# Patient Record
Sex: FEMALE | Race: WHITE | NOT HISPANIC OR LATINO | Employment: PART TIME | URBAN - METROPOLITAN AREA
[De-identification: names, ages, dates, MRNs, and addresses within clinical notes are randomized per-mention and may not be internally consistent; named-entity substitution may affect disease eponyms.]

---

## 2017-03-27 ENCOUNTER — TELEPHONE (OUTPATIENT)
Dept: PEDIATRIC GASTROENTEROLOGY | Facility: CLINIC | Age: 22
End: 2017-03-27

## 2017-03-27 NOTE — TELEPHONE ENCOUNTER
Called and spoke with mom.  Informed mom pt may f/u in clinic with Dr. Piedra and transition at any time to adult.  Mom would like to f/u with Dr. Piedra.  Scheduled for Wednesday 4/26 at 10am.  Informed mom pt may schedule IBD clinic appt after f/u with Dr. Piedra.

## 2017-03-27 NOTE — TELEPHONE ENCOUNTER
----- Message from Tala Gonzalez MD sent at 3/27/2017 11:04 AM CDT -----  Contact: Mom [phone]  They definitely need to be seen.  If it's me that's great, happy to see them.  If she wants to see adult then we can recommend some folks across the street.  Thanks,  cfb  ----- Message -----     From: Maryan Khalil RN     Sent: 3/27/2017  10:49 AM       To: Tala Gonzalez MD    If they have moved on to an adult MD, is there an adult IBD Clinic you recommend?    Thanks,  Maryan     ----- Message -----     From: Dona Sumner     Sent: 3/27/2017  10:46 AM       To: [facility] Edgar Staff    Mom says she received a letter about opportunity to meet with providers about Crohn's Disease. Since the pt will be turning 22 on May 7th she wants to know if this will still apply to her. Please advise.

## 2017-04-26 ENCOUNTER — LAB VISIT (OUTPATIENT)
Dept: LAB | Facility: HOSPITAL | Age: 22
End: 2017-04-26
Attending: PEDIATRICS
Payer: MEDICAID

## 2017-04-26 ENCOUNTER — OFFICE VISIT (OUTPATIENT)
Dept: PEDIATRIC GASTROENTEROLOGY | Facility: CLINIC | Age: 22
End: 2017-04-26
Payer: MEDICAID

## 2017-04-26 VITALS
BODY MASS INDEX: 26.91 KG/M2 | SYSTOLIC BLOOD PRESSURE: 126 MMHG | DIASTOLIC BLOOD PRESSURE: 72 MMHG | HEART RATE: 73 BPM | WEIGHT: 142.5 LBS | TEMPERATURE: 98 F | HEIGHT: 61 IN

## 2017-04-26 DIAGNOSIS — K50.019 CROHN'S DISEASE OF SMALL INTESTINE WITH COMPLICATION: Primary | ICD-10-CM

## 2017-04-26 DIAGNOSIS — K50.019 CROHN'S DISEASE OF SMALL INTESTINE WITH COMPLICATION: ICD-10-CM

## 2017-04-26 LAB
25(OH)D3+25(OH)D2 SERPL-MCNC: 34 NG/ML
ALBUMIN SERPL BCP-MCNC: 3.9 G/DL
ALP SERPL-CCNC: 60 U/L
ALT SERPL W/O P-5'-P-CCNC: 9 U/L
ANION GAP SERPL CALC-SCNC: 8 MMOL/L
AST SERPL-CCNC: 16 U/L
BASOPHILS # BLD AUTO: 0.03 K/UL
BASOPHILS NFR BLD: 0.4 %
BILIRUB SERPL-MCNC: 0.1 MG/DL
BUN SERPL-MCNC: 6 MG/DL
CALCIUM SERPL-MCNC: 9.4 MG/DL
CHLORIDE SERPL-SCNC: 106 MMOL/L
CO2 SERPL-SCNC: 23 MMOL/L
CREAT SERPL-MCNC: 0.8 MG/DL
CRP SERPL-MCNC: 1.9 MG/L
DIFFERENTIAL METHOD: ABNORMAL
EOSINOPHIL # BLD AUTO: 0.3 K/UL
EOSINOPHIL NFR BLD: 4.5 %
ERYTHROCYTE [DISTWIDTH] IN BLOOD BY AUTOMATED COUNT: 16.8 %
ERYTHROCYTE [SEDIMENTATION RATE] IN BLOOD BY WESTERGREN METHOD: 7 MM/HR
EST. GFR  (AFRICAN AMERICAN): >60 ML/MIN/1.73 M^2
EST. GFR  (NON AFRICAN AMERICAN): >60 ML/MIN/1.73 M^2
FERRITIN SERPL-MCNC: 6 NG/ML
GGT SERPL-CCNC: 14 U/L
GLUCOSE SERPL-MCNC: 90 MG/DL
HCT VFR BLD AUTO: 36.7 %
HGB BLD-MCNC: 11.5 G/DL
IGA SERPL-MCNC: 250 MG/DL
IRON SERPL-MCNC: 16 UG/DL
LYMPHOCYTES # BLD AUTO: 1.7 K/UL
LYMPHOCYTES NFR BLD: 25.3 %
MCH RBC QN AUTO: 22.9 PG
MCHC RBC AUTO-ENTMCNC: 31.3 %
MCV RBC AUTO: 73 FL
MONOCYTES # BLD AUTO: 0.6 K/UL
MONOCYTES NFR BLD: 8.5 %
NEUTROPHILS # BLD AUTO: 4.1 K/UL
NEUTROPHILS NFR BLD: 61.3 %
PLATELET # BLD AUTO: 326 K/UL
PMV BLD AUTO: 10.3 FL
POTASSIUM SERPL-SCNC: 4.7 MMOL/L
PROT SERPL-MCNC: 7.5 G/DL
RBC # BLD AUTO: 5.02 M/UL
SATURATED IRON: 3 %
SODIUM SERPL-SCNC: 137 MMOL/L
TOTAL IRON BINDING CAPACITY: 509 UG/DL
TRANSFERRIN SERPL-MCNC: 344 MG/DL
VIT B12 SERPL-MCNC: 282 PG/ML
WBC # BLD AUTO: 6.68 K/UL

## 2017-04-26 PROCEDURE — 82784 ASSAY IGA/IGD/IGG/IGM EACH: CPT

## 2017-04-26 PROCEDURE — 82977 ASSAY OF GGT: CPT

## 2017-04-26 PROCEDURE — 86140 C-REACTIVE PROTEIN: CPT

## 2017-04-26 PROCEDURE — 85025 COMPLETE CBC W/AUTO DIFF WBC: CPT | Mod: PO

## 2017-04-26 PROCEDURE — 82306 VITAMIN D 25 HYDROXY: CPT

## 2017-04-26 PROCEDURE — 83540 ASSAY OF IRON: CPT

## 2017-04-26 PROCEDURE — 82728 ASSAY OF FERRITIN: CPT

## 2017-04-26 PROCEDURE — 83516 IMMUNOASSAY NONANTIBODY: CPT

## 2017-04-26 PROCEDURE — 36415 COLL VENOUS BLD VENIPUNCTURE: CPT | Mod: PO

## 2017-04-26 PROCEDURE — 80053 COMPREHEN METABOLIC PANEL: CPT

## 2017-04-26 PROCEDURE — 99999 PR PBB SHADOW E&M-EST. PATIENT-LVL III: CPT | Mod: PBBFAC,,, | Performed by: PEDIATRICS

## 2017-04-26 PROCEDURE — 82607 VITAMIN B-12: CPT

## 2017-04-26 PROCEDURE — 99214 OFFICE O/P EST MOD 30 MIN: CPT | Mod: S$PBB,,, | Performed by: PEDIATRICS

## 2017-04-26 PROCEDURE — 85651 RBC SED RATE NONAUTOMATED: CPT

## 2017-04-26 NOTE — MR AVS SNAPSHOT
[facility]  [address]  [facility] [address]  Phone: [phone]                  Nina Dickerson   2017 10:00 AM   Appointment    Description:  Female : 1995   Provider:  Tala Gonzalez MD   Department:  [facility]                To Do List           Goals (5 Years of Data)     None      [facility]     [facility] Care Line -  Assistance  Unless otherwise directed by your provider, please contact [facility], our nurse care line that is available for  assistance.     Registered nurses in the [facility] provide: appointment scheduling, clinical advisement, health education, and other advisory services.  Call: [phone] (toll free)               Medications           Message regarding Medications     Verify the changes and/or additions to your medication regime listed below are the same as discussed with your clinician today.  If any of these changes or additions are incorrect, please notify your healthcare provider.             Verify that the below list of medications is an accurate representation of the medications you are currently taking.  If none reported, the list may be blank. If incorrect, please contact your healthcare provider. Carry this list with you in case of emergency.           Current Medications     budesonide (ENTOCORT EC) 3 mg capsule Take 9 mg by mouth once daily.    hyoscyamine (LEVSIN/SL) 0.125 mg Subl Take 1 tablet (0.125 mg total) by mouth every 4 (four) hours as needed (Abdominal pain).    omeprazole (PRILOSEC) 40 MG capsule Take 1 capsule (40 mg total) by mouth once daily.    sertraline (ZOLOFT) 100 MG tablet Take 100 mg by mouth once daily.           Clinical Reference Information           Allergies as of 2017     Latex, Natural Rubber    Adhesive      Immunizations Administered on Date of Encounter - 2017     None      [facility] Sign-Up     Activating your  [facility] account is as easy as 1-2-3!     1) Visit [email or web address], select Sign Up Now, enter this activation code and your date of birth, then select Next.  [email or web address]  Expires: 6/10/2017 10:04 AM      2) Create a username and password to use when you visit [facility] in the future and select a security question in case you lose your password and select Next.    3) Enter your e-mail address and click Sign Up!    Additional Information  If you have questions, please e-mail [email or web address] or call [phone] to talk to our [employer] staff. Remember, [employer] is NOT to be used for urgent needs. For medical emergencies, dial 911.         Language Assistance Services     ATTENTION: Language assistance services are available, free of charge. Please call [phone].      ATENCIÓN: Si habla español, tiene a hardin disposición servicios gratuitos de asistencia lingüística. Llame al [phone].     LINDA Ý: N?u b?n nói Ti?ng Vi?t, có các d?ch v? h? tr? ngôn ng? mi?n phí dành cho b?n. G?i s? [phone].         [facility] complies with applicable Federal civil rights laws and does not discriminate on the basis of race, color, national origin, age, disability, or sex.

## 2017-04-26 NOTE — PROGRESS NOTES
"Chief complaint:   Chief Complaint   Patient presents with    Abdominal Pain       HPI:  21 y.o. female otherwise healthy, referred by Dr. Hinds, comes in with Mom for "stomach pains".  Here for follow up with mom.  Hasn't been seen in 1 year.  Off of entocort after weaning after last scope which was normal.  Since then has had 2 "flares".    Pain present during flares with some diarrhea that seem to last about 3 days and resolve on their own.  Watching diet "for the most part". Not really on low fiber/low residue diet.  Weight is steady, although down about 4 pounds in 1 year.    Currently on no treatment.      1 year ago:  S/p EGD/Colon. EGD showed some duodenitis and colon on repeat eval had chronic inflammation on IC valve, normal colon.  S/p MRI enterography with distal and terminal ileal thickening.        Past Medical History:   Diagnosis Date    Crohn disease     Crohn disease      Past Surgical History:   Procedure Laterality Date    COLONOSCOPY N/A 12/10/2015    Procedure: COLONOSCOPY;  Surgeon: Tala Gonzalez MD;  Location: [facility] ([address] [place] [address]);  Service: Endoscopy;  Laterality: N/A;    COLONOSCOPY N/A 4/21/2016    Procedure: COLONOSCOPY;  Surgeon: Tala Gonzalez MD;  Location: [facility] ([address] [place] [address]);  Service: Endoscopy;  Laterality: N/A;    ESOPHAGOGASTRODUODENOSCOPY      TONSILLECTOMY      TYMPANOSTOMY TUBE PLACEMENT       Family History   Problem Relation Age of Onset    No Known Problems Mother     Heart attacks under age 50 Father     Heart disease Father     Hypertension Father     Cardiomyopathy Maternal Grandmother     Dementia Maternal Grandfather     Heart disease Paternal Grandmother     Heart disease Paternal Grandfather     No Known Problems Sister      Social History     Social History    Marital status: Single     Spouse name: N/A    Number of children: N/A    Years of education: N/A     Occupational History    Not on file.     Social History Main " "Topics    Smoking status: Passive Smoke Exposure - Never Smoker    Smokeless tobacco: Not on file      Comment: mom smokes    Alcohol use No    Drug use: No    Sexual activity: Not on file     Other Topics Concern    Not on file     Social History Narrative    Lives with mom, sister.    2 cats.    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       Review Of Systems:  Constitutional: negative for fatigue, fevers and weight loss  ENT: no nasal congestion or sore throat  Respiratory: negative for cough  Cardiovascular: negative for chest pressure/discomfort, palpitations and cyanosis  Gastrointestinal: see HPI  Genitourinary: no hematuria or dysuria  Hematologic/Lymphatic: no easy bruising or lymphadenopathy  Musculoskeletal: no arthralgias or myalgias  Neurological: no seizures or tremors  Behavioral/Psych: no auditory or visual hallucinations  Endocrine: no heat or cold intolerance    Physical Exam:    /72 (BP Location: Left arm, Patient Position: Sitting, BP Method: Automatic)  Pulse 73  Temp 97.7 °F (36.5 °C) (Tympanic)   Ht 5' 1.02" (1.55 m)  Wt 64.7 kg (142 lb 8.4 oz)  BMI 26.91 kg/m2    General:  alert, active, in no acute distress  Head:  atraumatic and normocephalic  Eyes:  conjunctiva clear and sclera nonicteric  Throat:  moist mucous membranes without erythema, exudates or petechiae  Neck:  supple, no lymphadenopathy  Lungs:  clear to auscultation  Heart:  regular rate and rhythm, normal S1, S2, no murmurs or gallops.  Abdomen:  Abdomen soft,mild tenderness in epigastric region.  BS normal. No masses, organomegaly  Neuro:  normal without focal findings  Musculoskeletal:  moves all extremities equally  Rectal:  not examined  Skin:  warm, no rashes, no ecchymosis    Records Reviewed:   EGD/Colon:   Duodenitis, IC valve with chronic inflammation, favors IBD, namely Crohn's if clinical picture fits.    12/2015 Serology; consistent with IBD though not " determinate Crohn's vs. UC.  TPMT normal  Hepatitis panel negative  Hepatitis surface Ab negative  quantiferon gold negative  DEXA normal    MRI 12/2015: bowel wall thickening in distal and terminal ileum    Assessment/Plan:  Crohn's disease, not on treatment    Overall doing ok, made appointment moreso because they received a letter about the IBD clinic and knew they needed a follow up.  Mostly asymptomatic, though concerned since she had inflammatory changes on MRE previously. Though last scope 1 year ago was normal.    1. Will get screening labs and stool studies today.  Orders Placed This Encounter    Stool culture    CBC auto differential    Comprehensive metabolic panel    ESR (SEDIMENTATION RATE, MANUAL)    C-reactive protein    TISSUE TRANSGLUTAMINASE (TTG), IGA    IgA    Gamma GT    IRON AND TIBC    FERRITIN    VITAMIN D    Vitamin B12    Calprotectin    Alpha-1-antitrypsin, stool    Occult blood x 1, stool     2. Eye exam needed.  Refused flu shot  3. Discussed diet again  4. Discussed possible scope but if needed will decide if she wants to transition to adult care.       Spent 40 minutes with patient and parents, greater than 50% of which was counseling.  The patient's doctor will be notified via Fax/[employer]

## 2017-04-28 LAB — TTG IGA SER IA-ACNC: 11 UNITS

## 2017-05-01 ENCOUNTER — TELEPHONE (OUTPATIENT)
Dept: PEDIATRIC GASTROENTEROLOGY | Facility: CLINIC | Age: 22
End: 2017-05-01

## 2017-05-01 NOTE — TELEPHONE ENCOUNTER
----- Message from Tala Gonzalez MD sent at 5/1/2017  1:33 PM CDT -----  Please let Nina know (or mom) that her labs are normal, except for anemia, and low iron which she already knew about.  She needs to take an iron supplement.  But good news is that her inflammatory markers are normal and her electrolytes are good.  Thanks,  cfb

## 2017-05-01 NOTE — TELEPHONE ENCOUNTER
Called and spoke with mom to inform of results and recommendations. No further questions at this time.

## 2017-05-16 ENCOUNTER — TELEPHONE (OUTPATIENT)
Dept: PEDIATRIC GASTROENTEROLOGY | Facility: CLINIC | Age: 22
End: 2017-05-16

## 2017-05-16 NOTE — TELEPHONE ENCOUNTER
----- Message from Alisson Manjarrez sent at 5/16/2017 12:15 PM CDT -----  Contact: [phone] patient  Patient ask if nurse can call her about results? She have a question.

## 2017-05-16 NOTE — TELEPHONE ENCOUNTER
Called and spoke with pt.  Pt discussed results with mom and wanted to clarify.  Explained to pt that her labs showed low iron, so MD recommends an iron supplement which she can get OTC.  Informed her that electrolytes and inflammatory markers looked good.  No further questions from pt at this time.

## 2017-05-16 NOTE — TELEPHONE ENCOUNTER
----- Message from Lilliana Mora sent at 5/16/2017  2:03 PM CDT -----  Contact: Krish Wood [phone]  Pt returning your call re: Her test result

## 2021-01-11 ENCOUNTER — TELEPHONE (OUTPATIENT)
Dept: SURGERY | Facility: CLINIC | Age: 26
End: 2021-01-11

## 2021-01-11 DIAGNOSIS — Z12.11 SCREENING FOR COLON CANCER: Primary | ICD-10-CM

## 2021-01-11 RX ORDER — SODIUM CHLORIDE 0.9 % (FLUSH) 0.9 %
3 SYRINGE (ML) INJECTION
Status: CANCELLED | OUTPATIENT
Start: 2021-01-11

## 2021-01-11 RX ORDER — SODIUM, POTASSIUM,MAG SULFATES 17.5-3.13G
1 SOLUTION, RECONSTITUTED, ORAL ORAL DAILY
Qty: 1 KIT | Refills: 0 | Status: SHIPPED | OUTPATIENT
Start: 2021-01-11 | End: 2021-01-13

## 2021-01-11 RX ORDER — SODIUM CHLORIDE, SODIUM LACTATE, POTASSIUM CHLORIDE, CALCIUM CHLORIDE 600; 310; 30; 20 MG/100ML; MG/100ML; MG/100ML; MG/100ML
INJECTION, SOLUTION INTRAVENOUS CONTINUOUS
Status: CANCELLED | OUTPATIENT
Start: 2021-01-11

## 2021-01-19 ENCOUNTER — TELEPHONE (OUTPATIENT)
Dept: SURGERY | Facility: CLINIC | Age: 26
End: 2021-01-19

## 2021-01-20 ENCOUNTER — TELEPHONE (OUTPATIENT)
Dept: SURGERY | Facility: CLINIC | Age: 26
End: 2021-01-20

## 2021-01-29 ENCOUNTER — TELEPHONE (OUTPATIENT)
Dept: SURGERY | Facility: CLINIC | Age: 26
End: 2021-01-29

## 2021-02-12 DIAGNOSIS — K56.699 STRICTURE INTESTINAL: Primary | ICD-10-CM

## 2021-02-22 ENCOUNTER — TELEPHONE (OUTPATIENT)
Dept: SURGERY | Facility: CLINIC | Age: 26
End: 2021-02-22

## 2021-03-26 ENCOUNTER — OFFICE VISIT (OUTPATIENT)
Dept: GASTROENTEROLOGY | Facility: CLINIC | Age: 26
End: 2021-03-26
Payer: MEDICAID

## 2021-03-26 VITALS
SYSTOLIC BLOOD PRESSURE: 115 MMHG | HEART RATE: 83 BPM | BODY MASS INDEX: 24.66 KG/M2 | WEIGHT: 130.5 LBS | OXYGEN SATURATION: 98 % | TEMPERATURE: 98 F | DIASTOLIC BLOOD PRESSURE: 82 MMHG

## 2021-03-26 DIAGNOSIS — K52.9 ILEITIS: ICD-10-CM

## 2021-03-26 DIAGNOSIS — R11.2 NON-INTRACTABLE VOMITING WITH NAUSEA, UNSPECIFIED VOMITING TYPE: ICD-10-CM

## 2021-03-26 DIAGNOSIS — R10.31 ABDOMINAL PAIN, RLQ (RIGHT LOWER QUADRANT): Primary | ICD-10-CM

## 2021-03-26 DIAGNOSIS — R19.7 DIARRHEA, UNSPECIFIED TYPE: ICD-10-CM

## 2021-03-26 DIAGNOSIS — K59.09 CONSTIPATION, CHRONIC: ICD-10-CM

## 2021-03-26 PROCEDURE — 99215 OFFICE O/P EST HI 40 MIN: CPT | Mod: S$GLB,,, | Performed by: INTERNAL MEDICINE

## 2021-03-26 PROCEDURE — 99215 PR OFFICE/OUTPT VISIT, EST, LEVL V, 40-54 MIN: ICD-10-PCS | Mod: S$GLB,,, | Performed by: INTERNAL MEDICINE

## 2021-03-26 RX ORDER — ONDANSETRON 4 MG/1
4 TABLET, ORALLY DISINTEGRATING ORAL EVERY 8 HOURS PRN
Qty: 90 TABLET | Refills: 0 | Status: SHIPPED | OUTPATIENT
Start: 2021-03-26 | End: 2022-05-10

## 2021-03-26 RX ORDER — PANTOPRAZOLE SODIUM 40 MG/1
40 TABLET, DELAYED RELEASE ORAL DAILY
Qty: 30 TABLET | Refills: 11 | Status: SHIPPED | OUTPATIENT
Start: 2021-03-26 | End: 2021-08-30 | Stop reason: SDUPTHER

## 2021-03-29 ENCOUNTER — PATIENT MESSAGE (OUTPATIENT)
Dept: GASTROENTEROLOGY | Facility: CLINIC | Age: 26
End: 2021-03-29

## 2021-04-06 ENCOUNTER — HOSPITAL ENCOUNTER (OUTPATIENT)
Dept: RADIOLOGY | Facility: HOSPITAL | Age: 26
Discharge: HOME | End: 2021-04-06
Attending: INTERNAL MEDICINE
Payer: MEDICAID

## 2021-04-06 DIAGNOSIS — K52.9 ILEITIS: ICD-10-CM

## 2021-04-06 DIAGNOSIS — R19.7 DIARRHEA, UNSPECIFIED TYPE: ICD-10-CM

## 2021-04-06 PROCEDURE — 74183 MRI ENTEROGRAPHY: ICD-10-PCS | Mod: 26,,, | Performed by: RADIOLOGY

## 2021-04-06 PROCEDURE — 72197 MRI PELVIS W/O & W/DYE: CPT | Mod: 26,,, | Performed by: RADIOLOGY

## 2021-04-06 PROCEDURE — 72197 MRI ENTEROGRAPHY: ICD-10-PCS | Mod: 26,,, | Performed by: RADIOLOGY

## 2021-04-06 PROCEDURE — 74183 MRI ABD W/O CNTR FLWD CNTR: CPT | Mod: 26,,, | Performed by: RADIOLOGY

## 2021-04-06 PROCEDURE — 25500020 PHARM REV CODE 255: Performed by: INTERNAL MEDICINE

## 2021-04-06 PROCEDURE — A9585 GADOBUTROL INJECTION: HCPCS | Performed by: INTERNAL MEDICINE

## 2021-04-06 PROCEDURE — 72197 MRI PELVIS W/O & W/DYE: CPT | Mod: TC

## 2021-04-06 RX ORDER — GADOBUTROL 604.72 MG/ML
10 INJECTION INTRAVENOUS
Status: COMPLETED | OUTPATIENT
Start: 2021-04-06 | End: 2021-04-06

## 2021-04-06 RX ADMIN — GADOBUTROL 10 ML: 604.72 INJECTION INTRAVENOUS at 06:04

## 2021-04-15 ENCOUNTER — PATIENT MESSAGE (OUTPATIENT)
Dept: GASTROENTEROLOGY | Facility: CLINIC | Age: 26
End: 2021-04-15

## 2021-04-27 ENCOUNTER — PATIENT MESSAGE (OUTPATIENT)
Dept: GASTROENTEROLOGY | Facility: HOSPITAL | Age: 26
End: 2021-04-27

## 2021-04-27 RX ORDER — BUDESONIDE 3 MG/1
9 CAPSULE, COATED PELLETS ORAL DAILY
Qty: 90 CAPSULE | Refills: 2 | Status: SHIPPED | OUTPATIENT
Start: 2021-04-27 | End: 2021-05-27

## 2021-04-28 ENCOUNTER — PATIENT MESSAGE (OUTPATIENT)
Dept: RESEARCH | Facility: HOSPITAL | Age: 26
End: 2021-04-28

## 2021-05-02 ENCOUNTER — PATIENT MESSAGE (OUTPATIENT)
Dept: GASTROENTEROLOGY | Facility: CLINIC | Age: 26
End: 2021-05-02

## 2021-05-17 ENCOUNTER — PATIENT MESSAGE (OUTPATIENT)
Dept: GASTROENTEROLOGY | Facility: CLINIC | Age: 26
End: 2021-05-17

## 2021-05-28 ENCOUNTER — PATIENT MESSAGE (OUTPATIENT)
Dept: GASTROENTEROLOGY | Facility: CLINIC | Age: 26
End: 2021-05-28

## 2021-05-28 RX ORDER — DICYCLOMINE HYDROCHLORIDE 20 MG/1
20 TABLET ORAL EVERY 6 HOURS
Qty: 120 TABLET | Refills: 3 | Status: SHIPPED | OUTPATIENT
Start: 2021-05-28 | End: 2021-06-27

## 2021-06-09 ENCOUNTER — TELEPHONE (OUTPATIENT)
Dept: ENDOSCOPY | Facility: HOSPITAL | Age: 26
End: 2021-06-09

## 2021-06-09 ENCOUNTER — OFFICE VISIT (OUTPATIENT)
Dept: GASTROENTEROLOGY | Facility: CLINIC | Age: 26
End: 2021-06-09
Payer: MEDICAID

## 2021-06-09 VITALS
BODY MASS INDEX: 24.04 KG/M2 | OXYGEN SATURATION: 100 % | HEART RATE: 92 BPM | DIASTOLIC BLOOD PRESSURE: 93 MMHG | WEIGHT: 127.19 LBS | TEMPERATURE: 98 F | SYSTOLIC BLOOD PRESSURE: 140 MMHG

## 2021-06-09 DIAGNOSIS — K50.90 CROHN DISEASE: ICD-10-CM

## 2021-06-09 DIAGNOSIS — R10.31 ABDOMINAL PAIN, RLQ (RIGHT LOWER QUADRANT): ICD-10-CM

## 2021-06-09 DIAGNOSIS — K52.9 ILEITIS: Primary | ICD-10-CM

## 2021-06-09 DIAGNOSIS — R09.A2 GLOBUS SENSATION: ICD-10-CM

## 2021-06-09 DIAGNOSIS — Z12.11 SPECIAL SCREENING FOR MALIGNANT NEOPLASMS, COLON: Primary | ICD-10-CM

## 2021-06-09 DIAGNOSIS — R12 HEARTBURN: ICD-10-CM

## 2021-06-09 DIAGNOSIS — R19.7 DIARRHEA, UNSPECIFIED TYPE: ICD-10-CM

## 2021-06-09 PROCEDURE — 99214 PR OFFICE/OUTPT VISIT, EST, LEVL IV, 30-39 MIN: ICD-10-PCS | Mod: S$GLB,,, | Performed by: INTERNAL MEDICINE

## 2021-06-09 PROCEDURE — 99214 OFFICE O/P EST MOD 30 MIN: CPT | Mod: S$GLB,,, | Performed by: INTERNAL MEDICINE

## 2021-06-09 RX ORDER — SODIUM, POTASSIUM,MAG SULFATES 17.5-3.13G
1 SOLUTION, RECONSTITUTED, ORAL ORAL ONCE
Qty: 1 KIT | Refills: 0 | Status: SHIPPED | OUTPATIENT
Start: 2021-06-09 | End: 2021-06-09

## 2021-06-09 RX ORDER — BUDESONIDE 3 MG/1
9 CAPSULE, COATED PELLETS ORAL DAILY
COMMUNITY
End: 2021-08-09 | Stop reason: SDUPTHER

## 2021-06-23 ENCOUNTER — TELEPHONE (OUTPATIENT)
Dept: GASTROENTEROLOGY | Facility: CLINIC | Age: 26
End: 2021-06-23

## 2021-06-23 ENCOUNTER — PATIENT MESSAGE (OUTPATIENT)
Dept: ENDOSCOPY | Facility: HOSPITAL | Age: 26
End: 2021-06-23

## 2021-06-28 ENCOUNTER — PATIENT MESSAGE (OUTPATIENT)
Dept: ENDOSCOPY | Facility: HOSPITAL | Age: 26
End: 2021-06-28

## 2021-07-05 ENCOUNTER — PATIENT MESSAGE (OUTPATIENT)
Dept: ENDOSCOPY | Facility: HOSPITAL | Age: 26
End: 2021-07-05

## 2021-07-06 ENCOUNTER — TELEPHONE (OUTPATIENT)
Dept: ENDOSCOPY | Facility: HOSPITAL | Age: 26
End: 2021-07-06

## 2021-07-07 ENCOUNTER — PATIENT MESSAGE (OUTPATIENT)
Dept: ENDOSCOPY | Facility: HOSPITAL | Age: 26
End: 2021-07-07

## 2021-07-07 ENCOUNTER — TELEPHONE (OUTPATIENT)
Dept: GASTROENTEROLOGY | Facility: CLINIC | Age: 26
End: 2021-07-07

## 2021-07-07 RX ORDER — PROMETHAZINE HYDROCHLORIDE 25 MG/1
25 TABLET ORAL EVERY 6 HOURS PRN
Qty: 6 TABLET | Refills: 0 | Status: SHIPPED | OUTPATIENT
Start: 2021-07-07 | End: 2022-04-25 | Stop reason: SDUPTHER

## 2021-07-08 ENCOUNTER — HOSPITAL ENCOUNTER (OUTPATIENT)
Facility: HOSPITAL | Age: 26
Discharge: HOME | End: 2021-07-08
Attending: INTERNAL MEDICINE | Admitting: INTERNAL MEDICINE
Payer: MEDICAID

## 2021-07-08 ENCOUNTER — ANESTHESIA EVENT (OUTPATIENT)
Dept: ENDOSCOPY | Facility: HOSPITAL | Age: 26
End: 2021-07-08
Payer: MEDICAID

## 2021-07-08 ENCOUNTER — ANESTHESIA (OUTPATIENT)
Dept: ENDOSCOPY | Facility: HOSPITAL | Age: 26
End: 2021-07-08
Payer: MEDICAID

## 2021-07-08 VITALS
BODY MASS INDEX: 22.73 KG/M2 | DIASTOLIC BLOOD PRESSURE: 83 MMHG | HEIGHT: 61 IN | TEMPERATURE: 99 F | RESPIRATION RATE: 16 BRPM | OXYGEN SATURATION: 99 % | HEART RATE: 72 BPM | SYSTOLIC BLOOD PRESSURE: 124 MMHG | WEIGHT: 120.38 LBS

## 2021-07-08 DIAGNOSIS — K52.9 ILEITIS: ICD-10-CM

## 2021-07-08 LAB
B-HCG UR QL: NEGATIVE
CTP QC/QA: YES

## 2021-07-08 PROCEDURE — 63600175 PHARM REV CODE 636 W HCPCS: Performed by: NURSE ANESTHETIST, CERTIFIED REGISTERED

## 2021-07-08 PROCEDURE — E9220 PRA ENDO ANESTHESIA: ICD-10-PCS | Mod: ,,, | Performed by: NURSE ANESTHETIST, CERTIFIED REGISTERED

## 2021-07-08 PROCEDURE — 88305 TISSUE EXAM BY PATHOLOGIST: CPT | Performed by: PATHOLOGY

## 2021-07-08 PROCEDURE — 43239 PR EGD, FLEX, W/BIOPSY, SGL/MULTI: ICD-10-PCS | Mod: 51,,, | Performed by: INTERNAL MEDICINE

## 2021-07-08 PROCEDURE — 88342 IMHCHEM/IMCYTCHM 1ST ANTB: CPT | Performed by: PATHOLOGY

## 2021-07-08 PROCEDURE — 00813 ANES UPR LWR GI NDSC PX: CPT | Performed by: INTERNAL MEDICINE

## 2021-07-08 PROCEDURE — 88342 IMHCHEM/IMCYTCHM 1ST ANTB: CPT | Mod: 26,,, | Performed by: PATHOLOGY

## 2021-07-08 PROCEDURE — 25000003 PHARM REV CODE 250: Performed by: NURSE ANESTHETIST, CERTIFIED REGISTERED

## 2021-07-08 PROCEDURE — 37000009 HC ANESTHESIA EA ADD 15 MINS: Performed by: INTERNAL MEDICINE

## 2021-07-08 PROCEDURE — 43239 EGD BIOPSY SINGLE/MULTIPLE: CPT | Mod: 51,,, | Performed by: INTERNAL MEDICINE

## 2021-07-08 PROCEDURE — 45380 COLONOSCOPY AND BIOPSY: CPT | Mod: ,,, | Performed by: INTERNAL MEDICINE

## 2021-07-08 PROCEDURE — 27201012 HC FORCEPS, HOT/COLD, DISP: Performed by: INTERNAL MEDICINE

## 2021-07-08 PROCEDURE — 45380 COLONOSCOPY AND BIOPSY: CPT | Performed by: INTERNAL MEDICINE

## 2021-07-08 PROCEDURE — 81025 URINE PREGNANCY TEST: CPT | Performed by: INTERNAL MEDICINE

## 2021-07-08 PROCEDURE — 43239 EGD BIOPSY SINGLE/MULTIPLE: CPT | Performed by: INTERNAL MEDICINE

## 2021-07-08 PROCEDURE — 88305 TISSUE EXAM BY PATHOLOGIST: CPT | Mod: 26,,, | Performed by: PATHOLOGY

## 2021-07-08 PROCEDURE — 45380 PR COLONOSCOPY,BIOPSY: ICD-10-PCS | Mod: ,,, | Performed by: INTERNAL MEDICINE

## 2021-07-08 PROCEDURE — 88305 TISSUE EXAM BY PATHOLOGIST: ICD-10-PCS | Mod: 26,,, | Performed by: PATHOLOGY

## 2021-07-08 PROCEDURE — 37000008 HC ANESTHESIA 1ST 15 MINUTES: Performed by: INTERNAL MEDICINE

## 2021-07-08 PROCEDURE — 25000003 PHARM REV CODE 250: Performed by: INTERNAL MEDICINE

## 2021-07-08 PROCEDURE — E9220 PRA ENDO ANESTHESIA: HCPCS | Mod: ,,, | Performed by: NURSE ANESTHETIST, CERTIFIED REGISTERED

## 2021-07-08 PROCEDURE — 88342 CHG IMMUNOCYTOCHEMISTRY: ICD-10-PCS | Mod: 26,,, | Performed by: PATHOLOGY

## 2021-07-08 RX ORDER — FENTANYL CITRATE 50 UG/ML
INJECTION, SOLUTION INTRAMUSCULAR; INTRAVENOUS
Status: DISCONTINUED | OUTPATIENT
Start: 2021-07-08 | End: 2021-07-08

## 2021-07-08 RX ORDER — MIDAZOLAM HYDROCHLORIDE 1 MG/ML
INJECTION, SOLUTION INTRAMUSCULAR; INTRAVENOUS
Status: DISCONTINUED | OUTPATIENT
Start: 2021-07-08 | End: 2021-07-08

## 2021-07-08 RX ORDER — SODIUM CHLORIDE 9 MG/ML
INJECTION, SOLUTION INTRAVENOUS CONTINUOUS
Status: DISCONTINUED | OUTPATIENT
Start: 2021-07-08 | End: 2021-07-08 | Stop reason: HOSPADM

## 2021-07-08 RX ORDER — PROPOFOL 10 MG/ML
VIAL (ML) INTRAVENOUS CONTINUOUS PRN
Status: DISCONTINUED | OUTPATIENT
Start: 2021-07-08 | End: 2021-07-08

## 2021-07-08 RX ORDER — PROPOFOL 10 MG/ML
VIAL (ML) INTRAVENOUS
Status: DISCONTINUED | OUTPATIENT
Start: 2021-07-08 | End: 2021-07-08

## 2021-07-08 RX ORDER — LIDOCAINE HCL/PF 100 MG/5ML
SYRINGE (ML) INTRAVENOUS
Status: DISCONTINUED | OUTPATIENT
Start: 2021-07-08 | End: 2021-07-08

## 2021-07-08 RX ADMIN — FENTANYL CITRATE 25 MCG: 50 INJECTION INTRAMUSCULAR; INTRAVENOUS at 09:07

## 2021-07-08 RX ADMIN — PROPOFOL 200 MCG/KG/MIN: 10 INJECTION, EMULSION INTRAVENOUS at 08:07

## 2021-07-08 RX ADMIN — PROPOFOL 50 MG: 10 INJECTION, EMULSION INTRAVENOUS at 08:07

## 2021-07-08 RX ADMIN — MIDAZOLAM HYDROCHLORIDE 2 MG: 1 INJECTION, SOLUTION INTRAMUSCULAR; INTRAVENOUS at 08:07

## 2021-07-08 RX ADMIN — FENTANYL CITRATE 25 MCG: 50 INJECTION INTRAMUSCULAR; INTRAVENOUS at 08:07

## 2021-07-08 RX ADMIN — GLYCOPYRROLATE 0.1 MG: 0.2 INJECTION, SOLUTION INTRAMUSCULAR; INTRAVITREAL at 08:07

## 2021-07-08 RX ADMIN — Medication 50 MG: at 08:07

## 2021-07-08 RX ADMIN — SODIUM CHLORIDE: 0.9 INJECTION, SOLUTION INTRAVENOUS at 08:07

## 2021-07-15 LAB
FINAL PATHOLOGIC DIAGNOSIS: NORMAL
GROSS: NORMAL
Lab: NORMAL

## 2021-08-04 ENCOUNTER — TELEPHONE (OUTPATIENT)
Dept: GASTROENTEROLOGY | Facility: CLINIC | Age: 26
End: 2021-08-04

## 2021-08-04 ENCOUNTER — PATIENT MESSAGE (OUTPATIENT)
Dept: GASTROENTEROLOGY | Facility: CLINIC | Age: 26
End: 2021-08-04

## 2021-08-06 ENCOUNTER — TELEPHONE (OUTPATIENT)
Dept: GASTROENTEROLOGY | Facility: CLINIC | Age: 26
End: 2021-08-06

## 2021-08-09 ENCOUNTER — PATIENT MESSAGE (OUTPATIENT)
Dept: GASTROENTEROLOGY | Facility: CLINIC | Age: 26
End: 2021-08-09

## 2021-08-09 RX ORDER — BUDESONIDE 3 MG/1
9 CAPSULE, COATED PELLETS ORAL DAILY
Qty: 90 CAPSULE | Refills: 2 | Status: SHIPPED | OUTPATIENT
Start: 2021-08-09 | End: 2021-12-06

## 2021-08-13 ENCOUNTER — PATIENT MESSAGE (OUTPATIENT)
Dept: GASTROENTEROLOGY | Facility: CLINIC | Age: 26
End: 2021-08-13

## 2021-08-16 ENCOUNTER — PATIENT MESSAGE (OUTPATIENT)
Dept: GASTROENTEROLOGY | Facility: CLINIC | Age: 26
End: 2021-08-16

## 2021-08-19 ENCOUNTER — OFFICE VISIT (OUTPATIENT)
Dept: GASTROENTEROLOGY | Facility: CLINIC | Age: 26
End: 2021-08-19
Payer: MEDICAID

## 2021-08-19 DIAGNOSIS — K50.012 CROHN'S DISEASE OF SMALL INTESTINE WITH INTESTINAL OBSTRUCTION: Primary | ICD-10-CM

## 2021-08-19 PROCEDURE — 99214 PR OFFICE/OUTPT VISIT, EST, LEVL IV, 30-39 MIN: ICD-10-PCS | Mod: 95,,, | Performed by: INTERNAL MEDICINE

## 2021-08-19 PROCEDURE — 99214 OFFICE O/P EST MOD 30 MIN: CPT | Mod: 95,,, | Performed by: INTERNAL MEDICINE

## 2021-08-19 RX ORDER — NORGESTIMATE AND ETHINYL ESTRADIOL 7DAYSX3 28
1 KIT ORAL DAILY
Status: ON HOLD | COMMUNITY
Start: 2021-08-05 | End: 2023-10-10 | Stop reason: HOSPADM

## 2021-08-20 ENCOUNTER — PATIENT MESSAGE (OUTPATIENT)
Dept: GASTROENTEROLOGY | Facility: CLINIC | Age: 26
End: 2021-08-20

## 2021-08-23 ENCOUNTER — LAB VISIT (OUTPATIENT)
Dept: LAB | Facility: HOSPITAL | Age: 26
End: 2021-08-23
Attending: INTERNAL MEDICINE
Payer: MEDICAID

## 2021-08-23 DIAGNOSIS — K50.012 CROHN'S DISEASE OF SMALL INTESTINE WITH INTESTINAL OBSTRUCTION: ICD-10-CM

## 2021-08-23 LAB
25(OH)D3+25(OH)D2 SERPL-MCNC: 21 NG/ML (ref 30–96)
ALBUMIN SERPL BCP-MCNC: 3.7 G/DL (ref 3.5–5.2)
ALP SERPL-CCNC: 51 U/L (ref 55–135)
ALT SERPL W/O P-5'-P-CCNC: 12 U/L (ref 10–44)
ANION GAP SERPL CALC-SCNC: 10 MMOL/L (ref 8–16)
AST SERPL-CCNC: 14 U/L (ref 10–40)
BASOPHILS # BLD AUTO: 0.03 K/UL (ref 0–0.2)
BASOPHILS NFR BLD: 0.5 % (ref 0–1.9)
BILIRUB SERPL-MCNC: 0.1 MG/DL (ref 0.1–1)
BUN SERPL-MCNC: 6 MG/DL (ref 6–20)
CALCIUM SERPL-MCNC: 9.6 MG/DL (ref 8.7–10.5)
CHLORIDE SERPL-SCNC: 102 MMOL/L (ref 95–110)
CO2 SERPL-SCNC: 25 MMOL/L (ref 23–29)
CREAT SERPL-MCNC: 0.8 MG/DL (ref 0.5–1.4)
CRP SERPL-MCNC: 9.9 MG/L (ref 0–8.2)
DIFFERENTIAL METHOD: ABNORMAL
EOSINOPHIL # BLD AUTO: 0.1 K/UL (ref 0–0.5)
EOSINOPHIL NFR BLD: 1.4 % (ref 0–8)
ERYTHROCYTE [DISTWIDTH] IN BLOOD BY AUTOMATED COUNT: 12.6 % (ref 11.5–14.5)
EST. GFR  (AFRICAN AMERICAN): >60 ML/MIN/1.73 M^2
EST. GFR  (NON AFRICAN AMERICAN): >60 ML/MIN/1.73 M^2
GLUCOSE SERPL-MCNC: 92 MG/DL (ref 70–110)
HBV CORE AB SERPL QL IA: NEGATIVE
HBV SURFACE AB SER-ACNC: NEGATIVE M[IU]/ML
HBV SURFACE AG SERPL QL IA: NEGATIVE
HCT VFR BLD AUTO: 37.6 % (ref 37–48.5)
HCV AB SERPL QL IA: NEGATIVE
HEPATITIS A ANTIBODY, IGG: NEGATIVE
HGB BLD-MCNC: 11.8 G/DL (ref 12–16)
HIV 1+2 AB+HIV1 P24 AG SERPL QL IA: NEGATIVE
IMM GRANULOCYTES # BLD AUTO: 0.02 K/UL (ref 0–0.04)
IMM GRANULOCYTES NFR BLD AUTO: 0.3 % (ref 0–0.5)
LYMPHOCYTES # BLD AUTO: 1.7 K/UL (ref 1–4.8)
LYMPHOCYTES NFR BLD: 25.5 % (ref 18–48)
MCH RBC QN AUTO: 25.8 PG (ref 27–31)
MCHC RBC AUTO-ENTMCNC: 31.4 G/DL (ref 32–36)
MCV RBC AUTO: 82 FL (ref 82–98)
MONOCYTES # BLD AUTO: 0.6 K/UL (ref 0.3–1)
MONOCYTES NFR BLD: 8.4 % (ref 4–15)
NEUTROPHILS # BLD AUTO: 4.2 K/UL (ref 1.8–7.7)
NEUTROPHILS NFR BLD: 63.9 % (ref 38–73)
NRBC BLD-RTO: 0 /100 WBC
PLATELET # BLD AUTO: 274 K/UL (ref 150–450)
PMV BLD AUTO: 10.7 FL (ref 9.2–12.9)
POTASSIUM SERPL-SCNC: 4.3 MMOL/L (ref 3.5–5.1)
PROT SERPL-MCNC: 7.7 G/DL (ref 6–8.4)
RBC # BLD AUTO: 4.57 M/UL (ref 4–5.4)
SODIUM SERPL-SCNC: 137 MMOL/L (ref 136–145)
VIT B12 SERPL-MCNC: 272 PG/ML (ref 210–950)
WBC # BLD AUTO: 6.58 K/UL (ref 3.9–12.7)

## 2021-08-23 PROCEDURE — 86704 HEP B CORE ANTIBODY TOTAL: CPT | Performed by: INTERNAL MEDICINE

## 2021-08-23 PROCEDURE — 85025 COMPLETE CBC W/AUTO DIFF WBC: CPT | Performed by: INTERNAL MEDICINE

## 2021-08-23 PROCEDURE — 86790 VIRUS ANTIBODY NOS: CPT | Performed by: INTERNAL MEDICINE

## 2021-08-23 PROCEDURE — 86140 C-REACTIVE PROTEIN: CPT | Performed by: INTERNAL MEDICINE

## 2021-08-23 PROCEDURE — 82306 VITAMIN D 25 HYDROXY: CPT | Performed by: INTERNAL MEDICINE

## 2021-08-23 PROCEDURE — 80053 COMPREHEN METABOLIC PANEL: CPT | Performed by: INTERNAL MEDICINE

## 2021-08-23 PROCEDURE — 86803 HEPATITIS C AB TEST: CPT | Performed by: INTERNAL MEDICINE

## 2021-08-23 PROCEDURE — 87389 HIV-1 AG W/HIV-1&-2 AB AG IA: CPT | Performed by: INTERNAL MEDICINE

## 2021-08-23 PROCEDURE — 87340 HEPATITIS B SURFACE AG IA: CPT | Performed by: INTERNAL MEDICINE

## 2021-08-23 PROCEDURE — 86787 VARICELLA-ZOSTER ANTIBODY: CPT | Performed by: INTERNAL MEDICINE

## 2021-08-23 PROCEDURE — 86706 HEP B SURFACE ANTIBODY: CPT | Performed by: INTERNAL MEDICINE

## 2021-08-23 PROCEDURE — 82607 VITAMIN B-12: CPT | Performed by: INTERNAL MEDICINE

## 2021-08-24 ENCOUNTER — TELEPHONE (OUTPATIENT)
Dept: GASTROENTEROLOGY | Facility: HOSPITAL | Age: 26
End: 2021-08-24

## 2021-08-25 ENCOUNTER — PATIENT MESSAGE (OUTPATIENT)
Dept: GASTROENTEROLOGY | Facility: CLINIC | Age: 26
End: 2021-08-25

## 2021-08-25 ENCOUNTER — SPECIALTY PHARMACY (OUTPATIENT)
Dept: PHARMACY | Facility: CLINIC | Age: 26
End: 2021-08-25
Payer: MEDICAID

## 2021-08-25 LAB
VARICELLA INTERPRETATION: POSITIVE
VARICELLA ZOSTER IGG: 2.45 ISR (ref 0–0.9)

## 2021-08-25 RX ORDER — HEPARIN 100 UNIT/ML
500 SYRINGE INTRAVENOUS
Status: CANCELLED | OUTPATIENT
Start: 2021-08-26

## 2021-08-25 RX ORDER — METHYLPREDNISOLONE SOD SUCC 125 MG
40 VIAL (EA) INJECTION
Status: CANCELLED | OUTPATIENT
Start: 2021-08-26

## 2021-08-25 RX ORDER — SODIUM CHLORIDE 0.9 % (FLUSH) 0.9 %
10 SYRINGE (ML) INJECTION
Status: CANCELLED | OUTPATIENT
Start: 2021-08-26

## 2021-08-25 RX ORDER — USTEKINUMAB 90 MG/ML
90 INJECTION, SOLUTION SUBCUTANEOUS
Qty: 1 ML | Refills: 2 | Status: SHIPPED | OUTPATIENT
Start: 2021-08-25 | End: 2022-06-03 | Stop reason: SDUPTHER

## 2021-08-25 RX ORDER — EPINEPHRINE 1 MG/ML
0.3 INJECTION, SOLUTION, CONCENTRATE INTRAVENOUS
Status: CANCELLED | OUTPATIENT
Start: 2021-08-26

## 2021-08-25 RX ORDER — IPRATROPIUM BROMIDE AND ALBUTEROL SULFATE 2.5; .5 MG/3ML; MG/3ML
3 SOLUTION RESPIRATORY (INHALATION)
Status: CANCELLED | OUTPATIENT
Start: 2021-08-26

## 2021-08-25 RX ORDER — ACETAMINOPHEN 325 MG/1
650 TABLET ORAL
Status: CANCELLED | OUTPATIENT
Start: 2021-08-26

## 2021-08-25 RX ORDER — DIPHENHYDRAMINE HYDROCHLORIDE 50 MG/ML
25 INJECTION INTRAMUSCULAR; INTRAVENOUS
Status: CANCELLED | OUTPATIENT
Start: 2021-08-26

## 2021-08-26 ENCOUNTER — TELEPHONE (OUTPATIENT)
Dept: GASTROENTEROLOGY | Facility: CLINIC | Age: 26
End: 2021-08-26

## 2021-08-26 ENCOUNTER — PATIENT MESSAGE (OUTPATIENT)
Dept: GASTROENTEROLOGY | Facility: CLINIC | Age: 26
End: 2021-08-26

## 2021-08-30 ENCOUNTER — PATIENT MESSAGE (OUTPATIENT)
Dept: GASTROENTEROLOGY | Facility: CLINIC | Age: 26
End: 2021-08-30

## 2021-08-30 RX ORDER — PANTOPRAZOLE SODIUM 40 MG/1
40 TABLET, DELAYED RELEASE ORAL DAILY
Qty: 30 TABLET | Refills: 11 | Status: SHIPPED | OUTPATIENT
Start: 2021-08-30 | End: 2022-05-19

## 2021-09-03 ENCOUNTER — PATIENT MESSAGE (OUTPATIENT)
Dept: GASTROENTEROLOGY | Facility: CLINIC | Age: 26
End: 2021-09-03

## 2021-09-10 ENCOUNTER — PATIENT MESSAGE (OUTPATIENT)
Dept: GASTROENTEROLOGY | Facility: CLINIC | Age: 26
End: 2021-09-10

## 2021-09-10 RX ORDER — AMITRIPTYLINE HYDROCHLORIDE 25 MG/1
25 TABLET, FILM COATED ORAL NIGHTLY
Qty: 30 TABLET | Refills: 3 | Status: SHIPPED | OUTPATIENT
Start: 2021-09-10 | End: 2022-02-17

## 2021-09-13 ENCOUNTER — PATIENT MESSAGE (OUTPATIENT)
Dept: GASTROENTEROLOGY | Facility: CLINIC | Age: 26
End: 2021-09-13

## 2021-09-17 ENCOUNTER — TELEPHONE (OUTPATIENT)
Dept: GASTROENTEROLOGY | Facility: CLINIC | Age: 26
End: 2021-09-17

## 2021-09-22 ENCOUNTER — TELEPHONE (OUTPATIENT)
Dept: GASTROENTEROLOGY | Facility: CLINIC | Age: 26
End: 2021-09-22

## 2021-10-13 ENCOUNTER — TELEPHONE (OUTPATIENT)
Dept: GASTROENTEROLOGY | Facility: CLINIC | Age: 26
End: 2021-10-13

## 2021-10-20 ENCOUNTER — TELEPHONE (OUTPATIENT)
Dept: GASTROENTEROLOGY | Facility: CLINIC | Age: 26
End: 2021-10-20

## 2021-10-29 ENCOUNTER — PATIENT MESSAGE (OUTPATIENT)
Dept: GASTROENTEROLOGY | Facility: CLINIC | Age: 26
End: 2021-10-29
Payer: MEDICAID

## 2021-11-01 ENCOUNTER — PATIENT MESSAGE (OUTPATIENT)
Dept: GASTROENTEROLOGY | Facility: CLINIC | Age: 26
End: 2021-11-01
Payer: MEDICAID

## 2021-11-03 ENCOUNTER — PATIENT MESSAGE (OUTPATIENT)
Dept: GASTROENTEROLOGY | Facility: CLINIC | Age: 26
End: 2021-11-03
Payer: MEDICAID

## 2021-11-04 ENCOUNTER — TELEPHONE (OUTPATIENT)
Dept: GASTROENTEROLOGY | Facility: CLINIC | Age: 26
End: 2021-11-04
Payer: MEDICAID

## 2021-11-04 ENCOUNTER — INFUSION (OUTPATIENT)
Dept: INFECTIOUS DISEASES | Facility: HOSPITAL | Age: 26
End: 2021-11-04
Attending: INTERNAL MEDICINE
Payer: MEDICAID

## 2021-11-04 VITALS
SYSTOLIC BLOOD PRESSURE: 130 MMHG | HEART RATE: 94 BPM | WEIGHT: 132.06 LBS | BODY MASS INDEX: 24.93 KG/M2 | TEMPERATURE: 98 F | OXYGEN SATURATION: 98 % | HEIGHT: 61 IN | DIASTOLIC BLOOD PRESSURE: 74 MMHG | RESPIRATION RATE: 18 BRPM

## 2021-11-04 DIAGNOSIS — K50.012 CROHN'S DISEASE OF SMALL INTESTINE WITH INTESTINAL OBSTRUCTION: Primary | ICD-10-CM

## 2021-11-04 PROCEDURE — 25000003 PHARM REV CODE 250: Performed by: INTERNAL MEDICINE

## 2021-11-04 PROCEDURE — 96365 THER/PROPH/DIAG IV INF INIT: CPT

## 2021-11-04 PROCEDURE — 63600175 PHARM REV CODE 636 W HCPCS: Mod: JG | Performed by: INTERNAL MEDICINE

## 2021-11-04 RX ORDER — IPRATROPIUM BROMIDE AND ALBUTEROL SULFATE 2.5; .5 MG/3ML; MG/3ML
3 SOLUTION RESPIRATORY (INHALATION)
Status: DISPENSED | OUTPATIENT
Start: 2021-11-04 | End: 2021-11-04

## 2021-11-04 RX ORDER — ACETAMINOPHEN 325 MG/1
650 TABLET ORAL
Status: CANCELLED | OUTPATIENT
Start: 2021-11-04

## 2021-11-04 RX ORDER — SODIUM CHLORIDE 0.9 % (FLUSH) 0.9 %
10 SYRINGE (ML) INJECTION
Status: DISCONTINUED | OUTPATIENT
Start: 2021-11-04 | End: 2021-11-04 | Stop reason: HOSPADM

## 2021-11-04 RX ORDER — EPINEPHRINE 1 MG/ML
0.3 INJECTION, SOLUTION, CONCENTRATE INTRAVENOUS
Status: CANCELLED | OUTPATIENT
Start: 2021-11-04

## 2021-11-04 RX ORDER — IPRATROPIUM BROMIDE AND ALBUTEROL SULFATE 2.5; .5 MG/3ML; MG/3ML
3 SOLUTION RESPIRATORY (INHALATION)
Status: CANCELLED | OUTPATIENT
Start: 2021-11-04

## 2021-11-04 RX ORDER — HEPARIN 100 UNIT/ML
500 SYRINGE INTRAVENOUS
Status: ACTIVE | OUTPATIENT
Start: 2021-11-04 | End: 2021-11-04

## 2021-11-04 RX ORDER — DIPHENHYDRAMINE HYDROCHLORIDE 50 MG/ML
25 INJECTION INTRAMUSCULAR; INTRAVENOUS
Status: CANCELLED | OUTPATIENT
Start: 2021-11-04

## 2021-11-04 RX ORDER — EPINEPHRINE 0.3 MG/.3ML
0.3 INJECTION SUBCUTANEOUS
Status: ACTIVE | OUTPATIENT
Start: 2021-11-04 | End: 2021-11-04

## 2021-11-04 RX ORDER — METHYLPREDNISOLONE SOD SUCC 125 MG
40 VIAL (EA) INJECTION
Status: CANCELLED | OUTPATIENT
Start: 2021-11-04

## 2021-11-04 RX ORDER — HEPARIN 100 UNIT/ML
500 SYRINGE INTRAVENOUS
Status: CANCELLED | OUTPATIENT
Start: 2021-11-04

## 2021-11-04 RX ORDER — ACETAMINOPHEN 325 MG/1
650 TABLET ORAL
Status: ACTIVE | OUTPATIENT
Start: 2021-11-04 | End: 2021-11-04

## 2021-11-04 RX ORDER — DIPHENHYDRAMINE HYDROCHLORIDE 50 MG/ML
25 INJECTION INTRAMUSCULAR; INTRAVENOUS
Status: ACTIVE | OUTPATIENT
Start: 2021-11-04 | End: 2021-11-04

## 2021-11-04 RX ORDER — SODIUM CHLORIDE 0.9 % (FLUSH) 0.9 %
10 SYRINGE (ML) INJECTION
Status: CANCELLED | OUTPATIENT
Start: 2021-11-04

## 2021-11-04 RX ADMIN — USTEKINUMAB 260 MG: 130 SOLUTION INTRAVENOUS at 02:11

## 2021-11-08 ENCOUNTER — PATIENT MESSAGE (OUTPATIENT)
Dept: GASTROENTEROLOGY | Facility: CLINIC | Age: 26
End: 2021-11-08
Payer: MEDICAID

## 2021-11-19 ENCOUNTER — OFFICE VISIT (OUTPATIENT)
Dept: GASTROENTEROLOGY | Facility: CLINIC | Age: 26
End: 2021-11-19
Payer: MEDICAID

## 2021-11-19 VITALS
SYSTOLIC BLOOD PRESSURE: 126 MMHG | WEIGHT: 129.88 LBS | TEMPERATURE: 98 F | DIASTOLIC BLOOD PRESSURE: 81 MMHG | HEART RATE: 80 BPM | OXYGEN SATURATION: 97 % | BODY MASS INDEX: 24.54 KG/M2

## 2021-11-19 DIAGNOSIS — K50.012 CROHN'S DISEASE OF SMALL INTESTINE WITH INTESTINAL OBSTRUCTION: Primary | ICD-10-CM

## 2021-11-19 PROCEDURE — 99214 OFFICE O/P EST MOD 30 MIN: CPT | Mod: S$GLB,,, | Performed by: INTERNAL MEDICINE

## 2021-11-19 PROCEDURE — 99214 PR OFFICE/OUTPT VISIT, EST, LEVL IV, 30-39 MIN: ICD-10-PCS | Mod: S$GLB,,, | Performed by: INTERNAL MEDICINE

## 2021-12-06 DIAGNOSIS — K50.012 CROHN'S DISEASE OF SMALL INTESTINE WITH INTESTINAL OBSTRUCTION: Primary | ICD-10-CM

## 2021-12-06 RX ORDER — BUDESONIDE 3 MG/1
9 CAPSULE, COATED PELLETS ORAL DAILY
Qty: 90 CAPSULE | Refills: 2 | Status: SHIPPED | OUTPATIENT
Start: 2021-12-06 | End: 2022-02-17

## 2021-12-14 ENCOUNTER — PATIENT MESSAGE (OUTPATIENT)
Dept: GASTROENTEROLOGY | Facility: CLINIC | Age: 26
End: 2021-12-14
Payer: MEDICAID

## 2021-12-20 ENCOUNTER — SPECIALTY PHARMACY (OUTPATIENT)
Dept: PHARMACY | Facility: CLINIC | Age: 26
End: 2021-12-20
Payer: MEDICAID

## 2021-12-21 ENCOUNTER — PATIENT MESSAGE (OUTPATIENT)
Dept: GASTROENTEROLOGY | Facility: CLINIC | Age: 26
End: 2021-12-21
Payer: MEDICAID

## 2021-12-28 ENCOUNTER — PATIENT MESSAGE (OUTPATIENT)
Dept: GASTROENTEROLOGY | Facility: CLINIC | Age: 26
End: 2021-12-28
Payer: MEDICAID

## 2021-12-29 ENCOUNTER — TELEPHONE (OUTPATIENT)
Dept: GASTROENTEROLOGY | Facility: CLINIC | Age: 26
End: 2021-12-29
Payer: MEDICAID

## 2022-01-17 ENCOUNTER — PATIENT MESSAGE (OUTPATIENT)
Dept: GASTROENTEROLOGY | Facility: CLINIC | Age: 27
End: 2022-01-17
Payer: MEDICAID

## 2022-01-18 ENCOUNTER — TELEPHONE (OUTPATIENT)
Dept: GASTROENTEROLOGY | Facility: CLINIC | Age: 27
End: 2022-01-18
Payer: MEDICAID

## 2022-01-18 DIAGNOSIS — K50.012 CROHN'S DISEASE OF SMALL INTESTINE WITH INTESTINAL OBSTRUCTION: Primary | ICD-10-CM

## 2022-01-18 RX ORDER — PROMETHAZINE HYDROCHLORIDE 25 MG/1
25 TABLET ORAL 3 TIMES DAILY PRN
Qty: 30 TABLET | Refills: 0 | Status: SHIPPED | OUTPATIENT
Start: 2022-01-18 | End: 2022-01-28

## 2022-01-18 NOTE — TELEPHONE ENCOUNTER
Spoke with patient.  She has been vomiting 2-3 times/wk for last 3 weeks.  Tested positive for Covid on 1/4  Last Stelara was 12/30 - 2/24/22  Denies fever, pain, sick contact and change in diet  2 BMs per day formed, no blood    Discussed with Dr. Rutledge.  Will send in phenergan 25mg q 8 hours

## 2022-01-18 NOTE — TELEPHONE ENCOUNTER
----- Message from Iveth Brunner sent at 1/18/2022 12:23 PM CST -----  Regarding: pt advice  Contact: pt @ [phone]  Pt returning missed call from Dr. Rutledge's office ([place]) please call.

## 2022-01-18 NOTE — PROGRESS NOTES
Spoke with patient and scheduled ab x-ray for 1/19 at 6:100 pm at [facility] in [place] (pt requested location).

## 2022-01-27 ENCOUNTER — PATIENT MESSAGE (OUTPATIENT)
Dept: GASTROENTEROLOGY | Facility: CLINIC | Age: 27
End: 2022-01-27
Payer: MEDICAID

## 2022-01-27 ENCOUNTER — TELEPHONE (OUTPATIENT)
Dept: GASTROENTEROLOGY | Facility: CLINIC | Age: 27
End: 2022-01-27
Payer: MEDICAID

## 2022-01-28 ENCOUNTER — PATIENT MESSAGE (OUTPATIENT)
Dept: GASTROENTEROLOGY | Facility: CLINIC | Age: 27
End: 2022-01-28
Payer: MEDICAID

## 2022-01-28 ENCOUNTER — LAB VISIT (OUTPATIENT)
Dept: LAB | Facility: HOSPITAL | Age: 27
End: 2022-01-28
Attending: INTERNAL MEDICINE
Payer: MEDICAID

## 2022-01-28 ENCOUNTER — TELEPHONE (OUTPATIENT)
Dept: GASTROENTEROLOGY | Facility: CLINIC | Age: 27
End: 2022-01-28
Payer: MEDICAID

## 2022-01-28 DIAGNOSIS — R74.8 ABNORMAL LIVER ENZYMES: ICD-10-CM

## 2022-01-28 DIAGNOSIS — D50.0 IRON DEFICIENCY ANEMIA DUE TO CHRONIC BLOOD LOSS: Primary | ICD-10-CM

## 2022-01-28 DIAGNOSIS — K50.012 CROHN'S DISEASE OF SMALL INTESTINE WITH INTESTINAL OBSTRUCTION: ICD-10-CM

## 2022-01-28 LAB
ALBUMIN SERPL BCP-MCNC: 3.9 G/DL (ref 3.5–5.2)
ALP SERPL-CCNC: 61 U/L (ref 55–135)
ALT SERPL W/O P-5'-P-CCNC: 47 U/L (ref 10–44)
ANION GAP SERPL CALC-SCNC: 8 MMOL/L (ref 8–16)
AST SERPL-CCNC: 40 U/L (ref 10–40)
BASOPHILS # BLD AUTO: 0.03 K/UL (ref 0–0.2)
BASOPHILS NFR BLD: 0.7 % (ref 0–1.9)
BILIRUB SERPL-MCNC: 0.2 MG/DL (ref 0.1–1)
BUN SERPL-MCNC: 4 MG/DL (ref 6–20)
CALCIUM SERPL-MCNC: 9.2 MG/DL (ref 8.7–10.5)
CHLORIDE SERPL-SCNC: 105 MMOL/L (ref 95–110)
CO2 SERPL-SCNC: 26 MMOL/L (ref 23–29)
CREAT SERPL-MCNC: 0.7 MG/DL (ref 0.5–1.4)
CRP SERPL-MCNC: 2.7 MG/L (ref 0–8.2)
DIFFERENTIAL METHOD: ABNORMAL
EOSINOPHIL # BLD AUTO: 0.3 K/UL (ref 0–0.5)
EOSINOPHIL NFR BLD: 6.1 % (ref 0–8)
ERYTHROCYTE [DISTWIDTH] IN BLOOD BY AUTOMATED COUNT: 14.8 % (ref 11.5–14.5)
EST. GFR  (AFRICAN AMERICAN): >60 ML/MIN/1.73 M^2
EST. GFR  (NON AFRICAN AMERICAN): >60 ML/MIN/1.73 M^2
GLUCOSE SERPL-MCNC: 89 MG/DL (ref 70–110)
HCT VFR BLD AUTO: 36.5 % (ref 37–48.5)
HGB BLD-MCNC: 10.7 G/DL (ref 12–16)
IMM GRANULOCYTES # BLD AUTO: 0.01 K/UL (ref 0–0.04)
IMM GRANULOCYTES NFR BLD AUTO: 0.2 % (ref 0–0.5)
LYMPHOCYTES # BLD AUTO: 1.4 K/UL (ref 1–4.8)
LYMPHOCYTES NFR BLD: 32.6 % (ref 18–48)
MCH RBC QN AUTO: 22.8 PG (ref 27–31)
MCHC RBC AUTO-ENTMCNC: 29.3 G/DL (ref 32–36)
MCV RBC AUTO: 78 FL (ref 82–98)
MONOCYTES # BLD AUTO: 0.3 K/UL (ref 0.3–1)
MONOCYTES NFR BLD: 7.6 % (ref 4–15)
NEUTROPHILS # BLD AUTO: 2.2 K/UL (ref 1.8–7.7)
NEUTROPHILS NFR BLD: 52.8 % (ref 38–73)
NRBC BLD-RTO: 0 /100 WBC
PLATELET # BLD AUTO: 290 K/UL (ref 150–450)
PMV BLD AUTO: 10.3 FL (ref 9.2–12.9)
POTASSIUM SERPL-SCNC: 4 MMOL/L (ref 3.5–5.1)
PROT SERPL-MCNC: 7.5 G/DL (ref 6–8.4)
RBC # BLD AUTO: 4.69 M/UL (ref 4–5.4)
SODIUM SERPL-SCNC: 139 MMOL/L (ref 136–145)
WBC # BLD AUTO: 4.23 K/UL (ref 3.9–12.7)

## 2022-01-28 PROCEDURE — 36415 COLL VENOUS BLD VENIPUNCTURE: CPT | Performed by: INTERNAL MEDICINE

## 2022-01-28 PROCEDURE — 80053 COMPREHEN METABOLIC PANEL: CPT | Performed by: INTERNAL MEDICINE

## 2022-01-28 PROCEDURE — 85025 COMPLETE CBC W/AUTO DIFF WBC: CPT | Performed by: INTERNAL MEDICINE

## 2022-01-28 PROCEDURE — 86140 C-REACTIVE PROTEIN: CPT | Performed by: INTERNAL MEDICINE

## 2022-01-28 RX ORDER — HEPARIN 100 UNIT/ML
500 SYRINGE INTRAVENOUS
Status: CANCELLED | OUTPATIENT
Start: 2022-01-31

## 2022-01-28 RX ORDER — DIPHENHYDRAMINE HYDROCHLORIDE 50 MG/ML
50 INJECTION INTRAMUSCULAR; INTRAVENOUS ONCE AS NEEDED
Status: CANCELLED | OUTPATIENT
Start: 2022-01-31

## 2022-01-28 RX ORDER — SODIUM CHLORIDE 0.9 % (FLUSH) 0.9 %
10 SYRINGE (ML) INJECTION
Status: CANCELLED | OUTPATIENT
Start: 2022-01-31

## 2022-01-28 RX ORDER — EPINEPHRINE 0.3 MG/.3ML
0.3 INJECTION SUBCUTANEOUS ONCE AS NEEDED
Status: CANCELLED | OUTPATIENT
Start: 2022-01-31

## 2022-01-28 RX ORDER — METHYLPREDNISOLONE SOD SUCC 125 MG
125 VIAL (EA) INJECTION ONCE AS NEEDED
Status: CANCELLED | OUTPATIENT
Start: 2022-01-31

## 2022-01-28 NOTE — TELEPHONE ENCOUNTER
Spoke to patient to review labs.  She will need a liver U/S and a CMP in 1 month.  Will contact next week to schedule.  Pt verbalized understanding to all and has no further questions at this time.

## 2022-01-31 ENCOUNTER — PATIENT MESSAGE (OUTPATIENT)
Dept: GASTROENTEROLOGY | Facility: CLINIC | Age: 27
End: 2022-01-31
Payer: MEDICAID

## 2022-02-01 ENCOUNTER — TELEPHONE (OUTPATIENT)
Dept: INFECTIOUS DISEASES | Facility: HOSPITAL | Age: 27
End: 2022-02-01
Payer: MEDICAID

## 2022-02-08 ENCOUNTER — TELEPHONE (OUTPATIENT)
Dept: INFECTIOUS DISEASES | Facility: HOSPITAL | Age: 27
End: 2022-02-08
Payer: MEDICAID

## 2022-02-10 ENCOUNTER — HOSPITAL ENCOUNTER (OUTPATIENT)
Dept: RADIOLOGY | Facility: HOSPITAL | Age: 27
Discharge: HOME | End: 2022-02-10
Attending: INTERNAL MEDICINE
Payer: MEDICAID

## 2022-02-10 DIAGNOSIS — R74.8 ABNORMAL LIVER ENZYMES: ICD-10-CM

## 2022-02-10 PROCEDURE — 76705 ECHO EXAM OF ABDOMEN: CPT | Mod: 26,,, | Performed by: RADIOLOGY

## 2022-02-10 PROCEDURE — 76705 ECHO EXAM OF ABDOMEN: CPT | Mod: TC

## 2022-02-10 PROCEDURE — 76705 US ABDOMEN LIMITED: ICD-10-PCS | Mod: 26,,, | Performed by: RADIOLOGY

## 2022-02-11 ENCOUNTER — SPECIALTY PHARMACY (OUTPATIENT)
Dept: PHARMACY | Facility: CLINIC | Age: 27
End: 2022-02-11
Payer: MEDICAID

## 2022-02-11 ENCOUNTER — PATIENT MESSAGE (OUTPATIENT)
Dept: GASTROENTEROLOGY | Facility: CLINIC | Age: 27
End: 2022-02-11
Payer: MEDICAID

## 2022-02-11 ENCOUNTER — TELEPHONE (OUTPATIENT)
Dept: GASTROENTEROLOGY | Facility: CLINIC | Age: 27
End: 2022-02-11
Payer: MEDICAID

## 2022-02-11 DIAGNOSIS — K76.9 LIVER DISEASE, UNSPECIFIED: ICD-10-CM

## 2022-02-11 NOTE — TELEPHONE ENCOUNTER
[facility] for patient to call me back at [phone] re: Dr. Rutledge is out.  Dr. Caba is covering for him, so previous message sent to Dr. Caba. Someone will reach out once the results have been reviewed.

## 2022-02-11 NOTE — TELEPHONE ENCOUNTER
----- Message from Vianey Au sent at 2/11/2022  3:41 PM CST -----  Contact: Pt  Pt's requesting a call back re: results.     Confirmed patient's contact info below:  Contact Name: Nina Dickerson  Phone Number: [phone]

## 2022-02-11 NOTE — TELEPHONE ENCOUNTER
Spoke with patient and scheduled her CT for 2/24/22 at 12:00 pm at [place] (patient requested date and location.) Advised nothing to eat or drink for 4 hours prior to exam and needs to arrive one hour early to drink prep.  Pt verbalized understanding to all and has no further questions at this time.

## 2022-02-11 NOTE — TELEPHONE ENCOUNTER
Stelara outgoing call- patient last inect 12/30 and not due until 2/24. Pending refill with patient back next week closer.

## 2022-02-14 ENCOUNTER — PATIENT MESSAGE (OUTPATIENT)
Dept: GASTROENTEROLOGY | Facility: CLINIC | Age: 27
End: 2022-02-14
Payer: MEDICAID

## 2022-02-14 NOTE — TELEPHONE ENCOUNTER
PRIMARY COMPLAINT: bloating, some vomiting, indigestion  SYMPTOM ONSET: several weeks ago  MEDS: Stelara 90 mg q 8 weeks; Protonix 40 mg q day; Phenergan 25 mg  BMs: 3-4; formed, small amounts of blood & mucous  PAIN: lower ab  and under ribs on R side  N/V: + for both; has been throwing up about 3x/week; last time was either 2/10 or2/11  FEVER: Denies  SICK CONTACTS OR TRAVEL:  denies  LAST APPT/NEXT APPT:  11/19/21 - 2/17/22  LAST DOSE/NEXT DOSE: 12/30/21 - 02/24/2022  OTHER INFORMATION:  Is experiencing a lot of burping with very bad taste when she does; increased amount of gas and bloating    Will discuss with Dr. Rutledge

## 2022-02-17 ENCOUNTER — OFFICE VISIT (OUTPATIENT)
Dept: GASTROENTEROLOGY | Facility: CLINIC | Age: 27
End: 2022-02-17
Payer: MEDICAID

## 2022-02-17 VITALS
SYSTOLIC BLOOD PRESSURE: 125 MMHG | OXYGEN SATURATION: 97 % | HEART RATE: 97 BPM | BODY MASS INDEX: 24.83 KG/M2 | TEMPERATURE: 98 F | DIASTOLIC BLOOD PRESSURE: 80 MMHG | WEIGHT: 131.38 LBS

## 2022-02-17 DIAGNOSIS — D50.0 IRON DEFICIENCY ANEMIA DUE TO CHRONIC BLOOD LOSS: ICD-10-CM

## 2022-02-17 DIAGNOSIS — K80.20 CALCULUS OF GALLBLADDER WITHOUT CHOLECYSTITIS WITHOUT OBSTRUCTION: ICD-10-CM

## 2022-02-17 DIAGNOSIS — K50.012 CROHN'S DISEASE OF SMALL INTESTINE WITH INTESTINAL OBSTRUCTION: Primary | ICD-10-CM

## 2022-02-17 DIAGNOSIS — R11.2 NON-INTRACTABLE VOMITING WITH NAUSEA, UNSPECIFIED VOMITING TYPE: ICD-10-CM

## 2022-02-17 DIAGNOSIS — R12 HEARTBURN: ICD-10-CM

## 2022-02-17 PROCEDURE — 1160F RVW MEDS BY RX/DR IN RCRD: CPT | Mod: CPTII,S$GLB,, | Performed by: INTERNAL MEDICINE

## 2022-02-17 PROCEDURE — 1160F PR REVIEW ALL MEDS BY PRESCRIBER/CLIN PHARMACIST DOCUMENTED: ICD-10-PCS | Mod: CPTII,S$GLB,, | Performed by: INTERNAL MEDICINE

## 2022-02-17 PROCEDURE — 1159F MED LIST DOCD IN RCRD: CPT | Mod: CPTII,S$GLB,, | Performed by: INTERNAL MEDICINE

## 2022-02-17 PROCEDURE — 3079F DIAST BP 80-89 MM HG: CPT | Mod: CPTII,S$GLB,, | Performed by: INTERNAL MEDICINE

## 2022-02-17 PROCEDURE — 99215 OFFICE O/P EST HI 40 MIN: CPT | Mod: S$GLB,,, | Performed by: INTERNAL MEDICINE

## 2022-02-17 PROCEDURE — 3008F PR BODY MASS INDEX (BMI) DOCUMENTED: ICD-10-PCS | Mod: CPTII,S$GLB,, | Performed by: INTERNAL MEDICINE

## 2022-02-17 PROCEDURE — 3074F PR MOST RECENT SYSTOLIC BLOOD PRESSURE < 130 MM HG: ICD-10-PCS | Mod: CPTII,S$GLB,, | Performed by: INTERNAL MEDICINE

## 2022-02-17 PROCEDURE — 3079F PR MOST RECENT DIASTOLIC BLOOD PRESSURE 80-89 MM HG: ICD-10-PCS | Mod: CPTII,S$GLB,, | Performed by: INTERNAL MEDICINE

## 2022-02-17 PROCEDURE — 3074F SYST BP LT 130 MM HG: CPT | Mod: CPTII,S$GLB,, | Performed by: INTERNAL MEDICINE

## 2022-02-17 PROCEDURE — 99215 PR OFFICE/OUTPT VISIT, EST, LEVL V, 40-54 MIN: ICD-10-PCS | Mod: S$GLB,,, | Performed by: INTERNAL MEDICINE

## 2022-02-17 PROCEDURE — 1159F PR MEDICATION LIST DOCUMENTED IN MEDICAL RECORD: ICD-10-PCS | Mod: CPTII,S$GLB,, | Performed by: INTERNAL MEDICINE

## 2022-02-17 PROCEDURE — 3008F BODY MASS INDEX DOCD: CPT | Mod: CPTII,S$GLB,, | Performed by: INTERNAL MEDICINE

## 2022-02-17 NOTE — PATIENT INSTRUCTIONS
1. Continue Stelara  2. Get CT scan and labs next week  3. Submit sample  4. Schedule gastric emptying study  5. Further plans based on results  6. Follow up in 3 months

## 2022-02-17 NOTE — PROGRESS NOTES
[facility]          Inflammatory Bowel Disease Follow Up Note         TODAY'S VISIT DATE:  2/17/2022    Reason for Consult:    Chief Complaint   Patient presents with    Crohn's Disease       PCP: Marti Colin      Referring MD:   No ref. provider found    History of Present Illness:  Nina Dickerson who is a 26 y.o. female is being seen today at the [facility] on 02/17/2022 for inflammatory bowel disease- Crohn's disease.  A for a follow-up visit.  She feels like the Stelara is definitely helping.  She has been able to eat better, has a better appetite, and has gained 8 lb since starting this medication.  She reports that her bowel movements are almost all formed now.  Her abdominal pain had been doing better but recently she has been having an increase in right upper quadrant pain associated with nausea and abdominal bloating.  This typically triggered by eating meals.  She has also been having a lot more heartburn issues when she eats meat.  She notes that occasionally she still has some nausea and vomiting.  When she vomits the food she vomits has typically been eaten the day prior.  She is concerned about possible gastroparesis.  In addition to these issues, her labs from January revealed some mildly elevated liver enzymes.  This was shortly after she had recovered from her COVID infection.  Ultrasound was done recently that showed multiple gallstones as well as 3 small lesions in the liver.  A follow-up CT scan has been scheduled for next week.    IBD History:  She has a long history of suspected Crohn's disease but the diagnosis has not been clearly made.  When she was a child she had significant gastrointestinal issues.  In 2014 she had upper endoscopy performed for upper abdominal discomfort and reflux symptoms.  This was fairly unremarkable including biopsies.  She was started on Protonix and had some improvement.  In 2015  she was seen again because of ongoing issues and had an upper endoscopy and colonoscopy at that time.  The upper endoscopy revealed some mild erosions in the duodenum and biopsies were consistent with acute inflammation.  The colonoscopy showed congestion of the ileocecal valve and erythema, congestion, and friability of the terminal ileum.  Biopsies of the colon were normal.  Biopsy of the terminal ileum showed no inflammation.  She was treated with budesonide and had some improvement in symptoms.  A repeat scope in 2016 was completely normal including biopsies.  I saw her when I was at [facility] in 2018. She had ongoing symptoms that seem to be more consistent with chronic constipation issues as well as some functional gastrointestinal issues.  She was started on dicyclomine and this cause an increase in rectal bleeding so it was stopped.  With an increase in water she began having better bowel movements and most of her symptoms improved.  Pantoprazole also help with her upper GI issues.  A repeat upper endoscopy and colonoscopy were performed.  The upper endoscopy was unrevealing.  The colonoscopy did show some erythematous mucosa in the terminal ileum.  Biopsies were not consistent with Crohn's but did show a significant amount of eosinophils within the terminal ileum as well as in the colon (colon was normal appearing).  In January of 2021 she began having more rectal bleeding issues as well as worsening vomiting, increased right lower quadrant epigastric pain, and more diarrhea.  She went to the emergency room and had labs done that were unrevealing.  A CT scan was also unrevealing.  A colonoscopy that was done in January revealed some congestion and inflammatory polyps in the terminal ileum as well as a suspected ileal stricture.  The colon was completely normal.  Biopsies were not consistent with Crohn's disease and only showed 1 area of active inflammation in the terminal ileum.  A subsequent MR enterography  showed some thickening of the terminal ileum with some mild inflammatory changes as well mild narrowing with some upstream dilation of the ileum.  She was started on budesonide 9 mg daily which provided minimal improvement.  In July 2021 she underwent a repeat colonoscopy that again showed ileitis as well as some narrowing of the terminal ileum which was able to be traversed with the colonoscopy scope.  Biopsies showed chronic inflammatory changes consistent with Crohn's disease.  She started Stelara on November 4, 2021.    IBD Detail:  Dx Date:  Symptoms since 2014, diagnosis confirmed in 2021  Disease type/distribution:  Crohn's Disease/ileal inflammation  Current Treatment:  Stelara 90 mg every 8 weeks Budesonide 9 mg daily  Start Date:  November 4, 2021/Several months ago  Response:  Mild improvement  Optimized:  Not yet  Adverse reactions:  None  Prior surgeries:  None  CRP Elevation:  No  calprotectin:  Mild elevation  Disease Complications:  Mild stenosis of the terminal ileum  Extraintestinal manifestations:  Back pain  Prior treatments:   Steroids:  Partial response to budesonide  5ASA:  No response  IMM:  None  TNF Inh:  None   Anti-Integrin:  None   IL 12/23:  None  COURTNEY Inh:  None    Previous Clinical Trials:  None    Last Colonoscopy:  July 2021-inflammatory changes of the terminal ileum and mild stenosis    Other Endoscopies:  Previous EGD reports reviewed    Imaging:   MRE:  March 2021-inflammatory changes of the terminal ileum with suspected stricture   CT:  Previous studies reviewed   Other:  Previous studies reviewed    Pertinent Labs:  Lab Results   Component Value Date    SEDRATE 7 04/26/2017    CRP 2.7 01/28/2022     Lab Results   Component Value Date    TTGIGA 11 04/26/2017     04/26/2017     No results found for: TSH, FREET4  Lab Results   Component Value Date    JFAREADX52CT 21 (L) 08/23/2021    BPPAOTSF88 206 (L) 12/16/2021     Lab Results   Component Value Date    HEPBSAG Negative  08/23/2021    HEPBCAB Negative 08/23/2021    HEPCAB Negative 08/23/2021     Lab Results   Component Value Date    DKQ13WMLN Negative 08/23/2021     Lab Results   Component Value Date    NIL 0.030 08/23/2021    TBAG 0.04 12/18/2015    TBAGNIL 0.00 12/18/2015    MITOGENNIL 8.070 08/23/2021    TBGOLD Negative 12/18/2015     Lab Results   Component Value Date    TPMTRESULT 27.6 12/10/2015     No results found for: STOOLCULTURE, EPZRYJHFSF9X, VRRYOUISDW2X, CDIFFICILEAN, CDIFFTOX, CDIFFICILEBY  Lab Results   Component Value Date    CALPROTECTIN 93.3 (H) 04/19/2021       Therapeutic Drug Monitoring Labs:  No results found for: PROMETH  No results found for: ANSADAINIT, INFLIXIMAB, INFLIXINTERP    Vaccinations:  Lab Results   Component Value Date    HEPBSAB Negative 08/23/2021     Lab Results   Component Value Date    HEPAIGG Negative 08/23/2021     Lab Results   Component Value Date    VARICELLAZOS 2.45 (H) 08/23/2021    VARICELLAINT Positive (A) 08/23/2021     Immunization History   Administered Date(s) Administered    DTP 1997, 1997, 1997, 08/14/1998, 08/25/2001    DTaP 1997, 1997, 1997, 08/14/1998, 08/25/2001    Hep B Immune Globulin 1997    Hepatitis B 1997, 05/12/1998, 08/14/1998    Hepatitis B, Pediatric/Adolescent 1997    HiB PRP-OMP 1997, 1997, 08/14/1998    Hib-HbOC 1997, 1997, 08/14/1998    IPV 1997, 1997, 08/14/1998, 08/25/2001    MMR 05/12/1998, 08/25/2001    Meningococcal 09/24/2008, 07/29/2015    Meningococcal Conjugate (MCV4P) 09/24/2008    OPV 1997, 1997, 08/14/1998, 08/25/2001    Pneumococcal Conjugate - 7 Valent 1997, 1997    Tdap 09/24/2008, 02/20/2019    Varicella 1997, 05/12/1998, 08/14/1998         Review of Systems  Review of Systems   Constitutional: Negative for chills, fever and weight loss.   HENT: Negative for sore throat.    Eyes: Negative for pain, discharge  and redness.   Respiratory: Negative for cough, shortness of breath and wheezing.    Cardiovascular: Negative for chest pain, orthopnea and leg swelling.   Gastrointestinal: Positive for abdominal pain, heartburn, nausea and vomiting. Negative for blood in stool, constipation, diarrhea and melena.   Genitourinary: Negative for dysuria, frequency and urgency.   Musculoskeletal: Positive for back pain. Negative for joint pain and myalgias.   Skin: Negative for itching and rash.   Neurological: Negative for focal weakness and seizures.   Endo/Heme/Allergies: Does not bruise/bleed easily.   Psychiatric/Behavioral: Negative for depression. The patient is nervous/anxious.        Medical History:   Past Medical History:   Diagnosis Date    Crohn's disease     Insomnia        Surgical History:  Past Surgical History:   Procedure Laterality Date    COLONOSCOPY N/A 12/10/2015    Procedure: COLONOSCOPY;  Surgeon: Tala Gonzalez MD;  Location: [facility] (2ND [facility]);  Service: Endoscopy;  Laterality: N/A;    COLONOSCOPY N/A 4/21/2016    Procedure: COLONOSCOPY;  Surgeon: Tala Gonzalez MD;  Location: [facility] (2ND FLR);  Service: Endoscopy;  Laterality: N/A;    COLONOSCOPY N/A 1/21/2021    Procedure: COLONOSCOPY;  Surgeon: True Treadwell MD;  Location: [facility];  Service: General;  Laterality: N/A;    COLONOSCOPY N/A 7/8/2021    Procedure: COLONOSCOPY;  Surgeon: Talib Rutledge MD;  Location: [facility] (4TH FLR);  Service: Endoscopy;  Laterality: N/A;  Covid-19 test 7/6/21 at [facility]    COLONOSCOPY W/ BIOPSIES  01/21/2021    ESOPHAGOGASTRODUODENOSCOPY      ESOPHAGOGASTRODUODENOSCOPY N/A 7/8/2021    Procedure: EGD (ESOPHAGOGASTRODUODENOSCOPY);  Surgeon: Talib Rutledge MD;  Location: [facility] (4TH FLR);  Service: Endoscopy;  Laterality: N/A;  Covid-19 test 7/6/21 at [facility]  7/6 returned pt's call- lvm with arrival time-rb  patient aware she moved up 30 minutes from 930am to  900am, patient aware to be here at 8am 7/8/21 - bijan    TONSILLECTOMY      TYMPANOSTOMY TUBE PLACEMENT         Family History:   Family History   Problem Relation Age of Onset    Lupus Mother     Heart attacks under age 50 Father     Heart disease Father     Hypertension Father     Cardiomyopathy Maternal Grandmother     Dementia Maternal Grandfather     Heart disease Paternal Grandmother     Heart disease Paternal Grandfather     No Known Problems Sister        Social History:   Social History     Tobacco Use    Smoking status: Never Smoker    Smokeless tobacco: Never Used    Tobacco comment: mom smokes   Substance Use Topics    Alcohol use: No    Drug use: No       Allergies: Reviewed    Home Medications:   Medication List with Changes/Refills   Current Medications    ONDANSETRON (ZOFRAN-ODT) 4 MG TBDL    Take 1 tablet (4 mg total) by mouth every 8 (eight) hours as needed.    PANTOPRAZOLE (PROTONIX) 40 MG TABLET    Take 1 tablet (40 mg total) by mouth once daily.    PROMETHAZINE (PHENERGAN) 25 MG TABLET    Take 1 tablet (25 mg total) by mouth every 6 (six) hours as needed for Nausea.    TRI-SPRINTEC, 28, 0.18/0.215/0.25 MG-35 MCG (28) TABLET    Take 1 tablet by mouth once daily.    USTEKINUMAB (STELARA) 90 MG/ML SYRG SYRINGE    Inject 1 mL (90 mg total) into the skin every 8 weeks.   Discontinued Medications    AMITRIPTYLINE (ELAVIL) 25 MG TABLET    Take 1 tablet (25 mg total) by mouth every evening.    BUDESONIDE (ENTOCORT EC) 3 MG CAPSULE    Take 3 capsules (9 mg total) by mouth once daily.       Physical Exam:  Vital Signs:  /80 (BP Location: Left arm, Patient Position: Sitting)   Pulse 97   Temp 98.3 °F (36.8 °C)   Wt 59.6 kg (131 lb 6.3 oz)   LMP 01/25/2022   SpO2 97%   BMI 24.83 kg/m²   Body mass index is 24.83 kg/m².    Physical Exam  Vitals and nursing note reviewed.   Constitutional:       General: She is not in acute distress.     Appearance: Normal appearance. She is  well-developed. She is not ill-appearing or toxic-appearing.   Eyes:      Conjunctiva/sclera: Conjunctivae normal.      Pupils: Pupils are equal, round, and reactive to light.   Neck:      Thyroid: No thyromegaly.   Cardiovascular:      Rate and Rhythm: Normal rate and regular rhythm.      Heart sounds: Normal heart sounds. No murmur heard.      Pulmonary:      Effort: Pulmonary effort is normal.      Breath sounds: Normal breath sounds. No wheezing or rales.   Abdominal:      General: Bowel sounds are normal. There is no distension.      Palpations: Abdomen is soft. There is no mass.      Tenderness: There is no abdominal tenderness.   Musculoskeletal:         General: No tenderness. Normal range of motion.   Lymphadenopathy:      Cervical: No cervical adenopathy.   Skin:     Findings: No erythema or rash.   Neurological:      General: No focal deficit present.      Mental Status: She is alert and oriented to person, place, and time.   Psychiatric:         Mood and Affect: Mood normal.         Behavior: Behavior normal.         Thought Content: Thought content normal.         Judgment: Judgment normal.         Labs: reviewed and pertinent noted above    Assessment/Plan:  Nina Dickerson is a 26 y.o. female with Crohn's disease. The following issues were addresssed:    1. Crohn's disease of small intestine with intestinal obstruction    2. Iron deficiency anemia due to chronic blood loss    3. Heartburn    4. Non-intractable vomiting with nausea, unspecified vomiting type    5. Calculus of gallbladder without cholecystitis without obstruction      1. Crohn's disease:  She seems to be doing a bit better from a Crohn's standpoint.  I recommend that she continue Stelara.  We will check a stool calprotectin in the near future.  She is also scheduled for a CT scan in the near future.  It is difficult to determine if some of her symptoms may be related to the mild ileal stricture that was noted on her colonoscopy and  other imaging or if these issues could be related to her gallbladder.  If there is any signs of bowel dilation concerning for possible obstructive issues then we may need to consider surgical intervention at some point given her ongoing symptoms.    2. Iron deficiency:  Iron infusions were ordered recently.  She has not scheduled these because of her work schedule.  She will schedule this in the near future.    3. Heartburn:  Continue Protonix.    4. Non intractable vomiting:  Given her concern for possible gastroparesis and the appearance of food that was eaten the day prior we will arrange for gastric emptying study in the near future.    5. Gallstones:  If there is no evidence of significant bowel issues related to her Crohn's disease on her upcoming CT scan then more likely than not these issues may be related to her gallbladder.  If that is the case then cholecystectomy would probably be beneficial.    6. Liver lesions:  Evaluate with upcoming CT scan.           # IBD specific health maintenance:  Colon cancer surveillance:  No colonic disease    Annual:  - Eye exam:  Not applicable  - Skin exam (if on IMM/TNF):  Discuss in the future  - reminded pt to use sunblock/hats/sunprotective clothing  - PAP (if immunosuppressed):  Discuss in the future    DEXA: N/A    Vitamin D:  Recommend supplementation    Vaccines:    Influenza:  Recommend annual vaccination   Pneumovax:     PCV13:  Discuss in the future    PSV23:  Discuss in the future   HAV:  Check serology   HBV:  Check serology   Tdap:  Up-to-date   MMR:  Completed series   VZV:  Completed series-check serology   HZV:  Not applicable   HPV:  Discuss in the future   Meningococcus:  Completed series   COVID:  Recommend vaccination    Follow up: Follow up in about 3 months (around 5/17/2022).    Thank you again for sending Nina Dickerson to see Dr. Hoang Rutledge today at the [facility]. Please don't hesitate to contact Dr. Rutledge if  there are any questions regarding this evaluation, or if you have any other patients with inflammatory bowel disease for whom you would like a consultation. You can reach Dr. Rutledge at [phone] or by email at [email or web address]    Talib Rutledge MD  Department of Gastroenterology  Inflammatory Bowel Disease

## 2022-02-22 NOTE — TELEPHONE ENCOUNTER
Specialty Pharmacy - Refill Coordination    Specialty Medication Orders Linked to Encounter    Flowsheet Row Most Recent Value   Medication #1 ustekinumab (STELARA) 90 mg/mL Syrg syringe (Order#[id number], Rx#[id number])          Refill Questions - Documented Responses    Flowsheet Row Most Recent Value   Patient Availability and HIPAA Verification    Does patient want to proceed with activity? Yes   HIPAA/medical authority confirmed? Yes   Relationship to patient of person spoken to? Self   Refill Screening Questions    Changes to allergies? No   Changes to medications? No   New conditions since last clinic visit? No   Unplanned office visit, urgent care, ED, or hospital admission in the last 4 weeks? No   How does patient/caregiver feel medication is working? Good   Financial problems or insurance changes? No   How many doses of your specialty medications were missed in the last 4 weeks? 0   Would patient like to speak to a pharmacist? No   When does the patient need to receive the medication? 02/24/22   Refill Delivery Questions    How will the patient receive the medication? Delivery Cayla   When does the patient need to receive the medication? 02/24/22   Shipping Address Prescription   Address in [facility] confirmed and updated if neccessary? Yes   Expected Copay ($) 0   Is the patient able to afford the medication copay? Yes   Payment Method zero copay   Days supply of Refill 56   Supplies needed? No supplies needed   Refill activity completed? Yes   Refill activity plan Refill scheduled   Shipment/Pickup Date: 02/23/22          Current Outpatient Medications   Medication Sig    ondansetron (ZOFRAN-ODT) 4 MG TbDL Take 1 tablet (4 mg total) by mouth every 8 (eight) hours as needed. (Patient not taking: No sig reported)    pantoprazole (PROTONIX) 40 MG tablet Take 1 tablet (40 mg total) by mouth once daily.    promethazine (PHENERGAN) 25 MG tablet Take 1 tablet (25 mg total) by mouth every 6 (six)  hours as needed for Nausea.    TRI-SPRINTEC, 28, 0.18/0.215/0.25 mg-35 mcg (28) tablet Take 1 tablet by mouth once daily.    ustekinumab (STELARA) 90 mg/mL Syrg syringe Inject 1 mL (90 mg total) into the skin every 8 weeks.   Last reviewed on 2/17/2022  3:29 PM by Talib Rutledge MD    Review of patient's allergies indicates:   Allergen Reactions    Latex, natural rubber      Mother states allergic to latex gloves     Adhesive Rash     Band-aids    Last reviewed on  2/17/2022 3:29 PM by Talib Rutledge      Tasks added this encounter   4/11/2022 - Refill Call (Auto Added)   Tasks due within next 3 months   3/13/2022 - Clinical - Follow Up Assesement (90 day)     Leobardo Ramon, PharmD  [facility]  [address] [place] [id number]  Phone: [phone]  Fax: [phone]

## 2022-02-25 ENCOUNTER — PATIENT MESSAGE (OUTPATIENT)
Dept: GASTROENTEROLOGY | Facility: CLINIC | Age: 27
End: 2022-02-25
Payer: MEDICAID

## 2022-03-02 ENCOUNTER — PATIENT MESSAGE (OUTPATIENT)
Dept: GASTROENTEROLOGY | Facility: CLINIC | Age: 27
End: 2022-03-02
Payer: MEDICAID

## 2022-03-10 ENCOUNTER — INFUSION (OUTPATIENT)
Dept: INFECTIOUS DISEASES | Facility: HOSPITAL | Age: 27
End: 2022-03-10
Attending: INTERNAL MEDICINE
Payer: MEDICAID

## 2022-03-10 VITALS
HEIGHT: 61 IN | OXYGEN SATURATION: 100 % | BODY MASS INDEX: 25.76 KG/M2 | DIASTOLIC BLOOD PRESSURE: 71 MMHG | HEART RATE: 73 BPM | TEMPERATURE: 98 F | RESPIRATION RATE: 17 BRPM | WEIGHT: 136.44 LBS | SYSTOLIC BLOOD PRESSURE: 118 MMHG

## 2022-03-10 DIAGNOSIS — D50.0 IRON DEFICIENCY ANEMIA DUE TO CHRONIC BLOOD LOSS: Primary | ICD-10-CM

## 2022-03-10 PROCEDURE — 63600175 PHARM REV CODE 636 W HCPCS: Mod: JG | Performed by: INTERNAL MEDICINE

## 2022-03-10 PROCEDURE — 25000003 PHARM REV CODE 250: Performed by: INTERNAL MEDICINE

## 2022-03-10 PROCEDURE — 96365 THER/PROPH/DIAG IV INF INIT: CPT

## 2022-03-10 RX ORDER — SODIUM CHLORIDE 0.9 % (FLUSH) 0.9 %
10 SYRINGE (ML) INJECTION
Status: DISCONTINUED | OUTPATIENT
Start: 2022-03-10 | End: 2022-03-10 | Stop reason: HOSPADM

## 2022-03-10 RX ORDER — METHYLPREDNISOLONE SOD SUCC 125 MG
125 VIAL (EA) INJECTION ONCE AS NEEDED
Status: CANCELLED | OUTPATIENT
Start: 2022-03-10

## 2022-03-10 RX ORDER — EPINEPHRINE 0.3 MG/.3ML
0.3 INJECTION SUBCUTANEOUS ONCE AS NEEDED
Status: DISCONTINUED | OUTPATIENT
Start: 2022-03-10 | End: 2022-03-10 | Stop reason: HOSPADM

## 2022-03-10 RX ORDER — METHYLPREDNISOLONE SOD SUCC 125 MG
125 VIAL (EA) INJECTION ONCE AS NEEDED
Status: DISCONTINUED | OUTPATIENT
Start: 2022-03-10 | End: 2022-03-10 | Stop reason: HOSPADM

## 2022-03-10 RX ORDER — SODIUM CHLORIDE 0.9 % (FLUSH) 0.9 %
10 SYRINGE (ML) INJECTION
Status: CANCELLED | OUTPATIENT
Start: 2022-03-10

## 2022-03-10 RX ORDER — HEPARIN 100 UNIT/ML
500 SYRINGE INTRAVENOUS
Status: CANCELLED | OUTPATIENT
Start: 2022-03-10

## 2022-03-10 RX ORDER — DIPHENHYDRAMINE HYDROCHLORIDE 50 MG/ML
50 INJECTION INTRAMUSCULAR; INTRAVENOUS ONCE AS NEEDED
Status: CANCELLED | OUTPATIENT
Start: 2022-03-10

## 2022-03-10 RX ORDER — EPINEPHRINE 0.3 MG/.3ML
0.3 INJECTION SUBCUTANEOUS ONCE AS NEEDED
Status: CANCELLED | OUTPATIENT
Start: 2022-03-10

## 2022-03-10 RX ORDER — DIPHENHYDRAMINE HYDROCHLORIDE 50 MG/ML
50 INJECTION INTRAMUSCULAR; INTRAVENOUS ONCE AS NEEDED
Status: DISCONTINUED | OUTPATIENT
Start: 2022-03-10 | End: 2022-03-10 | Stop reason: HOSPADM

## 2022-03-10 RX ADMIN — FERUMOXYTOL 510 MG: 510 INJECTION INTRAVENOUS at 12:03

## 2022-03-10 RX ADMIN — SODIUM CHLORIDE: 9 INJECTION, SOLUTION INTRAVENOUS at 12:03

## 2022-03-10 NOTE — PROGRESS NOTES
Arrived for 1/2 doses of fereheme infusion.  Observed patient for 1 hour after infusion no reactions noted patient tolerated infusion well.  Next appointment already scheduled.  Left unit in NAD.

## 2022-03-10 NOTE — PROGRESS NOTES
Upon completion of oberservation time, pt stated she was having some abdominal cramping.  No nausea.  Denied itching, no trouble breathing.  Vitals obtained and stable.  Pt stated she had her own medication and would take if needed to help with the cramping.  Advised patient to go to ED with any symptoms of allergic reaction.  Pt verbalized understanding and left in NAD.  Message sent to Dr. Rutledge to update as well.

## 2022-03-17 ENCOUNTER — INFUSION (OUTPATIENT)
Dept: INFECTIOUS DISEASES | Facility: HOSPITAL | Age: 27
End: 2022-03-17
Attending: INTERNAL MEDICINE
Payer: MEDICAID

## 2022-03-17 VITALS
DIASTOLIC BLOOD PRESSURE: 57 MMHG | WEIGHT: 131.5 LBS | OXYGEN SATURATION: 97 % | BODY MASS INDEX: 24.85 KG/M2 | TEMPERATURE: 98 F | SYSTOLIC BLOOD PRESSURE: 122 MMHG | HEART RATE: 77 BPM

## 2022-03-17 DIAGNOSIS — D50.0 IRON DEFICIENCY ANEMIA DUE TO CHRONIC BLOOD LOSS: Primary | ICD-10-CM

## 2022-03-17 PROCEDURE — 25000003 PHARM REV CODE 250: Performed by: INTERNAL MEDICINE

## 2022-03-17 PROCEDURE — 63600175 PHARM REV CODE 636 W HCPCS: Mod: JG | Performed by: INTERNAL MEDICINE

## 2022-03-17 PROCEDURE — 96365 THER/PROPH/DIAG IV INF INIT: CPT

## 2022-03-17 RX ORDER — EPINEPHRINE 0.3 MG/.3ML
0.3 INJECTION SUBCUTANEOUS ONCE AS NEEDED
OUTPATIENT
Start: 2022-03-17

## 2022-03-17 RX ORDER — DIPHENHYDRAMINE HYDROCHLORIDE 50 MG/ML
50 INJECTION INTRAMUSCULAR; INTRAVENOUS ONCE AS NEEDED
Status: DISCONTINUED | OUTPATIENT
Start: 2022-03-17 | End: 2022-03-17 | Stop reason: HOSPADM

## 2022-03-17 RX ORDER — DIPHENHYDRAMINE HYDROCHLORIDE 50 MG/ML
50 INJECTION INTRAMUSCULAR; INTRAVENOUS ONCE AS NEEDED
OUTPATIENT
Start: 2022-03-17

## 2022-03-17 RX ORDER — SODIUM CHLORIDE 0.9 % (FLUSH) 0.9 %
10 SYRINGE (ML) INJECTION
Status: CANCELLED | OUTPATIENT
Start: 2022-03-17

## 2022-03-17 RX ORDER — EPINEPHRINE 0.3 MG/.3ML
0.3 INJECTION SUBCUTANEOUS ONCE AS NEEDED
Status: DISCONTINUED | OUTPATIENT
Start: 2022-03-17 | End: 2022-03-17 | Stop reason: HOSPADM

## 2022-03-17 RX ORDER — METHYLPREDNISOLONE SOD SUCC 125 MG
125 VIAL (EA) INJECTION ONCE AS NEEDED
Status: DISCONTINUED | OUTPATIENT
Start: 2022-03-17 | End: 2022-03-17 | Stop reason: HOSPADM

## 2022-03-17 RX ORDER — METHYLPREDNISOLONE SOD SUCC 125 MG
125 VIAL (EA) INJECTION ONCE AS NEEDED
OUTPATIENT
Start: 2022-03-17

## 2022-03-17 RX ORDER — SODIUM CHLORIDE 0.9 % (FLUSH) 0.9 %
10 SYRINGE (ML) INJECTION
Status: DISCONTINUED | OUTPATIENT
Start: 2022-03-17 | End: 2022-03-17 | Stop reason: HOSPADM

## 2022-03-17 RX ORDER — HEPARIN 100 UNIT/ML
500 SYRINGE INTRAVENOUS
OUTPATIENT
Start: 2022-03-17

## 2022-03-17 RX ADMIN — FERUMOXYTOL 510 MG: 510 INJECTION INTRAVENOUS at 11:03

## 2022-03-17 NOTE — PROGRESS NOTES
Arrived for 2/2 Fereheme infusion.  Observed patient for 30 minutes post infusion. No signs/symptoms of adverse reaction. Tolerated infusion without difficulty and left unit in NAD.

## 2022-04-11 ENCOUNTER — PATIENT MESSAGE (OUTPATIENT)
Dept: GASTROENTEROLOGY | Facility: CLINIC | Age: 27
End: 2022-04-11
Payer: MEDICAID

## 2022-04-11 ENCOUNTER — SPECIALTY PHARMACY (OUTPATIENT)
Dept: PHARMACY | Facility: CLINIC | Age: 27
End: 2022-04-11
Payer: MEDICAID

## 2022-04-11 NOTE — TELEPHONE ENCOUNTER
Specialty Pharmacy - Refill Coordination    Specialty Medication Orders Linked to Encounter    Flowsheet Row Most Recent Value   Medication #1 ustekinumab (STELARA) 90 mg/mL Syrg syringe (Order#[id number], Rx#[id number])          Refill Questions - Documented Responses    Flowsheet Row Most Recent Value   Patient Availability and HIPAA Verification    Does patient want to proceed with activity? Yes   HIPAA/medical authority confirmed? Yes   Relationship to patient of person spoken to? Self   Refill Screening Questions    Changes to allergies? No   Changes to medications? No   New conditions since last clinic visit? No   Unplanned office visit, urgent care, ED, or hospital admission in the last 4 weeks? No   How does patient/caregiver feel medication is working? Good   Financial problems or insurance changes? No   How many doses of your specialty medications were missed in the last 4 weeks? 0   Would patient like to speak to a pharmacist? No   When does the patient need to receive the medication? 04/19/22   Refill Delivery Questions    How will the patient receive the medication? Delivery Cayla   When does the patient need to receive the medication? 04/19/22   Shipping Address Home   Address in [facility] confirmed and updated if neccessary? Yes   Expected Copay ($) 0   Is the patient able to afford the medication copay? Yes   Payment Method zero copay   Days supply of Refill 56   Supplies needed? No supplies needed   Refill activity completed? Yes   Refill activity plan Refill scheduled   Shipment/Pickup Date: 04/14/22          Current Outpatient Medications   Medication Sig    ondansetron (ZOFRAN-ODT) 4 MG TbDL Take 1 tablet (4 mg total) by mouth every 8 (eight) hours as needed. (Patient not taking: No sig reported)    pantoprazole (PROTONIX) 40 MG tablet Take 1 tablet (40 mg total) by mouth once daily.    promethazine (PHENERGAN) 25 MG tablet Take 1 tablet (25 mg total) by mouth every 6 (six) hours as  needed for Nausea.    TRI-SPRINTEC, 28, 0.18/0.215/0.25 mg-35 mcg (28) tablet Take 1 tablet by mouth once daily.    ustekinumab (STELARA) 90 mg/mL Syrg syringe Inject 1 mL (90 mg total) into the skin every 8 weeks.   Last reviewed on 3/17/2022 12:16 PM by Tash Palacio, RN    Review of patient's allergies indicates:   Allergen Reactions    Latex, natural rubber      Mother states allergic to latex gloves     Adhesive Rash     Band-aids    Last reviewed on  3/17/2022 11:20 AM by Tash Palacio      Tasks added this encounter   6/3/2022 - Refill Call (Auto Added)   Tasks due within next 3 months   No tasks due.     [facility]  [address] [place] [id number]  Phone: [phone]  Fax: [phone]

## 2022-04-13 ENCOUNTER — TELEPHONE (OUTPATIENT)
Dept: GASTROENTEROLOGY | Facility: CLINIC | Age: 27
End: 2022-04-13
Payer: MEDICAID

## 2022-04-13 NOTE — TELEPHONE ENCOUNTER
Spoke to pt. C/o bloating, belching, heartburn and abdominal discomfort no pain. Pt concerns regarding having gallbladder remove.

## 2022-04-13 NOTE — TELEPHONE ENCOUNTER
"Spoke to pt. Stated " to busy to have any additional test done. Only person at work". Pt was frustrated and hang up.  "

## 2022-04-18 ENCOUNTER — PATIENT MESSAGE (OUTPATIENT)
Dept: ADMINISTRATIVE | Facility: OTHER | Age: 27
End: 2022-04-18
Payer: MEDICAID

## 2022-04-25 ENCOUNTER — PATIENT MESSAGE (OUTPATIENT)
Dept: GASTROENTEROLOGY | Facility: CLINIC | Age: 27
End: 2022-04-25
Payer: MEDICAID

## 2022-04-25 DIAGNOSIS — K50.012 CROHN'S DISEASE OF SMALL INTESTINE WITH INTESTINAL OBSTRUCTION: Primary | ICD-10-CM

## 2022-04-25 DIAGNOSIS — R11.2 NON-INTRACTABLE VOMITING WITH NAUSEA, UNSPECIFIED VOMITING TYPE: ICD-10-CM

## 2022-04-25 RX ORDER — PROMETHAZINE HYDROCHLORIDE 25 MG/1
25 TABLET ORAL EVERY 6 HOURS PRN
Qty: 28 TABLET | Refills: 0 | Status: SHIPPED | OUTPATIENT
Start: 2022-04-25 | End: 2022-05-02

## 2022-04-25 NOTE — TELEPHONE ENCOUNTER
Called & spoke to pt  - Reports 6-7 episodes of emesis since 7:10 am  - No additional symptoms aside from a dry mouth  - Pt requesting phenergan RX be sent to [facility]  - Will discuss w/ Dr. Garrido

## 2022-05-02 ENCOUNTER — PATIENT MESSAGE (OUTPATIENT)
Dept: GASTROENTEROLOGY | Facility: CLINIC | Age: 27
End: 2022-05-02
Payer: MEDICAID

## 2022-05-03 ENCOUNTER — PATIENT MESSAGE (OUTPATIENT)
Dept: GASTROENTEROLOGY | Facility: CLINIC | Age: 27
End: 2022-05-03
Payer: MEDICAID

## 2022-05-19 ENCOUNTER — OFFICE VISIT (OUTPATIENT)
Dept: GASTROENTEROLOGY | Facility: CLINIC | Age: 27
End: 2022-05-19
Payer: MEDICAID

## 2022-05-19 DIAGNOSIS — R11.2 NON-INTRACTABLE VOMITING WITH NAUSEA, UNSPECIFIED VOMITING TYPE: ICD-10-CM

## 2022-05-19 DIAGNOSIS — D50.0 IRON DEFICIENCY ANEMIA DUE TO CHRONIC BLOOD LOSS: ICD-10-CM

## 2022-05-19 DIAGNOSIS — K50.012 CROHN'S DISEASE OF SMALL INTESTINE WITH INTESTINAL OBSTRUCTION: Primary | ICD-10-CM

## 2022-05-19 PROCEDURE — 1159F PR MEDICATION LIST DOCUMENTED IN MEDICAL RECORD: ICD-10-PCS | Mod: CPTII,95,, | Performed by: INTERNAL MEDICINE

## 2022-05-19 PROCEDURE — 1160F PR REVIEW ALL MEDS BY PRESCRIBER/CLIN PHARMACIST DOCUMENTED: ICD-10-PCS | Mod: CPTII,95,, | Performed by: INTERNAL MEDICINE

## 2022-05-19 PROCEDURE — 1160F RVW MEDS BY RX/DR IN RCRD: CPT | Mod: CPTII,95,, | Performed by: INTERNAL MEDICINE

## 2022-05-19 PROCEDURE — 99214 PR OFFICE/OUTPT VISIT, EST, LEVL IV, 30-39 MIN: ICD-10-PCS | Mod: 95,,, | Performed by: INTERNAL MEDICINE

## 2022-05-19 PROCEDURE — 99214 OFFICE O/P EST MOD 30 MIN: CPT | Mod: 95,,, | Performed by: INTERNAL MEDICINE

## 2022-05-19 PROCEDURE — 1159F MED LIST DOCD IN RCRD: CPT | Mod: CPTII,95,, | Performed by: INTERNAL MEDICINE

## 2022-05-19 RX ORDER — PROMETHAZINE HYDROCHLORIDE 50 MG/1
50 TABLET ORAL EVERY 4 HOURS
COMMUNITY
End: 2022-05-19

## 2022-05-19 RX ORDER — PROMETHAZINE HYDROCHLORIDE 25 MG/1
TABLET ORAL
COMMUNITY
End: 2022-11-23 | Stop reason: SDUPTHER

## 2022-05-19 RX ORDER — BUPROPION HYDROCHLORIDE 150 MG/1
150 TABLET ORAL DAILY
COMMUNITY
Start: 2022-05-11 | End: 2023-04-17

## 2022-05-19 NOTE — PATIENT INSTRUCTIONS
1. Continue Stelara   2. Get labs in the near future  3. Schedule gastric emptying study whenever you have an opportunity  4. Schedule EGD and colonoscopy sometime in the next few months  5. Follow-up in 4 months

## 2022-05-19 NOTE — PROGRESS NOTES
[facility]          Inflammatory Bowel Disease Follow Up Note         TODAY'S VISIT DATE:  5/19/2022    Reason for Consult:    Chief Complaint   Patient presents with    Crohn's Disease       PCP: Marti Colin      Referring MD:   No ref. provider found    History of Present Illness:  Nina Dickerson who is a 27 y.o. female is being seen today at the [facility] on 05/19/2022 for inflammatory bowel disease- Crohn's disease.  She is here today for a follow-up visit.  She reports that since our last visit she has actually been doing quite well.  She has only had 1 episode of nausea and vomiting that occurred over a few day.  Toward the end of April.  The only thing she can identify that might have triggered this episode was an increase in stress.  Otherwise she reports that her abdominal pain has completely resolved.  She has not been having any nocturnal bowel movements.  Mostly she has about 2 formed bowel movements daily.  She has been able to eat more foods without any issues.  She has started Wellbutrin recently because of her anxiety issues.  She has not had any issues with Stelara.    IBD History:  She has a long history of suspected Crohn's disease but the diagnosis has not been clearly made.  When she was a child she had significant gastrointestinal issues.  In 2014 she had upper endoscopy performed for upper abdominal discomfort and reflux symptoms.  This was fairly unremarkable including biopsies.  She was started on Protonix and had some improvement.  In 2015 she was seen again because of ongoing issues and had an upper endoscopy and colonoscopy at that time.  The upper endoscopy revealed some mild erosions in the duodenum and biopsies were consistent with acute inflammation.  The colonoscopy showed congestion of the ileocecal valve and erythema, congestion, and friability of the terminal ileum.  Biopsies of the colon were  normal.  Biopsy of the terminal ileum showed no inflammation.  She was treated with budesonide and had some improvement in symptoms.  A repeat scope in 2016 was completely normal including biopsies.  I saw her when I was at [facility] in 2018. She had ongoing symptoms that seem to be more consistent with chronic constipation issues as well as some functional gastrointestinal issues.  She was started on dicyclomine and this cause an increase in rectal bleeding so it was stopped.  With an increase in water she began having better bowel movements and most of her symptoms improved.  Pantoprazole also help with her upper GI issues.  A repeat upper endoscopy and colonoscopy were performed.  The upper endoscopy was unrevealing.  The colonoscopy did show some erythematous mucosa in the terminal ileum.  Biopsies were not consistent with Crohn's but did show a significant amount of eosinophils within the terminal ileum as well as in the colon (colon was normal appearing).  In January of 2021 she began having more rectal bleeding issues as well as worsening vomiting, increased right lower quadrant epigastric pain, and more diarrhea.  She went to the emergency room and had labs done that were unrevealing.  A CT scan was also unrevealing.  A colonoscopy that was done in January revealed some congestion and inflammatory polyps in the terminal ileum as well as a suspected ileal stricture.  The colon was completely normal.  Biopsies were not consistent with Crohn's disease and only showed 1 area of active inflammation in the terminal ileum.  A subsequent MR enterography showed some thickening of the terminal ileum with some mild inflammatory changes as well mild narrowing with some upstream dilation of the ileum.  She was started on budesonide 9 mg daily which provided minimal improvement.  In July 2021 she underwent a repeat colonoscopy that again showed ileitis as well as some narrowing of the terminal ileum which was able to be  traversed with the colonoscopy scope.  Biopsies showed chronic inflammatory changes consistent with Crohn's disease.  She started Stelara on November 4, 2021.    IBD Detail:  Dx Date:  Symptoms since 2014, diagnosis confirmed in 2021  Disease type/distribution:  Crohn's Disease/ileal inflammation  Current Treatment:  Stelara 90 mg every 8 weeks Budesonide 9 mg daily  Start Date:  November 4, 2021/Several months ago  Response:  Mild improvement  Optimized:  Not yet  Adverse reactions:  None  Prior surgeries:  None  CRP Elevation:  No  calprotectin:  Mild elevation  Disease Complications:  Mild stenosis of the terminal ileum  Extraintestinal manifestations:  Back pain  Prior treatments:   Steroids:  Partial response to budesonide  5ASA:  No response  IMM:  None  TNF Inh:  None   Anti-Integrin:  None   IL 12/23:  None  COURTNEY Inh:  None    Previous Clinical Trials:  None    Last Colonoscopy:  July 2021-inflammatory changes of the terminal ileum and mild stenosis    Other Endoscopies:  Previous EGD reports reviewed    Imaging:   MRE:  March 2021-inflammatory changes of the terminal ileum with suspected stricture   CT:  Previous studies reviewed   Other:  Previous studies reviewed    Pertinent Labs:  Lab Results   Component Value Date    SEDRATE 7 04/26/2017    CRP 2.7 01/28/2022     Lab Results   Component Value Date    TTGIGA 11 04/26/2017     04/26/2017     No results found for: TSH, FREET4  Lab Results   Component Value Date    GPTTFSDP85IN 21 (L) 08/23/2021    IPHDLFPV35 206 (L) 12/16/2021     Lab Results   Component Value Date    HEPBSAG Negative 08/23/2021    HEPBCAB Negative 08/23/2021    HEPCAB Negative 08/23/2021     Lab Results   Component Value Date    ODL24XDCS Negative 08/23/2021     Lab Results   Component Value Date    NIL 0.030 08/23/2021    TBAG 0.04 12/18/2015    TBAGNIL 0.00 12/18/2015    MITOGENNIL 8.070 08/23/2021    TBGOLD Negative 12/18/2015     Lab Results   Component Value Date    TPMTRESULT  27.6 12/10/2015     No results found for: STOOLCULTURE, OTMUEHJPER3I, UCJFUBGTZS6Q, CDIFFICILEAN, CDIFFTOX, CDIFFICILEBY  Lab Results   Component Value Date    CALPROTECTIN 40.8 02/24/2022       Therapeutic Drug Monitoring Labs:  No results found for: PROMETH  No results found for: ANSADAINIT, INFLIXIMAB, INFLIXINTERP    Vaccinations:  Lab Results   Component Value Date    HEPBSAB Negative 08/23/2021     Lab Results   Component Value Date    HEPAIGG Negative 08/23/2021     Lab Results   Component Value Date    VARICELLAZOS 2.45 (H) 08/23/2021    VARICELLAINT Positive (A) 08/23/2021     Immunization History   Administered Date(s) Administered    DTP 1997, 1997, 1997, 08/14/1998, 08/25/2001    DTaP 1997, 1997, 1997, 08/14/1998, 08/25/2001    Hep B Immune Globulin 1997    Hepatitis B 1997, 05/12/1998, 08/14/1998    Hepatitis B, Pediatric/Adolescent 1997    HiB PRP-OMP 1997, 1997, 08/14/1998    Hib-HbOC 1997, 1997, 08/14/1998    IPV 1997, 1997, 08/14/1998, 08/25/2001    MMR 05/12/1998, 08/25/2001    Meningococcal 09/24/2008, 07/29/2015    Meningococcal Conjugate (MCV4P) 09/24/2008, 07/29/2015    OPV 1997, 1997, 08/14/1998, 08/25/2001    Pneumococcal Conjugate - 7 Valent 1997, 1997    Tdap 09/24/2008, 02/20/2019    Varicella 1997, 05/12/1998, 08/14/1998         Review of Systems  Review of Systems   Constitutional: Negative for chills, fever and weight loss.   HENT: Negative for sore throat.    Eyes: Negative for pain, discharge and redness.   Respiratory: Negative for cough, shortness of breath and wheezing.    Cardiovascular: Negative for chest pain, orthopnea and leg swelling.   Gastrointestinal: Positive for nausea. Negative for abdominal pain, blood in stool, constipation, diarrhea, heartburn, melena and vomiting.   Genitourinary: Negative for dysuria, frequency and urgency.    Musculoskeletal: Positive for back pain. Negative for joint pain and myalgias.   Skin: Negative for itching and rash.   Neurological: Negative for focal weakness and seizures.   Endo/Heme/Allergies: Does not bruise/bleed easily.   Psychiatric/Behavioral: Negative for depression. The patient is nervous/anxious.        Medical History:   Past Medical History:   Diagnosis Date    Crohn's disease     Insomnia        Surgical History:  Past Surgical History:   Procedure Laterality Date    COLONOSCOPY N/A 12/10/2015    Procedure: COLONOSCOPY;  Surgeon: Tala Gonzalez MD;  Location: [facility] (2ND FLR);  Service: Endoscopy;  Laterality: N/A;    COLONOSCOPY N/A 4/21/2016    Procedure: COLONOSCOPY;  Surgeon: Tala Gonzalez MD;  Location: [facility] (2ND FLR);  Service: Endoscopy;  Laterality: N/A;    COLONOSCOPY N/A 1/21/2021    Procedure: COLONOSCOPY;  Surgeon: True Treadwell MD;  Location: [facility];  Service: General;  Laterality: N/A;    COLONOSCOPY N/A 7/8/2021    Procedure: COLONOSCOPY;  Surgeon: Talib Rutledge MD;  Location: [facility] (4TH FLR);  Service: Endoscopy;  Laterality: N/A;  [place]    COLONOSCOPY W/ BIOPSIES  01/21/2021    ESOPHAGOGASTRODUODENOSCOPY      ESOPHAGOGASTRODUODENOSCOPY N/A 7/8/2021    Procedure: EGD (ESOPHAGOGASTRODUODENOSCOPY);  Surgeon: Talib Rutledge MD;  Location: [facility] (4TH FLR);  Service: Endoscopy;  Laterality: N/A;  [place]ov[place]  7/6 returned pt's call- lvm with arrival time-rb  patient aware she moved up 30 minutes from 930am to 900am, patient aware to be here at 8am 7/8/21 - bijan    TONSILLECTOMY      TYMPANOSTOMY TUBE PLACEMENT         Family History:   Family History   Problem Relation Age of Onset    Lupus Mother     Heart attacks under age 50 Father     Heart disease Father     Hypertension Father     Cardiomyopathy Maternal Grandmother     Dementia Maternal Grandfather      Heart disease Paternal Grandmother     Heart disease Paternal Grandfather     No Known Problems Sister        Social History:   Social History     Tobacco Use    Smoking status: Never Smoker    Smokeless tobacco: Never Used    Tobacco comment: mom smokes   Substance Use Topics    Alcohol use: No    Drug use: No       Allergies: Reviewed    Home Medications:   Medication List with Changes/Refills   Current Medications    BUPROPION (WELLBUTRIN XL) 150 MG TB24 TABLET    Take 150 mg by mouth once daily.    PROMETHAZINE (PHENERGAN) 25 MG TABLET    promethazine 25 mg tablet    TRI-SPRINTEC, 28, 0.18/0.215/0.25 MG-35 MCG (28) TABLET    Take 1 tablet by mouth once daily.    USTEKINUMAB (STELARA) 90 MG/ML SYRG SYRINGE    Inject 1 mL (90 mg total) into the skin every 8 weeks.   Discontinued Medications    ONDANSETRON (ZOFRAN-ODT) 4 MG TBDL    Take 1 tablet (4 mg total) by mouth every 8 (eight) hours as needed.    PANTOPRAZOLE (PROTONIX) 40 MG TABLET    Take 1 tablet (40 mg total) by mouth once daily.    PROMETHAZINE (PHENERGAN) 50 MG TABLET    Take 50 mg by mouth every 4 (four) hours.       Physical Exam:  Vital Signs:  There were no vitals taken for this visit.  There is no height or weight on file to calculate BMI.    Physical Exam  Nursing note reviewed.   Constitutional:       General: She is not in acute distress.     Appearance: Normal appearance. She is well-developed. She is not ill-appearing or toxic-appearing.   Skin:     Findings: No rash.   Neurological:      General: No focal deficit present.      Mental Status: She is alert and oriented to person, place, and time.   Psychiatric:         Mood and Affect: Mood normal.         Behavior: Behavior normal.         Thought Content: Thought content normal.         Judgment: Judgment normal.     Telemedicine visit. Remainder of physical unable to be completed.    Labs: reviewed and pertinent noted above    Assessment/Plan:  Nina Dickerson is a 27 y.o. female  with Crohn's disease. The following issues were addresssed:    1. Crohn's disease of small intestine with intestinal obstruction    2. Iron deficiency anemia due to chronic blood loss    3. Non-intractable vomiting with nausea, unspecified vomiting type      1. Crohn's disease:  Overall she is doing significantly better.  I recommend that she continue Stelara.  We will update her labs in the near future.  We will arrange for a colonoscopy sometime in the near future to reassess her disease activity now that she has been on treatment for about 6 months.    2. Iron deficiency:  Iron infusions were completed.  We will plan to recheck iron studies with her next labs    3. Heartburn:  Continue Protonix.  Improved.    4. Non intractable vomiting:  We will arrange for gastric emptying study whenever possible with her work schedule.  We will also further assess with EGD at the time of her colonoscopy.    5. Gallstones:  These did not seem to be causing any significant issues right now.  If her EGD and gastric emptying study are unrevealing and her intermittent episodes of nausea and vomiting persists then we may need to consider surgical evaluation.    6. Liver lesions:  Consistent with hemangiomas.         # IBD specific health maintenance:  Colon cancer surveillance:  No colonic disease    Annual:  - Eye exam:  Not applicable  - Skin exam (if on IMM/TNF):  Recommend annual skin exam  - reminded pt to use sunblock/hats/sunprotective clothing  - PAP (if immunosuppressed):  Recommend annual exam with gynecologist    DEXA: N/A    Vitamin D:  Recommend supplementation-recheck in the near future    Vaccines:    Influenza:  Recommend annual vaccination   Pneumovax:     PCV13:  Discuss in the future    PSV23:  Discuss in the future   HAV:  Check serology   HBV:  Check serology   Tdap:  Up-to-date   MMR:  Completed series   VZV:  Completed series-check serology   HZV:  Not applicable   HPV:  Discuss in the future   Meningococcus:   Completed series   COVID:  Recommend vaccination    Follow up: Follow up in about 4 months (around 9/19/2022).    Thank you again for sending Nina Dickerson to see Dr. Hoang Rutledge today at the [facility]. Please don't hesitate to contact Dr. Rutledge if there are any questions regarding this evaluation, or if you have any other patients with inflammatory bowel disease for whom you would like a consultation. You can reach Dr. Rutledge at [phone] or by email at [email or web address]    Talib Rutledge MD  Department of Gastroenterology  Inflammatory Bowel Disease      The patient location is: [place]  The chief complaint leading to consultation is: Crohn's disease    Visit type: audiovisual    Face to Face time with patient: 15  25 minutes of total time spent on the encounter, which includes face to face time and non-face to face time preparing to see the patient (eg, review of tests), Obtaining and/or reviewing separately obtained history, Documenting clinical information in the electronic or other health record, Independently interpreting results (not separately reported) and communicating results to the patient/family/caregiver, or Care coordination (not separately reported).         Each patient to whom he or she provides medical services by telemedicine is:  (1) informed of the relationship between the physician and patient and the respective role of any other health care provider with respect to management of the patient; and (2) notified that he or she may decline to receive medical services by telemedicine and may withdraw from such care at any time.    Notes:

## 2022-06-03 ENCOUNTER — PATIENT MESSAGE (OUTPATIENT)
Dept: GASTROENTEROLOGY | Facility: CLINIC | Age: 27
End: 2022-06-03
Payer: MEDICAID

## 2022-06-03 ENCOUNTER — SPECIALTY PHARMACY (OUTPATIENT)
Dept: PHARMACY | Facility: CLINIC | Age: 27
End: 2022-06-03
Payer: MEDICAID

## 2022-06-03 DIAGNOSIS — K50.012 CROHN'S DISEASE OF SMALL INTESTINE WITH INTESTINAL OBSTRUCTION: Primary | ICD-10-CM

## 2022-06-03 NOTE — TELEPHONE ENCOUNTER
Spoke with patient for stelara refill. Script out of refills, MD faxed. Patient due to inject 6/21. Will follow up 6/14.

## 2022-06-03 NOTE — TELEPHONE ENCOUNTER
Patient called back to clarify that Stelara dose is due on 6/16 not 6/21 as previously reported. Updated refill call date and advised that [facility] will follow up once a prescription is received.

## 2022-06-06 ENCOUNTER — TELEPHONE (OUTPATIENT)
Dept: GASTROENTEROLOGY | Facility: CLINIC | Age: 27
End: 2022-06-06
Payer: MEDICAID

## 2022-06-06 RX ORDER — USTEKINUMAB 90 MG/ML
90 INJECTION, SOLUTION SUBCUTANEOUS
Qty: 1 ML | Refills: 2 | Status: SHIPPED | OUTPATIENT
Start: 2022-06-06 | End: 2022-11-21 | Stop reason: SDUPTHER

## 2022-06-06 NOTE — TELEPHONE ENCOUNTER
Spoke with patient and advised she is due for labwork.  States that she is working 6 days/week right now and will likely be doing so for at least another 3 weeks and cannot get off to have labs drawn  Advised I would speak to Dr. Rutledge so as not to have her injections get off schedule.

## 2022-06-06 NOTE — TELEPHONE ENCOUNTER
----- Message from Eula Gallo sent at 6/6/2022 10:52 AM CDT -----  Regarding: call back  Type:  Patient Returning Call    Who Called: patient   Who Left Message for Patient:office   Does the patient know what this is regarding?: missed call   Would the patient rather a call back or a response via [facility]? Call back   Best Call Back Number:[phone]  Additional Information: n/a

## 2022-06-09 ENCOUNTER — TELEPHONE (OUTPATIENT)
Dept: GASTROENTEROLOGY | Facility: CLINIC | Age: 27
End: 2022-06-09
Payer: MEDICAID

## 2022-06-09 ENCOUNTER — PATIENT MESSAGE (OUTPATIENT)
Dept: GASTROENTEROLOGY | Facility: CLINIC | Age: 27
End: 2022-06-09
Payer: MEDICAID

## 2022-06-09 NOTE — TELEPHONE ENCOUNTER
----- Message from Anastasia Spear sent at 6/9/2022 12:53 PM CDT -----  Regarding: Pt Inquiry  Type:  Needs Medical Advice    Who Called: pt  Would the patient rather a call back or a response via [facility]? call  Best Call Back Number: [phone]  Additional Information: questions about lab work and injection projected to take place next week. Also was called by pharmacy to be informed she needed new PS

## 2022-06-13 ENCOUNTER — LAB VISIT (OUTPATIENT)
Dept: LAB | Facility: HOSPITAL | Age: 27
End: 2022-06-13
Attending: INTERNAL MEDICINE
Payer: MEDICAID

## 2022-06-13 DIAGNOSIS — K50.012 CROHN'S DISEASE OF SMALL INTESTINE WITH INTESTINAL OBSTRUCTION: ICD-10-CM

## 2022-06-13 DIAGNOSIS — D50.0 IRON DEFICIENCY ANEMIA DUE TO CHRONIC BLOOD LOSS: ICD-10-CM

## 2022-06-13 LAB
25(OH)D3+25(OH)D2 SERPL-MCNC: 11 NG/ML (ref 30–96)
ALBUMIN SERPL BCP-MCNC: 4.1 G/DL (ref 3.5–5.2)
ALP SERPL-CCNC: 49 U/L (ref 55–135)
ALT SERPL W/O P-5'-P-CCNC: 13 U/L (ref 10–44)
ANION GAP SERPL CALC-SCNC: 9 MMOL/L (ref 8–16)
AST SERPL-CCNC: 16 U/L (ref 10–40)
BASOPHILS # BLD AUTO: 0.04 K/UL (ref 0–0.2)
BASOPHILS NFR BLD: 0.9 % (ref 0–1.9)
BILIRUB SERPL-MCNC: 0.2 MG/DL (ref 0.1–1)
BUN SERPL-MCNC: 5 MG/DL (ref 6–20)
CALCIUM SERPL-MCNC: 9.3 MG/DL (ref 8.7–10.5)
CHLORIDE SERPL-SCNC: 107 MMOL/L (ref 95–110)
CO2 SERPL-SCNC: 23 MMOL/L (ref 23–29)
CREAT SERPL-MCNC: 0.7 MG/DL (ref 0.5–1.4)
CRP SERPL-MCNC: 1.1 MG/L (ref 0–8.2)
DIFFERENTIAL METHOD: ABNORMAL
EOSINOPHIL # BLD AUTO: 0.2 K/UL (ref 0–0.5)
EOSINOPHIL NFR BLD: 4.1 % (ref 0–8)
ERYTHROCYTE [DISTWIDTH] IN BLOOD BY AUTOMATED COUNT: 14.2 % (ref 11.5–14.5)
EST. GFR  (AFRICAN AMERICAN): >60 ML/MIN/1.73 M^2
EST. GFR  (NON AFRICAN AMERICAN): >60 ML/MIN/1.73 M^2
FERRITIN SERPL-MCNC: 60 NG/ML (ref 20–300)
GLUCOSE SERPL-MCNC: 87 MG/DL (ref 70–110)
HCT VFR BLD AUTO: 42.3 % (ref 37–48.5)
HGB BLD-MCNC: 13.3 G/DL (ref 12–16)
IMM GRANULOCYTES # BLD AUTO: 0.01 K/UL (ref 0–0.04)
IMM GRANULOCYTES NFR BLD AUTO: 0.2 % (ref 0–0.5)
IRON SERPL-MCNC: 44 UG/DL (ref 30–160)
LYMPHOCYTES # BLD AUTO: 1.2 K/UL (ref 1–4.8)
LYMPHOCYTES NFR BLD: 24.6 % (ref 18–48)
MCH RBC QN AUTO: 28.2 PG (ref 27–31)
MCHC RBC AUTO-ENTMCNC: 31.4 G/DL (ref 32–36)
MCV RBC AUTO: 90 FL (ref 82–98)
MONOCYTES # BLD AUTO: 0.4 K/UL (ref 0.3–1)
MONOCYTES NFR BLD: 7.7 % (ref 4–15)
NEUTROPHILS # BLD AUTO: 2.9 K/UL (ref 1.8–7.7)
NEUTROPHILS NFR BLD: 62.5 % (ref 38–73)
NRBC BLD-RTO: 0 /100 WBC
PLATELET # BLD AUTO: 182 K/UL (ref 150–450)
PMV BLD AUTO: 12.4 FL (ref 9.2–12.9)
POTASSIUM SERPL-SCNC: 3.9 MMOL/L (ref 3.5–5.1)
PROT SERPL-MCNC: 7.2 G/DL (ref 6–8.4)
RBC # BLD AUTO: 4.72 M/UL (ref 4–5.4)
SATURATED IRON: 13 % (ref 20–50)
SODIUM SERPL-SCNC: 139 MMOL/L (ref 136–145)
TOTAL IRON BINDING CAPACITY: 343 UG/DL (ref 250–450)
TRANSFERRIN SERPL-MCNC: 232 MG/DL (ref 200–375)
VIT B12 SERPL-MCNC: 467 PG/ML (ref 210–950)
WBC # BLD AUTO: 4.68 K/UL (ref 3.9–12.7)

## 2022-06-13 PROCEDURE — 84466 ASSAY OF TRANSFERRIN: CPT | Performed by: INTERNAL MEDICINE

## 2022-06-13 PROCEDURE — 80053 COMPREHEN METABOLIC PANEL: CPT | Performed by: INTERNAL MEDICINE

## 2022-06-13 PROCEDURE — 86480 TB TEST CELL IMMUN MEASURE: CPT | Performed by: INTERNAL MEDICINE

## 2022-06-13 PROCEDURE — 86706 HEP B SURFACE ANTIBODY: CPT | Performed by: INTERNAL MEDICINE

## 2022-06-13 PROCEDURE — 82306 VITAMIN D 25 HYDROXY: CPT | Performed by: INTERNAL MEDICINE

## 2022-06-13 PROCEDURE — 87340 HEPATITIS B SURFACE AG IA: CPT | Performed by: INTERNAL MEDICINE

## 2022-06-13 PROCEDURE — 86704 HEP B CORE ANTIBODY TOTAL: CPT | Performed by: INTERNAL MEDICINE

## 2022-06-13 PROCEDURE — 82728 ASSAY OF FERRITIN: CPT | Performed by: INTERNAL MEDICINE

## 2022-06-13 PROCEDURE — 86140 C-REACTIVE PROTEIN: CPT | Performed by: INTERNAL MEDICINE

## 2022-06-13 PROCEDURE — 82607 VITAMIN B-12: CPT | Performed by: INTERNAL MEDICINE

## 2022-06-13 PROCEDURE — 85025 COMPLETE CBC W/AUTO DIFF WBC: CPT | Performed by: INTERNAL MEDICINE

## 2022-06-13 NOTE — TELEPHONE ENCOUNTER
NGUYỄN brownlee for continuation of therapy (Stelara 90mg every 8 weeks) faxed to plan ([phone]).

## 2022-06-13 NOTE — TELEPHONE ENCOUNTER
Incoming call from pt to check status of Stelara. Pt stated she's due on 6/16 for injection. Informed pt we are waiting on approval from insurance on PA. Once we have approval we will call pt to set up refill. Pt voiced understanding.

## 2022-06-14 LAB
GAMMA INTERFERON BACKGROUND BLD IA-ACNC: 0.02 IU/ML
HBV CORE AB SERPL QL IA: NEGATIVE
HBV SURFACE AG SERPL QL IA: NEGATIVE
M TB IFN-G CD4+ BCKGRND COR BLD-ACNC: 0.01 IU/ML
MITOGEN IGNF BCKGRD COR BLD-ACNC: 8.13 IU/ML
TB GOLD PLUS: NEGATIVE
TB2 - NIL: -0 IU/ML

## 2022-06-15 NOTE — TELEPHONE ENCOUNTER
Incoming call from patient regarding status of Stelara reauth. Informed her that insurance denied due to Stelara being non-preferred as patient has not have a trial of TNFi (Humira). Patient stated understanding and informed pharmacist that this was the same reason as last fall which was overturned on appeal. Informed her that [facility] will submit urgent reconsideration request to plan.    She states that prior to starting Stelara her symptoms and disease activity were such that she was unable to work. Since starting the Stelara she reports that she is doing so much better -- symptomatic remission. She informed me that she did repeat labs earlier this week (HepB/TB, CRP, etc), and agrees to follow up from [facility] later this week

## 2022-06-16 ENCOUNTER — SPECIALTY PHARMACY (OUTPATIENT)
Dept: PHARMACY | Facility: CLINIC | Age: 27
End: 2022-06-16
Payer: MEDICAID

## 2022-06-16 LAB
HBV SURFACE AB SER QL IA: NEGATIVE
HBV SURFACE AB SERPL IA-ACNC: 3 MIU/ML

## 2022-06-16 NOTE — TELEPHONE ENCOUNTER
Specialty Pharmacy - Refill Coordination    Specialty Medication Orders Linked to Encounter    Flowsheet Row Most Recent Value   Medication #1 ustekinumab (STELARA) 90 mg/mL Syrg syringe (Order#[id number], Rx#[id number])          Refill Questions - Documented Responses    Flowsheet Row Most Recent Value   Patient Availability and HIPAA Verification    Does patient want to proceed with activity? Yes   HIPAA/medical authority confirmed? Yes   Relationship to patient of person spoken to? Self   Refill Screening Questions    Changes to allergies? No   Changes to medications? No   New conditions since last clinic visit? No   Unplanned office visit, urgent care, ED, or hospital admission in the last 4 weeks? No   How does patient/caregiver feel medication is working? Very good   Financial problems or insurance changes? No   How many doses of your specialty medications were missed in the last 4 weeks? 0   Would patient like to speak to a pharmacist? No   When does the patient need to receive the medication? 06/17/22   Refill Delivery Questions    How will the patient receive the medication? Delivery Cayla   When does the patient need to receive the medication? 06/17/22   Shipping Address Home   Address in [facility] confirmed and updated if neccessary? Yes   Expected Copay ($) 0   Is the patient able to afford the medication copay? Yes   Payment Method zero copay   Days supply of Refill 56   Supplies needed? No supplies needed   Refill activity completed? Yes   Refill activity plan Refill scheduled   Shipment/Pickup Date: 06/17/22          Current Outpatient Medications   Medication Sig    buPROPion (WELLBUTRIN XL) 150 MG TB24 tablet Take 150 mg by mouth once daily.    promethazine (PHENERGAN) 25 MG tablet promethazine 25 mg tablet    TRI-SPRINTEC, 28, 0.18/0.215/0.25 mg-35 mcg (28) tablet Take 1 tablet by mouth once daily.    ustekinumab (STELARA) 90 mg/mL Syrg syringe Inject 1 mL (90 mg total) into the skin  every 8 weeks.   Last reviewed on 5/19/2022  1:39 PM by Talib Rutledge MD    Review of patient's allergies indicates:   Allergen Reactions    Latex, natural rubber      Mother states allergic to latex gloves     Adhesive Rash     Band-aids    Last reviewed on  5/19/2022 1:39 PM by Talib Rutledge      Tasks added this encounter   8/3/2022 - Refill Call (Auto Added)   Tasks due within next 3 months   No tasks due.     Marta [facility]  [address] [place] [id number]  Phone: [phone]  Fax: [phone]

## 2022-06-16 NOTE — TELEPHONE ENCOUNTER
Specialty Pharmacy - Refill Coordination    Specialty Medication Orders Linked to Encounter    Flowsheet Row Most Recent Value   Medication #1 ustekinumab (STELARA) 90 mg/mL Syrg syringe (Order#[id number], Rx#[id number])          Refill Questions - Documented Responses    Flowsheet Row Most Recent Value   Patient Availability and HIPAA Verification    Does patient want to proceed with activity? Yes   HIPAA/medical authority confirmed? Yes   Relationship to patient of person spoken to? Self   Refill Screening Questions    Changes to allergies? No   Changes to medications? Yes  [bupropion]   New conditions since last clinic visit? No   Unplanned office visit, urgent care, ED, or hospital admission in the last 4 weeks? No   How does patient/caregiver feel medication is working? Very good   Financial problems or insurance changes? No   How many doses of your specialty medications were missed in the last 4 weeks? 0   Would patient like to speak to a pharmacist? No   When does the patient need to receive the medication? 06/17/22   Refill Delivery Questions    How will the patient receive the medication? Delivery Cayla   When does the patient need to receive the medication? 06/17/22   Shipping Address Home   Address in [facility] confirmed and updated if neccessary? Yes   Expected Copay ($) 0   Is the patient able to afford the medication copay? Yes   Payment Method zero copay   Days supply of Refill 56   Supplies needed? No supplies needed   Refill activity completed? Yes   Refill activity plan Refill scheduled   Shipment/Pickup Date: 06/17/22          Current Outpatient Medications   Medication Sig    buPROPion (WELLBUTRIN XL) 150 MG TB24 tablet Take 150 mg by mouth once daily.    promethazine (PHENERGAN) 25 MG tablet promethazine 25 mg tablet    TRI-SPRINTEC, 28, 0.18/0.215/0.25 mg-35 mcg (28) tablet Take 1 tablet by mouth once daily.    ustekinumab (STELARA) 90 mg/mL Syrg syringe Inject 1 mL (90 mg total)  into the skin every 8 weeks.   Last reviewed on 5/19/2022  1:39 PM by Talib Rutledge MD    Review of patient's allergies indicates:   Allergen Reactions    Latex, natural rubber      Mother states allergic to latex gloves     Adhesive Rash     Band-aids    Last reviewed on  5/19/2022 1:39 PM by Talib Rutledge    Interventions added this encounter   Closed: [facility] Patient Intervention - Drug safety     Tasks added this encounter   8/3/2022 - Refill Call (Auto Added)   Tasks due within next 3 months   No tasks due.     Marta Jerome  [facility]  [address] [facility] [address]  Phone: [phone]  Fax: [phone]

## 2022-06-20 ENCOUNTER — TELEPHONE (OUTPATIENT)
Dept: GASTROENTEROLOGY | Facility: CLINIC | Age: 27
End: 2022-06-20
Payer: MEDICAID

## 2022-06-20 DIAGNOSIS — Z01.812 PRE-PROCEDURE LAB EXAM: ICD-10-CM

## 2022-06-20 DIAGNOSIS — Z12.11 SPECIAL SCREENING FOR MALIGNANT NEOPLASMS, COLON: Primary | ICD-10-CM

## 2022-06-20 RX ORDER — POLYETHYLENE GLYCOL 3350, SODIUM SULFATE ANHYDROUS, SODIUM BICARBONATE, SODIUM CHLORIDE, POTASSIUM CHLORIDE 236; 22.74; 6.74; 5.86; 2.97 G/4L; G/4L; G/4L; G/4L; G/4L
4 POWDER, FOR SOLUTION ORAL ONCE
Qty: 4000 ML | Refills: 0 | Status: SHIPPED | OUTPATIENT
Start: 2022-06-20 | End: 2022-06-20

## 2022-06-20 NOTE — TELEPHONE ENCOUNTER
----- Message from Noelle Fontanez sent at 6/20/2022  9:38 AM CDT -----  Contact: self @ [phone]  Pt is scheduled for a colonoscopy on 8-11-22.  Pt says she is due to take her ustekinumab (STELARA) 90 mg/mL Syrg syringe on 8-12-22 but says she needed to have the colonoscopy to get the medication.  She says she tried to schedule an earlier appt for the colonoscopy but that is the soonest she could get.  She is concerned about getting her medication.  She says she is not sure if her insurance will approve the medication right away after she has the colonoscopy or if they will make her wait until the results are ready before they will approve it.  Pls call.

## 2022-06-20 NOTE — TELEPHONE ENCOUNTER
Spoke to pt. Corrine scheduled and currently has Stelara refills. Pt verbalizes understanding. No other concerns at this time.

## 2022-07-19 ENCOUNTER — PATIENT MESSAGE (OUTPATIENT)
Dept: ENDOSCOPY | Facility: HOSPITAL | Age: 27
End: 2022-07-19
Payer: MEDICAID

## 2022-07-20 DIAGNOSIS — K50.012 CROHN'S DISEASE OF SMALL INTESTINE WITH INTESTINAL OBSTRUCTION: Primary | ICD-10-CM

## 2022-07-20 RX ORDER — PROMETHAZINE HYDROCHLORIDE 25 MG/1
25 TABLET ORAL EVERY 6 HOURS PRN
Qty: 30 TABLET | Refills: 0 | Status: SHIPPED | OUTPATIENT
Start: 2022-07-20 | End: 2022-11-08 | Stop reason: SDUPTHER

## 2022-08-01 DIAGNOSIS — Z12.11 COLON CANCER SCREENING: Primary | ICD-10-CM

## 2022-08-01 RX ORDER — POLYETHYLENE GLYCOL 3350, SODIUM SULFATE ANHYDROUS, SODIUM BICARBONATE, SODIUM CHLORIDE, POTASSIUM CHLORIDE 236; 22.74; 6.74; 5.86; 2.97 G/4L; G/4L; G/4L; G/4L; G/4L
4 POWDER, FOR SOLUTION ORAL ONCE
Qty: 4000 ML | Refills: 0 | Status: SHIPPED | OUTPATIENT
Start: 2022-08-01 | End: 2022-08-01

## 2022-08-03 ENCOUNTER — SPECIALTY PHARMACY (OUTPATIENT)
Dept: PHARMACY | Facility: CLINIC | Age: 27
End: 2022-08-03
Payer: MEDICAID

## 2022-08-03 ENCOUNTER — PATIENT MESSAGE (OUTPATIENT)
Dept: PHARMACY | Facility: CLINIC | Age: 27
End: 2022-08-03
Payer: MEDICAID

## 2022-08-03 ENCOUNTER — PATIENT MESSAGE (OUTPATIENT)
Dept: ENDOSCOPY | Facility: HOSPITAL | Age: 27
End: 2022-08-03
Payer: MEDICAID

## 2022-08-03 NOTE — TELEPHONE ENCOUNTER
Specialty Pharmacy - Refill Coordination    Specialty Medication Orders Linked to Encounter    Flowsheet Row Most Recent Value   Medication #1 ustekinumab (STELARA) 90 mg/mL Syrg syringe (Order#[id number], Rx#[id number])          Refill Questions - Documented Responses    Flowsheet Row Most Recent Value   Patient Availability and HIPAA Verification    Does patient want to proceed with activity? Yes   HIPAA/medical authority confirmed? Yes   Relationship to patient of person spoken to? Self   Refill Screening Questions    Changes to allergies? No   Changes to medications? No   New conditions since last clinic visit? No   Unplanned office visit, urgent care, ED, or hospital admission in the last 4 weeks? No   How does patient/caregiver feel medication is working? Excellent   Financial problems or insurance changes? No   How many doses of your specialty medications were missed in the last 4 weeks? 0   Would patient like to speak to a pharmacist? No   When does the patient need to receive the medication? 08/12/22   Refill Delivery Questions    How will the patient receive the medication? Delivery Cayla   When does the patient need to receive the medication? 08/12/22   Shipping Address Home   Address in [facility] confirmed and updated if neccessary? Yes   Expected Copay ($) 0   Is the patient able to afford the medication copay? Yes   Payment Method zero copay   Days supply of Refill 56   Supplies needed? No supplies needed   Refill activity completed? Yes   Refill activity plan Refill scheduled   Shipment/Pickup Date: 08/10/22          Current Outpatient Medications   Medication Sig    buPROPion (WELLBUTRIN XL) 150 MG TB24 tablet Take 150 mg by mouth once daily.    promethazine (PHENERGAN) 25 MG tablet promethazine 25 mg tablet    promethazine (PHENERGAN) 25 MG tablet Take 1 tablet (25 mg total) by mouth every 6 (six) hours as needed for Nausea.    TRI-SPRINTEC, 28, 0.18/0.215/0.25 mg-35 mcg (28) tablet Take  1 tablet by mouth once daily.    ustekinumab (STELARA) 90 mg/mL Syrg syringe Inject 1 mL (90 mg total) into the skin every 8 weeks.   Last reviewed on 5/19/2022  1:39 PM by Talib Rutledge MD    Review of patient's allergies indicates:   Allergen Reactions    Latex, natural rubber      Mother states allergic to latex gloves     Adhesive Rash     Band-aids    Last reviewed on  8/1/2022 9:10 AM by Amanda MAYBERRY Prevot      Tasks added this encounter   9/25/2022 - Refill Call (Auto Added)   Tasks due within next 3 months   9/20/2022 - Clinical - Follow Up Assesement (Annual)     Robe Brand, PharmD  [facility]  [address]  [facility] [address]  Phone: [phone]  Fax: [phone]

## 2022-09-06 ENCOUNTER — PATIENT MESSAGE (OUTPATIENT)
Dept: ENDOSCOPY | Facility: HOSPITAL | Age: 27
End: 2022-09-06
Payer: MEDICAID

## 2022-09-06 ENCOUNTER — PATIENT MESSAGE (OUTPATIENT)
Dept: GASTROENTEROLOGY | Facility: CLINIC | Age: 27
End: 2022-09-06
Payer: MEDICAID

## 2022-09-06 NOTE — TELEPHONE ENCOUNTER
Spoke with patient  Rescheduled her to 11/22 at 3:00 pm in person visit.  Pt verbalized understanding to all and has no further questions at this time.    Sent message to endo  to see about getting prep changed.

## 2022-09-27 ENCOUNTER — HOSPITAL ENCOUNTER (OUTPATIENT)
Facility: HOSPITAL | Age: 27
Discharge: HOME | End: 2022-09-27
Attending: INTERNAL MEDICINE | Admitting: INTERNAL MEDICINE
Payer: MEDICAID

## 2022-09-27 ENCOUNTER — ANESTHESIA (OUTPATIENT)
Dept: ENDOSCOPY | Facility: HOSPITAL | Age: 27
End: 2022-09-27
Payer: MEDICAID

## 2022-09-27 ENCOUNTER — ANESTHESIA EVENT (OUTPATIENT)
Dept: ENDOSCOPY | Facility: HOSPITAL | Age: 27
End: 2022-09-27
Payer: MEDICAID

## 2022-09-27 ENCOUNTER — SPECIALTY PHARMACY (OUTPATIENT)
Dept: PHARMACY | Facility: CLINIC | Age: 27
End: 2022-09-27
Payer: MEDICAID

## 2022-09-27 VITALS
SYSTOLIC BLOOD PRESSURE: 102 MMHG | BODY MASS INDEX: 23.6 KG/M2 | RESPIRATION RATE: 18 BRPM | HEART RATE: 70 BPM | DIASTOLIC BLOOD PRESSURE: 61 MMHG | HEIGHT: 61 IN | OXYGEN SATURATION: 100 % | WEIGHT: 125 LBS | TEMPERATURE: 98 F

## 2022-09-27 DIAGNOSIS — K50.90 CROHN'S DISEASE: ICD-10-CM

## 2022-09-27 PROCEDURE — 00813 ANES UPR LWR GI NDSC PX: CPT | Performed by: INTERNAL MEDICINE

## 2022-09-27 PROCEDURE — 88305 TISSUE EXAM BY PATHOLOGIST: CPT | Mod: 26,,, | Performed by: PATHOLOGY

## 2022-09-27 PROCEDURE — 25000003 PHARM REV CODE 250: Performed by: INTERNAL MEDICINE

## 2022-09-27 PROCEDURE — 43239 PR EGD, FLEX, W/BIOPSY, SGL/MULTI: ICD-10-PCS | Mod: 51,,, | Performed by: INTERNAL MEDICINE

## 2022-09-27 PROCEDURE — 45386 PR COLONOSCOPY,FLEX,W/DILAT, 1/>STRICTURES: ICD-10-PCS | Mod: ,,, | Performed by: INTERNAL MEDICINE

## 2022-09-27 PROCEDURE — 88305 TISSUE EXAM BY PATHOLOGIST: CPT | Mod: 59 | Performed by: PATHOLOGY

## 2022-09-27 PROCEDURE — 45386 COLONOSCOPY W/BALLOON DILAT: CPT | Performed by: INTERNAL MEDICINE

## 2022-09-27 PROCEDURE — 43239 EGD BIOPSY SINGLE/MULTIPLE: CPT | Mod: 51,,, | Performed by: INTERNAL MEDICINE

## 2022-09-27 PROCEDURE — E9220 PRA ENDO ANESTHESIA: ICD-10-PCS | Mod: ,,, | Performed by: NURSE ANESTHETIST, CERTIFIED REGISTERED

## 2022-09-27 PROCEDURE — 25000003 PHARM REV CODE 250: Performed by: NURSE ANESTHETIST, CERTIFIED REGISTERED

## 2022-09-27 PROCEDURE — 27201012 HC FORCEPS, HOT/COLD, DISP: Performed by: INTERNAL MEDICINE

## 2022-09-27 PROCEDURE — 45380 COLONOSCOPY AND BIOPSY: CPT | Mod: 59 | Performed by: INTERNAL MEDICINE

## 2022-09-27 PROCEDURE — 37000008 HC ANESTHESIA 1ST 15 MINUTES: Performed by: INTERNAL MEDICINE

## 2022-09-27 PROCEDURE — 45380 COLONOSCOPY AND BIOPSY: CPT | Mod: 59,,, | Performed by: INTERNAL MEDICINE

## 2022-09-27 PROCEDURE — 63600175 PHARM REV CODE 636 W HCPCS: Performed by: NURSE ANESTHETIST, CERTIFIED REGISTERED

## 2022-09-27 PROCEDURE — 88305 TISSUE EXAM BY PATHOLOGIST: ICD-10-PCS | Mod: 26,,, | Performed by: PATHOLOGY

## 2022-09-27 PROCEDURE — 45380 PR COLONOSCOPY,BIOPSY: ICD-10-PCS | Mod: 59,,, | Performed by: INTERNAL MEDICINE

## 2022-09-27 PROCEDURE — E9220 PRA ENDO ANESTHESIA: HCPCS | Mod: ,,, | Performed by: NURSE ANESTHETIST, CERTIFIED REGISTERED

## 2022-09-27 PROCEDURE — 43239 EGD BIOPSY SINGLE/MULTIPLE: CPT | Performed by: INTERNAL MEDICINE

## 2022-09-27 PROCEDURE — 45386 COLONOSCOPY W/BALLOON DILAT: CPT | Mod: ,,, | Performed by: INTERNAL MEDICINE

## 2022-09-27 PROCEDURE — 37000009 HC ANESTHESIA EA ADD 15 MINS: Performed by: INTERNAL MEDICINE

## 2022-09-27 PROCEDURE — C1726 CATH, BAL DIL, NON-VASCULAR: HCPCS | Performed by: INTERNAL MEDICINE

## 2022-09-27 RX ORDER — PROPOFOL 10 MG/ML
VIAL (ML) INTRAVENOUS
Status: DISCONTINUED | OUTPATIENT
Start: 2022-09-27 | End: 2022-09-27

## 2022-09-27 RX ORDER — SODIUM CHLORIDE 9 MG/ML
INJECTION, SOLUTION INTRAVENOUS CONTINUOUS
Status: DISCONTINUED | OUTPATIENT
Start: 2022-09-27 | End: 2022-09-27 | Stop reason: HOSPADM

## 2022-09-27 RX ORDER — PROPOFOL 10 MG/ML
VIAL (ML) INTRAVENOUS CONTINUOUS PRN
Status: DISCONTINUED | OUTPATIENT
Start: 2022-09-27 | End: 2022-09-27

## 2022-09-27 RX ORDER — LIDOCAINE HYDROCHLORIDE 20 MG/ML
INJECTION INTRAVENOUS
Status: DISCONTINUED | OUTPATIENT
Start: 2022-09-27 | End: 2022-09-27

## 2022-09-27 RX ADMIN — PROPOFOL 20 MG: 10 INJECTION, EMULSION INTRAVENOUS at 07:09

## 2022-09-27 RX ADMIN — PROPOFOL 80 MG: 10 INJECTION, EMULSION INTRAVENOUS at 07:09

## 2022-09-27 RX ADMIN — PROPOFOL 50 MG: 10 INJECTION, EMULSION INTRAVENOUS at 07:09

## 2022-09-27 RX ADMIN — Medication 250 MCG/KG/MIN: at 07:09

## 2022-09-27 RX ADMIN — LIDOCAINE HYDROCHLORIDE 40 MG: 20 INJECTION INTRAVENOUS at 07:09

## 2022-09-27 RX ADMIN — SODIUM CHLORIDE: 0.9 INJECTION, SOLUTION INTRAVENOUS at 06:09

## 2022-09-27 NOTE — ANESTHESIA PREPROCEDURE EVALUATION
09/27/2022  Nina Dickerson is a 27 y.o., female.    Past Medical History:   Diagnosis Date    Crohn's disease     Insomnia      Past Surgical History:   Procedure Laterality Date    COLONOSCOPY N/A 12/10/2015    Procedure: COLONOSCOPY;  Surgeon: Tala Gonzalez MD;  Location: [facility] (2ND FLR);  Service: Endoscopy;  Laterality: N/A;    COLONOSCOPY N/A 4/21/2016    Procedure: COLONOSCOPY;  Surgeon: Tala Gonzalez MD;  Location: [facility] (2ND FLR);  Service: Endoscopy;  Laterality: N/A;    COLONOSCOPY N/A 1/21/2021    Procedure: COLONOSCOPY;  Surgeon: True Treadwell MD;  Location: [facility];  Service: General;  Laterality: N/A;    COLONOSCOPY N/A 7/8/2021    Procedure: COLONOSCOPY;  Surgeon: Talib Rutledge MD;  Location: [facility] (4TH FLR);  Service: Endoscopy;  Laterality: N/A;  Covid-19 test 7/6/21 at [facility]    COLONOSCOPY W/ BIOPSIES  01/21/2021    ESOPHAGOGASTRODUODENOSCOPY      ESOPHAGOGASTRODUODENOSCOPY N/A 7/8/2021    Procedure: EGD (ESOPHAGOGASTRODUODENOSCOPY);  Surgeon: Talib Rutledge MD;  Location: [facility] (4TH FLR);  Service: Endoscopy;  Laterality: N/A;  Covid-19 test 7/6/21 at [facility]  7/6 returned pt's call- lvm with arrival time-rb  patient aware she moved up 30 minutes from 930am to 900am, patient aware to be here at 8am 7/8/21 - bijan    TONSILLECTOMY      TYMPANOSTOMY TUBE PLACEMENT               Pre-op Assessment    I have reviewed the Patient Summary Reports.       I have reviewed the Medications.     Review of Systems  Anesthesia Hx:  No problems with previous Anesthesia  Denies Family Hx of Anesthesia complications.   Denies Personal Hx of Anesthesia complications.   Hematology/Oncology:  Hematology Normal   Oncology Normal     EENT/Dental:EENT/Dental Normal   Cardiovascular:  Cardiovascular Normal     Pulmonary:  Pulmonary Normal     Renal/:  Renal/ Normal     Hepatic/GI:  Hepatic/GI Normal    Musculoskeletal:  Musculoskeletal Normal    Neurological:  Neurology Normal    Endocrine:  Endocrine Normal    Dermatological:  Skin Normal    Psych:  Psychiatric Normal           Physical Exam  General: Well nourished    Airway:  Mallampati: I   Mouth Opening: Normal  Tongue: Normal  Neck ROM: Normal ROM    Dental:  Intact        Anesthesia Plan  Type of Anesthesia, risks & benefits discussed:    Anesthesia Type: Gen Natural Airway  Intra-op Monitoring Plan: Standard ASA Monitors  Induction:  IV  Informed Consent: Informed consent signed with the Patient and all parties understand the risks and agree with anesthesia plan.  All questions answered.   ASA Score: 2  Day of Surgery Review of History & Physical: H&P Update referred to the surgeon/provider.    Ready For Surgery From Anesthesia Perspective.     .

## 2022-09-27 NOTE — ANESTHESIA POSTPROCEDURE EVALUATION
Anesthesia Post Evaluation    Patient: Nina Dickerson    Procedure(s) Performed: Procedure(s) (LRB):  ESOPHAGOGASTRODUODENOSCOPY (EGD) (N/A)  COLONOSCOPY (N/A)    Final Anesthesia Type: general      Patient location during evaluation: PACU  Patient participation: Yes- Able to Participate  Level of consciousness: awake and alert and oriented  Post-procedure vital signs: reviewed and stable  Pain management: adequate  Airway patency: patent    PONV status at discharge: No PONV  Anesthetic complications: no      Cardiovascular status: blood pressure returned to baseline and hemodynamically stable  Respiratory status: unassisted and spontaneous ventilation  Hydration status: euvolemic  Follow-up not needed.          Vitals Value Taken Time   /61 09/27/22 0836   Temp 36.8 °C (98.2 °F) 09/27/22 0813   Pulse 70 09/27/22 0836   Resp 18 09/27/22 0836   SpO2 100 % 09/27/22 0836         Event Time   Out of Recovery 08:45:26         Pain/Timothy Score: Timothy Score: 10 (9/27/2022  8:39 AM)

## 2022-09-27 NOTE — H&P
Short Stay Endoscopy History and Physical    PCP - Talib Rutledge MD    Procedure - EGD and colonoscopy  ASA - 2  Mallampati - per anesthesia  History of Anesthesia problems - no  Family history Anesthesia problems -  no     HPI:  This is a 27 y.o. female here for evaluation of :     EGD  Reflux - no  Dysphagia - no  Abdominal pain - no  Diarrhea - no  Anemia - yes  GI bleeding - no  Other - Nausea/vomiting    Colonoscopy  Screening - no  History of polyps - no  Diarrhea - no  Anemia - no  Blood in stools - no  Abdominal pain - no  Other - Crohn's disease    ROS:  CONSTITUTIONAL: Denies weight change,  fatigue, fevers, chills, night sweats.  CARDIOVASCULAR: Denies chest pain, shortness of breath, orthopnea and edema.  RESPIRATORY: Denies cough, hemoptysis, dyspnea, and wheezing.  GI: See HPI.    Medical History:   Past Medical History:   Diagnosis Date    Crohn's disease     Insomnia        Surgical History:   Past Surgical History:   Procedure Laterality Date    COLONOSCOPY N/A 12/10/2015    Procedure: COLONOSCOPY;  Surgeon: Tala Gonzalez MD;  Location: [facility] (2ND [facility]);  Service: Endoscopy;  Laterality: N/A;    COLONOSCOPY N/A 4/21/2016    Procedure: COLONOSCOPY;  Surgeon: Tala Gonzalez MD;  Location: [facility] (2ND FLR);  Service: Endoscopy;  Laterality: N/A;    COLONOSCOPY N/A 1/21/2021    Procedure: COLONOSCOPY;  Surgeon: True Treadwell MD;  Location: [facility];  Service: General;  Laterality: N/A;    COLONOSCOPY N/A 7/8/2021    Procedure: COLONOSCOPY;  Surgeon: Talib Rutledge MD;  Location: [facility] (4TH FLR);  Service: Endoscopy;  Laterality: N/A;  Covid-19 test 7/6/21 at [facility]    COLONOSCOPY W/ BIOPSIES  01/21/2021    ESOPHAGOGASTRODUODENOSCOPY      ESOPHAGOGASTRODUODENOSCOPY N/A 7/8/2021    Procedure: EGD (ESOPHAGOGASTRODUODENOSCOPY);  Surgeon: Talbi Rutledge MD;  Location: [facility] (4TH FLR);  Service: Endoscopy;  Laterality: N/A;  Covid-19 test 7/6/21  at [facility] -   7/6 returned pt's call- lvm with arrival time-rb  patient aware she moved up 30 minutes from 930am to 900am, patient aware to be here at 8am 7/8/21 - bijan    TONSILLECTOMY      TYMPANOSTOMY TUBE PLACEMENT         Family History:   Family History   Problem Relation Age of Onset    Lupus Mother     Heart attacks under age 50 Father     Heart disease Father     Hypertension Father     Cardiomyopathy Maternal Grandmother     Dementia Maternal Grandfather     Heart disease Paternal Grandmother     Heart disease Paternal Grandfather     No Known Problems Sister        Social History:   Social History     Tobacco Use    Smoking status: Never    Smokeless tobacco: Never    Tobacco comments:     mom smokes   Substance Use Topics    Alcohol use: No    Drug use: No       Allergies: Reviewed    Medications:   No current facility-administered medications on file prior to encounter.     Current Outpatient Medications on File Prior to Encounter   Medication Sig Dispense Refill    buPROPion (WELLBUTRIN XL) 150 MG TB24 tablet Take 150 mg by mouth once daily.      promethazine (PHENERGAN) 25 MG tablet promethazine 25 mg tablet      TRI-SPRINTEC, 28, 0.18/0.215/0.25 mg-35 mcg (28) tablet Take 1 tablet by mouth once daily.         Physical Exam:  Vital Signs:   Vitals:    09/27/22 0649   BP: 133/79   Pulse: 80   Resp: 16   Temp: 98 °F (36.7 °C)     General Appearance: Well appearing in no acute distress  ENT: OP clear  Chest: CTA B  CV: RRR, no m/r/g  Abd: s/nt/nd/nabs  Ext: no edema    Labs:Reviewed    Plan:   I have explained the risks and benefits of upper endoscopy and colonoscopy to the patient including but not limited to bleeding, perforation, infection, and death. The patient wishes to proceed.

## 2022-09-27 NOTE — PROVATION PATIENT INSTRUCTIONS
Discharge Summary/Instructions after an Endoscopic Procedure  Patient Name: Nina Dickerson  Patient MRN: [id number]  Patient YOB: 1995 Tuesday, September 27, 2022  Talib Rutledge MD  Dear patient,  As a result of recent federal legislation (The Federal Cures Act), you may   receive lab or pathology results from your procedure in your [facility]   account before your physician is able to contact you. Your physician or   their representative will relay the results to you with their   recommendations at their soonest availability.  Thank you,  RESTRICTIONS:  During your procedure today, you received medications for sedation.  These   medications may affect your judgment, balance and coordination.  Therefore,   for 24 hours, you have the following restrictions:   - DO NOT drive a car, operate machinery, make legal/financial decisions,   sign important papers or drink alcohol.    ACTIVITY:  Today: no heavy lifting, straining or running due to procedural   sedation/anesthesia.  The following day: return to full activity including work.  DIET:  Eat and drink normally unless instructed otherwise.     TREATMENT FOR COMMON SIDE EFFECTS:  - Mild abdominal pain, nausea, belching, bloating or excessive gas:  rest,   eat lightly and use a heating pad.  - Sore Throat: treat with throat lozenges and/or gargle with warm salt   water.  - Because air was used during the procedure, expelling large amounts of air   from your rectum or belching is normal.  - If a bowel prep was taken, you may not have a bowel movement for 1-3 days.    This is normal.  SYMPTOMS TO WATCH FOR AND REPORT TO YOUR PHYSICIAN:  1. Abdominal pain or bloating, other than gas cramps.  2. Chest pain.  3. Back pain.  4. Signs of infection such as: chills or fever occurring within 24 hours   after the procedure.  5. Rectal bleeding, which would show as bright red, maroon, or black stools.   (A tablespoon of blood from the rectum is not serious,  especially if   hemorrhoids are present.)  6. Vomiting.  7. Weakness or dizziness.  GO DIRECTLY TO THE NEAREST EMERGENCY ROOM IF YOU HAVE ANY OF THE FOLLOWING:      Difficulty breathing              Chills and/or fever over 101 F   Persistent vomiting and/or vomiting blood   Severe abdominal pain   Severe chest pain   Black, tarry stools   Bleeding- more than one tablespoon   Any other symptom or condition that you feel may need urgent attention  Your doctor recommends these additional instructions:  If any biopsies were taken, your doctors clinic will contact you in 1 to 2   weeks with any results.  - Discharge patient to home.   - Resume previous diet today.   - Perform a colonoscopy today.   - Continue present medications.   - Await pathology results.   - Return to my office as previously scheduled.  For questions, problems or results please call your physician - Talib Rutledge MD at Work:  [phone].  [facility], EMERGENCY ROOM PHONE NUMBER: [phone]  IF A COMPLICATION OR EMERGENCY SITUATION ARISES AND YOU ARE UNABLE TO REACH   YOUR PHYSICIAN - GO DIRECTLY TO THE EMERGENCY ROOM.  Talib Rutledge MD  9/27/2022 7:36:10 AM  This report has been verified and signed electronically.  Dear patient,  As a result of recent federal legislation (The Federal Cures Act), you may   receive lab or pathology results from your procedure in your [facility]   account before your physician is able to contact you. Your physician or   their representative will relay the results to you with their   recommendations at their soonest availability.  Thank you,  PROVATION

## 2022-09-27 NOTE — TRANSFER OF CARE
"Anesthesia Transfer of Care Note    Patient: Nina Dickerson    Procedure(s) Performed: Procedure(s) (LRB):  ESOPHAGOGASTRODUODENOSCOPY (EGD) (N/A)  COLONOSCOPY (N/A)    Patient location: OPS    Anesthesia Type: general    Transport from OR: Transported from OR on room air with adequate spontaneous ventilation    Post pain: adequate analgesia    Post assessment: no apparent anesthetic complications and tolerated procedure well    Post vital signs: stable    Level of consciousness: awake, alert and oriented    Nausea/Vomiting: no nausea/vomiting    Complications: none    Transfer of care protocol was followed      Last vitals:   Visit Vitals  /79 (Patient Position: Lying)   Pulse 80   Temp 36.7 °C (98 °F) (Temporal)   Resp 16   Ht 5' 1" (1.549 m)   Wt 56.7 kg (125 lb)   SpO2 100%   Breastfeeding No   BMI 23.62 kg/m²     "

## 2022-09-27 NOTE — TELEPHONE ENCOUNTER
Specialty Pharmacy - Refill Coordination    Specialty Medication Orders Linked to Encounter      Flowsheet Row Most Recent Value   Medication #1 ustekinumab (STELARA) 90 mg/mL Syrg syringe (Order#[id number], Rx#[id number])            Refill Questions - Documented Responses      Flowsheet Row Most Recent Value   Patient Availability and HIPAA Verification    Does patient want to proceed with activity? Yes   HIPAA/medical authority confirmed? Yes   Relationship to patient of person spoken to? Self   Refill Screening Questions    Changes to allergies? No   Changes to medications? No   New conditions since last clinic visit? No   Unplanned office visit, urgent care, ED, or hospital admission in the last 4 weeks? No   How does patient/caregiver feel medication is working? Good   Financial problems or insurance changes? No   How many doses of your specialty medications were missed in the last 4 weeks? 0   Would patient like to speak to a pharmacist? No   When does the patient need to receive the medication? 10/07/22   Refill Delivery Questions    How will the patient receive the medication? Delivery Cayla   When does the patient need to receive the medication? 10/07/22   Shipping Address Prescription   Address in [facility] confirmed and updated if neccessary? Yes   Expected Copay ($) 0   Is the patient able to afford the medication copay? Yes   Payment Method zero copay   Days supply of Refill 56   Supplies needed? No supplies needed   Refill activity completed? Yes   Refill activity plan Refill scheduled   Shipment/Pickup Date: 09/28/22            No current outpatient medications on file.     Last reviewed on 9/27/2022  6:48 AM by Kasey L. Schoennagel, RN    Review of patient's allergies indicates:   Allergen Reactions    Latex, natural rubber      Mother states allergic to latex gloves     Adhesive Rash     Band-aids    Last reviewed on  9/27/2022 6:48 AM by Kasey L Schoennagel      Tasks added this encounter    6/27/2023 - Clinical - Follow Up Assesement (Annual)  11/19/2022 - Refill Call (Auto Added)   Tasks due within next 3 months   9/20/2022 - Clinical - Follow Up Assesement (Annual)     Anneliese Allen, PharmD  [facility]  [address]  [facility] [address]  Phone: [phone]  Fax: [phone]

## 2022-09-27 NOTE — PROVATION PATIENT INSTRUCTIONS
Discharge Summary/Instructions after an Endoscopic Procedure  Patient Name: Nina Dickerson  Patient MRN: [id number]  Patient YOB: 1995 Tuesday, September 27, 2022  Talib Rutledge MD  Dear patient,  As a result of recent federal legislation (The Federal Cures Act), you may   receive lab or pathology results from your procedure in your [facility]   account before your physician is able to contact you. Your physician or   their representative will relay the results to you with their   recommendations at their soonest availability.  Thank you,  RESTRICTIONS:  During your procedure today, you received medications for sedation.  These   medications may affect your judgment, balance and coordination.  Therefore,   for 24 hours, you have the following restrictions:   - DO NOT drive a car, operate machinery, make legal/financial decisions,   sign important papers or drink alcohol.    ACTIVITY:  Today: no heavy lifting, straining or running due to procedural   sedation/anesthesia.  The following day: return to full activity including work.  DIET:  Eat and drink normally unless instructed otherwise.     TREATMENT FOR COMMON SIDE EFFECTS:  - Mild abdominal pain, nausea, belching, bloating or excessive gas:  rest,   eat lightly and use a heating pad.  - Sore Throat: treat with throat lozenges and/or gargle with warm salt   water.  - Because air was used during the procedure, expelling large amounts of air   from your rectum or belching is normal.  - If a bowel prep was taken, you may not have a bowel movement for 1-3 days.    This is normal.  SYMPTOMS TO WATCH FOR AND REPORT TO YOUR PHYSICIAN:  1. Abdominal pain or bloating, other than gas cramps.  2. Chest pain.  3. Back pain.  4. Signs of infection such as: chills or fever occurring within 24 hours   after the procedure.  5. Rectal bleeding, which would show as bright red, maroon, or black stools.   (A tablespoon of blood from the rectum is not serious,  especially if   hemorrhoids are present.)  6. Vomiting.  7. Weakness or dizziness.  GO DIRECTLY TO THE NEAREST EMERGENCY ROOM IF YOU HAVE ANY OF THE FOLLOWING:      Difficulty breathing              Chills and/or fever over 101 F   Persistent vomiting and/or vomiting blood   Severe abdominal pain   Severe chest pain   Black, tarry stools   Bleeding- more than one tablespoon   Any other symptom or condition that you feel may need urgent attention  Your doctor recommends these additional instructions:  If any biopsies were taken, your doctors clinic will contact you in 1 to 2   weeks with any results.  - Discharge patient to home.   - Resume previous diet today.   - Continue present medications.   - Await pathology results.   - Repeat colonoscopy in 2 years to assess disease activity.   - Return to my office as previously scheduled.   - There is no evidence of active inflammation at this time. 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  - Patient has a contact number available for emergencies.  The signs and   symptoms of potential delayed complications were discussed with the   patient.  Return to normal activities tomorrow.  Written discharge   instructions were provided to the patient.  For questions, problems or results please call your physician - Talib Rutledge MD at Work:  [phone].  [facility], EMERGENCY ROOM PHONE NUMBER: [phone]  IF A COMPLICATION OR EMERGENCY SITUATION ARISES AND YOU ARE UNABLE TO REACH   YOUR PHYSICIAN - GO DIRECTLY TO THE EMERGENCY ROOM.  Talib Rutledge MD  9/27/2022 8:10:39 AM  This report has been verified and signed electronically.  Dear patient,  As a result of recent federal legislation (The Federal Cures Act), you may   receive lab or pathology results from your procedure in your [facility]   account before your physician is able to contact you. Your physician or   their representative  will relay the results to you with their   recommendations at their soonest availability.  Thank you,  PROVATION

## 2022-09-29 LAB
FINAL PATHOLOGIC DIAGNOSIS: NORMAL
GROSS: NORMAL
Lab: NORMAL

## 2022-11-08 ENCOUNTER — PATIENT MESSAGE (OUTPATIENT)
Dept: GASTROENTEROLOGY | Facility: CLINIC | Age: 27
End: 2022-11-08
Payer: MEDICAID

## 2022-11-08 DIAGNOSIS — K50.012 CROHN'S DISEASE OF SMALL INTESTINE WITH INTESTINAL OBSTRUCTION: ICD-10-CM

## 2022-11-08 NOTE — TELEPHONE ENCOUNTER
Refill request for Promethazine    Allergies reviewed     Drug Monitoring labs:  CBC/CMP q 6 months; TB & Hep B yearly    Lab Results   Component Value Date    HEPBSAG Negative 06/13/2022    HEPBCAB Negative 06/13/2022     Lab Results   Component Value Date    TBGOLDPLUS Negative 06/13/2022     Lab Results   Component Value Date    PAKQRMPE78UZ 11 (L) 06/13/2022    OCIOZEJC78 467 06/13/2022     Lab Results   Component Value Date    WBC 4.68 06/13/2022    HGB 13.3 06/13/2022    HCT 42.3 06/13/2022    MCV 90 06/13/2022     06/13/2022     Lab Results   Component Value Date    CREATININE 0.7 06/13/2022    ALBUMIN 4.1 06/13/2022    BILITOT 0.2 06/13/2022    ALKPHOS 49 (L) 06/13/2022    AST 16 06/13/2022    ALT 13 06/13/2022    GGT 14 04/26/2017       Labs due:  12/2022    Next appt: 11/22/22    RX pended

## 2022-11-09 RX ORDER — PROMETHAZINE HYDROCHLORIDE 25 MG/1
25 TABLET ORAL EVERY 6 HOURS PRN
Qty: 30 TABLET | Refills: 0 | Status: ON HOLD | OUTPATIENT
Start: 2022-11-09 | End: 2023-10-10 | Stop reason: HOSPADM

## 2022-11-21 ENCOUNTER — SPECIALTY PHARMACY (OUTPATIENT)
Dept: PHARMACY | Facility: CLINIC | Age: 27
End: 2022-11-21
Payer: MEDICAID

## 2022-11-21 DIAGNOSIS — K50.012 CROHN'S DISEASE OF SMALL INTESTINE WITH INTESTINAL OBSTRUCTION: ICD-10-CM

## 2022-11-21 RX ORDER — USTEKINUMAB 90 MG/ML
90 INJECTION, SOLUTION SUBCUTANEOUS
Qty: 1 ML | Refills: 0 | OUTPATIENT
Start: 2022-11-21 | End: 2022-11-22 | Stop reason: SDUPTHER

## 2022-11-21 NOTE — TELEPHONE ENCOUNTER
Refill request for Stelara     Allergies reviewed     Drug Monitoring labs:  CBC/CMP q 6 months; TB & Hep B yearly    Lab Results   Component Value Date    HEPBSAG Negative 06/13/2022    HEPBCAB Negative 06/13/2022     Lab Results   Component Value Date    TBGOLDPLUS Negative 06/13/2022     Lab Results   Component Value Date    OAOGBVSC86FM 11 (L) 06/13/2022    ZOLJHIIE22 467 06/13/2022     Lab Results   Component Value Date    WBC 4.68 06/13/2022    HGB 13.3 06/13/2022    HCT 42.3 06/13/2022    MCV 90 06/13/2022     06/13/2022     Lab Results   Component Value Date    CREATININE 0.7 06/13/2022    ALBUMIN 4.1 06/13/2022    BILITOT 0.2 06/13/2022    ALKPHOS 49 (L) 06/13/2022    AST 16 06/13/2022    ALT 13 06/13/2022    GGT 14 04/26/2017       Labs due:  12/2022    Next appt: 11/22/22    RX pended

## 2022-11-21 NOTE — TELEPHONE ENCOUNTER
Outgoing call for Stelara refill. Last injected 10/07/2022 so due to inject next 12/02/2022. Pt aware no more refills left for [facility] to utilized. Sent [facility] refill request and pt has a 3pm appt tomorrow and will ask for refill then as well.     Will await [facility] response to then send Stelara refill.

## 2022-11-22 ENCOUNTER — OFFICE VISIT (OUTPATIENT)
Dept: GASTROENTEROLOGY | Facility: CLINIC | Age: 27
End: 2022-11-22
Payer: MEDICAID

## 2022-11-22 VITALS
SYSTOLIC BLOOD PRESSURE: 149 MMHG | DIASTOLIC BLOOD PRESSURE: 83 MMHG | TEMPERATURE: 98 F | WEIGHT: 120.38 LBS | HEIGHT: 61 IN | HEART RATE: 109 BPM | BODY MASS INDEX: 22.73 KG/M2 | OXYGEN SATURATION: 98 %

## 2022-11-22 DIAGNOSIS — E55.9 VITAMIN D DEFICIENCY: ICD-10-CM

## 2022-11-22 DIAGNOSIS — K50.012 CROHN'S DISEASE OF SMALL INTESTINE WITH INTESTINAL OBSTRUCTION: Primary | ICD-10-CM

## 2022-11-22 PROBLEM — K52.9 ILEITIS: Status: RESOLVED | Noted: 2021-07-08 | Resolved: 2022-11-22

## 2022-11-22 PROCEDURE — 1159F PR MEDICATION LIST DOCUMENTED IN MEDICAL RECORD: ICD-10-PCS | Mod: CPTII,S$GLB,, | Performed by: INTERNAL MEDICINE

## 2022-11-22 PROCEDURE — 3079F PR MOST RECENT DIASTOLIC BLOOD PRESSURE 80-89 MM HG: ICD-10-PCS | Mod: CPTII,S$GLB,, | Performed by: INTERNAL MEDICINE

## 2022-11-22 PROCEDURE — 3079F DIAST BP 80-89 MM HG: CPT | Mod: CPTII,S$GLB,, | Performed by: INTERNAL MEDICINE

## 2022-11-22 PROCEDURE — 3077F SYST BP >= 140 MM HG: CPT | Mod: CPTII,S$GLB,, | Performed by: INTERNAL MEDICINE

## 2022-11-22 PROCEDURE — 3077F PR MOST RECENT SYSTOLIC BLOOD PRESSURE >= 140 MM HG: ICD-10-PCS | Mod: CPTII,S$GLB,, | Performed by: INTERNAL MEDICINE

## 2022-11-22 PROCEDURE — 3008F BODY MASS INDEX DOCD: CPT | Mod: CPTII,S$GLB,, | Performed by: INTERNAL MEDICINE

## 2022-11-22 PROCEDURE — 99213 OFFICE O/P EST LOW 20 MIN: CPT | Mod: S$GLB,,, | Performed by: INTERNAL MEDICINE

## 2022-11-22 PROCEDURE — 3008F PR BODY MASS INDEX (BMI) DOCUMENTED: ICD-10-PCS | Mod: CPTII,S$GLB,, | Performed by: INTERNAL MEDICINE

## 2022-11-22 PROCEDURE — 99213 PR OFFICE/OUTPT VISIT, EST, LEVL III, 20-29 MIN: ICD-10-PCS | Mod: S$GLB,,, | Performed by: INTERNAL MEDICINE

## 2022-11-22 PROCEDURE — 1159F MED LIST DOCD IN RCRD: CPT | Mod: CPTII,S$GLB,, | Performed by: INTERNAL MEDICINE

## 2022-11-22 RX ORDER — USTEKINUMAB 90 MG/ML
90 INJECTION, SOLUTION SUBCUTANEOUS
Qty: 1 ML | Refills: 3 | Status: SHIPPED | OUTPATIENT
Start: 2022-11-22 | End: 2023-07-05 | Stop reason: SDUPTHER

## 2022-11-22 NOTE — PROGRESS NOTES
"IBD PATIENT INTAKE:    COVID symptoms in the last 7 days (runny nose, sore throat, congestion, cough, fever): No  PCP: Marti Colin  If not PCP-  number given to establish [phone]: N/A    ALLERGIES REVIEWED:  Yes    CHIEF COMPLAINT:    Chief Complaint   Patient presents with    Crohn's Disease       VITAL SIGNS:  BP (!) 149/83 (BP Location: Left arm, Patient Position: Sitting)   Pulse 109   Temp 98.1 °F (36.7 °C)   Ht 5' 1" (1.549 m)   Wt 54.6 kg (120 lb 5.9 oz)   SpO2 98%   BMI 22.74 kg/m²      Change in medical, surgical, family or social history: No    IBD THERAPY (name, dose/frequency):  Stelara Q 8 weeks   Last dose:  10/7/2022    Next dose:  12/2/2022  Infusion/Pharmacy: [facility] ([facility])    NSAIDs (aspirin, ibuprofen-advil or motrin, naproxen-aleve, diclofenac-voltaren, BC powder, excedrin, goodies): No    Alternative/Complementary Medications (i.e. probiotics, turmeric, fish oil, aloe vera):      no  Name/dose:  none    Vitamins:   Vit D:  no     Vit B-12:  no   Folic Acid: no       Calcium: no     Iron:  no      MVI: no    Antibiotics (past 30 Days):  no  If yes   Indication:  Name of antibiotic:  Completion date:     REVIEWED MEDICATION LIST RECONCILED INCLUDING ABOVE MEDS:  yes                  Answers submitted by the patient for this visit:  Established Patient Questionnaire  (Submitted on 11/18/2022)  heartburn: Yes    "

## 2022-11-22 NOTE — PROGRESS NOTES
[facility]          Inflammatory Bowel Disease Follow Up Note         TODAY'S VISIT DATE:  11/22/2022    Reason for Consult:    Chief Complaint   Patient presents with    Crohn's Disease       PCP: Marti Colin      Referring MD:   No ref. provider found    History of Present Illness:  Nina Dickerson who is a 27 y.o. female is being seen today at the [facility] on 11/22/2022 for inflammatory bowel disease- Crohn's disease.  She is here today for a follow-up visit.  Doing well since last visit. Reports occasional nausea which is associated with large meals.  Denies abdominal pain.  Normal bowel movements without blood.  Reports compliance with stelara.  No issues with medications.    IBD History:  She has a long history of suspected Crohn's disease but the diagnosis has not been clearly made.  When she was a child she had significant gastrointestinal issues.  In 2014 she had upper endoscopy performed for upper abdominal discomfort and reflux symptoms.  This was fairly unremarkable including biopsies.  She was started on Protonix and had some improvement.  In 2015 she was seen again because of ongoing issues and had an upper endoscopy and colonoscopy at that time.  The upper endoscopy revealed some mild erosions in the duodenum and biopsies were consistent with acute inflammation.  The colonoscopy showed congestion of the ileocecal valve and erythema, congestion, and friability of the terminal ileum.  Biopsies of the colon were normal.  Biopsy of the terminal ileum showed no inflammation.  She was treated with budesonide and had some improvement in symptoms.  A repeat scope in 2016 was completely normal including biopsies.  I saw her when I was at [facility] in 2018. She had ongoing symptoms that seem to be more consistent with chronic constipation issues as well as some functional gastrointestinal issues.  She was started on dicyclomine  and this cause an increase in rectal bleeding so it was stopped.  With an increase in water she began having better bowel movements and most of her symptoms improved.  Pantoprazole also help with her upper GI issues.  A repeat upper endoscopy and colonoscopy were performed.  The upper endoscopy was unrevealing.  The colonoscopy did show some erythematous mucosa in the terminal ileum.  Biopsies were not consistent with Crohn's but did show a significant amount of eosinophils within the terminal ileum as well as in the colon (colon was normal appearing).  In January of 2021 she began having more rectal bleeding issues as well as worsening vomiting, increased right lower quadrant epigastric pain, and more diarrhea.  She went to the emergency room and had labs done that were unrevealing.  A CT scan was also unrevealing.  A colonoscopy that was done in January revealed some congestion and inflammatory polyps in the terminal ileum as well as a suspected ileal stricture.  The colon was completely normal.  Biopsies were not consistent with Crohn's disease and only showed 1 area of active inflammation in the terminal ileum.  A subsequent MR enterography showed some thickening of the terminal ileum with some mild inflammatory changes as well mild narrowing with some upstream dilation of the ileum.  She was started on budesonide 9 mg daily which provided minimal improvement.  In July 2021 she underwent a repeat colonoscopy that again showed ileitis as well as some narrowing of the terminal ileum which was able to be traversed with the colonoscopy scope.  Biopsies showed chronic inflammatory changes consistent with Crohn's disease.  She started Stelara on November 4, 2021.  At visit in 5/2022 noted to have nausea and vomiting.  Underwent EGD/Colonoscopy with notable ileal stricture but normal EGD.  Biopsies unremarkable.    IBD Detail:  Dx Date:  Symptoms since 2014, diagnosis confirmed in 2021  Disease type/distribution:   Crohn's Disease/ileal inflammation  Current Treatment:  Stelara 90 mg every 8 weeks Start Date:  November 4, 2021/Several months ago  Response:  Mild improvement  Optimized:  Not yet  Adverse reactions:  None  Prior surgeries:  None  CRP Elevation:  No  calprotectin:  Mild elevation  Disease Complications:  Mild stenosis of the terminal ileum  Extraintestinal manifestations:  Back pain  Prior treatments:   Steroids:  Partial response to budesonide  5ASA:  No response  IMM:  None  TNF Inh:  None   Anti-Integrin:  None   IL 12/23:  current therapy  COURTNEY Inh:  None    Previous Clinical Trials:  None    Last Colonoscopy:  9/2022 - Impression:            - Perianal skin tags found on perianal exam.                          - Internal hemorrhoids.                          - Anal papilla(e) were hypertrophied.                          - Scar in the terminal ileum.                          - Stricture in the ileum, 5 cm from the ileocecal                          valve. Dilated. Lesion not amenable to dilation,                          and not attempted.                          - The terminal ileum contained some pseudopolyps                          but no signs of active inflammation.                          - Simple Endoscopic Score for Crohn's Disease: 3,                          mucosal inflammatory changes.    Other Endoscopies:  EGD 9/2022 Impression:            - Normal esophagus.                          - Z-line regular, 36 cm from the incisors.                          - Normal stomach. Biopsied.                          - Normal examined duodenum.     Imaging:   MRE:  March 2021-inflammatory changes of the terminal ileum with suspected stricture   CT:  Previous studies reviewed   Other:  Previous studies reviewed    Pertinent Labs:  Lab Results   Component Value Date    SEDRATE 7 04/26/2017    CRP 1.1 06/13/2022     Lab Results   Component Value Date    TTGIGA 11 04/26/2017     04/26/2017     No results  found for: TSH, FREET4  Lab Results   Component Value Date    ISFFYINX78NF 11 (L) 06/13/2022    OIRBZSJJ33 467 06/13/2022     Lab Results   Component Value Date    HEPBSAG Negative 06/13/2022    HEPBCAB Negative 06/13/2022    HEPCAB Negative 08/23/2021     Lab Results   Component Value Date    ESP40PABL Negative 08/23/2021     Lab Results   Component Value Date    NIL 0.68893 06/13/2022    TBAG 0.04 12/18/2015    TBAGNIL 0.00 12/18/2015    MITOGENNIL 8.129 06/13/2022    TBGOLD Negative 12/18/2015     Lab Results   Component Value Date    TPMTRESULT 27.6 12/10/2015     No results found for: STOOLCULTURE, YSAMMMSZGG1Q, EYVGJSEHAQ7F, CDIFFICILEAN, CDIFFTOX, CDIFFICILEBY  Lab Results   Component Value Date    CALPROTECTIN 40.8 02/24/2022       Therapeutic Drug Monitoring Labs:  No results found for: PROMETH  No results found for: ANSADAINIT, INFLIXIMAB, INFLIXINTERP    Vaccinations:  Lab Results   Component Value Date    HEPBSAB Negative 08/23/2021     Lab Results   Component Value Date    HEPAIGG Negative 08/23/2021     Lab Results   Component Value Date    VARICELLAZOS 2.45 (H) 08/23/2021    VARICELLAINT Positive (A) 08/23/2021     Immunization History   Administered Date(s) Administered    DTP 1997, 1997, 1997, 08/14/1998, 08/25/2001    DTaP 1997, 1997, 1997, 08/14/1998, 08/25/2001    Hep B Immune Globulin 1997    Hepatitis B 1997, 05/12/1998, 08/14/1998    Hepatitis B, Pediatric/Adolescent 1997    HiB PRP-OMP 1997, 1997, 08/14/1998    Hib-HbOC 1997, 1997, 08/14/1998    IPV 1997, 1997, 08/14/1998, 08/25/2001    MMR 05/12/1998, 08/25/2001    Meningococcal 09/24/2008, 07/29/2015    Meningococcal Conjugate (MCV4P) 09/24/2008, 07/29/2015    OPV 1997, 1997, 08/14/1998, 08/25/2001    Pneumococcal Conjugate - 7 Valent 1997, 1997    Tdap 09/24/2008, 02/20/2019    Varicella 1997, 05/12/1998, 08/14/1998          Review of Systems  Review of Systems   Constitutional:  Negative for chills, fever and weight loss.   HENT:  Negative for sore throat.    Eyes:  Negative for pain, discharge and redness.   Respiratory:  Negative for cough, shortness of breath and wheezing.    Cardiovascular:  Negative for chest pain, orthopnea and leg swelling.   Gastrointestinal:  Negative for abdominal pain, blood in stool, constipation, diarrhea, heartburn, melena and vomiting.   Genitourinary:  Negative for dysuria, frequency and urgency.   Musculoskeletal:  Negative for joint pain and myalgias.   Skin:  Negative for itching and rash.   Neurological:  Negative for focal weakness and seizures.   Endo/Heme/Allergies:  Does not bruise/bleed easily.   Psychiatric/Behavioral:  Negative for depression.      Medical History:   Past Medical History:   Diagnosis Date    Crohn's disease     Insomnia        Surgical History:  Past Surgical History:   Procedure Laterality Date    COLONOSCOPY N/A 12/10/2015    Procedure: COLONOSCOPY;  Surgeon: Tala Gonzalez MD;  Location: [facility] ([address] [place] [address]);  Service: Endoscopy;  Laterality: N/A;    COLONOSCOPY N/A 4/21/2016    Procedure: COLONOSCOPY;  Surgeon: Tala Gonzalez MD;  Location: [facility] (2ND FLR);  Service: Endoscopy;  Laterality: N/A;    COLONOSCOPY N/A 1/21/2021    Procedure: COLONOSCOPY;  Surgeon: True Treadwell MD;  Location: [facility];  Service: General;  Laterality: N/A;    COLONOSCOPY N/A 7/8/2021    Procedure: COLONOSCOPY;  Surgeon: Talib Rutledge MD;  Location: [facility] (4TH FLR);  Service: Endoscopy;  Laterality: N/A;  Covid-19 test 7/6/21 at [facility]    COLONOSCOPY N/A 9/27/2022    Procedure: COLONOSCOPY;  Surgeon: Talib Rutledge MD;  Location: [facility] (4TH FLR);  Service: Endoscopy;  Laterality: N/A;  golytely    COLONOSCOPY W/ BIOPSIES  01/21/2021    ESOPHAGOGASTRODUODENOSCOPY      ESOPHAGOGASTRODUODENOSCOPY N/A 7/8/2021    Procedure: EGD  (ESOPHAGOGASTRODUODENOSCOPY);  Surgeon: Talib Rutledge MD;  [facility]  Service: Endoscopy;  Laterality: N/A;  Covid-19 test 7/6/21 at [facility] - pg  7/6 returned pt's call- lvm with arrival time-rb  patient aware she moved up 30 minutes from 930am to 900am, patient aware to be here at 8am 7/8/21 - bijan    ESOPHAGOGASTRODUODENOSCOPY N/A 9/27/2022    Procedure: ESOPHAGOGASTRODUODENOSCOPY (EGD);  Surgeon: Talib Rutledge MD;  [facility]  Service: Endoscopy;  Laterality: N/A;  NOT Vaccinated.Covid test on 9/24 at [facility], instructions sent to [email or web address]  Clear liquids up to 4 hrs prior/ AM prep [id number]    TONSILLECTOMY      TYMPANOSTOMY TUBE PLACEMENT         Family History:   Family History   Problem Relation Age of Onset    Lupus Mother     Heart attacks under age 50 Father     Heart disease Father     Hypertension Father     Cardiomyopathy Maternal Grandmother     Dementia Maternal Grandfather     Heart disease Paternal Grandmother     Heart disease Paternal Grandfather     No Known Problems Sister        Social History:   Social History     Tobacco Use    Smoking status: Never    Smokeless tobacco: Never    Tobacco comments:     mom smokes   Substance Use Topics    Alcohol use: No    Drug use: No       Allergies: Reviewed    Home Medications:   Medication List with Changes/Refills   Current Medications    BUPROPION (WELLBUTRIN XL) 150 MG TB24 TABLET    Take 150 mg by mouth once daily.    PROMETHAZINE (PHENERGAN) 25 MG TABLET    promethazine 25 mg tablet    PROMETHAZINE (PHENERGAN) 25 MG TABLET    Take 1 tablet (25 mg total) by mouth every 6 (six) hours as needed for Nausea.    TRI-SPRINTEC, 28, 0.18/0.215/0.25 MG-35 MCG (28) TABLET    Take 1 tablet by mouth once daily.   Changed and/or Refilled Medications    Modified Medication Previous Medication    USTEKINUMAB (STELARA) 90 MG/ML SYRG SYRINGE ustekinumab (STELARA) 90 mg/mL Syrg syringe       Inject 1  "mL (90 mg total) into the skin every 8 weeks.    Inject 1 mL (90 mg total) into the skin every 8 weeks.       Physical Exam:  Vital Signs:  BP (!) 149/83 (BP Location: Left arm, Patient Position: Sitting)   Pulse 109   Temp 98.1 °F (36.7 °C)   Ht 5' 1" (1.549 m)   Wt 54.6 kg (120 lb 5.9 oz)   SpO2 98%   BMI 22.74 kg/m²   Body mass index is 22.74 kg/m².    Gen: NAD, lying comfortably  HENT: NCAT, oropharynx clear  Eyes: anicteric sclerae, EOMI grossly  Neck: supple, no visible masses/goiter  Cardiac: RRR  Lungs: non-labored breathing  Abd: soft, NT/ND, normoactive BS, no rebound  Ext: no LE edema, warm, well perfused  Skin: skin intact on exposed body parts, no visible rashes, lesions  Neuro: A&Ox4, neuro exam grossly intact, moves all extremities  Psych: appropriate mood, affect      Labs: reviewed and pertinent noted above    Assessment/Plan:  Nina Dickerson is a 27 y.o. female with Crohn's disease. The following issues were addresssed:    1. Crohn's disease of small intestine with intestinal obstruction    2. Vitamin D deficiency        1. Crohn's disease:  Doing well and without complaint today.  Continue Stelara Q8wks.  Update labs today.    2. Iron deficiency:  Iron infusions were completed.      3. Heartburn:  Continue Protonix.  Improved.    4. Vit D Deficiency: recommend OTC Vit D supplementation, recheck in 6 months after starting therapy    5. Gallstones:  These did not seem to be causing any significant issues right now.  If her EGD and gastric emptying study are unrevealing and her intermittent episodes of nausea and vomiting persists then we may need to consider surgical evaluation.    6. Liver lesions:  Consistent with hemangiomas.         # IBD specific health maintenance:  Colon cancer surveillance:  No colonic disease    Annual:  - Eye exam:  Not applicable  - Skin exam (if on IMM/TNF):  Recommend annual skin exam  - reminded pt to use sunblock/hats/sunprotective clothing  - PAP (if " immunosuppressed):  Recommend annual exam with gynecologist    DEXA: N/A    Vitamin D:  Recommend supplementation-recheck in the near future    Vaccines:    Influenza:  Recommend annual vaccination   Pneumovax:     PCV13:  Discuss in the future    PSV23:  Discuss in the future   HAV:  Check serology   HBV:  Check serology   Tdap:  Up-to-date   MMR:  Completed series   VZV:  Completed series-check serology   HZV:  Not applicable   HPV:  Discuss in the future   Meningococcus:  Completed series   COVID:  Recommend vaccination    Follow up: Follow up in about 6 months (around 5/22/2023).    Thank you again for sending Nina Dickerson to see Dr. Hoang Rutledge today at the [facility]. Please don't hesitate to contact Dr. Rutledge if there are any questions regarding this evaluation, or if you have any other patients with inflammatory bowel disease for whom you would like a consultation. You can reach Dr. Rutledge at [phone] or by email at [email or web address]    Lennox Dutta MD  Department of Gastroenterology  Inflammatory Bowel Disease      Answers submitted by the patient for this visit:  Established Patient Questionnaire  (Submitted on 11/18/2022)  heartburn: Yes

## 2022-11-23 NOTE — TELEPHONE ENCOUNTER
Specialty Pharmacy - Refill Coordination  Specialty Pharmacy - Clinical Reassessment    Specialty Medication Orders Linked to Encounter      Flowsheet Row Most Recent Value   Medication #1 ustekinumab (STELARA) 90 mg/mL Syrg syringe (Order#[id number], Rx#[id number])            Refill Questions - Documented Responses      Flowsheet Row Most Recent Value   Patient Availability and HIPAA Verification    Does patient want to proceed with activity? Yes   HIPAA/medical authority confirmed? Yes   Relationship to patient of person spoken to? Self   Refill Screening Questions    Changes to allergies? No   Changes to medications? No   New conditions since last clinic visit? No   Unplanned office visit, urgent care, ED, or hospital admission in the last 4 weeks? No   How does patient/caregiver feel medication is working? Very good   Financial problems or insurance changes? No   How many doses of your specialty medications were missed in the last 4 weeks? 0   Would patient like to speak to a pharmacist? No   When does the patient need to receive the medication? 12/02/22   Refill Delivery Questions    How will the patient receive the medication? [facility]Rx   When does the patient need to receive the medication? 12/02/22   Shipping Address Prescription   Address in [facility] confirmed and updated if neccessary? Yes   Expected Copay ($) 0   Is the patient able to afford the medication copay? Yes   Payment Method zero copay   Days supply of Refill 56   Supplies needed? No supplies needed   Refill activity completed? Yes   Refill activity plan Refill scheduled   Shipment/Pickup Date: 11/30/22            Current Outpatient Medications   Medication Sig    buPROPion (WELLBUTRIN XL) 150 MG TB24 tablet Take 150 mg by mouth once daily.    promethazine (PHENERGAN) 25 MG tablet Take 1 tablet (25 mg total) by mouth every 6 (six) hours as needed for Nausea.    TRI-SPRINTEC, 28, 0.18/0.215/0.25 mg-35 mcg (28) tablet Take 1 tablet by mouth  once daily.    ustekinumab (STELARA) 90 mg/mL Syrg syringe Inject 1 mL (90 mg total) into the skin every 8 weeks.   Last reviewed on 11/23/2022 12:34 PM by Hanna Stratton, Diane    Review of patient's allergies indicates:   Allergen Reactions    Latex, natural rubber      Mother states allergic to latex gloves     Adhesive Rash     Band-aids    Last reviewed on  11/23/2022 12:33 PM by Hanna Stratton      Tasks added this encounter   No tasks added.   Tasks due within next 3 months   9/20/2022 - Clinical - Follow Up Assesement (Annual)  11/19/2022 - Refill Call (Auto Added)     Hanna Stratton, PharmD  [facility]  [address] [place] [id number]  Phone: [phone]  Fax: [phone]

## 2023-01-17 ENCOUNTER — SPECIALTY PHARMACY (OUTPATIENT)
Dept: PHARMACY | Facility: CLINIC | Age: 28
End: 2023-01-17
Payer: MEDICAID

## 2023-01-17 NOTE — TELEPHONE ENCOUNTER
Specialty Pharmacy - Refill Coordination    Specialty Medication Orders Linked to Encounter      Flowsheet Row Most Recent Value   Medication #1 ustekinumab (STELARA) 90 mg/mL Syrg syringe (Order#[id number], Rx#[id number])            Refill Questions - Documented Responses      Flowsheet Row Most Recent Value   Patient Availability and HIPAA Verification    Does patient want to proceed with activity? Yes   HIPAA/medical authority confirmed? Yes   Relationship to patient of person spoken to? Self   Refill Screening Questions    Changes to allergies? No   Changes to medications? No   New conditions since last clinic visit? No   Unplanned office visit, urgent care, ED, or hospital admission in the last 4 weeks? No   How does patient/caregiver feel medication is working? Good   Financial problems or insurance changes? No   How many doses of your specialty medications were missed in the last 4 weeks? 0   Would patient like to speak to a pharmacist? No   When does the patient need to receive the medication? 01/27/23   Refill Delivery Questions    How will the patient receive the medication? MEDRx   When does the patient need to receive the medication? 01/27/23   Shipping Address Home   Address in [facility] confirmed and updated if neccessary? Yes   Expected Copay ($) 0   Is the patient able to afford the medication copay? Yes   Payment Method zero copay   Days supply of Refill 56   Supplies needed? No supplies needed   Refill activity completed? Yes   Refill activity plan Refill scheduled   Shipment/Pickup Date: 01/23/23            Current Outpatient Medications   Medication Sig    buPROPion (WELLBUTRIN XL) 150 MG TB24 tablet Take 150 mg by mouth once daily.    promethazine (PHENERGAN) 25 MG tablet Take 1 tablet (25 mg total) by mouth every 6 (six) hours as needed for Nausea.    TRI-SPRINTEC, 28, 0.18/0.215/0.25 mg-35 mcg (28) tablet Take 1 tablet by mouth once daily.    ustekinumab (STELARA) 90 mg/mL Syrg syringe  Inject 1 mL (90 mg total) into the skin every 8 weeks.   Last reviewed on 11/23/2022 12:34 PM by Hanna Stratton, PharmDREW    Review of patient's allergies indicates:   Allergen Reactions    Latex, natural rubber      Mother states allergic to latex gloves     Adhesive Rash     Band-aids    Last reviewed on  11/23/2022 12:33 PM by Hanna Stratton      Tasks added this encounter   3/11/2023 - Refill Call (Auto Added)   Tasks due within next 3 months   No tasks due.     Melanie Rivas, PharmD  [facility]  [address] [place] [id number]  Phone: [phone]  Fax: [phone]

## 2023-02-23 ENCOUNTER — CLINICAL SUPPORT (OUTPATIENT)
Dept: OBSTETRICS AND GYNECOLOGY | Facility: CLINIC | Age: 28
End: 2023-02-23
Payer: MEDICAID

## 2023-02-23 DIAGNOSIS — N91.2 AMENORRHEA: Primary | ICD-10-CM

## 2023-02-23 NOTE — PROGRESS NOTES
Spoke with patient for a total of 30  minutes.  Updated chart to reflect up to date patient demographics.  Medication, pharmacy, and family history updated.  Patient was guided through expectations of OB/GYN care throughout history of pregnancy.  Pregnancy confirmation, dating u/s initial OB  scheduled for the pregnancy.

## 2023-03-13 ENCOUNTER — SPECIALTY PHARMACY (OUTPATIENT)
Dept: PHARMACY | Facility: CLINIC | Age: 28
End: 2023-03-13
Payer: MEDICAID

## 2023-03-13 ENCOUNTER — PATIENT MESSAGE (OUTPATIENT)
Dept: PHARMACY | Facility: CLINIC | Age: 28
End: 2023-03-13
Payer: MEDICAID

## 2023-03-13 NOTE — TELEPHONE ENCOUNTER
Incoming call from pt for stelara refill, pt reports next injection date 03/24. Pending refill call

## 2023-03-15 ENCOUNTER — OFFICE VISIT (OUTPATIENT)
Dept: OBSTETRICS AND GYNECOLOGY | Facility: CLINIC | Age: 28
End: 2023-03-15
Payer: MEDICAID

## 2023-03-15 ENCOUNTER — HOSPITAL ENCOUNTER (OUTPATIENT)
Dept: PERINATAL CARE | Facility: OTHER | Age: 28
Discharge: HOME | End: 2023-03-15
Attending: OBSTETRICS & GYNECOLOGY
Payer: MEDICAID

## 2023-03-15 VITALS
WEIGHT: 118.81 LBS | BODY MASS INDEX: 22.43 KG/M2 | HEIGHT: 61 IN | DIASTOLIC BLOOD PRESSURE: 72 MMHG | SYSTOLIC BLOOD PRESSURE: 100 MMHG

## 2023-03-15 DIAGNOSIS — Z32.01 POSITIVE PREGNANCY TEST: Primary | ICD-10-CM

## 2023-03-15 DIAGNOSIS — N91.2 AMENORRHEA: ICD-10-CM

## 2023-03-15 DIAGNOSIS — N91.4 SECONDARY OLIGOMENORRHEA: ICD-10-CM

## 2023-03-15 DIAGNOSIS — Z12.4 PAP SMEAR FOR CERVICAL CANCER SCREENING: ICD-10-CM

## 2023-03-15 LAB
B-HCG UR QL: POSITIVE
CTP QC/QA: YES

## 2023-03-15 PROCEDURE — 3008F BODY MASS INDEX DOCD: CPT | Mod: CPTII,,, | Performed by: FAMILY MEDICINE

## 2023-03-15 PROCEDURE — 76801 OB US < 14 WKS SINGLE FETUS: CPT | Mod: 26,,, | Performed by: OBSTETRICS & GYNECOLOGY

## 2023-03-15 PROCEDURE — 1160F RVW MEDS BY RX/DR IN RCRD: CPT | Mod: CPTII,,, | Performed by: FAMILY MEDICINE

## 2023-03-15 PROCEDURE — 3074F SYST BP LT 130 MM HG: CPT | Mod: CPTII,,, | Performed by: FAMILY MEDICINE

## 2023-03-15 PROCEDURE — 3008F PR BODY MASS INDEX (BMI) DOCUMENTED: ICD-10-PCS | Mod: CPTII,,, | Performed by: FAMILY MEDICINE

## 2023-03-15 PROCEDURE — 99203 PR OFFICE/OUTPT VISIT, NEW, LEVL III, 30-44 MIN: ICD-10-PCS | Mod: S$PBB,,, | Performed by: FAMILY MEDICINE

## 2023-03-15 PROCEDURE — 3074F PR MOST RECENT SYSTOLIC BLOOD PRESSURE < 130 MM HG: ICD-10-PCS | Mod: CPTII,,, | Performed by: FAMILY MEDICINE

## 2023-03-15 PROCEDURE — 87591 N.GONORRHOEAE DNA AMP PROB: CPT | Performed by: FAMILY MEDICINE

## 2023-03-15 PROCEDURE — 99203 OFFICE O/P NEW LOW 30 MIN: CPT | Mod: S$PBB,,, | Performed by: FAMILY MEDICINE

## 2023-03-15 PROCEDURE — 1159F PR MEDICATION LIST DOCUMENTED IN MEDICAL RECORD: ICD-10-PCS | Mod: CPTII,,, | Performed by: FAMILY MEDICINE

## 2023-03-15 PROCEDURE — 3078F PR MOST RECENT DIASTOLIC BLOOD PRESSURE < 80 MM HG: ICD-10-PCS | Mod: CPTII,,, | Performed by: FAMILY MEDICINE

## 2023-03-15 PROCEDURE — 76801 OB US < 14 WKS SINGLE FETUS: CPT

## 2023-03-15 PROCEDURE — 3078F DIAST BP <80 MM HG: CPT | Mod: CPTII,,, | Performed by: FAMILY MEDICINE

## 2023-03-15 PROCEDURE — 1159F MED LIST DOCD IN RCRD: CPT | Mod: CPTII,,, | Performed by: FAMILY MEDICINE

## 2023-03-15 PROCEDURE — 76801 US OB/GYN PROCEDURE (VIEWPOINT): ICD-10-PCS | Mod: 26,,, | Performed by: OBSTETRICS & GYNECOLOGY

## 2023-03-15 PROCEDURE — 99213 OFFICE O/P EST LOW 20 MIN: CPT | Mod: PBBFAC,TH | Performed by: FAMILY MEDICINE

## 2023-03-15 PROCEDURE — 81025 URINE PREGNANCY TEST: CPT | Mod: PBBFAC | Performed by: FAMILY MEDICINE

## 2023-03-15 PROCEDURE — 99999 PR PBB SHADOW E&M-EST. PATIENT-LVL III: ICD-10-PCS | Mod: PBBFAC,,, | Performed by: FAMILY MEDICINE

## 2023-03-15 PROCEDURE — 1160F PR REVIEW ALL MEDS BY PRESCRIBER/CLIN PHARMACIST DOCUMENTED: ICD-10-PCS | Mod: CPTII,,, | Performed by: FAMILY MEDICINE

## 2023-03-15 PROCEDURE — 87086 URINE CULTURE/COLONY COUNT: CPT | Performed by: FAMILY MEDICINE

## 2023-03-15 PROCEDURE — 99999 PR PBB SHADOW E&M-EST. PATIENT-LVL III: CPT | Mod: PBBFAC,,, | Performed by: FAMILY MEDICINE

## 2023-03-15 PROCEDURE — 88175 CYTOPATH C/V AUTO FLUID REDO: CPT | Performed by: FAMILY MEDICINE

## 2023-03-15 NOTE — PROGRESS NOTES
HISTORY OF PRESENT ILLNESS:    Nina Dickerson is a 27 y.o. female, ,  Patient's last menstrual period was 2023 (exact date).  for a routine exam complaining of amenorrhea and positive home UPT. First pregnancy she is aware of. Pt with crohn's in remission, will let her GI know about pregnancy. Partner with HLD. Mild nausea, no vomiting. Fatigue and increased hunger. No VB or pelvic pain. Pt is a . This is the extent of the patient's complaints at this time.     Past Medical History:   Diagnosis Date    Crohn's disease     Insomnia        Past Surgical History:   Procedure Laterality Date    COLONOSCOPY N/A 12/10/2015    Procedure: COLONOSCOPY;  Surgeon: Tala Gonzalez MD;  Location: [facility] (2ND FLR);  Service: Endoscopy;  Laterality: N/A;    COLONOSCOPY N/A 2016    Procedure: COLONOSCOPY;  Surgeon: Tala Gonzalez MD;  Location: [facility] (2ND FLR);  Service: Endoscopy;  Laterality: N/A;    COLONOSCOPY N/A 2021    Procedure: COLONOSCOPY;  Surgeon: True Treadwell MD;  Location: [facility];  Service: General;  Laterality: N/A;    COLONOSCOPY N/A 2021    Procedure: COLONOSCOPY;  Surgeon: Talib Rutledge MD;  Location: [facility] (4TH FLR);  Service: Endoscopy;  Laterality: N/A;  Covid-19 test 21 at [facility]    COLONOSCOPY N/A 2022    Procedure: COLONOSCOPY;  Surgeon: Talib Rutledge MD;  Location: [facility] (4TH FLR);  Service: Endoscopy;  Laterality: N/A;  golytely    COLONOSCOPY W/ BIOPSIES  2021    ESOPHAGOGASTRODUODENOSCOPY      ESOPHAGOGASTRODUODENOSCOPY N/A 2021    Procedure: EGD (ESOPHAGOGASTRODUODENOSCOPY);  Surgeon: Talib Rutledge MD;  Location: [facility] (4TH FLR);  Service: Endoscopy;  Laterality: N/A;  Covid-19 test 21 at [facility]   returned pt's call- lvm with arrival time-rb  patient aware she moved up 30 minutes from 930am to 900am, patient aware to be here at 8am 21 - bijan     ESOPHAGOGASTRODUODENOSCOPY N/A 9/27/2022    Procedure: ESOPHAGOGASTRODUODENOSCOPY (EGD);  Surgeon: Talib Rutledge MD;  Location: [address]);  Service: Endoscopy;  Laterality: N/A;  NOT Vaccinated.Covid test on 9/24 at [facility], instructions sent to [email or web address]  Clear liquids up to 4 hrs prior/ AM prep [id number]    TONSILLECTOMY      TYMPANOSTOMY TUBE PLACEMENT         MEDICATIONS AND ALLERGIES:      Current Outpatient Medications:     ustekinumab (STELARA) 90 mg/mL Syrg syringe, Inject 1 mL (90 mg total) into the skin every 8 weeks., Disp: 1 mL, Rfl: 3    buPROPion (WELLBUTRIN XL) 150 MG TB24 tablet, Take 150 mg by mouth once daily., Disp: , Rfl:     diphenhydrAMINE (BENADRYL) 25 mg capsule, Take 1 capsule (25 mg total) by mouth every 6 (six) hours as needed for Itching or Allergies. (Patient not taking: Reported on 3/15/2023), Disp: 20 capsule, Rfl: 0    promethazine (PHENERGAN) 25 MG tablet, Take 1 tablet (25 mg total) by mouth every 6 (six) hours as needed for Nausea. (Patient not taking: Reported on 3/15/2023), Disp: 30 tablet, Rfl: 0    TRI-SPRINTEC, 28, 0.18/0.215/0.25 mg-35 mcg (28) tablet, Take 1 tablet by mouth once daily., Disp: , Rfl:     Review of patient's allergies indicates:   Allergen Reactions    Latex, natural rubber      Mother states allergic to latex gloves     Adhesive Rash     Band-aids       Family History   Problem Relation Age of Onset    Lupus Mother     Heart attacks under age 50 Father     Heart disease Father     Hypertension Father     Cardiomyopathy Maternal Grandmother     Dementia Maternal Grandfather     Heart disease Paternal Grandmother     Heart disease Paternal Grandfather     No Known Problems Sister        Social History     Socioeconomic History    Marital status:    Tobacco Use    Smoking status: Never    Smokeless tobacco: Never    Tobacco comments:     mom smokes   Substance and Sexual Activity    Alcohol use: No    Drug use: No    Sexual  "activity: Yes     Partners: Male   Social History Narrative    Lives with mom, sister.    2 cats.    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       COMPREHENSIVE GYN HISTORY:  PAP History: Denies abnormal Paps.  Infection History: Denies STDs. Denies PID.  Benign History: Denies uterine fibroids. Denies ovarian cysts. Denies endometriosis. Denies other conditions.  Cancer History: Denies cervical cancer. Denies uterine cancer or hyperplasia. Denies ovarian cancer. Denies vulvar cancer or pre-cancer. Denies vaginal cancer or pre-cancer. Denies breast cancer. Denies colon cancer.  Sexual Activity History: Reports currently being sexually active  Menstrual History: None.  Contraception: None    ROS:  GENERAL: No weight changes. No swelling. + fatigue. No fever.  CARDIOVASCULAR: No chest pain. No shortness of breath. No leg cramps.   NEUROLOGICAL: No headaches. No vision changes.  BREASTS: No pain. No lumps. No discharge.  ABDOMEN: No pain. + nausea. No vomiting. No diarrhea. No constipation.  REPRODUCTIVE: No abnormal bleeding.   VULVA: No pain. No lesions. No itching.  VAGINA: No relaxation. No itching. No odor. No discharge. No lesions.  URINARY: No incontinence. No nocturia. No frequency. No dysuria.    /72   Ht 5' 1" (1.549 m)   Wt 53.9 kg (118 lb 13.3 oz)   LMP 01/18/2023 (Exact Date)   BMI 22.45 kg/m²     PE:  Physical Exam:   Constitutional: She appears well-developed and well-nourished. She does not appear ill. No distress.        Pulmonary/Chest: Right breast exhibits no inverted nipple, no mass, no nipple discharge, no skin change, no tenderness and no swelling. Left breast exhibits no inverted nipple, no mass, no nipple discharge, no skin change, no tenderness and no swelling.          Genitourinary:    Vagina, uterus, right adnexa and left adnexa normal.      Pelvic exam was performed with patient supine.   The external female genitalia was normal.   " Genitalia hair distrobution normal .   There is no rash or lesion on the right labia. There is no rash or lesion on the left labia. Cervix is normal. No rectocele, cystocele or unspecified prolapse of vaginal walls in the vagina.    pap smear completedUterus is not tender. Normal urethral meatus.Urethra findings: no urethral mass              Neurological: GCS eye subscore is 4. GCS verbal subscore is 5. GCS motor subscore is 6.       PROCEDURES:  UPT Positive  Genprobe  Pap    Results for orders placed or performed in visit on 03/15/23   POCT urine pregnancy   Result Value Ref Range    POC Preg Test, Ur Positive (A) Negative     Acceptable Yes        DIAGNOSIS:  Gyn exam  IUP with stated LMP of 1/18/23    Indication   ========   Indication: Estimation of Gestational Age     Method   ======   [place] S8, Transabdominal and transvaginal ultrasound examination. View: Good view     Pregnancy   =========   Kathleen pregnancy. Number of embryos: 1     Dating   ======   Ultrasound examination on: 3/15/2023   GA by U/S based upon: CRL   GA by U/S 6 w + 5 d   JADA by U/S: 11/3/2023   Assigned: based on ultrasound (CRL), selected on 03/15/2023   Assigned GA 6 w + 5 d   Assigned JADA: 11/3/2023     Assessment   ==========   Gestational sac: visualized   Location: intrauterine   Yolk sac: visualized   Amniotic sac: visualized   Embryo: visualized   CRL 7.8 mm 6w 5d [place]   Cardiac activity: present    bpm     Maternal Structures   ===============   Uterus / Cervix   Uterus: Normal   Cervix: Visualized   Approach: Transvaginal   Ovaries / Tubes / Adnexa   Rt ovary: Normal   Rt ovary D1 29 mm   Rt ovary D2 27 mm   Rt ovary D3 13 mm   Rt ovary Vol 5.5 cmÂ³   Lt ovary: Normal   Lt ovary D1 28 mm   Lt ovary D2 29 mm   Lt ovary D3 16 mm   Lt ovary Vol 6.7 cmÂ³   Cul de Sac / Bladder / Kidneys / Other   Free fluid: No free fluid visualized     Impression   =========   A kathleen living IUP is identified.   JADA  is assigned based on the CRL from today?s study. If other clinical data, such as IVF conception dating or prior ultrasound assignment of JADA differs from the JADA   assigned today, please contact the [facility] department so that this report can be revised to reflect the correct JADA.   The maternal adnexae are normal in appearance.        PLAN:Routine prenatal care    MEDICATIONS PRESCRIBED:  PNV    LABS AND TESTS ORDERED:  New Ob Labs      1st TRIMESTER COUNSELING: Discussed all, booklet provided  Common complaints of pregnancy  HIV and other routine prenatal tests including  genetic screening  Risk factors identified by prenatal history  Anticipated course of prenatal care  Nutrition and weight gain counseling  Toxoplasmosis precautions (Cats/Raw Meat)  Sexual activity and exercise  Environmental/Work hazards  Travel  Tobacco (Ask, Advise, Assess, Assist, and Arrange), as well as alcohol and drug use  Use of any medications (Including supplements, Vitamins, Herbs, or OTC Drugs)  Indications for Ultrasound  Domestic violence  Seat belt use  Childbirth classes/Hospital facilities     TERATOLOGY COUNSELING: Discussed options for SS, MT21 and carrier screening. Pt will let us know her desires. Discussed time constraints on SS      FOLLOW-UP for a New Ob Visit in   4   weeks with   -discussed to call clinic or L&D/ER if after hours for pain/bleeding

## 2023-03-15 NOTE — PATIENT INSTRUCTIONS
LABOR AND DELIVERY PHONE NUMBER, [phone] (OPEN , LOCATED ON 6TH FLOOR OF HOSPITAL)  [address] PHONE NUMBER, [phone] (OPEN MON-FRI, 8a-5p)     1) Eat small frequent meals through the day versus three large meals  2) Try ginger ale or sprite to help settle the stomach   3) Eat crackers or dry toast before getting out of bed in the morning   4) Stay hydrated by drinking plenty of water-do not immediately eat or drink something after vomiting. Give your stomach a rest for 20-30 minutes. Slowly reintroduce ice chips, small sips water, crackers, etc.    5) Try OTC unisom (1/2 tablet) and vitamin B6 - take the 25mg b6 twice a day and then both together at night before bed. This can help with the nausea in the morning and is safe to use during pregnancy.    If you are unable to keep anything down and constantly vomiting for more that 24 hours, let the office know so that dehydration can be avoided. We would recommend being seen in the emergency department if this is the case.       Topic  General Pregnancy Information Recommended   (Unless Otherwise Contraindicated Or Restrictions Given To You By Your OB Doctor)      1. Anticipated course of prenatal care      Visits: will be Every 4 wks until 28 weeks, then every 2 weeks until 36 weeks, and then weekly until delivery.   Urine will be collected at each Obstetric visit        2. Nutrition and weight gain    Daily pre-chris vitamin (recommend taking at night)   Additional 300 calories needed daily  No Sushi, hotdogs, unpasteurized products (milk/cheeses). No large fish such as: shark, margaret mackerel, tile, sword fish   Incorporate 12 ounces of smaller seafood/week and no more than 6oz of albacore tuna   Caffeine: 200 mg/day or 2 cups of caffeine/day   Weight gain recommendations are based off of BMI before pregnancy. Generally patients who with normal weight prior pregnancy it is recommended 25-35 pounds of weight gain during the pregnancy with an estimated  weekly gain of 1 pound/wk in 2nd and 3rd Trimester.    3. Toxoplasmosis precautions  If cats are in the home avoid changing litter boxes and if you need to change the litter box recommended you use gloves   4. Sexual Activity  Sexual activity is okay unless you are put on restrictions by your provider. I recommend urinating after intercourse    5. Exercise  Generally pre-pregnancy routine is okay to continue   Drink plenty fluids for hydration   Stop any activity that causes heavy cramping like a period or bright red bleeding and contact your provider  No extreme or contact sports   No exercise on your back for an extended period of time after 20 weeks    6. Hot Tub/Saunas  Avoid hot tubes and saunas    7. Hair Treatments  Because of the lack of scientific studies on the effects of chemical treatments on your hair, we must advise that you do it at your own risk. If you choose to treat your hair, we recommend that you wait until after 12 weeks gestation. At this time there is no reason to believe that normal hair treatment is associated with onsequences to the baby.    8. Vaccines  Influenza vaccine is recommended by CDC during flu season   Tdap (pertussis or whopping cough) recommended each pregnancy between 27 and 36 weeks   Tdap booster recommended for family and other planned direct caregivers    9. Water  Water is an important nutrient in a good diet. However, it cannot be stressed enough that during pregnancy water is essential. The body has increased circulation through blood vessels, and without a large increase in water, pregnant women will be dehydrated. It plays an important role in decreasing constipation, preventing  contractions, decreasing swelling, and preventing dizziness. We recommend that you drink 8-10 glasses of water per day.    10. Smoking/Alcohol/Illicit Drug Use  No safe Level   Can lead to problems with pregnancy   Growth of the developing fetus    labor (delivery before 37  weeks)    rupture of the membranes (water breaking before 37 weeks)   Premature separation of the placenta (which may cause bleeding)   American College of Obstetricians and Gynecologists endorses abstinence   Can lead to babies with disabilities    11. Environmental or work hazards  Unless otherwise restricted you may continue work throughout the pregnancy   Notify your provider of any work hazards or chemical exposure concerns   12. Travel    Safe to travel up to 35 weeks   Continue to wear a seatbelt and airbags are still recommended   Drink plenty fluids   Blood clots are a concern during pregnancy with long travel. Recommend compression stockings and moving around at least every 2 hours and staying hydrated.    13. Use of medications, vitamins, herbs, OTC drugs    Any medications not on the list provided to you from our clinic or given to you by one of our providers we recommend calling to make sure the medication is safe for you and baby.    14. Domestic Violence    Please notify office immediately of any concerns or violence so that we can help direct you to assistance needed   [place] [employer]: [phone]    15. Childbirth classes    List of Childbirth classes from [facility] is available    16. Selecting a Pediatrician  Selecting a pediatrician before delivery is recommended  You can interview pediatricians before delivery    17. Fetal Monitoring    A simple test of your babys well-being is a kick count. After 26 weeks, fetal motion of any kind should be monitored. Further discussion at that time   18.  Labor Signs    Water break, leaking fluids from Vagina prior 37 weeks  Regular contractions, Contractions that are more than 5-6/hour, getting stronger and painful with lower back pain, does not go away with rest and fluids    19. Postpartum Family Planning    Multiple options available from short term methods to long term reversible and irreversible methods    Discuss with provider as you get closer to delivery    20. Dental    It is recommended that you get an annual dental cleaning    21. Breastfeeding    Classes offered at [facility] and it is recommended to take a class    22. Lifting In 2013, the National [place] for Occupational Safety and Health (NIOSH) published clinical guidelines for occupational lifting in uncomplicated pregnancies. The recommended weight limits are based on gestational age, intermittent versus repetitive lifting, time (hours/day) spent lifting, and lifting height from floor and distance in 3 front of body. In this guideline, the maximum permissible weight for a woman less than 20 weeks of gestation performing infrequent lifting is 36 pounds (16 kgs) and the maximum permissible weight at ?20 weeks is 26 pounds (12 kgs). For repetitive lifting ?1 hour/day, the maximum weights in the first and second half of pregnancy are 18 pounds (8 kgs) and 13 pounds (6 kgs), respectively, and for repetitive lifting <1 hour/day, the maximum weights are 30 pounds (14 kgs) and 22 pounds (10 kgs), respectively. Although not based on high quality evidence, these guidelines are a reasonable reference for counseling pregnant women     23. Scheduling and Provider Availability    Your Obstetric Doctor is usually here weekly but not every day. We recommend you make 3-4 advanced appointments at a time to accommodate your personal needs and work/school obligations.   We ask that you come 15 minutes prior your scheduled appointment.   For same day appointments (not routine appointments) there is a Nurse Practitioner or another obstetric provider available. Please let the  aware you are an OB patient requesting a same day appointment.      24. Recommended Phone [facility]      MEDICATIONS IN PREGNANCY     While some medications are considered safe to take during pregnancy, the effects of other medications on your unborn baby are unknown.  Therefore, it is very important to pay special attention to medications you take while you are pregnant.   If you were taking prescription medications before you became pregnant, please ask your health care provider about continuing these medications as soon as you find out that you are pregnant. Do not stop taking any medications without discussing with your health care provider. Your health care provider will weigh the benefits and risks to your pregnancy when making his or her recommendation. With some medications, the risk of not taking them may be more serious than the potential risk of taking them.   If you are prescribed any new medication, please inform your health care provider that you are pregnant. Be sure to discuss the risks and benefits of the newly prescribed medication with your health care provider before taking the medication. We are always available to answer any questions about new medications and how they relate to your pregnancy.     Are Alternative Pregnancy Medicine Therapies Safe?   Many pregnant women believe natural products can be safely used to relieve nausea, backache, and other annoying symptoms of pregnancy, but many of these so-called natural products have not been tested for their safety and effectiveness. Therefore, it is very important to check with your health care provider before taking any alternative therapies. She will not recommend a product or therapy until it is shown to be safe and effective.     Which Over the Counter Drugs Are Safe?   Prenatal vitamins, now available without a prescription, are safe and important to take during pregnancy. Ask your health care provider about the safety of taking other vitamins, herbal remedies and supplements during pregnancy. Most herbal preparations and supplements have not been proven to be safe during pregnancy. Generally, you should not take any over-the-counter medication unless it is necessary. The following medications and home  remedies have no known harmful effects during pregnancy when taken according to the package directions. If you want to know about the safety of any other medications not listed here, please contact your health care provider.      Problem:  Safe to Take:    Pain relief, headache, and fever  Acetaminophen - Tylenol, Anacin Aspirin-Free    Heartburn  Acid neutralizers - Maalox, Mylanta, Rolaids, Tums, Gaviscon   Histamine-blockers - Pepcid, Zantac, Prilosec    Gas pains and bloating  Simethicone - Gas-X, Maalox Anti-Gas, Mylanta Gas, Mylicon    Nausea  Donna - beverages, tablets, candies   Vitamin B6   Emetrol (if not diabetic)   Sea bands   Anti-histamines - Sleep-alma, Benadryl, Bonnine, Dramamine    Cough  Guaifenesin (expectorant) - Hytuss, Mucinex, Robitussin   Dextromethorphan (antitussive) - Benylin, Delsym, Scot-Tussin DM   Guaifenesin plus dextromethorphan - Benylin Expectorant, Robitussin DM    Congestion  Pseudoephedrine - Sudafed, Actifed, Dristan, Neosynephrine   Vicks VapoRub   Saline nasal drops or spray    Sore throat  Throat lozenges - Sucrets, Cepacol, Cepastat, Ricola   Chloroseptic Spray   Warm salt/water gargle    Allergy relief  Chlorpheniramine - Chlor-Trimeton, Triaminic   Loratadine - Alavert, Claritin, Tavist ND, Triaminic Allerchews   Cetirizine - Zyrtec   Diphenhydramine -Benadryl, Diphenhist    Rashes  Hydrocortisone cream or ointment   Caladryl lotion or cream   Benadryl cream   Oatmeal bath (Aveeno)    Diarrhea  Loperamide - Imodium, Kaopectate, Maalox Anti-Diarrheal, Pepto Bismol    Constipation  Fiber supplements - Metamucil, Citrucel, Fiberall/Fibercon, Benefiber   Stool softeners - Colace, Senekot, Dulcolax   Milk of Magnesia    Hemorrhoids  Warm baths   Witch hazel preparations - Tucks medicated pads   Steroid preparations - Anusol-HC, Preparation H    Insomnia  Diphenhydramine - Benadryl, Unisom SleepGels, Nytol, Sominex   Doxylamine succinate - Unisom Nighttime Sleep-Aid     First-aid ointments  Cortaid, Lanacort, Polysporin, Bacitracin, Neosporin    Yeast infections  Call office for appointment      Connected MOM   Using Wireless Technology to Manage Your Prenatal Health   Congratulations! Your team here at [facility] is excited for the upcoming addition to your family and is ready to support you over the course of your pregnancy. One of the ways we are prepared to help you is through our exciting new Digital Medicine Program, Connected MOM. Connected MOM stands for Connected Maternity Online Monitoring and is offered free of charge as a way to help our expectant mothers manage their pregnancy with fewer visits to the obstetrician.    In the fast-paced environment we live in, patients demand convenient, reliable access to healthcare at their fingertips. Recommended by The American College of Obstetricians and Gynecologists (ACOG), the standard prenatal care model for low-risk pregnancies can average anywhere between 12-14 in-office prenatal visits.    Through this innovative program, participating mothers-to-be receive a wireless scale, wireless blood pressure cuff and an at-home urine protein test kit. Through the use of a smartphone, patients can easily send their weight, blood pressure readings and urine protein test results digitally in real-time from the comfort of their own homes. Results are sent directly to their [facility] account, the systems online patient portal which connects directly to the patients Electronic Medical Record. A specialized Connected MOM care team - comprised of the patients obstetrician, a dedicated health  and a  - reviews the data and provides medical recommendations as needed. This allows expectant mothers to receive care proactively, wherever they are, while minimizing the need for in-person visits and thus minimizing disruption to their regular daily activities.    How it Works At the initial prenatal  visit, interested patients can work with their obstetrician to sign up for the program. After the appointment, expectant mothers can head to the [employer], a first-of-its-kind retail experience created by [facility] offering the latest in cutting-edge, interactive healthcare technology, to receive a Connected MOM kit containing all of the digital tools needed for remote monitoring. Once enrolled, patients weigh themselves regularly and take their blood pressure once a week. Instead of coming to three office appointments at weeks 20, 30, 37 the patient will have the appointment at home. They will take their blood pressure, weight and perform three at-home urine protein tests at these home visits. Results are directly transmitted into the EMR, and notifications and messages from the care team are communicated via [facility].     TECH SUPPORT [phone]  [email or web address]      Chromosomal testing  The sequential screen is a test done around 12-14 weeks that consists of an ultrasound that measures the nuchal fold (neck thickness) of baby and blood work on the same day. You get a second set of labs done a few weeks later after 15 weeks. Based on all three of these results, they are able to tell you if you are considered to be low risk or high risk regarding neural tube defects and chromosomal abnormalities like Down Syndrome. If you are at all interested, I usually place the order today so we do not miss the window. Someone will give you a phone call in a week or two to schedule this. If you do not want it, just tell them no thank you. This test is typically covered by your insurance and is performed by the Maternal Fetal Medicine (MFM) department here at [facility]. It will not tell you the sex of the baby.     OR     2.  Call [phone] to see about coverage and any out of pocket costs regarding the Aoszypzkr67 (MT21) testing. This is done any day after 11 weeks and is blood work only. It checks for any  chromosomal abnormalities like Down Syndrome. You can also find out the sex of the baby if you choose to know. Once you find out coverage and decide to proceed, send either myself or your doctor a message and we can see what date you can do it. It is done at our second floor lab at [facility].

## 2023-03-16 LAB
BACTERIA UR CULT: NO GROWTH
C TRACH DNA SPEC QL NAA+PROBE: NOT DETECTED
N GONORRHOEA DNA SPEC QL NAA+PROBE: NOT DETECTED

## 2023-03-17 ENCOUNTER — TELEPHONE (OUTPATIENT)
Dept: GASTROENTEROLOGY | Facility: CLINIC | Age: 28
End: 2023-03-17
Payer: MEDICAID

## 2023-03-17 ENCOUNTER — PATIENT MESSAGE (OUTPATIENT)
Dept: GASTROENTEROLOGY | Facility: CLINIC | Age: 28
End: 2023-03-17
Payer: MEDICAID

## 2023-03-17 NOTE — TELEPHONE ENCOUNTER
Called patient regarding refill and claim was not running through. Had to have a ROSCOE override (although took x4 reps to complete). Stelara now going through for $0 copay. LVM for pt to setup refill. Next need by date is 3/24

## 2023-03-17 NOTE — TELEPHONE ENCOUNTER
Specialty Pharmacy - Refill Coordination    Specialty Medication Orders Linked to Encounter      Flowsheet Row Most Recent Value   Medication #1 ustekinumab (STELARA) 90 mg/mL Syrg syringe (Order#[id number], Rx#[id number])            Refill Questions - Documented Responses      Flowsheet Row Most Recent Value   Patient Availability and HIPAA Verification    Does patient want to proceed with activity? Yes   HIPAA/medical authority confirmed? Yes   Relationship to patient of person spoken to? Self   Refill Screening Questions    Changes to allergies? No   Changes to medications? No   New conditions since last clinic visit? No   Unplanned office visit, urgent care, ED, or hospital admission in the last 4 weeks? No   How does patient/caregiver feel medication is working? Good   Financial problems or insurance changes? No   How many doses of your specialty medications were missed in the last 4 weeks? 0   Would patient like to speak to a pharmacist? No   When does the patient need to receive the medication? 03/24/23   Refill Delivery Questions    How will the patient receive the medication? MEDRx   When does the patient need to receive the medication? 03/24/23   Shipping Address Home   Address in [facility] confirmed and updated if neccessary? Yes   Expected Copay ($) 0   Is the patient able to afford the medication copay? Yes   Payment Method zero copay   Days supply of Refill 56   Supplies needed? No supplies needed   Refill activity completed? Yes   Refill activity plan Refill scheduled   Shipment/Pickup Date: 03/21/23            Current Outpatient Medications   Medication Sig    buPROPion (WELLBUTRIN XL) 150 MG TB24 tablet Take 150 mg by mouth once daily.    diphenhydrAMINE (BENADRYL) 25 mg capsule Take 1 capsule (25 mg total) by mouth every 6 (six) hours as needed for Itching or Allergies. (Patient not taking: Reported on 3/15/2023)    promethazine (PHENERGAN) 25 MG tablet Take 1 tablet (25 mg total) by mouth  every 6 (six) hours as needed for Nausea. (Patient not taking: Reported on 3/15/2023)    TRI-SPRINTEC, 28, 0.18/0.215/0.25 mg-35 mcg (28) tablet Take 1 tablet by mouth once daily.    ustekinumab (STELARA) 90 mg/mL Syrg syringe Inject 1 mL (90 mg total) into the skin every 8 weeks.   Last reviewed on 3/15/2023  2:11 PM by Kevin Ambriz NP    Review of patient's allergies indicates:   Allergen Reactions    Latex, natural rubber      Mother states allergic to latex gloves     Adhesive Rash     Band-aids    Last reviewed on  3/15/2023 2:11 PM by Kevin Ambriz      Tasks added this encounter   5/9/2023 - Refill Call (Auto Added)   Tasks due within next 3 months   No tasks due.     Pina Oneil, Patient Care Assistant  [facility]  [address] [place], [place] [address]  [facility] [address]  Phone: [phone]  Fax: [phone]

## 2023-03-28 ENCOUNTER — PATIENT MESSAGE (OUTPATIENT)
Dept: OBSTETRICS AND GYNECOLOGY | Facility: CLINIC | Age: 28
End: 2023-03-28
Payer: MEDICAID

## 2023-04-17 ENCOUNTER — INITIAL PRENATAL (OUTPATIENT)
Dept: OBSTETRICS AND GYNECOLOGY | Facility: CLINIC | Age: 28
End: 2023-04-17
Payer: MEDICAID

## 2023-04-17 VITALS
SYSTOLIC BLOOD PRESSURE: 108 MMHG | DIASTOLIC BLOOD PRESSURE: 72 MMHG | WEIGHT: 123.44 LBS | BODY MASS INDEX: 23.33 KG/M2

## 2023-04-17 DIAGNOSIS — Z34.90 ENCOUNTER FOR SUPERVISION OF LOW-RISK PREGNANCY, ANTEPARTUM: Primary | ICD-10-CM

## 2023-04-17 DIAGNOSIS — Z3A.11 11 WEEKS GESTATION OF PREGNANCY: ICD-10-CM

## 2023-04-17 PROCEDURE — 99213 OFFICE O/P EST LOW 20 MIN: CPT | Mod: TH,S$PBB,, | Performed by: OBSTETRICS & GYNECOLOGY

## 2023-04-17 PROCEDURE — 99213 PR OFFICE/OUTPT VISIT, EST, LEVL III, 20-29 MIN: ICD-10-PCS | Mod: TH,S$PBB,, | Performed by: OBSTETRICS & GYNECOLOGY

## 2023-04-17 PROCEDURE — 99999 PR PBB SHADOW E&M-EST. PATIENT-LVL II: ICD-10-PCS | Mod: PBBFAC,,, | Performed by: OBSTETRICS & GYNECOLOGY

## 2023-04-17 PROCEDURE — 99999 PR PBB SHADOW E&M-EST. PATIENT-LVL II: CPT | Mod: PBBFAC,,, | Performed by: OBSTETRICS & GYNECOLOGY

## 2023-04-17 PROCEDURE — 99212 OFFICE O/P EST SF 10 MIN: CPT | Mod: PBBFAC,TH,PN | Performed by: OBSTETRICS & GYNECOLOGY

## 2023-04-17 NOTE — PROGRESS NOTES
Pregnancy dating, labs, ultrasound reports, prenatal testing, and problem list; prior records and results; and available outside records were reviewed and updated in EMR.  Pertinent findings were noted below.    Reason for Visit   Initial Prenatal Visit    HPI   27 y.o., at 11w3d by Estimated Date of Delivery: 11/3/23    No complaints today.     TW lbs     Follows with GI for Crohn's, in remission, on stelara injections q 8 weeks.      Previously on wellbutrin for anxiety, discontinued 3-4 months ago. Reports mood is well controlled with behavior modifications/ coping mechanisms.     Contractions: No   Bleeding: No   Loss of fluid: No   Fetal movement: Not applicable.    Nausea: No   Vomiting: No   Headache: No     Exam   /72   Wt 56 kg (123 lb 7.3 oz)   LMP 2023 (Exact Date)   BMI 23.33 kg/m²     GENERAL: No acute distress  ABD: Gravid    Assessment and Plan   Encounter for supervision of low-risk pregnancy, antepartum  -     US MFM Procedure ([facility]); Future    11 weeks gestation of pregnancy      Discussed TWG 5 lbs. Recommendation for BMI 25-35 lbs.   Discussed OTC nausea/vomiting and headache.   AFP at 15 weeks   Anatomy Ultrasound orders placed     Pain and bleeding precautions given  Follow-up: 4 weeks

## 2023-04-18 ENCOUNTER — PATIENT MESSAGE (OUTPATIENT)
Dept: ADMINISTRATIVE | Facility: OTHER | Age: 28
End: 2023-04-18
Payer: MEDICAID

## 2023-04-24 ENCOUNTER — TELEPHONE (OUTPATIENT)
Dept: OBSTETRICS AND GYNECOLOGY | Facility: CLINIC | Age: 28
End: 2023-04-24
Payer: MEDICAID

## 2023-04-24 NOTE — TELEPHONE ENCOUNTER
Called to notify patient of neg Mat21 results. Gender results put into envelope per patient request. Questions answered.

## 2023-05-01 ENCOUNTER — PATIENT MESSAGE (OUTPATIENT)
Dept: MATERNAL FETAL MEDICINE | Facility: CLINIC | Age: 28
End: 2023-05-01
Payer: MEDICAID

## 2023-05-09 ENCOUNTER — SPECIALTY PHARMACY (OUTPATIENT)
Dept: PHARMACY | Facility: CLINIC | Age: 28
End: 2023-05-09
Payer: MEDICAID

## 2023-05-09 NOTE — TELEPHONE ENCOUNTER
Specialty Pharmacy - Refill Coordination    Specialty Medication Orders Linked to Encounter      Flowsheet Row Most Recent Value   Medication #1 ustekinumab (STELARA) 90 mg/mL Syrg syringe (Order#[id number], Rx#[id number])            Refill Questions - Documented Responses      Flowsheet Row Most Recent Value   Patient Availability and HIPAA Verification    Does patient want to proceed with activity? Yes   HIPAA/medical authority confirmed? Yes   Relationship to patient of person spoken to? Self   Refill Screening Questions    Changes to allergies? No   Changes to medications? No   New conditions since last clinic visit? No   Unplanned office visit, urgent care, ED, or hospital admission in the last 4 weeks? No   How does patient/caregiver feel medication is working? Very good   Financial problems or insurance changes? No   How many doses of your specialty medications were missed in the last 4 weeks? 0   Would patient like to speak to a pharmacist? No   When does the patient need to receive the medication? 05/19/23   Refill Delivery Questions    How will the patient receive the medication? MEDRx   When does the patient need to receive the medication? 05/19/23   Shipping Address Home   Address in [facility] confirmed and updated if neccessary? Yes   Expected Copay ($) 0   Is the patient able to afford the medication copay? Yes   Payment Method zero copay   Days supply of Refill 56   Supplies needed? No supplies needed   Refill activity completed? Yes   Refill activity plan Refill scheduled   Shipment/Pickup Date: 05/16/23            Current Outpatient Medications   Medication Sig    diphenhydrAMINE (BENADRYL) 25 mg capsule Take 1 capsule (25 mg total) by mouth every 6 (six) hours as needed for Itching or Allergies. (Patient not taking: Reported on 3/15/2023)    promethazine (PHENERGAN) 25 MG tablet Take 1 tablet (25 mg total) by mouth every 6 (six) hours as needed for Nausea. (Patient not taking: Reported on  3/15/2023)    TRI-SPRINTEC, 28, 0.18/0.215/0.25 mg-35 mcg (28) tablet Take 1 tablet by mouth once daily.    ustekinumab (STELARA) 90 mg/mL Syrg syringe Inject 1 mL (90 mg total) into the skin every 8 weeks.   Last reviewed on 4/17/2023  1:49 PM by Pina Figueroa MD    Review of patient's allergies indicates:   Allergen Reactions    Latex, natural rubber      Mother states allergic to latex gloves     Adhesive Rash     Band-aids    Last reviewed on  4/17/2023 1:49 PM by Pina Figueroa      Tasks added this encounter   No tasks added.   Tasks due within next 3 months   5/9/2023 - Refill Coordination Outreach (1 time occurrence)  6/27/2023 - Clinical Assessment (1 year recurrence)     [facility]  [address] [place] [id number]  Phone: [phone]  Fax: [phone]

## 2023-05-15 ENCOUNTER — PROCEDURE VISIT (OUTPATIENT)
Dept: OBSTETRICS AND GYNECOLOGY | Facility: CLINIC | Age: 28
End: 2023-05-15
Payer: MEDICAID

## 2023-05-15 ENCOUNTER — ROUTINE PRENATAL (OUTPATIENT)
Dept: OBSTETRICS AND GYNECOLOGY | Facility: CLINIC | Age: 28
End: 2023-05-15
Payer: MEDICAID

## 2023-05-15 VITALS
WEIGHT: 123.44 LBS | BODY MASS INDEX: 23.33 KG/M2 | SYSTOLIC BLOOD PRESSURE: 106 MMHG | DIASTOLIC BLOOD PRESSURE: 79 MMHG

## 2023-05-15 DIAGNOSIS — Z34.82 ENCOUNTER FOR SUPERVISION OF OTHER NORMAL PREGNANCY IN SECOND TRIMESTER: Primary | ICD-10-CM

## 2023-05-15 DIAGNOSIS — Z34.82 ENCOUNTER FOR SUPERVISION OF OTHER NORMAL PREGNANCY IN SECOND TRIMESTER: ICD-10-CM

## 2023-05-15 PROCEDURE — 99213 OFFICE O/P EST LOW 20 MIN: CPT | Mod: PBBFAC,TH,PN | Performed by: ADVANCED PRACTICE MIDWIFE

## 2023-05-15 PROCEDURE — 99213 PR OFFICE/OUTPT VISIT, EST, LEVL III, 20-29 MIN: ICD-10-PCS | Mod: TH,S$PBB,, | Performed by: ADVANCED PRACTICE MIDWIFE

## 2023-05-15 PROCEDURE — 99999 PR PBB SHADOW E&M-EST. PATIENT-LVL III: CPT | Mod: PBBFAC,,, | Performed by: ADVANCED PRACTICE MIDWIFE

## 2023-05-15 PROCEDURE — 99999 PR PBB SHADOW E&M-EST. PATIENT-LVL III: ICD-10-PCS | Mod: PBBFAC,,, | Performed by: ADVANCED PRACTICE MIDWIFE

## 2023-05-15 PROCEDURE — 82105 ALPHA-FETOPROTEIN SERUM: CPT | Performed by: ADVANCED PRACTICE MIDWIFE

## 2023-05-15 PROCEDURE — 99213 OFFICE O/P EST LOW 20 MIN: CPT | Mod: TH,S$PBB,, | Performed by: ADVANCED PRACTICE MIDWIFE

## 2023-05-15 NOTE — PROGRESS NOTES
Reason for visit: Routine Prenatal Visit      HPI:   28 y.o., at 15w3d by Estimated Date of Delivery: 11/3/23    In with no c/o    - Contractions: denies  - Bleeding: denies  - Loss of fluid: denies  - Fetal movement: not yet  - Nausea: no  - Vomiting:no  - Headache: no      Reviewed:    Past medical, surgical, social, family, and obstetric history: Reviewed and updated in EMR.  Medications: Reviewed and updated in EMR.  Allergies: Latex, natural rubber and Adhesive    Pregnancy dating, labs, ultrasound reports, prenatal testing, and problem list: Reviewed and updated in EMR.  Outside records: na  Independent interpretation of tests: na  Discussion with another healthcare professional: na      Vitals: /79   Wt 56 kg (123 lb 7.3 oz)   LMP 2023 (Exact Date)   BMI 23.33 kg/m²     Physical exam:  GENERAL: No acute distress  ABD: Gravid      Assessment and Plan:    Encounter for supervision of other normal pregnancy in second trimester  -     Maternal Screen AFP (Single Marker); Future; Expected date: 05/15/2023         AFP drawn today     labor precautions given  Follow-up: 4 weeks      I spent a total of 20 minutes on the day of the visit. This includes face to face time and non-face to face time preparing to see the patient (eg, review of tests), Obtaining and/or reviewing separately obtained history, Documenting clinical information in the electronic or other health record, Independently interpreting results and communicating results to the patient/family/caregiver, or Care coordination.

## 2023-05-18 LAB
# FETUSES US: NORMAL
AFP INTERPRETATION: NORMAL
AFP MOM SERPL: 1.09
AFP SERPL-MCNC: 39.6 NG/ML
AFP SERPL-MCNC: NEGATIVE NG/ML
AGE AT DELIVERY: 26
GA (DAYS): 3 D
GA (WEEKS): 15 WK
GESTATIONAL AGE METHOD: NORMAL
IDDM PATIENT QL: NORMAL
SMOKING STATUS FTND: NO

## 2023-05-19 ENCOUNTER — PATIENT MESSAGE (OUTPATIENT)
Dept: OTHER | Facility: OTHER | Age: 28
End: 2023-05-19
Payer: MEDICAID

## 2023-05-23 ENCOUNTER — OFFICE VISIT (OUTPATIENT)
Dept: GASTROENTEROLOGY | Facility: CLINIC | Age: 28
End: 2023-05-23
Payer: MEDICAID

## 2023-05-23 VITALS
TEMPERATURE: 98 F | WEIGHT: 125.25 LBS | DIASTOLIC BLOOD PRESSURE: 79 MMHG | SYSTOLIC BLOOD PRESSURE: 114 MMHG | BODY MASS INDEX: 23.66 KG/M2 | OXYGEN SATURATION: 100 % | HEART RATE: 104 BPM

## 2023-05-23 DIAGNOSIS — D50.0 IRON DEFICIENCY ANEMIA DUE TO CHRONIC BLOOD LOSS: ICD-10-CM

## 2023-05-23 DIAGNOSIS — K50.012 CROHN'S DISEASE OF SMALL INTESTINE WITH INTESTINAL OBSTRUCTION: Primary | ICD-10-CM

## 2023-05-23 DIAGNOSIS — E55.9 VITAMIN D DEFICIENCY: ICD-10-CM

## 2023-05-23 DIAGNOSIS — Z3A.16 16 WEEKS GESTATION OF PREGNANCY: ICD-10-CM

## 2023-05-23 PROCEDURE — 3078F DIAST BP <80 MM HG: CPT | Mod: CPTII,S$GLB,, | Performed by: INTERNAL MEDICINE

## 2023-05-23 PROCEDURE — 1159F PR MEDICATION LIST DOCUMENTED IN MEDICAL RECORD: ICD-10-PCS | Mod: CPTII,S$GLB,, | Performed by: INTERNAL MEDICINE

## 2023-05-23 PROCEDURE — 1160F PR REVIEW ALL MEDS BY PRESCRIBER/CLIN PHARMACIST DOCUMENTED: ICD-10-PCS | Mod: CPTII,S$GLB,, | Performed by: INTERNAL MEDICINE

## 2023-05-23 PROCEDURE — 3074F PR MOST RECENT SYSTOLIC BLOOD PRESSURE < 130 MM HG: ICD-10-PCS | Mod: CPTII,S$GLB,, | Performed by: INTERNAL MEDICINE

## 2023-05-23 PROCEDURE — 1159F MED LIST DOCD IN RCRD: CPT | Mod: CPTII,S$GLB,, | Performed by: INTERNAL MEDICINE

## 2023-05-23 PROCEDURE — 3078F PR MOST RECENT DIASTOLIC BLOOD PRESSURE < 80 MM HG: ICD-10-PCS | Mod: CPTII,S$GLB,, | Performed by: INTERNAL MEDICINE

## 2023-05-23 PROCEDURE — 3008F PR BODY MASS INDEX (BMI) DOCUMENTED: ICD-10-PCS | Mod: CPTII,S$GLB,, | Performed by: INTERNAL MEDICINE

## 2023-05-23 PROCEDURE — 1160F RVW MEDS BY RX/DR IN RCRD: CPT | Mod: CPTII,S$GLB,, | Performed by: INTERNAL MEDICINE

## 2023-05-23 PROCEDURE — 99214 OFFICE O/P EST MOD 30 MIN: CPT | Mod: S$GLB,,, | Performed by: INTERNAL MEDICINE

## 2023-05-23 PROCEDURE — 3008F BODY MASS INDEX DOCD: CPT | Mod: CPTII,S$GLB,, | Performed by: INTERNAL MEDICINE

## 2023-05-23 PROCEDURE — 99214 PR OFFICE/OUTPT VISIT, EST, LEVL IV, 30-39 MIN: ICD-10-PCS | Mod: S$GLB,,, | Performed by: INTERNAL MEDICINE

## 2023-05-23 PROCEDURE — 3074F SYST BP LT 130 MM HG: CPT | Mod: CPTII,S$GLB,, | Performed by: INTERNAL MEDICINE

## 2023-05-23 RX ORDER — GENTAMICIN SULFATE 0.3 %
OINTMENT (GRAM) OPHTHALMIC (EYE)
Status: ON HOLD | COMMUNITY
End: 2023-10-10 | Stop reason: HOSPADM

## 2023-05-23 NOTE — PROGRESS NOTES
IBD PATIENT INTAKE:    COVID symptoms in the last 7 days (runny nose, sore throat, congestion, cough, fever): No  PCP: Marti Colin  If not PCP-  number given to establish [phone]: N/A    ALLERGIES REVIEWED:  Yes    CHIEF COMPLAINT:    Chief Complaint   Patient presents with    Crohn's Disease       VITAL SIGNS:  /79 (BP Location: Right arm, Patient Position: Sitting)   Pulse 104   Temp 98.2 °F (36.8 °C)   Wt 56.8 kg (125 lb 3.5 oz)   LMP 01/18/2023 (Exact Date)   SpO2 100%   BMI 23.66 kg/m²      Change in medical, surgical, family or social history: Yes pregnant JADA 11/03/2023    IBD THERAPY (name, dose/frequency):  Stelara q8w  Last dose:  5/19    Next dose:  7/14  Infusion/Pharmacy: [facility] ([facility])    NSAIDs (aspirin, ibuprofen-advil or motrin, naproxen-aleve, diclofenac-voltaren, BC powder, excedrin, goodies): No    Alternative/Complementary Medications (i.e. probiotics, turmeric, fish oil, aloe vera):      No  Name/dose:  n/a    Vitamins:   Vit D:  no     Vit B-12:  no   Folic Acid: no       Calcium: no     Iron:  no      MVI: yes    Antibiotics (past 30 Days):  No  If yes   Indication:  Name of antibiotic:  Completion date:     REVIEWED MEDICATION LIST RECONCILED INCLUDING ABOVE MEDS:  Yes

## 2023-05-23 NOTE — PROGRESS NOTES
[facility]          Inflammatory Bowel Disease Follow Up Note         TODAY'S VISIT DATE:  5/23/2023    Reason for Consult:    Chief Complaint   Patient presents with    Crohn's Disease       PCP: Marti Colin      Referring MD:   No ref. provider found    History of Present Illness:  Nina Dickerson who is a 28 y.o. female is being seen today at the [facility] on 05/23/2023 for inflammatory bowel disease- Crohn's disease.  She is here today for a follow-up visit.  She continues to do very well.  She denies any gastrointestinal symptoms whatsoever.  She has normal formed bowel movements.  She has not had any side effects related to her Stelara.  Today she did inform me that she is about 16 weeks pregnant and is due in early November.  She has not had any significant issues with her pregnancy.  She has done some research on her own regarding safety of her medications and the impact of Crohn's disease on pregnancy.    IBD History:  She has a long history of suspected Crohn's disease but the diagnosis has not been clearly made.  When she was a child she had significant gastrointestinal issues.  In 2014 she had upper endoscopy performed for upper abdominal discomfort and reflux symptoms.  This was fairly unremarkable including biopsies.  She was started on Protonix and had some improvement.  In 2015 she was seen again because of ongoing issues and had an upper endoscopy and colonoscopy at that time.  The upper endoscopy revealed some mild erosions in the duodenum and biopsies were consistent with acute inflammation.  The colonoscopy showed congestion of the ileocecal valve and erythema, congestion, and friability of the terminal ileum.  Biopsies of the colon were normal.  Biopsy of the terminal ileum showed no inflammation.  She was treated with budesonide and had some improvement in symptoms.  A repeat scope in 2016 was completely normal  including biopsies.  I saw her when I was at [facility] in 2018. She had ongoing symptoms that seem to be more consistent with chronic constipation issues as well as some functional gastrointestinal issues.  She was started on dicyclomine and this cause an increase in rectal bleeding so it was stopped.  With an increase in water she began having better bowel movements and most of her symptoms improved.  Pantoprazole also help with her upper GI issues.  A repeat upper endoscopy and colonoscopy were performed.  The upper endoscopy was unrevealing.  The colonoscopy did show some erythematous mucosa in the terminal ileum.  Biopsies were not consistent with Crohn's but did show a significant amount of eosinophils within the terminal ileum as well as in the colon (colon was normal appearing).  In January of 2021 she began having more rectal bleeding issues as well as worsening vomiting, increased right lower quadrant epigastric pain, and more diarrhea.  She went to the emergency room and had labs done that were unrevealing.  A CT scan was also unrevealing.  A colonoscopy that was done in January revealed some congestion and inflammatory polyps in the terminal ileum as well as a suspected ileal stricture.  The colon was completely normal.  Biopsies were not consistent with Crohn's disease and only showed 1 area of active inflammation in the terminal ileum.  A subsequent MR enterography showed some thickening of the terminal ileum with some mild inflammatory changes as well mild narrowing with some upstream dilation of the ileum.  She was started on budesonide 9 mg daily which provided minimal improvement.  In July 2021 she underwent a repeat colonoscopy that again showed ileitis as well as some narrowing of the terminal ileum which was able to be traversed with the colonoscopy scope.  Biopsies showed chronic inflammatory changes consistent with Crohn's disease.  She started Stelara on November 4, 2021.  At visit in 5/2022  noted to have nausea and vomiting.  Underwent EGD/Colonoscopy with notable ileal stricture but no active inflammation in the ileum and normal EGD.  Biopsies unremarkable.    IBD Detail:  Dx Date:  Symptoms since 2014, diagnosis confirmed in 2021  Disease type/distribution:  Crohn's Disease/ileal inflammation  Current Treatment:  Stelara 90 mg every 8 weeks Start Date:  November 4, 2021  Response:  Endoscopic and histologic remission  Optimized:  Not yet  Adverse reactions:  None  Prior surgeries:  None  CRP Elevation:  No  calprotectin:  Mild elevation  Disease Complications:  Mild stenosis of the terminal ileum  Extraintestinal manifestations:  Back pain  Prior treatments:   Steroids:  Partial response to budesonide  5ASA:  No response  IMM:  None  TNF Inh:  None   Anti-Integrin:  None   IL 12/23:  current therapy  COURTNEY Inh:  None    Previous Clinical Trials:  None    Last Colonoscopy:  9/2022 - Impression:            - Perianal skin tags found on perianal exam.                          - Internal hemorrhoids.                          - Anal papilla(e) were hypertrophied.                          - Scar in the terminal ileum.                          - Stricture in the ileum, 5 cm from the ileocecal                          valve. Dilated. Lesion not amenable to dilation,                          and not attempted.                          - The terminal ileum contained some pseudopolyps                          but no signs of active inflammation.                          - Simple Endoscopic Score for Crohn's Disease: 3,                          mucosal inflammatory changes.    Other Endoscopies:  EGD 9/2022 Impression:            - Normal esophagus.                          - Z-line regular, 36 cm from the incisors.                          - Normal stomach. Biopsied.                          - Normal examined duodenum.     Imaging:   MRE:  March 2021-inflammatory changes of the terminal ileum with suspected  stricture   CT:  Previous studies reviewed   Other:  Previous studies reviewed    Pertinent Labs:  Lab Results   Component Value Date    SEDRATE 7 04/26/2017    CRP 22.3 (H) 11/22/2022     Lab Results   Component Value Date    TTGIGA 11 04/26/2017     04/26/2017     No results found for: TSH, FREET4  Lab Results   Component Value Date    EBUQCJFP50MC 11 (L) 06/13/2022    BNUCDABG40 467 06/13/2022     Lab Results   Component Value Date    HEPBSAG Non-reactive 03/15/2023    HEPBCAB Negative 06/13/2022    HEPCAB Non-reactive 03/15/2023     Lab Results   Component Value Date    MQD24UFID Non-reactive 03/15/2023     Lab Results   Component Value Date    NIL 0.13659 06/13/2022    TBAG 0.04 12/18/2015    TBAGNIL 0.00 12/18/2015    MITOGENNIL 8.129 06/13/2022    TBGOLD Negative 12/18/2015     Lab Results   Component Value Date    TPMTRESULT 27.6 12/10/2015     No results found for: STOOLCULTURE, WTIBXJFKLE8G, VSJWKQFBJA2U, CDIFFICILEAN, CDIFFTOX, CDIFFICILEBY  Lab Results   Component Value Date    CALPROTECTIN 40.8 02/24/2022       Therapeutic Drug Monitoring Labs:  No results found for: PROMETH  No results found for: ANSADAINIT, INFLIXIMAB, INFLIXINTERP    Vaccinations:  Lab Results   Component Value Date    HEPBSAB Negative 08/23/2021     Lab Results   Component Value Date    HEPAIGG Negative 08/23/2021     Lab Results   Component Value Date    VARICELLAZOS 1294.00 03/15/2023    VARICELLAINT Positive 03/15/2023     Immunization History   Administered Date(s) Administered    DTP 1997, 1997, 1997, 08/14/1998, 08/25/2001    DTaP 1997, 1997, 1997, 08/14/1998, 08/25/2001    Hep B Immune Globulin 1997    Hepatitis B 1997, 05/12/1998, 08/14/1998    Hepatitis B, Pediatric/Adolescent 1997    HiB PRP-OMP 1997, 1997, 08/14/1998    Hib-HbOC 1997, 1997, 08/14/1998    IPV 1997, 1997, 08/14/1998, 08/25/2001    MMR 05/12/1998, 08/25/2001     Meningococcal 09/24/2008, 07/29/2015    Meningococcal Conjugate (MCV4P) 09/24/2008, 07/29/2015    OPV 1997, 1997, 08/14/1998, 08/25/2001    Pneumococcal Conjugate - 7 Valent 1997, 1997    Tdap 09/24/2008, 02/20/2019    Varicella 1997, 05/12/1998, 08/14/1998         Review of Systems  Review of Systems   Constitutional:  Negative for chills, fever and weight loss.   HENT:  Negative for sore throat.    Eyes:  Negative for pain, discharge and redness.   Respiratory:  Negative for cough, shortness of breath and wheezing.    Cardiovascular:  Negative for chest pain, orthopnea and leg swelling.   Gastrointestinal:  Negative for abdominal pain, blood in stool, constipation, diarrhea, heartburn, melena and vomiting.   Genitourinary:  Negative for dysuria, frequency and urgency.   Musculoskeletal:  Negative for joint pain and myalgias.   Skin:  Negative for itching and rash.   Neurological:  Negative for focal weakness and seizures.   Endo/Heme/Allergies:  Does not bruise/bleed easily.   Psychiatric/Behavioral:  Negative for depression.      Medical History:   Past Medical History:   Diagnosis Date    Crohn's disease     Insomnia        Surgical History:  Past Surgical History:   Procedure Laterality Date    COLONOSCOPY N/A 12/10/2015    Procedure: COLONOSCOPY;  Surgeon: Tala Gonzalez MD;  Location: [facility] (2ND FLR);  Service: Endoscopy;  Laterality: N/A;    COLONOSCOPY N/A 4/21/2016    Procedure: COLONOSCOPY;  Surgeon: Tala Gonzalez MD;  Location: [facility] (2ND FLR);  Service: Endoscopy;  Laterality: N/A;    COLONOSCOPY N/A 1/21/2021    Procedure: COLONOSCOPY;  Surgeon: True Treadwell MD;  Location: [facility];  Service: General;  Laterality: N/A;    COLONOSCOPY N/A 7/8/2021    Procedure: COLONOSCOPY;  Surgeon: Talib Rutledge MD;  Location: [facility] (4TH FLR);  Service: Endoscopy;  Laterality: N/A;  Covid-19 test 7/6/21 at [facility]    COLONOSCOPY N/A  9/27/2022    Procedure: COLONOSCOPY;  Surgeon: Talib Rutledge MD;  Location: [facility] (4TH FLR);  Service: Endoscopy;  Laterality: N/A;  golytely    COLONOSCOPY W/ BIOPSIES  01/21/2021    ESOPHAGOGASTRODUODENOSCOPY      ESOPHAGOGASTRODUODENOSCOPY N/A 7/8/2021    Procedure: EGD (ESOPHAGOGASTRODUODENOSCOPY);  Surgeon: Talib Rutledge MD;  Location: [facility] (4TH FLR);  Service: Endoscopy;  Laterality: N/A;  Covid-19 test 7/6/21 at [facility] - pg  7/6 returned pt's call- lvm with arrival time-rb  patient aware she moved up 30 minutes from 930am to 900am, patient aware to be here at 8am 7/8/21 - bijan    ESOPHAGOGASTRODUODENOSCOPY N/A 9/27/2022    Procedure: ESOPHAGOGASTRODUODENOSCOPY (EGD);  Surgeon: Talib Rutledge MD;  Location: [facility] (4TH FLR);  Service: Endoscopy;  Laterality: N/A;  NOT Vaccinated.Covid test on 9/24 at [facility], instructions sent to [email or web address]  Clear liquids up to 4 hrs prior/ AM prep [id number]    TONSILLECTOMY      TYMPANOSTOMY TUBE PLACEMENT         Family History:   Family History   Problem Relation Age of Onset    Lupus Mother     Heart attacks under age 50 Father     Heart disease Father     Hypertension Father     Cardiomyopathy Maternal Grandmother     Dementia Maternal Grandfather     Heart disease Paternal Grandmother     Heart disease Paternal Grandfather     No Known Problems Sister        Social History:   Social History     Tobacco Use    Smoking status: Never    Smokeless tobacco: Never    Tobacco comments:     mom smokes   Substance Use Topics    Alcohol use: No    Drug use: No       Allergies: Reviewed    Home Medications:   Medication List with Changes/Refills   Current Medications    DIPHENHYDRAMINE (BENADRYL) 25 MG CAPSULE    Take 1 capsule (25 mg total) by mouth every 6 (six) hours as needed for Itching or Allergies.    GENTAMICIN (GARAMYCIN) 0.3 % (3 MG/GRAM) OPHTHALMIC OINTMENT    gentamicin 0.3 % (3 mg/gram) eye ointment   APPLY A SMALL AMOUNT (1/2  INCH) TO THE LOWER LID OF THE AFFECTED EYE(S) BY OPHTHALMIC ROUTE 2 TIMES PER DAY    PRENATAL 25/IRON FUM/FOLIC/DHA (PRENATAL-1 ORAL)    Take by mouth.    PROMETHAZINE (PHENERGAN) 25 MG TABLET    Take 1 tablet (25 mg total) by mouth every 6 (six) hours as needed for Nausea.    TRI-SPRINTEC, 28, 0.18/0.215/0.25 MG-35 MCG (28) TABLET    Take 1 tablet by mouth once daily.    USTEKINUMAB (STELARA) 90 MG/ML SYRG SYRINGE    Inject 1 mL (90 mg total) into the skin every 8 weeks.       Physical Exam:  Vital Signs:  /79 (BP Location: Right arm, Patient Position: Sitting)   Pulse 104   Temp 98.2 °F (36.8 °C)   Wt 56.8 kg (125 lb 3.5 oz)   LMP 01/18/2023 (Exact Date)   SpO2 100%   BMI 23.66 kg/m²   Body mass index is 23.66 kg/m².    Gen: NAD, lying comfortably  HENT: NCAT, oropharynx clear  Eyes: anicteric sclerae, EOMI grossly  Neck: supple, no visible masses/goiter  Cardiac: RRR  Lungs: non-labored breathing  Abd: soft, NT/ND, normoactive BS, no rebound  Ext: no LE edema, warm, well perfused  Skin: skin intact on exposed body parts, no visible rashes, lesions  Neuro: A&Ox4, neuro exam grossly intact, moves all extremities  Psych: appropriate mood, affect      Labs: reviewed and pertinent noted above    Assessment/Plan:  Nina Dickerson is a 28 y.o. female with Crohn's disease. The following issues were addresssed:    1. Crohn's disease of small intestine with intestinal obstruction    2. Iron deficiency anemia due to chronic blood loss    3. Vitamin D deficiency    4. 16 weeks gestation of pregnancy        1. Crohn's disease:  She continues to do very well.  I recommend that she continue Stelara every 8 weeks.  We will update labs today.  We had a long discussion today about the management of Crohn's disease with regards to pregnancy.  We discussed the safety of Stelara in pregnant women.  We also discussed the importance of maintaining good control of her Crohn's disease during pregnancy to give her the best  chance of having a successful and healthy pregnancy.    2. Iron deficiency:  Recheck iron levels in the future.    3. Vit D Deficiency:  Recheck vitamin-D level today.    Pregnancy: Continue to follow-up with Ob.         # IBD specific health maintenance:  Colon cancer surveillance:  No colonic disease    Annual:  - Eye exam:  Not applicable  - Skin exam (if on IMM/TNF):  Recommend annual skin exam  - reminded pt to use sunblock/hats/sunprotective clothing  - PAP (if immunosuppressed):  Recommend annual exam with gynecologist    DEXA: N/A    Vitamin D:  Recommend supplementation-recheck in the near future    Vaccines:    Influenza:  Recommend annual vaccination   Pneumovax:     PCV13:  Discuss in the future    PSV23:  Discuss in the future   HAV:  Check serology   HBV:  Check serology   Tdap:  Up-to-date   MMR:  Completed series   VZV:  Completed series-check serology   HZV:  Not applicable   HPV:  Discuss in the future   Meningococcus:  Completed series   COVID:  Recommend vaccination    Follow up: Follow up in about 4 months (around 9/23/2023).    Thank you again for sending Nina Dickerson to see Dr. Hoang Rutledge today at the [facility]. Please don't hesitate to contact Dr. Rutledge if there are any questions regarding this evaluation, or if you have any other patients with inflammatory bowel disease for whom you would like a consultation. You can reach Dr. Rutledge at [phone] or by email at [email or web address]    Talib Rutledge MD  Department of Gastroenterology  Inflammatory Bowel Disease

## 2023-05-26 ENCOUNTER — PATIENT MESSAGE (OUTPATIENT)
Dept: OTHER | Facility: OTHER | Age: 28
End: 2023-05-26
Payer: MEDICAID

## 2023-06-09 ENCOUNTER — PATIENT MESSAGE (OUTPATIENT)
Dept: MATERNAL FETAL MEDICINE | Facility: CLINIC | Age: 28
End: 2023-06-09
Payer: MEDICAID

## 2023-06-11 NOTE — PROGRESS NOTES
Lab Documentation:    Order Type: Written Order placed in [facility]    Patient in for lab visit only per provider treatment plan.

## 2023-06-12 ENCOUNTER — ROUTINE PRENATAL (OUTPATIENT)
Dept: OBSTETRICS AND GYNECOLOGY | Facility: CLINIC | Age: 28
End: 2023-06-12
Payer: MEDICAID

## 2023-06-12 ENCOUNTER — PROCEDURE VISIT (OUTPATIENT)
Dept: MATERNAL FETAL MEDICINE | Facility: CLINIC | Age: 28
End: 2023-06-12
Payer: MEDICAID

## 2023-06-12 VITALS
BODY MASS INDEX: 23.74 KG/M2 | SYSTOLIC BLOOD PRESSURE: 110 MMHG | WEIGHT: 125.69 LBS | DIASTOLIC BLOOD PRESSURE: 70 MMHG

## 2023-06-12 DIAGNOSIS — Z34.90 ENCOUNTER FOR SUPERVISION OF LOW-RISK PREGNANCY, ANTEPARTUM: ICD-10-CM

## 2023-06-12 DIAGNOSIS — Z36.2 ENCOUNTER FOR FOLLOW-UP ULTRASOUND OF FETAL ANATOMY: Primary | ICD-10-CM

## 2023-06-12 DIAGNOSIS — Z34.02 ENCOUNTER FOR SUPERVISION OF NORMAL FIRST PREGNANCY IN SECOND TRIMESTER: Primary | ICD-10-CM

## 2023-06-12 PROCEDURE — 99213 OFFICE O/P EST LOW 20 MIN: CPT | Mod: TH,S$PBB,, | Performed by: ADVANCED PRACTICE MIDWIFE

## 2023-06-12 PROCEDURE — 76811 US MFM PROCEDURE (VIEWPOINT): ICD-10-PCS | Mod: 26,S$PBB,, | Performed by: OBSTETRICS & GYNECOLOGY

## 2023-06-12 PROCEDURE — 99213 PR OFFICE/OUTPT VISIT, EST, LEVL III, 20-29 MIN: ICD-10-PCS | Mod: TH,S$PBB,, | Performed by: ADVANCED PRACTICE MIDWIFE

## 2023-06-12 PROCEDURE — 99999 PR PBB SHADOW E&M-EST. PATIENT-LVL II: ICD-10-PCS | Mod: PBBFAC,,, | Performed by: ADVANCED PRACTICE MIDWIFE

## 2023-06-12 PROCEDURE — 76811 OB US DETAILED SNGL FETUS: CPT | Mod: PBBFAC | Performed by: OBSTETRICS & GYNECOLOGY

## 2023-06-12 PROCEDURE — 99999 PR PBB SHADOW E&M-EST. PATIENT-LVL II: CPT | Mod: PBBFAC,,, | Performed by: ADVANCED PRACTICE MIDWIFE

## 2023-06-12 PROCEDURE — 99212 OFFICE O/P EST SF 10 MIN: CPT | Mod: PBBFAC,TH,PN | Performed by: ADVANCED PRACTICE MIDWIFE

## 2023-06-13 NOTE — PROGRESS NOTES
Reason for visit: Routine Prenatal Visit      HPI:   28 y.o., at 19w4d by Estimated Date of Delivery: 11/3/23    In with no c/o    - Contractions: denies  - Bleeding: denies  - Loss of fluid: denies  - Fetal movement: reports FM  - Nausea: no  - Vomiting: no  - Headache: no      Reviewed:    Past medical, surgical, social, family, and obstetric history: Reviewed and updated in EMR.  Medications: Reviewed and updated in EMR.  Allergies: Latex, natural rubber and Adhesive    Pregnancy dating, labs, ultrasound reports, prenatal testing, and problem list: Reviewed and updated in EMR.  Outside records: na  Independent interpretation of tests: na  Discussion with another healthcare professional: na      Vitals: /70   Wt 57 kg (125 lb 10.6 oz)   LMP 2023 (Exact Date)   BMI 23.74 kg/m²     Physical exam:  GENERAL: No acute distress  ABD: Gravid      Assessment and Plan:    Encounter for supervision of normal first pregnancy in second trimester         Advised DM screen next visit     labor precautions given  Follow-up:4 weeks      I spent a total of 20 minutes on the day of the visit. This includes face to face time and non-face to face time preparing to see the patient (eg, review of tests), Obtaining and/or reviewing separately obtained history, Documenting clinical information in the electronic or other health record, Independently interpreting results and communicating results to the patient/family/caregiver, or Care coordination.

## 2023-06-16 ENCOUNTER — PATIENT MESSAGE (OUTPATIENT)
Dept: OTHER | Facility: OTHER | Age: 28
End: 2023-06-16
Payer: MEDICAID

## 2023-07-05 DIAGNOSIS — K50.012 CROHN'S DISEASE OF SMALL INTESTINE WITH INTESTINAL OBSTRUCTION: ICD-10-CM

## 2023-07-05 RX ORDER — USTEKINUMAB 90 MG/ML
90 INJECTION, SOLUTION SUBCUTANEOUS
Qty: 1 ML | Refills: 3 | Status: ON HOLD | OUTPATIENT
Start: 2023-07-05 | End: 2023-10-24 | Stop reason: SDUPTHER

## 2023-07-05 NOTE — TELEPHONE ENCOUNTER
IBD medications Stelara 90 mg/ml Q 8 weeks    Refill request for Stelara 90 mg/ml Q 8 weeks    Allergies reviewed Yes    Drug Monitoring labs/frequency for all IBD meds:    CBC / CMP Q 6 months  TB Quantiferon Yearly  HBsAg / HBcAb Yearly    Lab Results   Component Value Date    HEPBSAG Non-reactive 03/15/2023    HEPBCAB Negative 06/13/2022     Lab Results   Component Value Date    TBGOLDPLUS Negative 05/23/2023     Lab Results   Component Value Date    IYVSOKKH84LT 22 (L) 05/23/2023    EHSWMKHV45 318 05/23/2023     Lab Results   Component Value Date    WBC 6.63 03/15/2023    HGB 13.5 03/15/2023    HCT 40.3 03/15/2023    MCV 87 03/15/2023     03/15/2023     Lab Results   Component Value Date    CREATININE 0.6 05/23/2023    ALBUMIN 3.4 (L) 05/23/2023    BILITOT 0.2 05/23/2023    ALKPHOS 48 (L) 05/23/2023    AST 11 05/23/2023    ALT 5 (L) 05/23/2023    GGT 14 04/26/2017       Lab due date (mo/yr):  11/2023    Labs scheduled: No    Next appt: 9/22/2023    RX refill sent to provider for amount until next labs: Yes

## 2023-07-08 ENCOUNTER — SPECIALTY PHARMACY (OUTPATIENT)
Dept: PHARMACY | Facility: CLINIC | Age: 28
End: 2023-07-08
Payer: MEDICAID

## 2023-07-08 NOTE — TELEPHONE ENCOUNTER
Received Stelara refill but now requiring a PA. PA submitted via [facility]: [id number]. If notes from last refill are correct on last need by date pt would be due 7/14/23 for next injection. Notably, pt is now pregnant and GI MDO is already aware.

## 2023-07-10 ENCOUNTER — PATIENT MESSAGE (OUTPATIENT)
Dept: MATERNAL FETAL MEDICINE | Facility: CLINIC | Age: 28
End: 2023-07-10
Payer: MEDICAID

## 2023-07-10 ENCOUNTER — ROUTINE PRENATAL (OUTPATIENT)
Dept: OBSTETRICS AND GYNECOLOGY | Facility: CLINIC | Age: 28
End: 2023-07-10
Payer: MEDICAID

## 2023-07-10 VITALS
SYSTOLIC BLOOD PRESSURE: 112 MMHG | WEIGHT: 132.25 LBS | BODY MASS INDEX: 24.99 KG/M2 | DIASTOLIC BLOOD PRESSURE: 68 MMHG

## 2023-07-10 DIAGNOSIS — Z34.02 ENCOUNTER FOR SUPERVISION OF NORMAL FIRST PREGNANCY IN SECOND TRIMESTER: Primary | ICD-10-CM

## 2023-07-10 PROCEDURE — 99212 OFFICE O/P EST SF 10 MIN: CPT | Mod: PBBFAC,TH | Performed by: ADVANCED PRACTICE MIDWIFE

## 2023-07-10 PROCEDURE — 99213 PR OFFICE/OUTPT VISIT, EST, LEVL III, 20-29 MIN: ICD-10-PCS | Mod: TH,S$PBB,, | Performed by: ADVANCED PRACTICE MIDWIFE

## 2023-07-10 PROCEDURE — 99213 OFFICE O/P EST LOW 20 MIN: CPT | Mod: TH,S$PBB,, | Performed by: ADVANCED PRACTICE MIDWIFE

## 2023-07-10 PROCEDURE — 99999 PR PBB SHADOW E&M-EST. PATIENT-LVL II: ICD-10-PCS | Mod: PBBFAC,,, | Performed by: ADVANCED PRACTICE MIDWIFE

## 2023-07-10 PROCEDURE — 99999 PR PBB SHADOW E&M-EST. PATIENT-LVL II: CPT | Mod: PBBFAC,,, | Performed by: ADVANCED PRACTICE MIDWIFE

## 2023-07-10 NOTE — TELEPHONE ENCOUNTER
Specialty Pharmacy - Medication/Referral Authorization  Specialty Pharmacy - Refill Coordination    Specialty Medication Orders Linked to Encounter      Flowsheet Row Most Recent Value   Medication #1 ustekinumab (STELARA) 90 mg/mL Syrg syringe (Order#[id number], Rx#)            Refill Questions - Documented Responses      Flowsheet Row Most Recent Value   Patient Availability and HIPAA Verification    Does patient want to proceed with activity? Yes   HIPAA/medical authority confirmed? Yes   Relationship to patient of person spoken to? Self   Refill Screening Questions    Changes to allergies? No   Changes to medications? No   New conditions since last clinic visit? No   Unplanned office visit, urgent care, ED, or hospital admission in the last 4 weeks? No   How does patient/caregiver feel medication is working? Very good   Financial problems or insurance changes? No   How many doses of your specialty medications were missed in the last 4 weeks? 0   Would patient like to speak to a pharmacist? No   When does the patient need to receive the medication? 07/14/23   Refill Delivery Questions    How will the patient receive the medication? MEDRx   When does the patient need to receive the medication? 07/14/23   Shipping Address Home   Address in [facility] confirmed and updated if neccessary? Yes   Expected Copay ($) 0   Is the patient able to afford the medication copay? Yes   Payment Method zero copay   Days supply of Refill 56   Supplies needed? No supplies needed   Refill activity completed? Yes   Refill activity plan Refill scheduled   Shipment/Pickup Date: 07/11/23            Current Outpatient Medications   Medication Sig    diphenhydrAMINE (BENADRYL) 25 mg capsule Take 1 capsule (25 mg total) by mouth every 6 (six) hours as needed for Itching or Allergies. (Patient not taking: Reported on 3/15/2023)    gentamicin (GARAMYCIN) 0.3 % (3 mg/gram) ophthalmic ointment gentamicin 0.3 % (3 mg/gram) eye ointment    APPLY A SMALL AMOUNT (1/2 INCH) TO THE LOWER LID OF THE AFFECTED EYE(S) BY OPHTHALMIC ROUTE 2 TIMES PER DAY    prenatal 25/iron fum/folic/dha (PRENATAL-1 ORAL) Take by mouth.    promethazine (PHENERGAN) 25 MG tablet Take 1 tablet (25 mg total) by mouth every 6 (six) hours as needed for Nausea. (Patient not taking: Reported on 3/15/2023)    TRI-SPRINTEC, 28, 0.18/0.215/0.25 mg-35 mcg (28) tablet Take 1 tablet by mouth once daily.    ustekinumab (STELARA) 90 mg/mL Syrg syringe Inject 1 mL (90 mg total) into the skin every 8 weeks.   Last reviewed on 5/23/2023  2:36 PM by Talib Rutledge MD    Review of patient's allergies indicates:   Allergen Reactions    Latex, natural rubber      Mother states allergic to latex gloves     Adhesive Rash     Band-aids    Last reviewed on  6/13/2023 1:54 PM by Emely Sheridan      Tasks added this encounter   7/10/2024 - Benefits Review (Annual recurrence)   Tasks due within next 3 months   6/27/2023 - Clinical Assessment (1 year recurrence)     Cindy Mendez, PharmD  [facility]  [address] [place], [place] [address]  [facility] [address]  Phone: [phone]  Fax: [phone]

## 2023-07-11 ENCOUNTER — PROCEDURE VISIT (OUTPATIENT)
Dept: MATERNAL FETAL MEDICINE | Facility: CLINIC | Age: 28
End: 2023-07-11
Payer: MEDICAID

## 2023-07-11 DIAGNOSIS — Z36.2 ENCOUNTER FOR FOLLOW-UP ULTRASOUND OF FETAL ANATOMY: ICD-10-CM

## 2023-07-11 DIAGNOSIS — Z36.89 ENCOUNTER FOR ULTRASOUND TO ASSESS FETAL GROWTH: Primary | ICD-10-CM

## 2023-07-11 PROCEDURE — 76816 OB US FOLLOW-UP PER FETUS: CPT | Mod: PBBFAC | Performed by: OBSTETRICS & GYNECOLOGY

## 2023-07-11 PROCEDURE — 76816 US MFM PROCEDURE (VIEWPOINT): ICD-10-PCS | Mod: 26,S$PBB,, | Performed by: OBSTETRICS & GYNECOLOGY

## 2023-07-14 ENCOUNTER — PATIENT MESSAGE (OUTPATIENT)
Dept: OTHER | Facility: OTHER | Age: 28
End: 2023-07-14
Payer: MEDICAID

## 2023-07-16 NOTE — PROGRESS NOTES
Reason for visit: Routine Prenatal Visit      HPI:   28 y.o., at 24w2d by Estimated Date of Delivery: 11/3/23    In with no c/o    - Contractions: denies  - Bleeding: denies  - Loss of fluid: denies  - Fetal movement: reports FM  - Nausea: no  - Vomiting: no  - Headache: no      Reviewed:    Past medical, surgical, social, family, and obstetric history: Reviewed and updated in EMR.  Medications: Reviewed and updated in EMR.  Allergies: Latex, natural rubber and Adhesive    Pregnancy dating, labs, ultrasound reports, prenatal testing, and problem list: Reviewed and updated in EMR.  Outside records: na  Independent interpretation of tests: na  Discussion with another healthcare professional: na      Vitals: /68   Wt 60 kg (132 lb 4.4 oz)   LMP 2023 (Exact Date)   BMI 24.99 kg/m²     Physical exam:  GENERAL: No acute distress  ABD: Gravid      Assessment and Plan:    Encounter for supervision of normal first pregnancy in second trimester         Discussed DM screen    F/U kelly scan scheduled       labor precautions given  Follow-up: 2 weeks      I spent a total of 20 minutes on the day of the visit. This includes face to face time and non-face to face time preparing to see the patient (eg, review of tests), Obtaining and/or reviewing separately obtained history, Documenting clinical information in the electronic or other health record, Independently interpreting results and communicating results to the patient/family/caregiver, or Care coordination.

## 2023-07-28 ENCOUNTER — PATIENT MESSAGE (OUTPATIENT)
Dept: OTHER | Facility: OTHER | Age: 28
End: 2023-07-28
Payer: MEDICAID

## 2023-08-07 ENCOUNTER — TELEPHONE (OUTPATIENT)
Dept: OBSTETRICS AND GYNECOLOGY | Facility: CLINIC | Age: 28
End: 2023-08-07
Payer: MEDICAID

## 2023-08-07 NOTE — LETTER
August 7, 2023    Nina Dickerson  [address] [facility] [place], [place] [address]              [facility], OBGYN  [address] [address]  Phone: [phone]  Fax: [phone]      To Whom It May Concern:    Ms. Dickerson is currently under our care for pregnancy.  Estimated Date of Delivery: 11/03/2023    Any elective dental work should preferably be scheduled during or after the mid-trimester or postpartum.    Dental procedures can be performed with local anesthesia without epinephrine. Recommended antibiotics include penicillins, erythromycin, and cephalosporins. Tylenol #3 or Percocet may be used for pain control. No x-rays are allowed for routine screening. For dental problems, up to four x-rays may be taken with proper abdominal shield.    Sincerely,    Emely Sheridan CNM

## 2023-08-07 NOTE — TELEPHONE ENCOUNTER
----- Message from Sandy Cosby sent at 8/4/2023  9:39 AM CDT -----  Name of Who is Calling:PIETER MICHEL [[id number]]                What is the request in detail: Pt is calling to have medical clearance faxed over so she can have dental work done, FAX# [phone], pt is in the office now.Please call back to further assist.                 Can the clinic reply by [facility]: No                [facility]:[phone]

## 2023-08-11 ENCOUNTER — PROCEDURE VISIT (OUTPATIENT)
Dept: OBSTETRICS AND GYNECOLOGY | Facility: CLINIC | Age: 28
End: 2023-08-11
Payer: MEDICAID

## 2023-08-11 ENCOUNTER — ROUTINE PRENATAL (OUTPATIENT)
Dept: OBSTETRICS AND GYNECOLOGY | Facility: CLINIC | Age: 28
End: 2023-08-11
Payer: MEDICAID

## 2023-08-11 ENCOUNTER — PATIENT MESSAGE (OUTPATIENT)
Dept: OTHER | Facility: OTHER | Age: 28
End: 2023-08-11
Payer: MEDICAID

## 2023-08-11 VITALS
DIASTOLIC BLOOD PRESSURE: 82 MMHG | BODY MASS INDEX: 26.24 KG/M2 | WEIGHT: 138.88 LBS | SYSTOLIC BLOOD PRESSURE: 122 MMHG

## 2023-08-11 DIAGNOSIS — Z34.02 ENCOUNTER FOR SUPERVISION OF NORMAL FIRST PREGNANCY IN SECOND TRIMESTER: ICD-10-CM

## 2023-08-11 DIAGNOSIS — K50.90 MATERNAL CROHN'S DISEASE AFFECTING PREGNANCY IN THIRD TRIMESTER: Primary | ICD-10-CM

## 2023-08-11 DIAGNOSIS — O99.613 MATERNAL CROHN'S DISEASE AFFECTING PREGNANCY IN THIRD TRIMESTER: Primary | ICD-10-CM

## 2023-08-11 DIAGNOSIS — O09.93 SUPERVISION OF HIGH RISK PREGNANCY IN THIRD TRIMESTER: ICD-10-CM

## 2023-08-11 LAB
BASOPHILS # BLD AUTO: 0.02 K/UL (ref 0–0.2)
BASOPHILS NFR BLD: 0.3 % (ref 0–1.9)
DIFFERENTIAL METHOD: ABNORMAL
EOSINOPHIL # BLD AUTO: 0.1 K/UL (ref 0–0.5)
EOSINOPHIL NFR BLD: 1.2 % (ref 0–8)
ERYTHROCYTE [DISTWIDTH] IN BLOOD BY AUTOMATED COUNT: 11.9 % (ref 11.5–14.5)
GLUCOSE SERPL-MCNC: 108 MG/DL (ref 70–140)
HCT VFR BLD AUTO: 39.4 % (ref 37–48.5)
HGB BLD-MCNC: 12.7 G/DL (ref 12–16)
IMM GRANULOCYTES # BLD AUTO: 0.03 K/UL (ref 0–0.04)
IMM GRANULOCYTES NFR BLD AUTO: 0.4 % (ref 0–0.5)
LYMPHOCYTES # BLD AUTO: 1 K/UL (ref 1–4.8)
LYMPHOCYTES NFR BLD: 13.9 % (ref 18–48)
MCH RBC QN AUTO: 28.2 PG (ref 27–31)
MCHC RBC AUTO-ENTMCNC: 32.2 G/DL (ref 32–36)
MCV RBC AUTO: 87 FL (ref 82–98)
MONOCYTES # BLD AUTO: 0.6 K/UL (ref 0.3–1)
MONOCYTES NFR BLD: 8.8 % (ref 4–15)
NEUTROPHILS # BLD AUTO: 5.5 K/UL (ref 1.8–7.7)
NEUTROPHILS NFR BLD: 75.4 % (ref 38–73)
NRBC BLD-RTO: 0 /100 WBC
PLATELET # BLD AUTO: 235 K/UL (ref 150–450)
PMV BLD AUTO: 11.7 FL (ref 9.2–12.9)
RBC # BLD AUTO: 4.51 M/UL (ref 4–5.4)
WBC # BLD AUTO: 7.27 K/UL (ref 3.9–12.7)

## 2023-08-11 PROCEDURE — 99999 PR PBB SHADOW E&M-EST. PATIENT-LVL III: CPT | Mod: PBBFAC,,, | Performed by: OBSTETRICS & GYNECOLOGY

## 2023-08-11 PROCEDURE — 99999 PR PBB SHADOW E&M-EST. PATIENT-LVL III: ICD-10-PCS | Mod: PBBFAC,,, | Performed by: OBSTETRICS & GYNECOLOGY

## 2023-08-11 PROCEDURE — 99213 OFFICE O/P EST LOW 20 MIN: CPT | Mod: PBBFAC,TH,PN | Performed by: OBSTETRICS & GYNECOLOGY

## 2023-08-11 PROCEDURE — 99999PBSHW TDAP VACCINE GREATER THAN OR EQUAL TO 7YO IM: ICD-10-PCS | Mod: PBBFAC,,,

## 2023-08-11 PROCEDURE — 82950 GLUCOSE TEST: CPT | Performed by: ADVANCED PRACTICE MIDWIFE

## 2023-08-11 PROCEDURE — 99213 OFFICE O/P EST LOW 20 MIN: CPT | Mod: TH,S$PBB,, | Performed by: OBSTETRICS & GYNECOLOGY

## 2023-08-11 PROCEDURE — 99999PBSHW TDAP VACCINE GREATER THAN OR EQUAL TO 7YO IM: Mod: PBBFAC,,,

## 2023-08-11 PROCEDURE — 90471 IMMUNIZATION ADMIN: CPT | Mod: PBBFAC,PN

## 2023-08-11 PROCEDURE — 85025 COMPLETE CBC W/AUTO DIFF WBC: CPT | Performed by: ADVANCED PRACTICE MIDWIFE

## 2023-08-11 PROCEDURE — 90715 TDAP VACCINE 7 YRS/> IM: CPT | Mod: PBBFAC,PN

## 2023-08-11 PROCEDURE — 99213 PR OFFICE/OUTPT VISIT, EST, LEVL III, 20-29 MIN: ICD-10-PCS | Mod: TH,S$PBB,, | Performed by: OBSTETRICS & GYNECOLOGY

## 2023-08-11 NOTE — PROGRESS NOTES
Pregnancy dating, labs, ultrasound reports, prenatal testing, and problem list; prior records and results; and available outside records were reviewed and updated in EMR.  Pertinent findings were noted below.    Reason for Visit   Routine Prenatal Visit    HPI   28 y.o., at 28w0d by Estimated Date of Delivery: 11/3/23    Crohns continues to be in remission. Feels like baby is in breech position.    Contractions: No   Bleeding: No   Loss of fluid: No   Fetal movement: Yes   Nausea: No   Vomiting: No   Headache: No     Exam   /82   Wt 63 kg (138 lb 14.2 oz)   LMP 2023 (Exact Date)   BMI 26.24 kg/m²     TW#  GENERAL: No acute distress  ABD: Gravid    Assessment and Plan   Maternal Crohn's disease affecting pregnancy in third trimester    Supervision of high risk pregnancy in third trimester        Growth US scheduled  for Crohns  Next Crohns injection scheduled at end of September. This will be last injection in pregnancy.  Tdap administered in office today  2T labs in office today  Birth control options discussed - given handout  Peds list provided  ECV info provided if fetus remains breech at 36 weeks       labor, Pain and bleeding and fetal movement count precautions given  Follow-up: 2 weeks

## 2023-08-15 ENCOUNTER — TELEPHONE (OUTPATIENT)
Dept: GASTROENTEROLOGY | Facility: CLINIC | Age: 28
End: 2023-08-15
Payer: MEDICAID

## 2023-08-22 ENCOUNTER — PROCEDURE VISIT (OUTPATIENT)
Dept: MATERNAL FETAL MEDICINE | Facility: CLINIC | Age: 28
End: 2023-08-22
Payer: MEDICAID

## 2023-08-22 DIAGNOSIS — Z36.89 ENCOUNTER FOR ULTRASOUND TO ASSESS FETAL GROWTH: ICD-10-CM

## 2023-08-22 PROCEDURE — 76816 OB US FOLLOW-UP PER FETUS: CPT | Mod: PBBFAC | Performed by: OBSTETRICS & GYNECOLOGY

## 2023-08-22 PROCEDURE — 76816 US MFM PROCEDURE (VIEWPOINT): ICD-10-PCS | Mod: 26,S$PBB,, | Performed by: OBSTETRICS & GYNECOLOGY

## 2023-08-23 ENCOUNTER — ROUTINE PRENATAL (OUTPATIENT)
Dept: OBSTETRICS AND GYNECOLOGY | Facility: CLINIC | Age: 28
End: 2023-08-23
Payer: MEDICAID

## 2023-08-23 VITALS
BODY MASS INDEX: 26.49 KG/M2 | WEIGHT: 140.19 LBS | DIASTOLIC BLOOD PRESSURE: 85 MMHG | SYSTOLIC BLOOD PRESSURE: 112 MMHG

## 2023-08-23 DIAGNOSIS — Z34.03 ENCOUNTER FOR SUPERVISION OF NORMAL FIRST PREGNANCY IN THIRD TRIMESTER: Primary | ICD-10-CM

## 2023-08-23 DIAGNOSIS — Z36.89 ENCOUNTER FOR ULTRASOUND TO ASSESS FETAL GROWTH: Primary | ICD-10-CM

## 2023-08-23 PROCEDURE — 99999 PR PBB SHADOW E&M-EST. PATIENT-LVL III: CPT | Mod: PBBFAC,,, | Performed by: ADVANCED PRACTICE MIDWIFE

## 2023-08-23 PROCEDURE — 99213 OFFICE O/P EST LOW 20 MIN: CPT | Mod: TH,S$PBB,, | Performed by: ADVANCED PRACTICE MIDWIFE

## 2023-08-23 PROCEDURE — 99213 PR OFFICE/OUTPT VISIT, EST, LEVL III, 20-29 MIN: ICD-10-PCS | Mod: TH,S$PBB,, | Performed by: ADVANCED PRACTICE MIDWIFE

## 2023-08-23 PROCEDURE — 99213 OFFICE O/P EST LOW 20 MIN: CPT | Mod: PBBFAC,TH,PN | Performed by: ADVANCED PRACTICE MIDWIFE

## 2023-08-23 PROCEDURE — 99999 PR PBB SHADOW E&M-EST. PATIENT-LVL III: ICD-10-PCS | Mod: PBBFAC,,, | Performed by: ADVANCED PRACTICE MIDWIFE

## 2023-08-23 NOTE — PROGRESS NOTES
Reason for visit: Routine Prenatal Visit      HPI:   28 y.o., at 29w5d by Estimated Date of Delivery: 11/3/23    In with no c/o    - Contractions: denies  - Bleeding: denies  - Loss of fluid: denies  - Fetal movement: reports good FM, reinforced BId  - Nausea: no  - Vomiting: no  - Headache: no      Reviewed:    Past medical, surgical, social, family, and obstetric history: Reviewed and updated in EMR.  Medications: Reviewed and updated in EMR.  Allergies: Latex, natural rubber and Adhesive    Pregnancy dating, labs, ultrasound reports, prenatal testing, and problem list: Reviewed and updated in EMR.  Outside records: na  Independent interpretation of tests: na  Discussion with another healthcare professional: na      Vitals: /85   Wt 63.6 kg (140 lb 3.4 oz)   LMP 2023 (Exact Date)   BMI 26.49 kg/m²     Physical exam:  GENERAL: No acute distress  ABD: Gravid      Assessment and Plan:    Encounter for supervision of normal first pregnancy in third trimester         Discussed Pepcid for heartburn   Discussed breech and time to turn- recheck at 36     labor precautions given  Follow-up: 2 weeks      I spent a total of 20 minutes on the day of the visit. This includes face to face time and non-face to face time preparing to see the patient (eg, review of tests), Obtaining and/or reviewing separately obtained history, Documenting clinical information in the electronic or other health record, Independently interpreting results and communicating results to the patient/family/caregiver, or Care coordination.

## 2023-08-25 ENCOUNTER — PATIENT MESSAGE (OUTPATIENT)
Dept: OTHER | Facility: OTHER | Age: 28
End: 2023-08-25
Payer: MEDICAID

## 2023-08-29 ENCOUNTER — PATIENT MESSAGE (OUTPATIENT)
Dept: OBSTETRICS AND GYNECOLOGY | Facility: CLINIC | Age: 28
End: 2023-08-29
Payer: MEDICAID

## 2023-09-06 ENCOUNTER — ROUTINE PRENATAL (OUTPATIENT)
Dept: OBSTETRICS AND GYNECOLOGY | Facility: CLINIC | Age: 28
End: 2023-09-06
Payer: MEDICAID

## 2023-09-06 VITALS
WEIGHT: 144.63 LBS | DIASTOLIC BLOOD PRESSURE: 90 MMHG | BODY MASS INDEX: 27.33 KG/M2 | SYSTOLIC BLOOD PRESSURE: 117 MMHG

## 2023-09-06 DIAGNOSIS — Z34.03 ENCOUNTER FOR SUPERVISION OF NORMAL FIRST PREGNANCY IN THIRD TRIMESTER: Primary | ICD-10-CM

## 2023-09-06 PROCEDURE — 99213 OFFICE O/P EST LOW 20 MIN: CPT | Mod: TH,S$PBB,, | Performed by: ADVANCED PRACTICE MIDWIFE

## 2023-09-06 PROCEDURE — 99213 OFFICE O/P EST LOW 20 MIN: CPT | Mod: PBBFAC,TH,PN | Performed by: ADVANCED PRACTICE MIDWIFE

## 2023-09-06 PROCEDURE — 99999 PR PBB SHADOW E&M-EST. PATIENT-LVL III: ICD-10-PCS | Mod: PBBFAC,,, | Performed by: ADVANCED PRACTICE MIDWIFE

## 2023-09-06 PROCEDURE — 99213 PR OFFICE/OUTPT VISIT, EST, LEVL III, 20-29 MIN: ICD-10-PCS | Mod: TH,S$PBB,, | Performed by: ADVANCED PRACTICE MIDWIFE

## 2023-09-06 PROCEDURE — 99999 PR PBB SHADOW E&M-EST. PATIENT-LVL III: CPT | Mod: PBBFAC,,, | Performed by: ADVANCED PRACTICE MIDWIFE

## 2023-09-06 NOTE — PROGRESS NOTES
Reason for visit: Routine Prenatal Visit      HPI:   28 y.o., at 31w5d by Estimated Date of Delivery: 11/3/23    In with no c/o    - Contractions: denies  - Bleeding: denies  - Loss of fluid: denies  - Fetal movement: reports good Fm, reinforced BId  - Nausea: no  - Vomiting: no  - Headache: no      Reviewed:    Past medical, surgical, social, family, and obstetric history: Reviewed and updated in EMR.  Medications: Reviewed and updated in EMR.  Allergies: Latex, natural rubber and Adhesive    Pregnancy dating, labs, ultrasound reports, prenatal testing, and problem list: Reviewed and updated in EMR.  Outside records: na  Independent interpretation of tests: na  Discussion with another healthcare professional: na      Vitals: BP (!) 117/90   Wt 65.6 kg (144 lb 10 oz)   LMP 2023 (Exact Date)   BMI 27.33 kg/m²     Physical exam:  GENERAL: No acute distress  ABD: Gravid      Assessment and Plan:    Encounter for supervision of normal first pregnancy in third trimester         Discussed options for BCM- pt undecided- poss IUD vs Implanon   Does not have definitice Birth Plan- desires skin to skin, breastfeeding     labor precautions given  Follow-up: 2 weeks      I spent a total of 20 minutes on the day of the visit. This includes face to face time and non-face to face time preparing to see the patient (eg, review of tests), Obtaining and/or reviewing separately obtained history, Documenting clinical information in the electronic or other health record, Independently interpreting results and communicating results to the patient/family/caregiver, or Care coordination.

## 2023-09-08 ENCOUNTER — PATIENT MESSAGE (OUTPATIENT)
Dept: OTHER | Facility: OTHER | Age: 28
End: 2023-09-08
Payer: MEDICAID

## 2023-09-12 ENCOUNTER — TELEPHONE (OUTPATIENT)
Dept: OBSTETRICS AND GYNECOLOGY | Facility: CLINIC | Age: 28
End: 2023-09-12
Payer: MEDICAID

## 2023-09-12 ENCOUNTER — TELEPHONE (OUTPATIENT)
Dept: OBSTETRICS AND GYNECOLOGY | Facility: HOSPITAL | Age: 28
End: 2023-09-12
Payer: MEDICAID

## 2023-09-12 DIAGNOSIS — R03.0 ELEVATED BLOOD PRESSURE READING: Primary | ICD-10-CM

## 2023-09-12 PROBLEM — O16.9 ELEVATED BLOOD PRESSURE AFFECTING PREGNANCY, ANTEPARTUM: Status: ACTIVE | Noted: 2023-09-12

## 2023-09-12 NOTE — TELEPHONE ENCOUNTER
----- Message from Pina Figueroa MD sent at 9/12/2023  8:53 AM CDT -----  Hi I called her due to connected mom readings. Can you get her in sometime this week for BP check and PreE labs? I believe the walk in could also do it if we have no appointment available at [facility]. She doesn't need to go to [facility] at this time because very mild BP and no symptoms.   Thanks  Pina

## 2023-09-12 NOTE — TELEPHONE ENCOUNTER
Multiple mild range BP readings on connected mom cuff. One mild range BP at last clinic visit on 9/6. No hx cHTN. No prior elevated Bps. Denies HA/visual changes/RUQ pain. Will get patient in for BP check and PreE labs this week. JOHN precautions given for BP >160/>110 on conn mom cuff, HA, black spots in vision or pain under right breast. Questions answered.

## 2023-09-12 NOTE — TELEPHONE ENCOUNTER
----- Message from Cary Jorge sent at 9/12/2023  9:07 AM CDT -----  Regarding: call back  Type:  Patient Returning Call    Who Called:Nina     Who Left Message for Patient:unknown    Does the patient know what this is regarding?:yes     Would the patient rather a call back or a response via [facility]? Call    Best Call Back Number:[phone]    Additional Information:

## 2023-09-13 ENCOUNTER — LAB VISIT (OUTPATIENT)
Dept: LAB | Facility: OTHER | Age: 28
End: 2023-09-13
Attending: OBSTETRICS & GYNECOLOGY
Payer: MEDICAID

## 2023-09-13 ENCOUNTER — ROUTINE PRENATAL (OUTPATIENT)
Dept: OBSTETRICS AND GYNECOLOGY | Facility: CLINIC | Age: 28
End: 2023-09-13
Payer: MEDICAID

## 2023-09-13 VITALS
BODY MASS INDEX: 27.62 KG/M2 | SYSTOLIC BLOOD PRESSURE: 114 MMHG | DIASTOLIC BLOOD PRESSURE: 90 MMHG | WEIGHT: 146.19 LBS

## 2023-09-13 DIAGNOSIS — Z3A.32 32 WEEKS GESTATION OF PREGNANCY: Primary | ICD-10-CM

## 2023-09-13 DIAGNOSIS — O16.3 ELEVATED BLOOD PRESSURE AFFECTING PREGNANCY IN THIRD TRIMESTER, ANTEPARTUM: ICD-10-CM

## 2023-09-13 DIAGNOSIS — R03.0 ELEVATED BLOOD PRESSURE READING: ICD-10-CM

## 2023-09-13 PROBLEM — O13.9 GESTATIONAL HYPERTENSION, ANTEPARTUM: Status: ACTIVE | Noted: 2023-09-12

## 2023-09-13 LAB
ALBUMIN SERPL BCP-MCNC: 2.6 G/DL (ref 3.5–5.2)
ALP SERPL-CCNC: 139 U/L (ref 55–135)
ALT SERPL W/O P-5'-P-CCNC: 8 U/L (ref 10–44)
ANION GAP SERPL CALC-SCNC: 8 MMOL/L (ref 8–16)
AST SERPL-CCNC: 15 U/L (ref 10–40)
BASOPHILS # BLD AUTO: 0.03 K/UL (ref 0–0.2)
BASOPHILS NFR BLD: 0.4 % (ref 0–1.9)
BILIRUB SERPL-MCNC: 0.2 MG/DL (ref 0.1–1)
BUN SERPL-MCNC: 3 MG/DL (ref 6–20)
CALCIUM SERPL-MCNC: 9.4 MG/DL (ref 8.7–10.5)
CHLORIDE SERPL-SCNC: 104 MMOL/L (ref 95–110)
CO2 SERPL-SCNC: 24 MMOL/L (ref 23–29)
CREAT SERPL-MCNC: 0.8 MG/DL (ref 0.5–1.4)
CREAT UR-MCNC: 96 MG/DL (ref 15–325)
DIFFERENTIAL METHOD: ABNORMAL
EOSINOPHIL # BLD AUTO: 0.1 K/UL (ref 0–0.5)
EOSINOPHIL NFR BLD: 1.1 % (ref 0–8)
ERYTHROCYTE [DISTWIDTH] IN BLOOD BY AUTOMATED COUNT: 11.9 % (ref 11.5–14.5)
EST. GFR  (NO RACE VARIABLE): >60 ML/MIN/1.73 M^2
GLUCOSE SERPL-MCNC: 89 MG/DL (ref 70–110)
HCT VFR BLD AUTO: 39.8 % (ref 37–48.5)
HGB BLD-MCNC: 12.9 G/DL (ref 12–16)
IMM GRANULOCYTES # BLD AUTO: 0.05 K/UL (ref 0–0.04)
IMM GRANULOCYTES NFR BLD AUTO: 0.7 % (ref 0–0.5)
LYMPHOCYTES # BLD AUTO: 1 K/UL (ref 1–4.8)
LYMPHOCYTES NFR BLD: 14.4 % (ref 18–48)
MCH RBC QN AUTO: 27.2 PG (ref 27–31)
MCHC RBC AUTO-ENTMCNC: 32.4 G/DL (ref 32–36)
MCV RBC AUTO: 84 FL (ref 82–98)
MONOCYTES # BLD AUTO: 0.6 K/UL (ref 0.3–1)
MONOCYTES NFR BLD: 8.9 % (ref 4–15)
NEUTROPHILS # BLD AUTO: 5.4 K/UL (ref 1.8–7.7)
NEUTROPHILS NFR BLD: 74.5 % (ref 38–73)
NRBC BLD-RTO: 0 /100 WBC
PLATELET # BLD AUTO: 228 K/UL (ref 150–450)
PMV BLD AUTO: 12.2 FL (ref 9.2–12.9)
POTASSIUM SERPL-SCNC: 4.7 MMOL/L (ref 3.5–5.1)
PROT SERPL-MCNC: 6.5 G/DL (ref 6–8.4)
PROT UR-MCNC: 9 MG/DL (ref 0–15)
PROT/CREAT UR: 0.09 MG/G{CREAT} (ref 0–0.2)
RBC # BLD AUTO: 4.74 M/UL (ref 4–5.4)
SODIUM SERPL-SCNC: 136 MMOL/L (ref 136–145)
WBC # BLD AUTO: 7.2 K/UL (ref 3.9–12.7)

## 2023-09-13 PROCEDURE — 99999 PR PBB SHADOW E&M-EST. PATIENT-LVL III: ICD-10-PCS | Mod: PBBFAC,,,

## 2023-09-13 PROCEDURE — 99999 PR PBB SHADOW E&M-EST. PATIENT-LVL III: CPT | Mod: PBBFAC,,,

## 2023-09-13 PROCEDURE — 80053 COMPREHEN METABOLIC PANEL: CPT | Performed by: OBSTETRICS & GYNECOLOGY

## 2023-09-13 PROCEDURE — 99213 OFFICE O/P EST LOW 20 MIN: CPT | Mod: PBBFAC,TH

## 2023-09-13 PROCEDURE — 85025 COMPLETE CBC W/AUTO DIFF WBC: CPT | Performed by: OBSTETRICS & GYNECOLOGY

## 2023-09-13 PROCEDURE — 99213 OFFICE O/P EST LOW 20 MIN: CPT | Mod: TH,S$PBB,, | Performed by: OBSTETRICS & GYNECOLOGY

## 2023-09-13 PROCEDURE — 82570 ASSAY OF URINE CREATININE: CPT

## 2023-09-13 PROCEDURE — 36415 COLL VENOUS BLD VENIPUNCTURE: CPT | Performed by: OBSTETRICS & GYNECOLOGY

## 2023-09-13 PROCEDURE — 99213 PR OFFICE/OUTPT VISIT, EST, LEVL III, 20-29 MIN: ICD-10-PCS | Mod: TH,S$PBB,, | Performed by: OBSTETRICS & GYNECOLOGY

## 2023-09-13 NOTE — PROGRESS NOTES
Here for routine OB appt at 32w5d, with no complaints. Here for BP check. /90. Denies headache, blurry vision, RUQ pain. Reports good FM.  Denies LOF, denies VB, denies contractions. Discussed that she now has gHTN due to 2 elevated BPs. Doing pre.e labs today. Discussed starting PNT and timing of induction.   Reviewed warning signs of Labor and Preeclampsia.  Daily FM counts reinforced.  F/U scheduled 1 week with Dr. Figueroa

## 2023-09-13 NOTE — PATIENT INSTRUCTIONS
Call  L & D after hours at [phone] for vaginal bleeding, leakage of fluids, contractions 4-5 in one hour, decreased fetal movements ( 10 kicks in 2 hours), headache not relieved by Tylenol, blurry vision, or temp of 100.4 or greater.  Begin doing fetal kick counts, at least 10 movements in 2 hours starting at 28 weeks gestation.  Keep next clinic appointment.

## 2023-09-14 ENCOUNTER — HOSPITAL ENCOUNTER (EMERGENCY)
Facility: OTHER | Age: 28
Discharge: HOME | End: 2023-09-14
Attending: OBSTETRICS & GYNECOLOGY
Payer: MEDICAID

## 2023-09-14 ENCOUNTER — TELEPHONE (OUTPATIENT)
Dept: OBSTETRICS AND GYNECOLOGY | Facility: CLINIC | Age: 28
End: 2023-09-14
Payer: MEDICAID

## 2023-09-14 VITALS
RESPIRATION RATE: 18 BRPM | HEART RATE: 79 BPM | DIASTOLIC BLOOD PRESSURE: 83 MMHG | OXYGEN SATURATION: 96 % | SYSTOLIC BLOOD PRESSURE: 122 MMHG | TEMPERATURE: 98 F

## 2023-09-14 DIAGNOSIS — Z3A.32 32 WEEKS GESTATION OF PREGNANCY: ICD-10-CM

## 2023-09-14 DIAGNOSIS — O13.9 GESTATIONAL HYPERTENSION, ANTEPARTUM: Primary | ICD-10-CM

## 2023-09-14 LAB
ALBUMIN SERPL BCP-MCNC: 2.7 G/DL (ref 3.5–5.2)
ALP SERPL-CCNC: 144 U/L (ref 55–135)
ALT SERPL W/O P-5'-P-CCNC: 8 U/L (ref 10–44)
ANION GAP SERPL CALC-SCNC: 11 MMOL/L (ref 8–16)
AST SERPL-CCNC: 16 U/L (ref 10–40)
BASOPHILS # BLD AUTO: 0.02 K/UL (ref 0–0.2)
BASOPHILS NFR BLD: 0.2 % (ref 0–1.9)
BILIRUB SERPL-MCNC: 0.3 MG/DL (ref 0.1–1)
BILIRUB SERPL-MCNC: NORMAL MG/DL
BLOOD URINE, POC: NORMAL
BUN SERPL-MCNC: 5 MG/DL (ref 6–20)
CALCIUM SERPL-MCNC: 9.4 MG/DL (ref 8.7–10.5)
CHLORIDE SERPL-SCNC: 105 MMOL/L (ref 95–110)
CO2 SERPL-SCNC: 21 MMOL/L (ref 23–29)
COLOR, POC UA: NORMAL
CREAT SERPL-MCNC: 0.8 MG/DL (ref 0.5–1.4)
CREAT UR-MCNC: 24.1 MG/DL (ref 15–325)
DIFFERENTIAL METHOD: ABNORMAL
EOSINOPHIL # BLD AUTO: 0.1 K/UL (ref 0–0.5)
EOSINOPHIL NFR BLD: 0.6 % (ref 0–8)
ERYTHROCYTE [DISTWIDTH] IN BLOOD BY AUTOMATED COUNT: 12 % (ref 11.5–14.5)
EST. GFR  (NO RACE VARIABLE): >60 ML/MIN/1.73 M^2
GLUCOSE SERPL-MCNC: 84 MG/DL (ref 70–110)
GLUCOSE UR QL STRIP: NORMAL
HCT VFR BLD AUTO: 37.9 % (ref 37–48.5)
HGB BLD-MCNC: 12.5 G/DL (ref 12–16)
IMM GRANULOCYTES # BLD AUTO: 0.03 K/UL (ref 0–0.04)
IMM GRANULOCYTES NFR BLD AUTO: 0.4 % (ref 0–0.5)
KETONES UR QL STRIP: NORMAL
LEUKOCYTE ESTERASE URINE, POC: NORMAL
LYMPHOCYTES # BLD AUTO: 1.4 K/UL (ref 1–4.8)
LYMPHOCYTES NFR BLD: 16.2 % (ref 18–48)
MCH RBC QN AUTO: 26.9 PG (ref 27–31)
MCHC RBC AUTO-ENTMCNC: 33 G/DL (ref 32–36)
MCV RBC AUTO: 82 FL (ref 82–98)
MONOCYTES # BLD AUTO: 0.7 K/UL (ref 0.3–1)
MONOCYTES NFR BLD: 8.1 % (ref 4–15)
NEUTROPHILS # BLD AUTO: 6.2 K/UL (ref 1.8–7.7)
NEUTROPHILS NFR BLD: 74.5 % (ref 38–73)
NITRITE, POC UA: NORMAL
NRBC BLD-RTO: 0 /100 WBC
PH, POC UA: NORMAL
PLATELET # BLD AUTO: 226 K/UL (ref 150–450)
PMV BLD AUTO: 12.4 FL (ref 9.2–12.9)
POTASSIUM SERPL-SCNC: 4 MMOL/L (ref 3.5–5.1)
PROT SERPL-MCNC: 6.7 G/DL (ref 6–8.4)
PROT UR-MCNC: <7 MG/DL (ref 0–15)
PROT/CREAT UR: NORMAL MG/G{CREAT} (ref 0–0.2)
PROTEIN, POC: NORMAL
RBC # BLD AUTO: 4.64 M/UL (ref 4–5.4)
SODIUM SERPL-SCNC: 137 MMOL/L (ref 136–145)
SPECIFIC GRAVITY, POC UA: NORMAL
UROBILINOGEN, POC UA: NORMAL
WBC # BLD AUTO: 8.31 K/UL (ref 3.9–12.7)

## 2023-09-14 PROCEDURE — 99284 EMERGENCY DEPT VISIT MOD MDM: CPT | Mod: 25,,, | Performed by: OBSTETRICS & GYNECOLOGY

## 2023-09-14 PROCEDURE — 80053 COMPREHEN METABOLIC PANEL: CPT

## 2023-09-14 PROCEDURE — 59025 PR FETAL 2N-STRESS TEST: ICD-10-PCS | Mod: 26,,, | Performed by: OBSTETRICS & GYNECOLOGY

## 2023-09-14 PROCEDURE — 85025 COMPLETE CBC W/AUTO DIFF WBC: CPT

## 2023-09-14 PROCEDURE — 82570 ASSAY OF URINE CREATININE: CPT

## 2023-09-14 PROCEDURE — 59025 FETAL NON-STRESS TEST: CPT

## 2023-09-14 PROCEDURE — 99284 EMERGENCY DEPT VISIT MOD MDM: CPT | Mod: 25

## 2023-09-14 PROCEDURE — 59025 FETAL NON-STRESS TEST: CPT | Mod: 26,,, | Performed by: OBSTETRICS & GYNECOLOGY

## 2023-09-14 PROCEDURE — 81002 URINALYSIS NONAUTO W/O SCOPE: CPT

## 2023-09-14 PROCEDURE — 99284 PR EMERGENCY DEPT VISIT,LEVEL IV: ICD-10-PCS | Mod: 25,,, | Performed by: OBSTETRICS & GYNECOLOGY

## 2023-09-14 PROCEDURE — 25000003 PHARM REV CODE 250

## 2023-09-14 RX ORDER — LIDOCAINE HYDROCHLORIDE 20 MG/ML
JELLY TOPICAL
Qty: 30 ML | Refills: 0 | Status: ON HOLD | OUTPATIENT
Start: 2023-09-14 | End: 2023-10-10 | Stop reason: HOSPADM

## 2023-09-14 RX ORDER — ACETAMINOPHEN 500 MG
1000 TABLET ORAL ONCE
Status: COMPLETED | OUTPATIENT
Start: 2023-09-14 | End: 2023-09-14

## 2023-09-14 RX ADMIN — ACETAMINOPHEN 1000 MG: 500 TABLET ORAL at 05:09

## 2023-09-14 NOTE — TELEPHONE ENCOUNTER
----- Message from Rachna San sent at 9/14/2023  3:20 PM CDT -----  Patient called in stating she has been experiencing abdominal pain. Patient would like a call back to discuss on what she soul do. Patient can be reached at [phone]

## 2023-09-14 NOTE — TELEPHONE ENCOUNTER
S/w pt   States she is having constant right side pain that started today she has taken a baby aspirin   Advised to be evaluated in [facility]

## 2023-09-14 NOTE — ED PROVIDER NOTES
Encounter Date: 2023       History     Chief Complaint   Patient presents with    Hypertension    Abdominal Pain     HPI      Nina Dickerson is a 28 y.o. F at 32w6d presents complaining of abdominal pain.   This IUP is complicated by gHTN.  Patient denies contractions, denies vaginal bleeding, denies LOF.   Fetal Movement: normal.     Patient reports she has had pain under her right breast since 1300 today. She describes it as sharp, stinging pain, rated 6/10 and tender to palpation. She also reported pressures of 120/90s at home today.    No other PreE symptoms reported at this time.    Review of patient's allergies indicates:   Allergen Reactions    Latex, natural rubber      Mother states allergic to latex gloves     Adhesive Rash     Band-aids     Past Medical History:   Diagnosis Date    Crohn's disease     Insomnia      Past Surgical History:   Procedure Laterality Date    COLONOSCOPY N/A 12/10/2015    Procedure: COLONOSCOPY;  Surgeon: Tala Gonzalez MD;  Location: [facility] (2ND FLR);  Service: Endoscopy;  Laterality: N/A;    COLONOSCOPY N/A 2016    Procedure: COLONOSCOPY;  Surgeon: Tala Gonzalez MD;  Location: [facility] (2ND FLR);  Service: Endoscopy;  Laterality: N/A;    COLONOSCOPY N/A 2021    Procedure: COLONOSCOPY;  Surgeon: True Treadwell MD;  Location: [facility];  Service: General;  Laterality: N/A;    COLONOSCOPY N/A 2021    Procedure: COLONOSCOPY;  Surgeon: Talib Rutledge MD;  Location: [facility] (4TH FLR);  Service: Endoscopy;  Laterality: N/A;  Covid-19 test 21 at [facility]    COLONOSCOPY N/A 2022    Procedure: COLONOSCOPY;  Surgeon: Talib Rutledge MD;  Location: [facility] (4TH FLR);  Service: Endoscopy;  Laterality: N/A;  golytely    COLONOSCOPY W/ BIOPSIES  2021    ESOPHAGOGASTRODUODENOSCOPY      ESOPHAGOGASTRODUODENOSCOPY N/A 2021    Procedure: EGD (ESOPHAGOGASTRODUODENOSCOPY);  Surgeon: Talib Rutledge MD;   Location: [facility] (4TH FLR);  Service: Endoscopy;  Laterality: N/A;  Covid-19 test 7/6/21 at [facility] - pg  7/6 returned pt's call- lvm with arrival time-rb  patient aware she moved up 30 minutes from 930am to 900am, patient aware to be here at 8am 7/8/21 - bijan    ESOPHAGOGASTRODUODENOSCOPY N/A 9/27/2022    Procedure: ESOPHAGOGASTRODUODENOSCOPY (EGD);  Surgeon: Talib Rtuledge MD;  Location: [facility] (4TH FLR);  Service: Endoscopy;  Laterality: N/A;  NOT Vaccinated.Covid test on 9/24 at [facility], instructions [facility]  Clear liquids up to 4 hrs prior/ AM prep [id number]    TONSILLECTOMY      TYMPANOSTOMY TUBE PLACEMENT       Family History   Problem Relation Age of Onset    Lupus Mother     Heart attacks under age 50 Father     Heart disease Father     Hypertension Father     Cardiomyopathy Maternal Grandmother     Dementia Maternal Grandfather     Heart disease Paternal Grandmother     Heart disease Paternal Grandfather     No Known Problems Sister      Social History     Tobacco Use    Smoking status: Never    Smokeless tobacco: Never    Tobacco comments:     mom smokes   Substance Use Topics    Alcohol use: No    Drug use: No     Review of Systems   Constitutional:  Negative for chills and fever.   Respiratory:  Negative for shortness of breath.    Cardiovascular:  Negative for chest pain.   Gastrointestinal:  Positive for abdominal pain (pain under right breast). Negative for nausea and vomiting.   Neurological:  Negative for light-headedness and headaches.   Psychiatric/Behavioral:  Negative for confusion. The patient is not nervous/anxious.        Physical Exam     Initial Vitals   BP Pulse Resp Temp SpO2   09/14/23 1720 09/14/23 1720 09/14/23 1910 09/14/23 1720 09/14/23 1720   (!) 134/101 71 18 97.5 °F (36.4 °C) 99 %      MAP       --                Physical Exam    Vitals reviewed.  Constitutional: She appears well-developed and well-nourished.   HENT:   Head: Normocephalic and  atraumatic.   Eyes: Conjunctivae are normal.   Neck: Tracheal deviation: under right breast, rates 6/10.   Cardiovascular:  Normal rate.           Pulmonary/Chest: No respiratory distress.   Normal work of breathing   Abdominal: There is abdominal tenderness.   Gravid abdomen     Neurological: She is alert and oriented to person, place, and time.   Skin: Skin is warm and dry.   Psychiatric: She has a normal mood and affect. Her behavior is normal. Thought content normal.         ED Course   Fetal non-stress test    Date/Time: 9/14/2023 5:21 PM    Performed by: Alejandro Wise MD  Authorized by: Shazia An MD    Nonstress Test:     Variability:  6-25 BPM    Decelerations:  None    Accelerations:  15 bpm    Baseline:  140    Contractions:  Regular (Patient is not feeling contractions)    Contraction Frequency:  2-3  Biophysical Profile:     Nonstress Test Interpretation: reactive      Overall Impression:  Reassuring    Labs Reviewed   COMPREHENSIVE METABOLIC PANEL - Abnormal; Notable for the following components:       Result Value    CO2 21 (*)     BUN 5 (*)     Albumin 2.7 (*)     Alkaline Phosphatase 144 (*)     ALT 8 (*)     All other components within normal limits   CBC W/ AUTO DIFFERENTIAL - Abnormal; Notable for the following components:    MCH 26.9 (*)     Gran % 74.5 (*)     Lymph % 16.2 (*)     All other components within normal limits   PROTEIN / CREATININE RATIO, URINE    Narrative:     Specimen Source->Urine   POCT URINALYSIS, DIPSTICK OR TABLET REAGENT, AUTOMATED, WITH MICROSCOP          Imaging Results    None          Medications   acetaminophen tablet 1,000 mg (1,000 mg Oral Given 9/14/23 1732)     Medical Decision Making  gHTN  - Reports stinging pain under right breast that is tender to palpation. Otherwise asymptomatic.   - Multiple mild range BP in [facility]  - CBC/CMP wnl  - PC ratio too low to calculate   - NST reactive and reassuring  - 1g tylenol given. Patient reports the pain has  improved and she only feels it now when she touches the area under her right breast.   - No s/s of labor or pre-eclampsia at this time  - Discussed home BP monitoring and pre-E warning signs   - RTC for prenatal appt as scheduled  - RT [facility] for ctx of increasing frequency/intensity, LOF, decreased fetal movement, vaginal bleeding  - Patient stable for discharge at this time  - ED return precautions given  - She voiced understanding and is in agreement with the plan     Uterine contractions on toco  -[place] with contractions q2-3  - Patient is not feeling any of the contractions  - Urine dip wnl. No ketones  - SVE: closed/thick/high  - Encourage PO hydration > contraction frequency on toco decreased         Amount and/or Complexity of Data Reviewed  Labs: ordered.    Risk  OTC drugs.  Prescription drug management.              Attending Attestation:   Physician Attestation Statement for Resident:  As the supervising MD   Physician Attestation Statement: I have personally seen and examined this patient.   I agree with the above history.  -:   As the supervising MD I agree with the above PE.     As the supervising MD I agree with the above treatment, course, plan, and disposition.   -:   NST  I independently reviewed the fetal non-stress test with the following interpretation:  140 BPM baseline  Variability: moderate  Accelerations: present  Decelerations: absent  Contractions: none  Category 1    Clinical Interpretation:reactive    Patient evaluated and found to be stable, agree with resident's assessment and plan.  Normal pre E labs. Known GHTN. Contractions on monitoring but cervix closed and not palpable to patient.  No dehyration on urinalysis so given po hydration only. PTL and pre E precautions reviewed.    I was personally present during the critical portions of the procedure(s) performed by the resident and was immediately available in the ED to provide services and assistance as needed during the entire  procedure.                                  Clinical Impression:   Final diagnoses:  [O13.9] Gestational hypertension, antepartum (Primary)  [Z3A.32] 32 weeks gestation of pregnancy        ED Disposition Condition    Discharge Stable          ED Prescriptions       Medication Sig Dispense Start Date End Date Auth. Provider    LIDOcaine HCL 2% (XYLOCAINE) 2 % jelly Apply topically as needed (burning pain). 30 mL 9/14/2023 -- Anna Tirado MD          Follow-up Information    None         Anna Tirado MD  [facility] OBGYN PGY2       Anna Tirado MD  Resident  09/15/23 0219       Shazia An MD  09/15/23 [id number]

## 2023-09-15 NOTE — DISCHARGE INSTRUCTIONS
Call clinic [phone] or L & D after hours at [phone] for vaginal bleeding, leakage of fluids, contractions 4-5 in one hour, decreased fetal movements ( 10 kicks in 2 hours), headache not relieved by Tylenol, blurry vision, or temp of 100.4 or greater.  Begin doing fetal kick counts, at least 10 movements in 2 hours starting at 28 weeks gestation.  Keep next clinic appointment

## 2023-09-19 ENCOUNTER — ROUTINE PRENATAL (OUTPATIENT)
Dept: OBSTETRICS AND GYNECOLOGY | Facility: CLINIC | Age: 28
End: 2023-09-19
Payer: MEDICAID

## 2023-09-19 ENCOUNTER — PROCEDURE VISIT (OUTPATIENT)
Dept: MATERNAL FETAL MEDICINE | Facility: CLINIC | Age: 28
End: 2023-09-19
Payer: MEDICAID

## 2023-09-19 VITALS
BODY MASS INDEX: 27.95 KG/M2 | SYSTOLIC BLOOD PRESSURE: 124 MMHG | WEIGHT: 147.94 LBS | DIASTOLIC BLOOD PRESSURE: 86 MMHG

## 2023-09-19 DIAGNOSIS — Z36.89 ENCOUNTER FOR ULTRASOUND TO ASSESS FETAL GROWTH: ICD-10-CM

## 2023-09-19 DIAGNOSIS — O13.9 GESTATIONAL HYPERTENSION, ANTEPARTUM: Primary | ICD-10-CM

## 2023-09-19 PROCEDURE — 99213 OFFICE O/P EST LOW 20 MIN: CPT | Mod: PBBFAC,TH,PN | Performed by: OBSTETRICS & GYNECOLOGY

## 2023-09-19 PROCEDURE — 99999 PR PBB SHADOW E&M-EST. PATIENT-LVL III: ICD-10-PCS | Mod: PBBFAC,,, | Performed by: OBSTETRICS & GYNECOLOGY

## 2023-09-19 PROCEDURE — 87081 CULTURE SCREEN ONLY: CPT | Performed by: OBSTETRICS & GYNECOLOGY

## 2023-09-19 PROCEDURE — 76816 US MFM PROCEDURE (VIEWPOINT): ICD-10-PCS | Mod: 26,S$PBB,, | Performed by: OBSTETRICS & GYNECOLOGY

## 2023-09-19 PROCEDURE — 99999 PR PBB SHADOW E&M-EST. PATIENT-LVL III: CPT | Mod: PBBFAC,,, | Performed by: OBSTETRICS & GYNECOLOGY

## 2023-09-19 PROCEDURE — 76816 OB US FOLLOW-UP PER FETUS: CPT | Mod: PBBFAC | Performed by: OBSTETRICS & GYNECOLOGY

## 2023-09-19 PROCEDURE — 99214 PR OFFICE/OUTPT VISIT, EST, LEVL IV, 30-39 MIN: ICD-10-PCS | Mod: TH,S$PBB,, | Performed by: OBSTETRICS & GYNECOLOGY

## 2023-09-19 PROCEDURE — 99214 OFFICE O/P EST MOD 30 MIN: CPT | Mod: TH,S$PBB,, | Performed by: OBSTETRICS & GYNECOLOGY

## 2023-09-19 RX ORDER — FAMOTIDINE 10 MG/1
10 TABLET ORAL 2 TIMES DAILY
Status: ON HOLD | COMMUNITY
End: 2023-10-10 | Stop reason: HOSPADM

## 2023-09-19 RX ORDER — ASPIRIN 81 MG/1
81 TABLET ORAL DAILY
Status: ON HOLD | COMMUNITY
End: 2023-10-10 | Stop reason: HOSPADM

## 2023-09-19 NOTE — PROGRESS NOTES
Pregnancy dating, labs, ultrasound reports, prenatal testing, and problem list; prior records and results; and available outside records were reviewed and updated in EMR.  Pertinent findings were noted below.    Reason for Visit   Routine Prenatal Visit    HPI   28 y.o., at 33w4d by Estimated Date of Delivery: 11/3/23    Recently diagnosed with gHTN. Denies HA/visual changes/RUQ pain.    Contractions: No, but having santi bender   Bleeding: No   Loss of fluid: No   Fetal movement: Yes   Nausea: No   Vomiting: No   Headache: No     Exam   /86   Wt 67.1 kg (147 lb 14.9 oz)   LMP 2023 (Exact Date)   BMI 27.95 kg/m²     TW#  GENERAL: No acute distress  ABD: Gravid    Assessment and Plan   Gestational hypertension, antepartum  -     Prenatal Testing- [facility]; Standing  -     IP OB Labor Induction; Future; Expected date: 10/13/2023  -     Strep B Screen, Vaginal / Rectal       IOL requested for 37.0   Start PNT for gHTN, needs weekly appts and labs   PreE precautions given, recent labs WNL   Growth scan today, VTX   Desires paragard IUD post placental   Consents signed for delivery + IUD + blood + circ   GBS swab sent     labor, Pain and bleeding, preE and fetal movement count precautions given  Follow-up: 1 week    Total time of 35 minutes, including face-to-face time and non-face-to-face time preparing to see the patient (eg, review of tests), obtaining and/or reviewing separately obtained history, documenting clinical information in the electronic or other health record, independently interpreting results, communicating results to the patient/family/caregiver, or care coordination

## 2023-09-22 LAB — BACTERIA SPEC AEROBE CULT: NORMAL

## 2023-09-25 NOTE — H&P
HISTORY AND PHYSICAL                                                OBSTETRICS          Subjective:       Nina Dickerson is a 28 y.o.  female with IUP at 37w0d weeks gestation who presents for medically indicated IOL.    Patient denies contractions, denies vaginal bleeding, denies LOF.   Fetal Movement: normal.    This IUP is complicated by Crohns disease (in remission on monoclonal Ab) and gHTN.    Review of Systems   Constitutional:  Negative for chills and fever.   Eyes:  Negative for visual disturbance.   Respiratory:  Negative for cough and shortness of breath.    Cardiovascular:  Negative for chest pain and leg swelling.   Gastrointestinal:  Negative for abdominal pain, nausea and vomiting.   Genitourinary:  Negative for dysuria, flank pain, frequency, urgency and vaginal bleeding.   Integumentary:  Negative for breast mass.   Neurological:  Negative for syncope and headaches.   Psychiatric/Behavioral:  Negative for depression. The patient is not nervous/anxious.    All other systems reviewed and are negative.  Breast: Negative for mass and mastodynia      PMHx:   Past Medical History:   Diagnosis Date    Crohn's disease     Insomnia        PSHx:   Past Surgical History:   Procedure Laterality Date    COLONOSCOPY N/A 12/10/2015    Procedure: COLONOSCOPY;  Surgeon: Tala Gonzalez MD;  Location: [facility] ([address] [place] [address]);  Service: Endoscopy;  Laterality: N/A;    COLONOSCOPY N/A 2016    Procedure: COLONOSCOPY;  Surgeon: Tala Gonzalez MD;  Location: [facility] ([address] [place] [address]);  Service: Endoscopy;  Laterality: N/A;    COLONOSCOPY N/A 2021    Procedure: COLONOSCOPY;  Surgeon: True Treadwell MD;  Location: [facility];  Service: General;  Laterality: N/A;    COLONOSCOPY N/A 2021    Procedure: COLONOSCOPY;  Surgeon: Talib Rutledge MD;  Location: [facility] (4TH FLR);  Service: Endoscopy;  Laterality: N/A;  Covid-19 test 21 at [facility]    COLONOSCOPY N/A 2022     Procedure: COLONOSCOPY;  Surgeon: Talib Rutledge MD;  Location: [facility] (4TH FLR);  Service: Endoscopy;  Laterality: N/A;  golytely    COLONOSCOPY W/ BIOPSIES  01/21/2021    ESOPHAGOGASTRODUODENOSCOPY      ESOPHAGOGASTRODUODENOSCOPY N/A 7/8/2021    Procedure: EGD (ESOPHAGOGASTRODUODENOSCOPY);  Surgeon: Talib Rutledge MD;  Location: [facility] (4TH FLR);  Service: Endoscopy;  Laterality: N/A;  Covid-19 test 7/6/21 at [facility] - pg  7/6 returned pt's call- lvm with arrival time-rb  patient aware she moved up 30 minutes from 930am to 900am, patient aware to be here at 8am 7/8/21 - bijan    ESOPHAGOGASTRODUODENOSCOPY N/A 9/27/2022    Procedure: ESOPHAGOGASTRODUODENOSCOPY (EGD);  Surgeon: Talib Rutledge MD;  [facility]  Service: Endoscopy;  Laterality: N/A;  NOT Vaccinated.Covid test on 9/24 at [facility], [facility]  Clear liquids up to 4 hrs prior/ AM prep [id number]    TONSILLECTOMY      TYMPANOSTOMY TUBE PLACEMENT         All:   Review of patient's allergies indicates:   Allergen Reactions    Latex, natural rubber      Mother states allergic to latex gloves     Adhesive Rash     Band-aids       Meds: (Not in a hospital admission)      SH:   Social History     Socioeconomic History    Marital status:    Tobacco Use    Smoking status: Never    Smokeless tobacco: Never    Tobacco comments:     mom smokes   Substance and Sexual Activity    Alcohol use: No    Drug use: No    Sexual activity: Yes     Partners: Male   Social History Narrative    Lives with mom, sister.    2 cats.    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     Social Determinants of Health     Financial Resource Strain: Low Risk  (3/30/2021)    Overall Financial Resource Strain (CARDIA)     Difficulty of Paying Living Expenses: Not very hard   Food Insecurity: No Food Insecurity (3/30/2021)    Hunger Vital Sign     Worried About Running Out of  Food in the Last Year: Never true     Ran Out of Food in the Last Year: Never true   Transportation Needs: No Transportation Needs (3/30/2021)    PRAPARE - Transportation     Lack of Transportation (Medical): No     Lack of Transportation (Non-Medical): No   Physical Activity: Inactive (3/30/2021)    Exercise Vital Sign     Days of Exercise per Week: 0 days     Minutes of Exercise per Session: 0 min   Stress: No Stress Concern Present (3/30/2021)    [employer] [place] [employer] - Occupational Stress Questionnaire     Feeling of Stress : Only a little   Social Connections: Unknown (3/30/2021)    Social Connection and Isolation Panel [NHANES]     Frequency of Communication with Friends and Family: More than three times a week     Frequency of Social Gatherings with Friends and Family: More than three times a week     Attends Ep[employer]op[employer] Services: Patient refused     Active Member of Clubs or Organizations: Patient refused     Attends Club or Organization Meetings: Patient refused     Marital Status: Patient refused   Housing Stability: Low Risk  (3/30/2021)    Housing Stability Vital Sign     Unable to Pay for Housing in the Last Year: No     Number of Places Lived in the Last Year: 1     Unstable Housing in the Last Year: No       FH:   Family History   Problem Relation Age of Onset    Lupus Mother     Heart attacks under age 50 Father     Heart disease Father     Hypertension Father     Cardiomyopathy Maternal Grandmother     Dementia Maternal Grandfather     Heart disease Paternal Grandmother     Heart disease Paternal Grandfather     No Known Problems Sister        OBHx:   OB History    Para Term  AB Living   1 0 0 0 0 0   SAB IAB Ectopic Multiple Live Births   0 0 0 0 0      # Outcome Date GA Lbr Shayan/2nd Weight Sex Delivery Anes PTL Lv   1 Current                Objective:       /86   Wt 67.1 kg (147 lb 14.9 oz)   LMP 2023 (Exact Date)   BMI 27.95 kg/m²     Vitals:     09/19/23 1512   BP: 124/86   Weight: 67.1 kg (147 lb 14.9 oz)       General:   alert, appears stated age and cooperative, no apparent distress   HENT:  normocephalic, atraumatic   Eyes:  extraocular movements and conjunctivae normal   Neck:  supple, range of motion normal, no thyromegaly   Lungs:   no respiratory distress   Heart:   regular rate   Abdomen:  soft, non-tender, non-distended but gravid, no rebound or guarding   Extremities negative edema, negative erythema   FHT: To be performed upon admission                 TOCO: To be performed upon admission   Presentations: To be performed upon admission   Cervix: To be performed upon admission   Sterile Speculum Exam: N/A    Recent Growth Scan: 09/19/2023        33w 4d                   2337g    34%     Lab Review  Blood Type A POS  GBBS: negative  Rubella: Immune  RPR: NR  HIV: negative  HepB: negative       Assessment:       37w0d weeks gestation for IOL 2/2 gHTN    Plan:      1. IOL  - Risks, benefits, alternatives and possible complications have been discussed in detail with the patient.   - Consents signed and to chart  - Admit to Labor and Delivery unit  - Epidural per Anesthesia  - Draw CBC, T&S  - IOL plan per L&D team  - EFW AGA 34%tile @ 33wk  - Maternal pelvis unproven  - MOC/MOF: desires post placental Paragard IUD (consents signed-in media)/breast    2. Crohns  - In remission on monoclobal Ab  - f/u with GI per scheduled    3. gHTN  - Diagnosed with 2 DBP >90 on 2 separate occasions  - PreE labs WNL  - Check PreE labs on admit  - Asx    Post-Partum Hemorrhage risk - low

## 2023-09-25 NOTE — H&P (VIEW-ONLY)
HISTORY AND PHYSICAL                                                OBSTETRICS          Subjective:       Nina Dickerson is a 28 y.o.  female with IUP at 37w0d weeks gestation who presents for medically indicated IOL.    Patient denies contractions, denies vaginal bleeding, denies LOF.   Fetal Movement: normal.    This IUP is complicated by Crohns disease (in remission on monoclonal Ab) and gHTN.    Review of Systems   Constitutional:  Negative for chills and fever.   Eyes:  Negative for visual disturbance.   Respiratory:  Negative for cough and shortness of breath.    Cardiovascular:  Negative for chest pain and leg swelling.   Gastrointestinal:  Negative for abdominal pain, nausea and vomiting.   Genitourinary:  Negative for dysuria, flank pain, frequency, urgency and vaginal bleeding.   Integumentary:  Negative for breast mass.   Neurological:  Negative for syncope and headaches.   Psychiatric/Behavioral:  Negative for depression. The patient is not nervous/anxious.    All other systems reviewed and are negative.  Breast: Negative for mass and mastodynia      PMHx:   Past Medical History:   Diagnosis Date    Crohn's disease     Insomnia        PSHx:   Past Surgical History:   Procedure Laterality Date    COLONOSCOPY N/A 12/10/2015    Procedure: COLONOSCOPY;  Surgeon: Tala Gonzalez MD;  Location: [facility] ([address] [place] [address]);  Service: Endoscopy;  Laterality: N/A;    COLONOSCOPY N/A 2016    Procedure: COLONOSCOPY;  Surgeon: Tala Gonzalez MD;  Location: [facility] ([address] [place] [address]);  Service: Endoscopy;  Laterality: N/A;    COLONOSCOPY N/A 2021    Procedure: COLONOSCOPY;  Surgeon: True Treadwell MD;  Location: [facility];  Service: General;  Laterality: N/A;    COLONOSCOPY N/A 2021    Procedure: COLONOSCOPY;  Surgeon: Talib Rutledge MD;  Location: [facility] (4TH FLR);  Service: Endoscopy;  Laterality: N/A;  Covid-19 test 21 at [facility]    COLONOSCOPY N/A 2022     Procedure: COLONOSCOPY;  Surgeon: Talib Rutledge MD;  Location: [facility] (4TH FLR);  Service: Endoscopy;  Laterality: N/A;  golytely    COLONOSCOPY W/ BIOPSIES  01/21/2021    ESOPHAGOGASTRODUODENOSCOPY      ESOPHAGOGASTRODUODENOSCOPY N/A 7/8/2021    Procedure: EGD (ESOPHAGOGASTRODUODENOSCOPY);  Surgeon: Talib Rutledge MD;  Location: [facility] (4TH FLR);  Service: Endoscopy;  Laterality: N/A;  Covid-19 test 7/6/21 at [facility] - pg  7/6 returned pt's call- lvm with arrival time-rb  patient aware she moved up 30 minutes from 930am to 900am, patient aware to be here at 8am 7/8/21 - bijan    ESOPHAGOGASTRODUODENOSCOPY N/A 9/27/2022    Procedure: ESOPHAGOGASTRODUODENOSCOPY (EGD);  Surgeon: Talib Rutledge MD;  [facility]  Service: Endoscopy;  Laterality: N/A;  NOT Vaccinated.Cov[facility] test on 9/24 at [facility], [facility]  Clear liquids up to 4 hrs prior/ AM prep [id number]    TONSILLECTOMY      TYMPANOSTOMY TUBE PLACEMENT         All:   Review of patient's allergies indicates:   Allergen Reactions    Latex, natural rubber      Mother states allergic to latex gloves     Adhesive Rash     Band-aids       Meds: (Not in a hospital admission)      SH:   Social History     Socioeconomic History    Marital status:    Tobacco Use    Smoking status: Never    Smokeless tobacco: Never    Tobacco comments:     mom smokes   Substance and Sexual Activity    Alcohol use: No    Drug use: No    Sexual activity: Yes     Partners: Male   Social History Narrative    Lives with mom, sister.    2 cats.    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     Social Determinants of Health     Financial Resource Strain: Low Risk  (3/30/2021)    Overall Financial Resource Strain (CARDIA)     Difficulty of Paying Living Expenses: Not very hard   Food Insecurity: No Food Insecurity (3/30/2021)    Hunger Vital Sign     Worried About Running Out of  Food in the Last Year: Never true     Ran Out of Food in the Last Year: Never true   Transportation Needs: No Transportation Needs (3/30/2021)    PRAPARE - Transportation     Lack of Transportation (Medical): No     Lack of Transportation (Non-Medical): No   Physical Activity: Inactive (3/30/2021)    Exercise Vital Sign     Days of Exercise per Week: 0 days     Minutes of Exercise per Session: 0 min   Stress: No Stress Concern Present (3/30/2021)    [employer] [place] [employer] - Occupational Stress Questionnaire     Feeling of Stress : Only a little   Social Connections: Unknown (3/30/2021)    Social Connection and Isolation Panel [NHANES]     Frequency of Communication with Friends and Family: More than three times a week     Frequency of Social Gatherings with Friends and Family: More than three times a week     Attends Cheondoism Services: Patient refused     Active Member of Clubs or Organizations: Patient refused     Attends Club or Organization Meetings: Patient refused     Marital Status: Patient refused   Housing Stability: Low Risk  (3/30/2021)    Housing Stability Vital Sign     Unable to Pay for Housing in the Last Year: No     Number of Places Lived in the Last Year: 1     Unstable Housing in the Last Year: No       FH:   Family History   Problem Relation Age of Onset    Lupus Mother     Heart attacks under age 50 Father     Heart disease Father     Hypertension Father     Cardiomyopathy Maternal Grandmother     Dementia Maternal Grandfather     Heart disease Paternal Grandmother     Heart disease Paternal Grandfather     No Known Problems Sister        OBHx:   OB History    Para Term  AB Living   1 0 0 0 0 0   SAB IAB Ectopic Multiple Live Births   0 0 0 0 0      # Outcome Date GA Lbr Shayan/2nd Weight Sex Delivery Anes PTL Lv   1 Current                Objective:       /86   Wt 67.1 kg (147 lb 14.9 oz)   LMP 2023 (Exact Date)   BMI 27.95 kg/m²     Vitals:     09/19/23 1512   BP: 124/86   Weight: 67.1 kg (147 lb 14.9 oz)       General:   alert, appears stated age and cooperative, no apparent distress   HENT:  normocephalic, atraumatic   Eyes:  extraocular movements and conjunctivae normal   Neck:  supple, range of motion normal, no thyromegaly   Lungs:   no respiratory distress   Heart:   regular rate   Abdomen:  soft, non-tender, non-distended but gravid, no rebound or guarding   Extremities negative edema, negative erythema   FHT: To be performed upon admission                 TOCO: To be performed upon admission   Presentations: To be performed upon admission   Cervix: To be performed upon admission   Sterile Speculum Exam: N/A    Recent Growth Scan: 09/19/2023        33w 4d                   2337g    34%     Lab Review  Blood Type A POS  GBBS: negative  Rubella: Immune  RPR: NR  HIV: negative  HepB: negative       Assessment:       37w0d weeks gestation for IOL 2/2 gHTN    Plan:      1. IOL  - Risks, benefits, alternatives and possible complications have been discussed in detail with the patient.   - Consents signed and to chart  - Admit to Labor and Delivery unit  - Epidural per Anesthesia  - Draw CBC, T&S  - IOL plan per L&D team  - EFW AGA 34%tile @ 33wk  - Maternal pelvis unproven  - MOC/MOF: desires post placental Paragard IUD (consents signed-in media)/breast    2. Crohns  - In remission on monoclobal Ab  - f/u with GI per scheduled    3. gHTN  - Diagnosed with 2 DBP >90 on 2 separate occasions  - PreE labs WNL  - Check PreE labs on admit  - Asx    Post-Partum Hemorrhage risk - low

## 2023-09-27 ENCOUNTER — ROUTINE PRENATAL (OUTPATIENT)
Dept: OBSTETRICS AND GYNECOLOGY | Facility: CLINIC | Age: 28
End: 2023-09-27
Payer: MEDICAID

## 2023-09-27 ENCOUNTER — HOSPITAL ENCOUNTER (OUTPATIENT)
Dept: PERINATAL CARE | Facility: OTHER | Age: 28
Discharge: HOME | End: 2023-09-27
Attending: OBSTETRICS & GYNECOLOGY
Payer: MEDICAID

## 2023-09-27 VITALS
DIASTOLIC BLOOD PRESSURE: 97 MMHG | WEIGHT: 148.56 LBS | SYSTOLIC BLOOD PRESSURE: 142 MMHG | BODY MASS INDEX: 28.08 KG/M2

## 2023-09-27 DIAGNOSIS — Z34.03 ENCOUNTER FOR SUPERVISION OF NORMAL FIRST PREGNANCY IN THIRD TRIMESTER: Primary | ICD-10-CM

## 2023-09-27 DIAGNOSIS — O09.93 PREGNANCY, SUPERVISION, HIGH-RISK, THIRD TRIMESTER: ICD-10-CM

## 2023-09-27 DIAGNOSIS — O13.9 GESTATIONAL HYPERTENSION, ANTEPARTUM: ICD-10-CM

## 2023-09-27 PROCEDURE — 76815 OB US LIMITED FETUS(S): CPT | Mod: 26,,, | Performed by: OBSTETRICS & GYNECOLOGY

## 2023-09-27 PROCEDURE — 76815 PRENATAL TESTING - NST/AFI: ICD-10-PCS | Mod: 26,,, | Performed by: OBSTETRICS & GYNECOLOGY

## 2023-09-27 PROCEDURE — 99999 PR PBB SHADOW E&M-EST. PATIENT-LVL III: ICD-10-PCS | Mod: PBBFAC,,, | Performed by: ADVANCED PRACTICE MIDWIFE

## 2023-09-27 PROCEDURE — 99213 OFFICE O/P EST LOW 20 MIN: CPT | Mod: 25,TH,S$PBB, | Performed by: ADVANCED PRACTICE MIDWIFE

## 2023-09-27 PROCEDURE — 59025 FETAL NON-STRESS TEST: CPT | Mod: 26,,, | Performed by: OBSTETRICS & GYNECOLOGY

## 2023-09-27 PROCEDURE — 59025 FETAL NON-STRESS TEST: CPT

## 2023-09-27 PROCEDURE — 99213 PR OFFICE/OUTPT VISIT, EST, LEVL III, 20-29 MIN: ICD-10-PCS | Mod: 25,TH,S$PBB, | Performed by: ADVANCED PRACTICE MIDWIFE

## 2023-09-27 PROCEDURE — 99999 PR PBB SHADOW E&M-EST. PATIENT-LVL III: CPT | Mod: PBBFAC,,, | Performed by: ADVANCED PRACTICE MIDWIFE

## 2023-09-27 PROCEDURE — 99213 OFFICE O/P EST LOW 20 MIN: CPT | Mod: PBBFAC,TH,PN,25 | Performed by: ADVANCED PRACTICE MIDWIFE

## 2023-09-27 PROCEDURE — 76815 OB US LIMITED FETUS(S): CPT

## 2023-09-27 PROCEDURE — 59025 PRENATAL TESTING - NST/AFI: ICD-10-PCS | Mod: 26,,, | Performed by: OBSTETRICS & GYNECOLOGY

## 2023-09-27 NOTE — PROGRESS NOTES
I have seen the patient, reviewed the Resident's assessment, plan and progress note. I have personally interviewed and examined the patient at bedside and: agree with the findings.     Emely Sheridan CNM  Obstetrics

## 2023-09-27 NOTE — PROGRESS NOTES
Pregnancy dating, labs, ultrasound reports, prenatal testing, and problem list; prior records and results; and available outside records were reviewed and updated in EMR.  Pertinent findings were noted below.    Reason for Visit   Routine Prenatal Visit    HPI   28 y.o., at 34w5d by Estimated Date of Delivery: 11/3/23    Patient has no complaints today. She reports intermittent contractions about twice a day. She has an IOL scheduled for 10/13 at 0500.     Contractions: No   Bleeding: No   Loss of fluid: No   Fetal movement: Yes   Nausea: No   Vomiting: No   Headache: No     Exam   Wt 67.4 kg (148 lb 9.4 oz)   LMP 2023 (Exact Date)   BMI 28.08 kg/m²     TW.9 kg  GENERAL: No acute distress  ABD: Gravid    Assessment and Plan   Encounter for supervision of normal first pregnancy in third trimester  -     CBC W/ AUTO DIFFERENTIAL; Future; Expected date: 2023  -     HIV 1/2 Ag/Ab (4th Gen); Future; Expected date: 2023  -     RPR; Future; Expected date: 2023    Gestational hypertension, antepartum        Labor, preE and fetal movement count precautions given  Follow-up: 1 week    Brynn Youssef MD  Obstetrics and Gynecology - PGY1

## 2023-09-28 ENCOUNTER — PATIENT MESSAGE (OUTPATIENT)
Dept: OBSTETRICS AND GYNECOLOGY | Facility: CLINIC | Age: 28
End: 2023-09-28
Payer: MEDICAID

## 2023-09-29 ENCOUNTER — PATIENT MESSAGE (OUTPATIENT)
Dept: OTHER | Facility: OTHER | Age: 28
End: 2023-09-29
Payer: MEDICAID

## 2023-10-03 ENCOUNTER — TELEPHONE (OUTPATIENT)
Dept: OBSTETRICS AND GYNECOLOGY | Facility: CLINIC | Age: 28
End: 2023-10-03
Payer: MEDICAID

## 2023-10-03 ENCOUNTER — OFFICE VISIT (OUTPATIENT)
Dept: GASTROENTEROLOGY | Facility: CLINIC | Age: 28
End: 2023-10-03
Payer: MEDICAID

## 2023-10-03 VITALS
OXYGEN SATURATION: 97 % | SYSTOLIC BLOOD PRESSURE: 174 MMHG | HEART RATE: 83 BPM | HEIGHT: 61 IN | BODY MASS INDEX: 29.3 KG/M2 | DIASTOLIC BLOOD PRESSURE: 102 MMHG | WEIGHT: 155.19 LBS

## 2023-10-03 DIAGNOSIS — E55.9 VITAMIN D DEFICIENCY: ICD-10-CM

## 2023-10-03 DIAGNOSIS — D50.0 IRON DEFICIENCY ANEMIA DUE TO CHRONIC BLOOD LOSS: ICD-10-CM

## 2023-10-03 DIAGNOSIS — K50.012 CROHN'S DISEASE OF SMALL INTESTINE WITH INTESTINAL OBSTRUCTION: Primary | ICD-10-CM

## 2023-10-03 DIAGNOSIS — Z3A.35 35 WEEKS GESTATION OF PREGNANCY: ICD-10-CM

## 2023-10-03 PROCEDURE — 3008F BODY MASS INDEX DOCD: CPT | Mod: CPTII,S$GLB,, | Performed by: INTERNAL MEDICINE

## 2023-10-03 PROCEDURE — 3077F PR MOST RECENT SYSTOLIC BLOOD PRESSURE >= 140 MM HG: ICD-10-PCS | Mod: CPTII,S$GLB,, | Performed by: INTERNAL MEDICINE

## 2023-10-03 PROCEDURE — 3008F PR BODY MASS INDEX (BMI) DOCUMENTED: ICD-10-PCS | Mod: CPTII,S$GLB,, | Performed by: INTERNAL MEDICINE

## 2023-10-03 PROCEDURE — 3080F DIAST BP >= 90 MM HG: CPT | Mod: CPTII,S$GLB,, | Performed by: INTERNAL MEDICINE

## 2023-10-03 PROCEDURE — 1159F MED LIST DOCD IN RCRD: CPT | Mod: CPTII,S$GLB,, | Performed by: INTERNAL MEDICINE

## 2023-10-03 PROCEDURE — 3077F SYST BP >= 140 MM HG: CPT | Mod: CPTII,S$GLB,, | Performed by: INTERNAL MEDICINE

## 2023-10-03 PROCEDURE — 99214 PR OFFICE/OUTPT VISIT, EST, LEVL IV, 30-39 MIN: ICD-10-PCS | Mod: S$GLB,,, | Performed by: INTERNAL MEDICINE

## 2023-10-03 PROCEDURE — 1160F PR REVIEW ALL MEDS BY PRESCRIBER/CLIN PHARMACIST DOCUMENTED: ICD-10-PCS | Mod: CPTII,S$GLB,, | Performed by: INTERNAL MEDICINE

## 2023-10-03 PROCEDURE — 99214 OFFICE O/P EST MOD 30 MIN: CPT | Mod: S$GLB,,, | Performed by: INTERNAL MEDICINE

## 2023-10-03 PROCEDURE — 3080F PR MOST RECENT DIASTOLIC BLOOD PRESSURE >= 90 MM HG: ICD-10-PCS | Mod: CPTII,S$GLB,, | Performed by: INTERNAL MEDICINE

## 2023-10-03 PROCEDURE — 1160F RVW MEDS BY RX/DR IN RCRD: CPT | Mod: CPTII,S$GLB,, | Performed by: INTERNAL MEDICINE

## 2023-10-03 PROCEDURE — 1159F PR MEDICATION LIST DOCUMENTED IN MEDICAL RECORD: ICD-10-PCS | Mod: CPTII,S$GLB,, | Performed by: INTERNAL MEDICINE

## 2023-10-03 NOTE — TELEPHONE ENCOUNTER
----- Message from Cindy Benja Marcello sent at 10/3/2023  2:23 PM CDT -----  Patient called saying she went to her gastro appt today and they are sending her to L&D due to her bp being 170/103. Patient can be reached at [phone].

## 2023-10-03 NOTE — PROGRESS NOTES
[facility]          Inflammatory Bowel Disease Follow Up Note         TODAY'S VISIT DATE:  10/3/2023    Reason for Consult:    Chief Complaint   Patient presents with    Follow-up       PCP: Marti Colin      Referring MD:   No ref. provider found    History of Present Illness:  Nina Durant who is a 28 y.o. female is being seen today at the [facility] on 10/03/2023 for inflammatory bowel disease- Crohn's disease.  She is doing well from a Crohn's disease standpoint.  She is having 1 bowel movement daily.  She denies any abdominal pain.  She feels like since she has been pregnant her Crohn's disease has been under even better control than it was before.  She is being induced next week due to elevated blood pressure recently.  She has not had any side effects associated with her Stelara injections.  Her last dose was on September 8 and her next dose is on November 3.    IBD History:  She has a long history of suspected Crohn's disease but the diagnosis has not been clearly made.  When she was a child she had significant gastrointestinal issues.  In 2014 she had upper endoscopy performed for upper abdominal discomfort and reflux symptoms.  This was fairly unremarkable including biopsies.  She was started on Protonix and had some improvement.  In 2015 she was seen again because of ongoing issues and had an upper endoscopy and colonoscopy at that time.  The upper endoscopy revealed some mild erosions in the duodenum and biopsies were consistent with acute inflammation.  The colonoscopy showed congestion of the ileocecal valve and erythema, congestion, and friability of the terminal ileum.  Biopsies of the colon were normal.  Biopsy of the terminal ileum showed no inflammation.  She was treated with budesonide and had some improvement in symptoms.  A repeat scope in 2016 was completely normal including biopsies.  I saw her  when I was at [facility] in 2018. She had ongoing symptoms that seem to be more consistent with chronic constipation issues as well as some functional gastrointestinal issues.  She was started on dicyclomine and this cause an increase in rectal bleeding so it was stopped.  With an increase in water she began having better bowel movements and most of her symptoms improved.  Pantoprazole also help with her upper GI issues.  A repeat upper endoscopy and colonoscopy were performed.  The upper endoscopy was unrevealing.  The colonoscopy did show some erythematous mucosa in the terminal ileum.  Biopsies were not consistent with Crohn's but did show a significant amount of eosinophils within the terminal ileum as well as in the colon (colon was normal appearing).  In January of 2021 she began having more rectal bleeding issues as well as worsening vomiting, increased right lower quadrant epigastric pain, and more diarrhea.  She went to the emergency room and had labs done that were unrevealing.  A CT scan was also unrevealing.  A colonoscopy that was done in January revealed some congestion and inflammatory polyps in the terminal ileum as well as a suspected ileal stricture.  The colon was completely normal.  Biopsies were not consistent with Crohn's disease and only showed 1 area of active inflammation in the terminal ileum.  A subsequent MR enterography showed some thickening of the terminal ileum with some mild inflammatory changes as well mild narrowing with some upstream dilation of the ileum.  She was started on budesonide 9 mg daily which provided minimal improvement.  In July 2021 she underwent a repeat colonoscopy that again showed ileitis as well as some narrowing of the terminal ileum which was able to be traversed with the colonoscopy scope.  Biopsies showed chronic inflammatory changes consistent with Crohn's disease.  She started Stelara on November 4, 2021.  At visit in 5/2022 noted to have nausea and vomiting.   Underwent EGD/Colonoscopy with notable ileal stricture but no active inflammation in the ileum and normal EGD.  Biopsies unremarkable.    IBD Detail:  Dx Date:  Symptoms since 2014, diagnosis confirmed in 2021  Disease type/distribution:  Crohn's Disease/ileal inflammation  Current Treatment:  Stelara 90 mg every 8 weeks Start Date:  November 4, 2021  Response:  Endoscopic and histologic remission  Optimized:  Not yet  Adverse reactions:  None  Prior surgeries:  None  CRP Elevation:  No  calprotectin:  Mild elevation  Disease Complications:  Mild stenosis of the terminal ileum  Extraintestinal manifestations:  Back pain  Prior treatments:   Steroids:  Partial response to budesonide  5ASA:  No response  IMM:  None  TNF Inh:  None   Anti-Integrin:  None   IL 12/23:  current therapy  COURTNEY Inh:  None    Previous Clinical Trials:  None    Last Colonoscopy:  9/2022 - Impression:            - Perianal skin tags found on perianal exam.                          - Internal hemorrhoids.                          - Anal papilla(e) were hypertrophied.                          - Scar in the terminal ileum.                          - Stricture in the ileum, 5 cm from the ileocecal                          valve. Dilated. Lesion not amenable to dilation,                          and not attempted.                          - The terminal ileum contained some pseudopolyps                          but no signs of active inflammation.                          - Simple Endoscopic Score for Crohn's Disease: 3,                          mucosal inflammatory changes.    Other Endoscopies:  EGD 9/2022 Impression:            - Normal esophagus.                          - Z-line regular, 36 cm from the incisors.                          - Normal stomach. Biopsied.                          - Normal examined duodenum.     Imaging:   MRE:  March 2021-inflammatory changes of the terminal ileum with suspected stricture   CT:  Previous studies  "reviewed   Other:  Previous studies reviewed    Pertinent Labs:  Lab Results   Component Value Date    SEDRATE 7 04/26/2017    CRP 22.3 (H) 11/22/2022     Lab Results   Component Value Date    TTGIGA 11 04/26/2017     04/26/2017     No results found for: "TSH", "FREET4"  Lab Results   Component Value Date    WRFZERHA63SW 22 (L) 05/23/2023    IFCPWBAG80 318 05/23/2023     Lab Results   Component Value Date    HEPBSAG Non-reactive 03/15/2023    HEPBCAB Negative 06/13/2022    HEPCAB Non-reactive 03/15/2023     Lab Results   Component Value Date    THX74YGPN Non-reactive 03/15/2023     Lab Results   Component Value Date    NIL 0.42676 05/23/2023    TBAG 0.04 12/18/2015    TBAGNIL 0.00 12/18/2015    MITOGENNIL 3.800 05/23/2023    TBGOLD Negative 12/18/2015     Lab Results   Component Value Date    TPMTRESULT 27.6 12/10/2015     No results found for: "STOOLCULTURE", "LEYPEKHLEE8V", "MHIUUKHRVY1G", "CDIFFICILEAN", "CDIFFTOX", "CDIFFICILEBY"  Lab Results   Component Value Date    CALPROTECTIN 40.8 02/24/2022       Therapeutic Drug Monitoring Labs:  No results found for: "PROMETH"  No results found for: "ANSADAINIT", "INFLIXIMAB", "INFLIXINTERP"    Vaccinations:  Lab Results   Component Value Date    HEPBSAB Negative 08/23/2021     Lab Results   Component Value Date    HEPAIGG Negative 08/23/2021     Lab Results   Component Value Date    VARICELLAZOS 1294.00 03/15/2023    VARICELLAINT Positive 03/15/2023     Immunization History   Administered Date(s) Administered    DTP 1997, 1997, 1997, 08/14/1998, 08/25/2001    DTaP 1997, 1997, 1997, 08/14/1998, 08/25/2001    Hep B Immune Globulin 1997    Hepatitis B 1997, 05/12/1998, 08/14/1998    Hepatitis B, Pediatric/Adolescent 1997    HiB PRP-OMP 1997, 1997, 08/14/1998    Hib-HbOC 1997, 1997, 08/14/1998    IPV 1997, 1997, 08/14/1998, 08/25/2001    MMR 05/12/1998, 08/25/2001    " Meningococcal 09/24/2008, 07/29/2015    Meningococcal Conjugate (MCV4P) 09/24/2008, 07/29/2015    OPV 1997, 1997, 08/14/1998, 08/25/2001    Pneumococcal Conjugate - 7 Valent 1997, 1997    Tdap 09/24/2008, 02/20/2019, 08/11/2023    Varicella 1997, 05/12/1998, 08/14/1998         Review of Systems  Review of Systems   Constitutional:  Negative for chills, fever and weight loss.   HENT:  Negative for sore throat.    Eyes:  Negative for pain, discharge and redness.   Respiratory:  Negative for cough, shortness of breath and wheezing.    Cardiovascular:  Negative for chest pain, orthopnea and leg swelling.   Gastrointestinal:  Negative for abdominal pain, blood in stool, constipation, diarrhea, heartburn, melena and vomiting.   Genitourinary:  Negative for dysuria, frequency and urgency.   Musculoskeletal:  Negative for joint pain and myalgias.   Skin:  Negative for itching and rash.   Neurological:  Negative for focal weakness and seizures.   Endo/Heme/Allergies:  Does not bruise/bleed easily.   Psychiatric/Behavioral:  Negative for depression.        Medical History:   Past Medical History:   Diagnosis Date    Crohn's disease     Insomnia        Surgical History:  Past Surgical History:   Procedure Laterality Date    COLONOSCOPY N/A 12/10/2015    Procedure: COLONOSCOPY;  Surgeon: Tala Gonzalez MD;  [facility]  Service: Endoscopy;  Laterality: N/A;    COLONOSCOPY N/A 4/21/2016    Procedure: COLONOSCOPY;  Surgeon: Tala Gonzalez MD;  Location: [facility] (2ND FLR);  Service: Endoscopy;  Laterality: N/A;    COLONOSCOPY N/A 1/21/2021    Procedure: COLONOSCOPY;  Surgeon: True Treadwell MD;  Location: [facility];  Service: General;  Laterality: N/A;    COLONOSCOPY N/A 7/8/2021    Procedure: COLONOSCOPY;  Surgeon: Talib Rutledge MD;  Location: [facility] (4TH FLR);  Service: Endoscopy;  Laterality: N/A;  Covid-19 test 7/6/21 at [facility]     COLONOSCOPY N/A 9/27/2022    Procedure: COLONOSCOPY;  Surgeon: Talib Rutledge MD;  Location: [facility] (4TH FLR);  Service: Endoscopy;  Laterality: N/A;  golytely    COLONOSCOPY W/ BIOPSIES  01/21/2021    ESOPHAGOGASTRODUODENOSCOPY      ESOPHAGOGASTRODUODENOSCOPY N/A 7/8/2021    Procedure: EGD (ESOPHAGOGASTRODUODENOSCOPY);  Surgeon: Talib Rutledge MD;  Location: [facility] (4TH FLR);  Service: Endoscopy;  Laterality: N/A;  Covid-19 test 7/6/21 at [facility] - pg  7/6 returned pt's call- lvm with arrival time-rb  patient aware she moved up 30 minutes from 930am to 900am, patient aware to be here at 8am 7/8/21 - bijan    ESOPHAGOGASTRODUODENOSCOPY N/A 9/27/2022    Procedure: ESOPHAGOGASTRODUODENOSCOPY (EGD);  Surgeon: Talib Rutledge MD;  Location: [facility] (4TH FLR);  Service: Endoscopy;  Laterality: N/A;  NOT Vaccinated.Covid test on 9/24 at [facility], instructions sent to [email or web address]  Clear liquids up to 4 hrs prior/ AM prep [id number]    TONSILLECTOMY      TYMPANOSTOMY TUBE PLACEMENT         Family History:   Family History   Problem Relation Age of Onset    Lupus Mother     Heart attacks under age 50 Father     Heart disease Father     Hypertension Father     Cardiomyopathy Maternal Grandmother     Dementia Maternal Grandfather     Heart disease Paternal Grandmother     Heart disease Paternal Grandfather     No Known Problems Sister        Social History:   Social History     Tobacco Use    Smoking status: Never    Smokeless tobacco: Never    Tobacco comments:     mom smokes   Substance Use Topics    Alcohol use: No    Drug use: No       Allergies: Reviewed    Home Medications:   Medication List with Changes/Refills   Current Medications    ASPIRIN (ECOTRIN) 81 MG EC TABLET    Take 81 mg by mouth once daily.    DIPHENHYDRAMINE (BENADRYL) 25 MG CAPSULE    Take 1 capsule (25 mg total) by mouth every 6 (six) hours as needed for Itching or Allergies.    FAMOTIDINE (PEPCID) 10 MG TABLET    Take 10 mg  "by mouth 2 (two) times daily.    GENTAMICIN (GARAMYCIN) 0.3 % (3 MG/GRAM) OPHTHALMIC OINTMENT    gentamicin 0.3 % (3 mg/gram) eye ointment   APPLY A SMALL AMOUNT (1/2 INCH) TO THE LOWER LID OF THE AFFECTED EYE(S) BY OPHTHALMIC ROUTE 2 TIMES PER DAY    LIDOCAINE HCL 2% (XYLOCAINE) 2 % JELLY    Apply topically as needed (burning pain).    PRENATAL 25/IRON FUM/FOLIC/DHA (PRENATAL-1 ORAL)    Take by mouth.    PROMETHAZINE (PHENERGAN) 25 MG TABLET    Take 1 tablet (25 mg total) by mouth every 6 (six) hours as needed for Nausea.    TRI-SPRINTEC, 28, 0.18/0.215/0.25 MG-35 MCG (28) TABLET    Take 1 tablet by mouth once daily.    USTEKINUMAB (STELARA) 90 MG/ML SYRG SYRINGE    Inject 1 mL (90 mg total) into the skin every 8 weeks.       Physical Exam:  Vital Signs:  BP (!) 174/102 (BP Location: Left arm, Patient Position: Sitting) Comment: Patient states going to hospital /OB  Pulse 83   Ht 5' 1" (1.549 m)   Wt 70.4 kg (155 lb 3.3 oz)   LMP 01/18/2023 (Exact Date)   SpO2 97%   BMI 29.33 kg/m²   Body mass index is 29.33 kg/m².    Physical Exam  Vitals and nursing note reviewed.   Constitutional:       General: She is not in acute distress.     Appearance: Normal appearance. She is well-developed. She is not ill-appearing or toxic-appearing.   Eyes:      General: No scleral icterus.  Cardiovascular:      Rate and Rhythm: Normal rate and regular rhythm.      Heart sounds: Normal heart sounds. No murmur heard.  Pulmonary:      Effort: Pulmonary effort is normal.      Breath sounds: Normal breath sounds. No wheezing or rales.   Abdominal:      General: Bowel sounds are normal. There is distension.      Palpations: Abdomen is soft.      Tenderness: There is no abdominal tenderness.      Comments: Gravid uterus   Skin:     General: Skin is warm and dry.      Coloration: Skin is not pale.      Findings: No erythema or rash.   Neurological:      General: No focal deficit present.      Mental Status: She is alert and oriented to " person, place, and time.   Psychiatric:         Mood and Affect: Mood normal.         Behavior: Behavior normal.         Thought Content: Thought content normal.         Judgment: Judgment normal.             Labs: reviewed and pertinent noted above    Assessment/Plan:  Nina Durant is a 28 y.o. female with Crohn's disease. The following issues were addresssed:    1. Crohn's disease of small intestine with intestinal obstruction    2. Iron deficiency anemia due to chronic blood loss    3. Vitamin D deficiency    4. 35 weeks gestation of pregnancy        1. Crohn's disease:  She is doing very well.  I recommend that she continue Stelara.  She is scheduled for her next injection about 3 weeks after her delivery which should be perfectly fine as long as her delivery and recovery is going well.  She is not due for any updated labs.    2. Iron deficiency:  Recent hemoglobin normal.  Recheck iron levels in the future.    3. Vit D Deficiency:  Recently low.  We start supplementation after delivery.    Pregnancy: Continue to follow-up with Ob.  Blood pressure was very high today.  Patient is going to the [facility] Emergency room for further evaluation as it is above the range that was given to her by her Ob.         # IBD specific health maintenance:  Colon cancer surveillance:  No colonic disease    Annual:  - Eye exam:  Not applicable  - Skin exam (if on IMM/TNF):  Recommend annual skin exam  - reminded pt to use sunblock/hats/sunprotective clothing  - PAP (if immunosuppressed):  Recommend annual exam with gynecologist    DEXA: N/A    Vitamin D:  Recommend supplementation-recheck in the near future    Vaccines:    Influenza:  Recommend annual vaccination   Pneumovax:     PCV13:  Discuss in the future    PSV23:  Discuss in the future   HAV:  Check serology   HBV:  Check serology   Tdap:  Up-to-date   MMR:  Completed series   VZV:  Completed series-check serology   HZV:  Not applicable   HPV:  Discuss in the  future   Meningococcus:  Completed series   COVID:  Recommend vaccination    Follow up: Follow up in about 6 months (around 4/3/2024).    Thank you again for sending Nina Durant to see Dr. Hoang Rutledge today at the [facility]. Please don't hesitate to contact Dr. Rutledge if there are any questions regarding this evaluation, or if you have any other patients with inflammatory bowel disease for whom you would like a consultation. You can reach Dr. Rutledge at [phone] or by email at [email or web address]    Talib Rutledge MD  Department of Gastroenterology  Inflammatory Bowel Disease

## 2023-10-03 NOTE — TELEPHONE ENCOUNTER
Spoke with pt, pt is currently in route to [facility] for evaluation for elevated BP at [place] [facility]. Contacted provider to inform of elevated BP. Provider calling over to [facility].

## 2023-10-04 ENCOUNTER — HOSPITAL ENCOUNTER (INPATIENT)
Facility: OTHER | Age: 28
LOS: 6 days | Discharge: HOME | End: 2023-10-10
Attending: OBSTETRICS & GYNECOLOGY | Admitting: OBSTETRICS & GYNECOLOGY
Payer: MEDICAID

## 2023-10-04 ENCOUNTER — ANESTHESIA EVENT (OUTPATIENT)
Dept: OBSTETRICS AND GYNECOLOGY | Facility: OTHER | Age: 28
End: 2023-10-04
Payer: MEDICAID

## 2023-10-04 ENCOUNTER — ROUTINE PRENATAL (OUTPATIENT)
Dept: OBSTETRICS AND GYNECOLOGY | Facility: CLINIC | Age: 28
End: 2023-10-04
Payer: MEDICAID

## 2023-10-04 ENCOUNTER — ANESTHESIA (OUTPATIENT)
Dept: OBSTETRICS AND GYNECOLOGY | Facility: OTHER | Age: 28
End: 2023-10-04
Payer: MEDICAID

## 2023-10-04 VITALS — BODY MASS INDEX: 28.95 KG/M2 | WEIGHT: 153.25 LBS

## 2023-10-04 DIAGNOSIS — O09.93 SUPERVISION OF HIGH RISK PREGNANCY IN THIRD TRIMESTER: ICD-10-CM

## 2023-10-04 DIAGNOSIS — O13.9 GESTATIONAL HYPERTENSION, ANTEPARTUM: Primary | ICD-10-CM

## 2023-10-04 DIAGNOSIS — Z3A.35 35 WEEKS GESTATION OF PREGNANCY: ICD-10-CM

## 2023-10-04 DIAGNOSIS — O14.93 PRE-ECLAMPSIA IN THIRD TRIMESTER: ICD-10-CM

## 2023-10-04 DIAGNOSIS — O16.3 ELEVATED BLOOD PRESSURE AFFECTING PREGNANCY IN THIRD TRIMESTER, ANTEPARTUM: ICD-10-CM

## 2023-10-04 DIAGNOSIS — Z98.891 STATUS POST CESAREAN DELIVERY: Primary | ICD-10-CM

## 2023-10-04 DIAGNOSIS — Z34.90 ENCOUNTER FOR INDUCTION OF LABOR: ICD-10-CM

## 2023-10-04 DIAGNOSIS — O61.9 FAILED INDUCTION OF LABOR, UNSPECIFIED TYPE: ICD-10-CM

## 2023-10-04 PROBLEM — Z3A.32 32 WEEKS GESTATION OF PREGNANCY: Status: ACTIVE | Noted: 2023-10-04

## 2023-10-04 PROBLEM — O13.3 GESTATIONAL HYPERTENSION, THIRD TRIMESTER: Status: ACTIVE | Noted: 2023-10-04

## 2023-10-04 LAB
ABO + RH BLD: NORMAL
ALBUMIN SERPL BCP-MCNC: 2.6 G/DL (ref 3.5–5.2)
ALP SERPL-CCNC: 151 U/L (ref 55–135)
ALT SERPL W/O P-5'-P-CCNC: 9 U/L (ref 10–44)
ANION GAP SERPL CALC-SCNC: 9 MMOL/L (ref 8–16)
AST SERPL-CCNC: 15 U/L (ref 10–40)
BASOPHILS # BLD AUTO: 0.03 K/UL (ref 0–0.2)
BASOPHILS NFR BLD: 0.5 % (ref 0–1.9)
BILIRUB SERPL-MCNC: 0.2 MG/DL (ref 0.1–1)
BLD GP AB SCN CELLS X3 SERPL QL: NORMAL
BUN SERPL-MCNC: 6 MG/DL (ref 6–20)
CALCIUM SERPL-MCNC: 9.2 MG/DL (ref 8.7–10.5)
CHLORIDE SERPL-SCNC: 104 MMOL/L (ref 95–110)
CO2 SERPL-SCNC: 22 MMOL/L (ref 23–29)
CREAT SERPL-MCNC: 0.8 MG/DL (ref 0.5–1.4)
CREAT UR-MCNC: 57.2 MG/DL (ref 15–325)
DIFFERENTIAL METHOD: ABNORMAL
EOSINOPHIL # BLD AUTO: 0 K/UL (ref 0–0.5)
EOSINOPHIL NFR BLD: 0.5 % (ref 0–8)
ERYTHROCYTE [DISTWIDTH] IN BLOOD BY AUTOMATED COUNT: 12.8 % (ref 11.5–14.5)
EST. GFR  (NO RACE VARIABLE): >60 ML/MIN/1.73 M^2
GLUCOSE SERPL-MCNC: 95 MG/DL (ref 70–110)
HCT VFR BLD AUTO: 36.3 % (ref 37–48.5)
HGB BLD-MCNC: 11.8 G/DL (ref 12–16)
IMM GRANULOCYTES # BLD AUTO: 0.03 K/UL (ref 0–0.04)
IMM GRANULOCYTES NFR BLD AUTO: 0.5 % (ref 0–0.5)
LYMPHOCYTES # BLD AUTO: 1.1 K/UL (ref 1–4.8)
LYMPHOCYTES NFR BLD: 17.6 % (ref 18–48)
MCH RBC QN AUTO: 26.6 PG (ref 27–31)
MCHC RBC AUTO-ENTMCNC: 32.5 G/DL (ref 32–36)
MCV RBC AUTO: 82 FL (ref 82–98)
MONOCYTES # BLD AUTO: 0.5 K/UL (ref 0.3–1)
MONOCYTES NFR BLD: 7.4 % (ref 4–15)
NEUTROPHILS # BLD AUTO: 4.7 K/UL (ref 1.8–7.7)
NEUTROPHILS NFR BLD: 73.5 % (ref 38–73)
NRBC BLD-RTO: 0 /100 WBC
PLATELET # BLD AUTO: 195 K/UL (ref 150–450)
PMV BLD AUTO: 12.5 FL (ref 9.2–12.9)
POTASSIUM SERPL-SCNC: 4.6 MMOL/L (ref 3.5–5.1)
PROT SERPL-MCNC: 6.3 G/DL (ref 6–8.4)
PROT UR-MCNC: 16 MG/DL (ref 0–15)
PROT/CREAT UR: 0.28 MG/G{CREAT} (ref 0–0.2)
RBC # BLD AUTO: 4.43 M/UL (ref 4–5.4)
SODIUM SERPL-SCNC: 135 MMOL/L (ref 136–145)
SPECIMEN OUTDATE: NORMAL
WBC # BLD AUTO: 6.36 K/UL (ref 3.9–12.7)

## 2023-10-04 PROCEDURE — 99999 PR PBB SHADOW E&M-EST. PATIENT-LVL III: ICD-10-PCS | Mod: PBBFAC,,, | Performed by: ADVANCED PRACTICE MIDWIFE

## 2023-10-04 PROCEDURE — 59025 FETAL NON-STRESS TEST: CPT

## 2023-10-04 PROCEDURE — 59200 INSERT CERVICAL DILATOR: CPT

## 2023-10-04 PROCEDURE — 85025 COMPLETE CBC W/AUTO DIFF WBC: CPT | Performed by: OBSTETRICS & GYNECOLOGY

## 2023-10-04 PROCEDURE — C1751 CATH, INF, PER/CENT/MIDLINE: HCPCS | Performed by: ANESTHESIOLOGY

## 2023-10-04 PROCEDURE — 72100002 HC LABOR CARE, 1ST 8 HOURS

## 2023-10-04 PROCEDURE — 99213 OFFICE O/P EST LOW 20 MIN: CPT | Mod: TH,S$PBB,, | Performed by: ADVANCED PRACTICE MIDWIFE

## 2023-10-04 PROCEDURE — 25000003 PHARM REV CODE 250: Performed by: ANESTHESIOLOGY

## 2023-10-04 PROCEDURE — 01968 ANES/ANALG CS DLVR NEURAXIAL: CPT | Mod: AA,,, | Performed by: ANESTHESIOLOGY

## 2023-10-04 PROCEDURE — 63600175 PHARM REV CODE 636 W HCPCS

## 2023-10-04 PROCEDURE — 59025 PR FETAL 2N-STRESS TEST: ICD-10-PCS | Mod: 26,,, | Performed by: OBSTETRICS & GYNECOLOGY

## 2023-10-04 PROCEDURE — 99213 PR OFFICE/OUTPT VISIT, EST, LEVL III, 20-29 MIN: ICD-10-PCS | Mod: TH,S$PBB,, | Performed by: ADVANCED PRACTICE MIDWIFE

## 2023-10-04 PROCEDURE — 84156 ASSAY OF PROTEIN URINE: CPT | Performed by: OBSTETRICS & GYNECOLOGY

## 2023-10-04 PROCEDURE — 99284 EMERGENCY DEPT VISIT MOD MDM: CPT | Mod: 25,,, | Performed by: OBSTETRICS & GYNECOLOGY

## 2023-10-04 PROCEDURE — 99213 OFFICE O/P EST LOW 20 MIN: CPT | Mod: PBBFAC,TH,PN,25 | Performed by: ADVANCED PRACTICE MIDWIFE

## 2023-10-04 PROCEDURE — 59514 PRA REAN DELIVERY ONLY: ICD-10-PCS | Mod: AA,,, | Performed by: ANESTHESIOLOGY

## 2023-10-04 PROCEDURE — 11000001 HC ACUTE MED/SURG PRIVATE ROOM

## 2023-10-04 PROCEDURE — 59025 FETAL NON-STRESS TEST: CPT | Mod: 26,,, | Performed by: OBSTETRICS & GYNECOLOGY

## 2023-10-04 PROCEDURE — 59514 CESAREAN DELIVERY ONLY: CPT | Mod: AA,,, | Performed by: ANESTHESIOLOGY

## 2023-10-04 PROCEDURE — 99285 EMERGENCY DEPT VISIT HI MDM: CPT | Mod: 25,27

## 2023-10-04 PROCEDURE — 01968 PR INSERT CATH,ART,PERCUT,SHORTTERM: ICD-10-PCS | Mod: AA,,, | Performed by: ANESTHESIOLOGY

## 2023-10-04 PROCEDURE — 25000003 PHARM REV CODE 250

## 2023-10-04 PROCEDURE — 62326 NJX INTERLAMINAR LMBR/SAC: CPT | Performed by: STUDENT IN AN ORGANIZED HEALTH CARE EDUCATION/TRAINING PROGRAM

## 2023-10-04 PROCEDURE — 86900 BLOOD TYPING SEROLOGIC ABO: CPT

## 2023-10-04 PROCEDURE — 99284 PR EMERGENCY DEPT VISIT,LEVEL IV: ICD-10-PCS | Mod: 25,,, | Performed by: OBSTETRICS & GYNECOLOGY

## 2023-10-04 PROCEDURE — 99999 PR PBB SHADOW E&M-EST. PATIENT-LVL III: CPT | Mod: PBBFAC,,, | Performed by: ADVANCED PRACTICE MIDWIFE

## 2023-10-04 PROCEDURE — 80053 COMPREHEN METABOLIC PANEL: CPT | Performed by: OBSTETRICS & GYNECOLOGY

## 2023-10-04 PROCEDURE — 96372 THER/PROPH/DIAG INJ SC/IM: CPT

## 2023-10-04 PROCEDURE — 27200710 HC EPIDURAL INFUSION PUMP SET: Performed by: ANESTHESIOLOGY

## 2023-10-04 RX ORDER — FAMOTIDINE 20 MG/1
20 TABLET, FILM COATED ORAL 2 TIMES DAILY
Status: DISCONTINUED | OUTPATIENT
Start: 2023-10-04 | End: 2023-10-04

## 2023-10-04 RX ORDER — SODIUM CHLORIDE 9 MG/ML
INJECTION, SOLUTION INTRAVENOUS
Status: DISCONTINUED | OUTPATIENT
Start: 2023-10-04 | End: 2023-10-06

## 2023-10-04 RX ORDER — MAGNESIUM SULFATE HEPTAHYDRATE 40 MG/ML
2 INJECTION, SOLUTION INTRAVENOUS CONTINUOUS
Status: DISPENSED | OUTPATIENT
Start: 2023-10-04 | End: 2023-10-07

## 2023-10-04 RX ORDER — TRANEXAMIC ACID 10 MG/ML
1000 INJECTION, SOLUTION INTRAVENOUS EVERY 30 MIN PRN
Status: DISCONTINUED | OUTPATIENT
Start: 2023-10-04 | End: 2023-10-10 | Stop reason: HOSPADM

## 2023-10-04 RX ORDER — ASPIRIN 81 MG/1
81 TABLET ORAL DAILY
Status: DISCONTINUED | OUTPATIENT
Start: 2023-10-05 | End: 2023-10-04

## 2023-10-04 RX ORDER — LABETALOL HYDROCHLORIDE 5 MG/ML
20 INJECTION, SOLUTION INTRAVENOUS ONCE
Status: COMPLETED | OUTPATIENT
Start: 2023-10-04 | End: 2023-10-04

## 2023-10-04 RX ORDER — OXYTOCIN 10 [USP'U]/ML
10 INJECTION, SOLUTION INTRAMUSCULAR; INTRAVENOUS ONCE AS NEEDED
Status: DISCONTINUED | OUTPATIENT
Start: 2023-10-04 | End: 2023-10-10 | Stop reason: HOSPADM

## 2023-10-04 RX ORDER — CALCIUM CARBONATE 200(500)MG
500 TABLET,CHEWABLE ORAL 3 TIMES DAILY PRN
Status: DISCONTINUED | OUTPATIENT
Start: 2023-10-04 | End: 2023-10-10 | Stop reason: HOSPADM

## 2023-10-04 RX ORDER — SODIUM CHLORIDE, SODIUM LACTATE, POTASSIUM CHLORIDE, CALCIUM CHLORIDE 600; 310; 30; 20 MG/100ML; MG/100ML; MG/100ML; MG/100ML
INJECTION, SOLUTION INTRAVENOUS CONTINUOUS
Status: DISCONTINUED | OUTPATIENT
Start: 2023-10-04 | End: 2023-10-10 | Stop reason: HOSPADM

## 2023-10-04 RX ORDER — SIMETHICONE 80 MG
1 TABLET,CHEWABLE ORAL 4 TIMES DAILY PRN
Status: DISCONTINUED | OUTPATIENT
Start: 2023-10-04 | End: 2023-10-10 | Stop reason: HOSPADM

## 2023-10-04 RX ORDER — SIMETHICONE 80 MG
1 TABLET,CHEWABLE ORAL EVERY 6 HOURS PRN
Status: DISCONTINUED | OUTPATIENT
Start: 2023-10-04 | End: 2023-10-04

## 2023-10-04 RX ORDER — FENTANYL/BUPIVACAINE/NS/PF 2MCG/ML-.1
PLASTIC BAG, INJECTION (ML) INJECTION
Status: DISCONTINUED | OUTPATIENT
Start: 2023-10-04 | End: 2023-10-06

## 2023-10-04 RX ORDER — ONDANSETRON 8 MG/1
8 TABLET, ORALLY DISINTEGRATING ORAL EVERY 8 HOURS PRN
Status: DISCONTINUED | OUTPATIENT
Start: 2023-10-04 | End: 2023-10-04

## 2023-10-04 RX ORDER — MISOPROSTOL 100 MCG
25 TABLET ORAL ONCE
Status: DISCONTINUED | OUTPATIENT
Start: 2023-10-04 | End: 2023-10-04

## 2023-10-04 RX ORDER — CARBOPROST TROMETHAMINE 250 UG/ML
250 INJECTION, SOLUTION INTRAMUSCULAR
Status: DISCONTINUED | OUTPATIENT
Start: 2023-10-04 | End: 2023-10-10 | Stop reason: HOSPADM

## 2023-10-04 RX ORDER — AMOXICILLIN 250 MG
1 CAPSULE ORAL NIGHTLY PRN
Status: DISCONTINUED | OUTPATIENT
Start: 2023-10-04 | End: 2023-10-04

## 2023-10-04 RX ORDER — PROCHLORPERAZINE EDISYLATE 5 MG/ML
5 INJECTION INTRAMUSCULAR; INTRAVENOUS EVERY 6 HOURS PRN
Status: DISCONTINUED | OUTPATIENT
Start: 2023-10-04 | End: 2023-10-04

## 2023-10-04 RX ORDER — ACETAMINOPHEN 325 MG/1
650 TABLET ORAL EVERY 6 HOURS PRN
Status: DISCONTINUED | OUTPATIENT
Start: 2023-10-04 | End: 2023-10-04

## 2023-10-04 RX ORDER — MISOPROSTOL 200 UG/1
800 TABLET ORAL ONCE AS NEEDED
Status: DISCONTINUED | OUTPATIENT
Start: 2023-10-04 | End: 2023-10-10 | Stop reason: HOSPADM

## 2023-10-04 RX ORDER — DIPHENHYDRAMINE HCL 25 MG
25 CAPSULE ORAL EVERY 4 HOURS PRN
Status: DISCONTINUED | OUTPATIENT
Start: 2023-10-04 | End: 2023-10-04

## 2023-10-04 RX ORDER — PROCHLORPERAZINE EDISYLATE 5 MG/ML
5 INJECTION INTRAMUSCULAR; INTRAVENOUS EVERY 6 HOURS PRN
Status: DISCONTINUED | OUTPATIENT
Start: 2023-10-04 | End: 2023-10-06

## 2023-10-04 RX ORDER — OXYTOCIN/RINGER'S LACTATE 30/500 ML
334 PLASTIC BAG, INJECTION (ML) INTRAVENOUS ONCE
Status: DISCONTINUED | OUTPATIENT
Start: 2023-10-04 | End: 2023-10-06

## 2023-10-04 RX ORDER — OXYTOCIN/RINGER'S LACTATE 30/500 ML
95 PLASTIC BAG, INJECTION (ML) INTRAVENOUS ONCE AS NEEDED
Status: DISCONTINUED | OUTPATIENT
Start: 2023-10-04 | End: 2023-10-10 | Stop reason: HOSPADM

## 2023-10-04 RX ORDER — PRENATAL WITH FERROUS FUM AND FOLIC ACID 3080; 920; 120; 400; 22; 1.84; 3; 20; 10; 1; 12; 200; 27; 25; 2 [IU]/1; [IU]/1; MG/1; [IU]/1; MG/1; MG/1; MG/1; MG/1; MG/1; MG/1; UG/1; MG/1; MG/1; MG/1; MG/1
1 TABLET ORAL DAILY
Status: DISCONTINUED | OUTPATIENT
Start: 2023-10-05 | End: 2023-10-04

## 2023-10-04 RX ORDER — SODIUM CHLORIDE, SODIUM LACTATE, POTASSIUM CHLORIDE, CALCIUM CHLORIDE 600; 310; 30; 20 MG/100ML; MG/100ML; MG/100ML; MG/100ML
INJECTION, SOLUTION INTRAVENOUS CONTINUOUS
Status: DISCONTINUED | OUTPATIENT
Start: 2023-10-04 | End: 2023-10-06

## 2023-10-04 RX ORDER — ONDANSETRON 8 MG/1
8 TABLET, ORALLY DISINTEGRATING ORAL EVERY 8 HOURS PRN
Status: DISCONTINUED | OUTPATIENT
Start: 2023-10-04 | End: 2023-10-10 | Stop reason: HOSPADM

## 2023-10-04 RX ORDER — CALCIUM GLUCONATE 98 MG/ML
1 INJECTION, SOLUTION INTRAVENOUS
Status: DISCONTINUED | OUTPATIENT
Start: 2023-10-04 | End: 2023-10-10 | Stop reason: HOSPADM

## 2023-10-04 RX ORDER — ACETAMINOPHEN 325 MG/1
650 TABLET ORAL EVERY 6 HOURS PRN
Status: DISCONTINUED | OUTPATIENT
Start: 2023-10-04 | End: 2023-10-06

## 2023-10-04 RX ORDER — OXYTOCIN/RINGER'S LACTATE 30/500 ML
334 PLASTIC BAG, INJECTION (ML) INTRAVENOUS ONCE AS NEEDED
Status: DISCONTINUED | OUTPATIENT
Start: 2023-10-04 | End: 2023-10-10 | Stop reason: HOSPADM

## 2023-10-04 RX ORDER — MAGNESIUM SULFATE HEPTAHYDRATE 40 MG/ML
4 INJECTION, SOLUTION INTRAVENOUS ONCE
Status: COMPLETED | OUTPATIENT
Start: 2023-10-04 | End: 2023-10-04

## 2023-10-04 RX ORDER — FENTANYL/BUPIVACAINE/NS/PF 2MCG/ML-.1
PLASTIC BAG, INJECTION (ML) INJECTION
Status: COMPLETED
Start: 2023-10-04 | End: 2023-10-04

## 2023-10-04 RX ORDER — OXYTOCIN/RINGER'S LACTATE 30/500 ML
0-30 PLASTIC BAG, INJECTION (ML) INTRAVENOUS CONTINUOUS
Status: DISCONTINUED | OUTPATIENT
Start: 2023-10-04 | End: 2023-10-06

## 2023-10-04 RX ORDER — LIDOCAINE HYDROCHLORIDE 10 MG/ML
10 INJECTION INFILTRATION; PERINEURAL ONCE AS NEEDED
Status: DISCONTINUED | OUTPATIENT
Start: 2023-10-04 | End: 2023-10-06

## 2023-10-04 RX ORDER — BETAMETHASONE SODIUM PHOSPHATE AND BETAMETHASONE ACETATE 3; 3 MG/ML; MG/ML
12 INJECTION, SUSPENSION INTRA-ARTICULAR; INTRALESIONAL; INTRAMUSCULAR; SOFT TISSUE
Status: COMPLETED | OUTPATIENT
Start: 2023-10-04 | End: 2023-10-05

## 2023-10-04 RX ORDER — CEFAZOLIN SODIUM 2 G/50ML
2 SOLUTION INTRAVENOUS ONCE AS NEEDED
Status: DISCONTINUED | OUTPATIENT
Start: 2023-10-04 | End: 2023-10-06

## 2023-10-04 RX ORDER — DIPHENOXYLATE HYDROCHLORIDE AND ATROPINE SULFATE 2.5; .025 MG/1; MG/1
2 TABLET ORAL EVERY 6 HOURS PRN
Status: DISCONTINUED | OUTPATIENT
Start: 2023-10-04 | End: 2023-10-10 | Stop reason: HOSPADM

## 2023-10-04 RX ORDER — METHYLERGONOVINE MALEATE 0.2 MG/ML
200 INJECTION INTRAVENOUS ONCE AS NEEDED
Status: DISCONTINUED | OUTPATIENT
Start: 2023-10-04 | End: 2023-10-10 | Stop reason: HOSPADM

## 2023-10-04 RX ORDER — DIPHENHYDRAMINE HYDROCHLORIDE 50 MG/ML
25 INJECTION INTRAMUSCULAR; INTRAVENOUS EVERY 4 HOURS PRN
Status: DISCONTINUED | OUTPATIENT
Start: 2023-10-04 | End: 2023-10-04

## 2023-10-04 RX ORDER — OXYTOCIN/RINGER'S LACTATE 30/500 ML
95 PLASTIC BAG, INJECTION (ML) INTRAVENOUS ONCE
Status: COMPLETED | OUTPATIENT
Start: 2023-10-04 | End: 2023-10-06

## 2023-10-04 RX ORDER — SODIUM CHLORIDE 0.9 % (FLUSH) 0.9 %
10 SYRINGE (ML) INJECTION
Status: DISCONTINUED | OUTPATIENT
Start: 2023-10-04 | End: 2023-10-04

## 2023-10-04 RX ORDER — LIDOCAINE HYDROCHLORIDE AND EPINEPHRINE 15; 5 MG/ML; UG/ML
INJECTION, SOLUTION EPIDURAL
Status: DISCONTINUED | OUTPATIENT
Start: 2023-10-04 | End: 2023-10-06

## 2023-10-04 RX ADMIN — Medication 5 ML: at 11:10

## 2023-10-04 RX ADMIN — MAGNESIUM SULFATE HEPTAHYDRATE 4 G: 40 INJECTION, SOLUTION INTRAVENOUS at 09:10

## 2023-10-04 RX ADMIN — Medication 8 ML/HR: at 11:10

## 2023-10-04 RX ADMIN — LIDOCAINE HYDROCHLORIDE,EPINEPHRINE BITARTRATE 3 ML: 15; .005 INJECTION, SOLUTION EPIDURAL; INFILTRATION; INTRACAUDAL; PERINEURAL at 11:10

## 2023-10-04 RX ADMIN — SODIUM CHLORIDE, POTASSIUM CHLORIDE, SODIUM LACTATE AND CALCIUM CHLORIDE: 600; 310; 30; 20 INJECTION, SOLUTION INTRAVENOUS at 09:10

## 2023-10-04 RX ADMIN — LABETALOL HYDROCHLORIDE 20 MG: 5 INJECTION INTRAVENOUS at 08:10

## 2023-10-04 RX ADMIN — FAMOTIDINE 20 MG: 20 TABLET ORAL at 08:10

## 2023-10-04 RX ADMIN — Medication 2 MILLI-UNITS/MIN: at 11:10

## 2023-10-04 RX ADMIN — BETAMETHASONE SODIUM PHOSPHATE AND BETAMETHASONE ACETATE 12 MG: 3; 3 INJECTION, SUSPENSION INTRA-ARTICULAR; INTRALESIONAL; INTRAMUSCULAR at 05:10

## 2023-10-04 RX ADMIN — SODIUM CHLORIDE, POTASSIUM CHLORIDE, SODIUM LACTATE AND CALCIUM CHLORIDE: 600; 310; 30; 20 INJECTION, SOLUTION INTRAVENOUS at 11:10

## 2023-10-04 NOTE — H&P
[facility] Emergency Dept (Obstetrics)  Obstetrics  History & Physical    Patient Name: Nina Durant  MRN: [id number]  Admission Date: 10/4/2023  Primary Care Provider: Marti Colin NP    Subjective:     Principal Problem:<principal problem not specified>    History of Present Illness:  Nina Durant is a 28 y.o. F at 35w5d presented to her routine OB visit today, and was found to have a severe range blood pressure. Patient was subsequently sent to the [facility] for continued blood pressure monitoring and repeat PreE labs. In the [facility], patient had mulitple mild range blood pressures. Her CBC and CMP were WNL, P/Cr ratio: 0.28. Patient denies any headache, vision changes, chest pain, shortness of breath, or right upper quadrant pain.     This IUP is complicated by gHTN, Chron's Disease.  Patient denies contractions, denies vaginal bleeding, denies LOF.   Fetal Movement: normal.       Obstetric HPI:  Patient reports intermittent uterine contractions, active fetal movement, No vaginal bleeding , No loss of fluid. Patient denies headache, vision changes, chest pain, shortness of breath, and right upper quadrant pain at this time.     This pregnancy has been complicated by Chron's disease, gHTN    OB History    Para Term  AB Living   1 0 0 0 0 0   SAB IAB Ectopic Multiple Live Births   0 0 0 0 0      # Outcome Date GA Lbr Shayan/2nd Weight Sex Delivery Anes PTL Lv   1 Current              Past Medical History:   Diagnosis Date    Crohn's disease     Insomnia      Past Surgical History:   Procedure Laterality Date    COLONOSCOPY N/A 12/10/2015    Procedure: COLONOSCOPY;  Surgeon: Tala Gonzalez MD;  Location: [facility] ([address] [place] [address]);  Service: Endoscopy;  Laterality: N/A;    COLONOSCOPY N/A 2016    Procedure: COLONOSCOPY;  Surgeon: Tala Gonzalez MD;  Location: [facility] ([address] [place] [address]);  Service: Endoscopy;  Laterality: N/A;    COLONOSCOPY N/A 2021     Procedure: COLONOSCOPY;  Surgeon: True Treadwell MD;  Location: [facility];  Service: General;  Laterality: N/A;    COLONOSCOPY N/A 7/8/2021    Procedure: COLONOSCOPY;  Surgeon: Talib Rutledge MD;  Location: [address]);  Service: Endoscopy;  Laterality: N/A;  Covid-19 test 7/6/21 at [facility]    COLONOSCOPY N/A 9/27/2022    Procedure: COLONOSCOPY;  Surgeon: Talib Rutledge MD;  Location: [address]);  Service: Endoscopy;  Laterality: N/A;  golytely    COLONOSCOPY W/ BIOPSIES  01/21/2021    ESOPHAGOGASTRODUODENOSCOPY      ESOPHAGOGASTRODUODENOSCOPY N/A 7/8/2021    Procedure: EGD (ESOPHAGOGASTRODUODENOSCOPY);  Surgeon: Talib Rutledge MD;  Location: [address]);  Service: Endoscopy;  Laterality: N/A;  Covid-19 test 7/6/21 at [facility]  7/6 returned pt's call- lvm with arrival time-rb  patient aware she moved up 30 minutes from 930am to 900am, patient aware to be here at 8am 7/8/21 -     ESOPHAGOGASTRODUODENOSCOPY N/A 9/27/2022    Procedure: ESOPHAGOGASTRODUODENOSCOPY (EGD);  Surgeon: Talib Rutledge MD;  Location: [address]);  Service: Endoscopy;  Laterality: N/A;  NOT Vaccinated.Covid test on 9/24 at [facility], [facility]  Clear liquids up to 4 hrs prior/ AM prep [id number]    TONSILLECTOMY      TYMPANOSTOMY TUBE PLACEMENT         (Not in a hospital admission)      Review of patient's allergies indicates:   Allergen Reactions    Latex, natural rubber      Mother states allergic to latex gloves     Adhesive Rash     Band-aids        Family History       Problem Relation (Age of Onset)    Cardiomyopathy Maternal Grandmother    Dementia Maternal Grandfather    Heart attacks under age 50 Father    Heart disease Father, Paternal Grandmother, Paternal Grandfather    Hypertension Father    Lupus Mother    No Known Problems Sister          Tobacco Use    Smoking status: Never    Smokeless tobacco: Never    Tobacco  comments:     mom smokes   Substance and Sexual Activity    Alcohol use: No    Drug use: No    Sexual activity: Yes     Partners: Male     Review of Systems   Constitutional:  Negative for chills and fever.   Eyes:  Negative for visual disturbance.   Respiratory:  Negative for cough and wheezing.    Cardiovascular:  Negative for chest pain and palpitations.   Gastrointestinal:  Negative for abdominal pain, constipation, diarrhea, nausea and vomiting.   Genitourinary:  Negative for dysuria, hematuria, vaginal bleeding and vaginal discharge.   Musculoskeletal:  Negative for back pain.   Neurological:  Negative for headaches.      Objective:     Vital Signs (Most Recent):  Temp: 98.4 °F (36.9 °C) (10/04/23 1517)  Pulse: 97 (10/04/23 1545)  Resp: 16 (10/04/23 1517)  BP: (!) 135/91 (10/04/23 1544)  SpO2: 98 % (10/04/23 1531) Vital Signs (24h Range):  Temp:  [98.4 °F (36.9 °C)] 98.4 °F (36.9 °C)  Pulse:  [] 97  Resp:  [16] 16  SpO2:  [97 %-99 %] 98 %  BP: (122-139)/(87-98) 135/91        There is no height or weight on file to calculate BMI.    FHT: 135bpm; moderate variability; +accels/-decels; (overall reactive and reassuring)  TOCO:  Infrequent     Physical Exam:   Constitutional: She appears well-developed and well-nourished.    HENT:   Head: Normocephalic and atraumatic.    Eyes: EOM are normal.     Cardiovascular:  Normal rate.             Pulmonary/Chest: Effort normal. No respiratory distress.        Abdominal:   Gravid abdomen             Musculoskeletal: Moves all extremeties.       Neurological: She is alert.    Skin: Skin is warm and dry.    Psychiatric: She has a normal mood and affect. Her behavior is normal. Thought content normal.        Cervix:  Dilation:  0.5  Effacement:  0%  Station: -3  Presentation: Vertex     Significant Labs:  Lab Results   Component Value Date    GROUPTRH A POS 03/15/2023    HEPBSAG Non-reactive 03/15/2023    STREPBCULT No Group B Streptococcus isolated 09/19/2023      Recent Labs   Lab 10/04/23  1523   WBC 6.36   RBC 4.43   HGB 11.8*   HCT 36.3*      MCV 82   MCH 26.6*   MCHC 32.5      Recent Labs   Lab 10/03/23  1630 10/04/23  1523    135*   K 4.2 4.6    104   CO2 18* 22*   BUN 6 6   CREATININE 0.8 0.8   GLU 74 95   PROT 6.8 6.3   BILITOT 0.2 0.2   ALKPHOS 156* 151*   ALT 6* 9*   AST 15 15      P/Cr ratio: 0.28      Assessment/Plan:     28 y.o. female  at 35w5d for:    32 weeks gestation of pregnancy  - Primary OB: [facility]  - Delivery consents, including vaginal, operative, and , and blood consents reviewed and signed at time of admission.   - Presentation: Vertex; If moving forward with delivery, will rescan to determine fetal pesentation   - Growth Ultrasound: 23 - EFW: 2337g @ 33w4d  - Diet: Regular  - Monitoring: NST/TOCO BID  - Labs: UTP  - Aneuploidy Screening: cfDNA neg  - 1hr GTT: 108; No indication to continue trending    - GBS: neg;   - BMZ course initiated today;   - Magnesium to be administered if patient meets criteria for PreE w/ SF. If > 32w0d, administer (4+2) for maternal seizure prophylaxis.  - Tdap: 23  - Continue PNV  - Contraception: Copper IUD    Gestational hypertension, third trimester  - Asymptomatic for PreE s/s   - BP: (126-156)/() 131/90; Will continue to collect vitals q 4 H   - Labs; Will repeat labs as needed or q 72H    - PCR 0.28 (baseline TLTC); Will collect 24H urine    - Plts 195    - Cr 0.8    - AST/ALT 15/9   - Patient admitted for observation and steroids in the setting of gHTN with newly elevated PCR. Will continue to monitor closely and escalate care if patient meets criteria for PreE with severe features. If PreE with severe features, will move to delivery. If PreE without SF will delivery at 37w0d, unless maternal/fetal indications.     Crohn's disease of small intestine with intestinal obstruction  - On Stelara q8 weeks  - Followed by GI, seen last week   - Asymptomatic         Brynn  MD Mikael  Obstetrics  [facility] - Emergency Dept (Obstetrics)

## 2023-10-04 NOTE — PLAN OF CARE
Pt admitted to antepartum for observation and a 24 urine hour urine. Pt educated on what to do.Pt verbalized understanding. VSS and is afebrile. Pt states + fetal movement, and denies cxts, VB, LOF, HA, blurry vision, SOB, or RUQ pain. Pt safety remained, with call light in place. First dose of BMZ administered.

## 2023-10-04 NOTE — HOSPITAL COURSE
10/04/2023: HD#1: admitted to observation in the setting of gHTN. BMZ initiated, will complete 24hr urine in the setting of PC ratio of 0.28

## 2023-10-04 NOTE — PROGRESS NOTES
Pregnancy dating, labs, ultrasound reports, prenatal testing, and problem list; prior records and results; and available outside records were reviewed and updated in EMR.  Pertinent findings were noted below.    Reason for Visit   Routine Prenatal Visit    HPI   28 y.o., at 35w5d by Estimated Date of Delivery: 11/3/23    Patient presents today for routine a OB visit. She denies any PreE symptoms. She reports some intermittent contractions    Contractions: Intermittent   Bleeding: No   Loss of fluid: No   Fetal movement: Yes   Nausea: No   Vomiting: No   Headache: No     Exam   Wt 69.5 kg (153 lb 3.5 oz)   LMP 2023 (Exact Date)   BMI 28.95 kg/m²     TWkg  GENERAL: No acute distress  ABD: Gravid    Assessment and Plan   Gestational hypertension, antepartum    Supervision of high risk pregnancy in third trimester         Patient sent to the [facility] for severe range blood pressure of 170/119    preE and fetal movement count precautions given  Follow-up: 1 week     Brynn Youssef MD  Obstetrics and Gynecology - PGY1    I have seen the patient, reviewed the Resident's assessment, plan and progress note. I have personally interviewed and examined the patient at bedside and: agree with the findings.     Emely Sheridan CNM  Obstetrics

## 2023-10-04 NOTE — ASSESSMENT & PLAN NOTE
- Primary OB: [facility]  - Delivery consents, including vaginal, operative, and , and blood consents reviewed and signed at time of admission.   - Presentation: Vertex; If moving forward with delivery, will rescan to determine fetal pesentation   - Growth Ultrasound: 23 - EFW: 2337g @ 33w4d  - Diet: Regular  - Monitoring: NST/TOCO BID  - Labs: UTP  - Aneuploidy Screening: cfDNA neg  - 1hr GTT: 108; No indication to continue trending    - GBS: neg;   - BMZ course initiated today;   - Magnesium to be administered if patient meets criteria for PreE w/ SF. If > 32w0d, administer (4+2) for maternal seizure prophylaxis.  - Tdap: 23  - Continue PNV  - Contraception: Copper IUD

## 2023-10-04 NOTE — HPI
Nina Durant is a 28 y.o. F at 35w5d presented to her routine OB visit today, and was found to have a severe range blood pressure. Patient was subsequently sent to the [facility] for continued blood pressure monitoring and repeat PreE labs. In the [facility], patient had mulitple mild range blood pressures. Her CBC and CMP were WNL, P/Cr ratio: 0.28. Patient denies any headache, vision changes, chest pain, shortness of breath, or right upper quadrant pain.     This IUP is complicated by gHTN, Chron's Disease.  Patient denies contractions, denies vaginal bleeding, denies LOF.   Fetal Movement: normal.

## 2023-10-04 NOTE — ASSESSMENT & PLAN NOTE
- Asymptomatic for PreE s/s   - BP: (126-156)/() 131/90; Will continue to collect vitals q 4 H   - Labs; Will repeat labs as needed or q 72H    - PCR 0.28 (baseline TLTC); Will collect 24H urine    - Plts 195    - Cr 0.8    - AST/ALT 15/9   - Patient admitted for observation and steroids in the setting of gHTN with newly elevated PCR. Will continue to monitor closely and escalate care if patient meets criteria for PreE with severe features. If PreE with severe features, will move to delivery. If PreE without SF will delivery at 37w0d, unless maternal/fetal indications.

## 2023-10-04 NOTE — ED PROVIDER NOTES
Encounter Date: 10/4/2023       History     Chief Complaint   Patient presents with    Hypertension     HPI    Nina Durant is a 28 y.o. F at 35w5d presents from clinic for evaluation of severe range BP. Patient seen in [facility] yesterday for similar issue, noted to have normal labs and mild range BP at that time. Today, had severe range BP in clinic and was sent for further monitoring. Denies any s/s of preeclampsia including headache, chest pain, scotoma, SOB, RUQ pain.    This IUP is complicated by gHTN.  Patient denies contractions, denies vaginal bleeding, denies LOF.   Fetal Movement: normal.     Review of patient's allergies indicates:   Allergen Reactions    Latex, natural rubber      Mother states allergic to latex gloves     Adhesive Rash     Band-aids     Past Medical History:   Diagnosis Date    Crohn's disease     Insomnia      Past Surgical History:   Procedure Laterality Date    COLONOSCOPY N/A 12/10/2015    Procedure: COLONOSCOPY;  Surgeon: Tala Gonzalez MD;  Location: [facility] (2ND FLR);  Service: Endoscopy;  Laterality: N/A;    COLONOSCOPY N/A 2016    Procedure: COLONOSCOPY;  Surgeon: Tala Gonzalez MD;  Location: [facility] (2ND FLR);  Service: Endoscopy;  Laterality: N/A;    COLONOSCOPY N/A 2021    Procedure: COLONOSCOPY;  Surgeon: True Treadwell MD;  Location: [facility];  Service: General;  Laterality: N/A;    COLONOSCOPY N/A 2021    Procedure: COLONOSCOPY;  Surgeon: Talib Rutledge MD;  Location: [facility] (4TH FLR);  Service: Endoscopy;  Laterality: N/A;  Covid-19 test 21 at [facility]    COLONOSCOPY N/A 2022    Procedure: COLONOSCOPY;  Surgeon: Talib Rutledge MD;  Location: [facility] (4TH FLR);  Service: Endoscopy;  Laterality: N/A;  golytely    COLONOSCOPY W/ BIOPSIES  2021    ESOPHAGOGASTRODUODENOSCOPY      ESOPHAGOGASTRODUODENOSCOPY N/A 2021    Procedure: EGD (ESOPHAGOGASTRODUODENOSCOPY);  Surgeon: Talib CH  MD Maty;  Location: [facility] ENDO (4TH FLR);  Service: Endoscopy;  Laterality: N/A;  Covid-19 test 7/6/21 at [facility] - pg  7/6 returned pt's call- lvm with arrival time-rb  patient aware she moved up 30 minutes from 930am to 900am, patient aware to be here at 8am 7/8/21 - bijan    ESOPHAGOGASTRODUODENOSCOPY N/A 9/27/2022    Procedure: ESOPHAGOGASTRODUODENOSCOPY (EGD);  Surgeon: Talib Rutledge MD;  Location: [facility] ENDO (4TH FLR);  Service: Endoscopy;  Laterality: N/A;  NOT Vaccinated.Covid test on 9/24 at [facility], [facility]  Clear liquids up to 4 hrs prior/ AM prep [id number]    TONSILLECTOMY      TYMPANOSTOMY TUBE PLACEMENT       Family History   Problem Relation Age of Onset    Lupus Mother     Heart attacks under age 50 Father     Heart disease Father     Hypertension Father     Cardiomyopathy Maternal Grandmother     Dementia Maternal Grandfather     Heart disease Paternal Grandmother     Heart disease Paternal Grandfather     No Known Problems Sister      Social History     Tobacco Use    Smoking status: Never    Smokeless tobacco: Never    Tobacco comments:     mom smokes   Substance Use Topics    Alcohol use: No    Drug use: No     Review of Systems   Constitutional:  Negative for fever.   HENT:  Negative for sore throat.    Respiratory:  Negative for shortness of breath.    Cardiovascular:  Negative for chest pain.   Gastrointestinal:  Negative for nausea.   Genitourinary:  Negative for dysuria.   Musculoskeletal:  Negative for back pain.   Skin:  Negative for rash.   Neurological:  Negative for weakness.   Hematological:  Does not bruise/bleed easily.       Physical Exam     Initial Vitals   BP Pulse Resp Temp SpO2   10/04/23 1513 10/04/23 1513 10/04/23 1517 10/04/23 1517 10/04/23 1515   (!) 139/98 90 16 98.4 °F (36.9 °C) 98 %      MAP       --              Temp:  [98.4 °F (36.9 °C)] 98.4 °F (36.9 °C)  Pulse:  [] 97  Resp:  [16] 16  SpO2:  [97 %-99 %] 98 %  BP:  (122-139)/(87-98) 135/91    Physical Exam    Nursing note and vitals reviewed.  Constitutional: She appears well-developed and well-nourished.   HENT:   Head: Normocephalic and atraumatic.   Eyes: Conjunctivae and EOM are normal. Pupils are equal, round, and reactive to light.   Neck: Neck supple.   Normal range of motion.  Cardiovascular:  Normal rate.           Pulmonary/Chest: No respiratory distress.   Musculoskeletal:         General: Normal range of motion.      Cervical back: Normal range of motion and neck supple.     Neurological: She is alert and oriented to person, place, and time. She has normal strength and normal reflexes.   Skin: Skin is warm and dry.   Psychiatric: She has a normal mood and affect. Her behavior is normal. Judgment and thought content normal.         ED Course   Fetal non-stress test    Date/Time: 10/4/2023 4:51 PM    Performed by: Moira Sidhu MD  Authorized by: Anneliese Mojica MD    Nonstress Test:     Variability:  6-25 BPM    Decelerations:  None    Accelerations:  15 bpm    Baseline:  135    Contractions:  Not present  Biophysical Profile:     Nonstress Test Interpretation: reactive      Overall Impression:  Reassuring  Post-procedure:     Patient tolerance:  Patient tolerated the procedure well with no immediate complications    Labs Reviewed   COMPREHENSIVE METABOLIC PANEL - Abnormal; Notable for the following components:       Result Value    Sodium 135 (*)     CO2 22 (*)     Albumin 2.6 (*)     Alkaline Phosphatase 151 (*)     ALT 9 (*)     All other components within normal limits   CBC W/ AUTO DIFFERENTIAL - Abnormal; Notable for the following components:    Hemoglobin 11.8 (*)     Hematocrit 36.3 (*)     MCH 26.6 (*)     Gran % 73.5 (*)     Lymph % 17.6 (*)     All other components within normal limits   PROTEIN / CREATININE RATIO, URINE - Abnormal; Notable for the following components:    Protein, Urine Random 16 (*)     Prot/Creat Ratio, Urine 0.28 (*)     All  other components within normal limits    Narrative:     Specimen Source->Urine   PROTEIN, URINE, TIMED        Recent Labs   Lab 10/03/23  1600 10/03/23  1630 10/04/23  1523 10/04/23  1524   WBC  --  8.02 6.36  --    HGB  --  12.6 11.8*  --    HCT  --  38.9 36.3*  --    PLT  --  198 195  --    BUN  --  6 6  --    CREATININE  --  0.8 0.8  --    ALT  --  6* 9*  --    AST  --  15 15  --    UTPCR Unable to calculate  --   --  0.28*        Imaging Results    None            Medications   betamethasone acetate-betamethasone sodium phosphate injection 12 mg (has no administration in time range)   aspirin EC tablet 81 mg (has no administration in time range)   famotidine tablet 20 mg (has no administration in time range)   sodium chloride 0.9% flush 10 mL (has no administration in time range)   acetaminophen tablet 650 mg (has no administration in time range)   ondansetron disintegrating tablet 8 mg (has no administration in time range)   prochlorperazine injection Soln 5 mg (has no administration in time range)   senna-docusate 8.6-50 mg per tablet 1 tablet (has no administration in time range)   simethicone chewable tablet 80 mg (has no administration in time range)   diphenhydrAMINE capsule 25 mg (has no administration in time range)   diphenhydrAMINE injection 25 mg (has no administration in time range)   prenatal vitamin oral tablet (has no administration in time range)     Medical Decision Making  Mild range BP on admission    Temp:  [98.4 °F (36.9 °C)] 98.4 °F (36.9 °C)  Pulse:  [] 97  Resp:  [16] 16  SpO2:  [97 %-99 %] 98 %  BP: (122-139)/(87-98) 135/91    CBC, CMP: wnl  PC Ratio: 0.28 - up from unable to calculate yesterday  NST: reactive and reassuring  TOCO: irregular uterine contractions    BSUS: cephalic  Consents signed and in media  [facility] consulted for admission for BP monitoring and 24 urine inpatient     [facility] agrees with antepartem admission for BMZ, 24 hour urine and close blood pressure  "monitoring    Amount and/or Complexity of Data Reviewed  Labs: ordered.              Attending Attestation:   Physician Attestation Statement for Resident:  As the supervising MD   Physician Attestation Statement: I have personally seen and examined this patient.   I agree with the above history.  -:   As the supervising MD I agree with the above PE.     As the supervising MD I agree with the above treatment, course, plan, and disposition.   -: I agree with the above edited resident note. Pt seen and examined, chart and labs reviewed.    Briefly, 29 yo G1 at 35w5d sent from clinic for severe range BP. BP in [facility] persistantly mild range, highest 156/107. Asymptomatic. Labs: plts 195, Cr 0.8, AST/ALT 15/9, P:C 0.28 (up from "too low to calculate" yesterday). Given severe range BP in clinic today and significant increase in protein since yesterday, pt reviewed with MFM Dr Mojica who is in agreement with 24hr obs on antepartum for 24h urine collection, BP monitoring and BMTZ. Consents signed and in chart. NST Cat I reactive.     All questions answered. Admit to [facility] for further mgmt.     Moira Sidhu MD  OB Hospitalist  10/4/2023     I was personally present during the critical portions of the procedure(s) performed by the resident and was immediately available in the ED to provide services and assistance as needed during the entire procedure.  I have reviewed and agree with the residents interpretation of the following: lab data.  I have reviewed the following: old records at this facility.                                Clinical Impression:   Final diagnoses:  [O16.3] Elevated blood pressure affecting pregnancy in third trimester, antepartum (Primary)  [Z3A.35] 35 weeks gestation of pregnancy        ED Disposition Condition    Observation              Brynn Youssef MD  Obstetrics and Gynecology - PGY1     Brynn Youssef MD  Resident  10/04/23 4804       Moira Sidhu MD  10/04/23 [id number]    "

## 2023-10-04 NOTE — SUBJECTIVE & OBJECTIVE
Obstetric HPI:  Patient reports intermittent uterine contractions, active fetal movement, No vaginal bleeding , No loss of fluid. Patient denies headache, vision changes, chest pain, shortness of breath, and right upper quadrant pain at this time.     This pregnancy has been complicated by Chron's disease, gHTN    OB History    Para Term  AB Living   1 0 0 0 0 0   SAB IAB Ectopic Multiple Live Births   0 0 0 0 0      # Outcome Date GA Lbr Shayan/2nd Weight Sex Delivery Anes PTL Lv   1 Current              Past Medical History:   Diagnosis Date    Crohn's disease     Insomnia      Past Surgical History:   Procedure Laterality Date    COLONOSCOPY N/A 12/10/2015    Procedure: COLONOSCOPY;  Surgeon: Tala Gonzalez MD;  Location: [facility] ENDO (2ND FLR);  Service: Endoscopy;  Laterality: N/A;    COLONOSCOPY N/A 2016    Procedure: COLONOSCOPY;  Surgeon: Tala Gonzalez MD;  Location: [facility] (2ND FLR);  Service: Endoscopy;  Laterality: N/A;    COLONOSCOPY N/A 2021    Procedure: COLONOSCOPY;  Surgeon: True Treadwell MD;  Location: [facility];  Service: General;  Laterality: N/A;    COLONOSCOPY N/A 2021    Procedure: COLONOSCOPY;  Surgeon: Talib Rutledge MD;  Location: [facility] (4TH FLR);  Service: Endoscopy;  Laterality: N/A;  Covid-19 test 21 at [facility]    COLONOSCOPY N/A 2022    Procedure: COLONOSCOPY;  Surgeon: Talib Rutledge MD;  Location: [facility] (4TH FLR);  Service: Endoscopy;  Laterality: N/A;  golytely    COLONOSCOPY W/ BIOPSIES  2021    ESOPHAGOGASTRODUODENOSCOPY      ESOPHAGOGASTRODUODENOSCOPY N/A 2021    Procedure: EGD (ESOPHAGOGASTRODUODENOSCOPY);  Surgeon: Talib Rutledge MD;  Location: [facility] (4TH FLR);  Service: Endoscopy;  Laterality: N/A;  Covid-19 test 21 at [facility]   returned pt's call- lvm with arrival time-rb  patient aware she moved up 30 minutes from 930am to 900am, patient aware to be here at 8am  7/8/21 -     ESOPHAGOGASTRODUODENOSCOPY N/A 9/27/2022    Procedure: ESOPHAGOGASTRODUODENOSCOPY (EGD);  Surgeon: Talib Rutledge MD;  Location: [address];  Service: Endoscopy;  Laterality: N/A;  NOT Vaccinated.Cov[facility] test on 9/24 at [facility], [facility]  Clear liquids up to 4 hrs prior/ AM prep [id number]    TONSILLECTOMY      TYMPANOSTOMY TUBE PLACEMENT         (Not in a hospital admission)      Review of patient's allergies indicates:   Allergen Reactions    Latex, natural rubber      Mother states allergic to latex gloves     Adhesive Rash     Band-aids        Family History       Problem Relation (Age of Onset)    Cardiomyopathy Maternal Grandmother    Dementia Maternal Grandfather    Heart attacks under age 50 Father    Heart disease Father, Paternal Grandmother, Paternal Grandfather    Hypertension Father    Lupus Mother    No Known Problems Sister          Tobacco Use    Smoking status: Never    Smokeless tobacco: Never    Tobacco comments:     mom smokes   Substance and Sexual Activity    Alcohol use: No    Drug use: No    Sexual activity: Yes     Partners: Male     Review of Systems   Constitutional:  Negative for chills and fever.   Eyes:  Negative for visual disturbance.   Respiratory:  Negative for cough and wheezing.    Cardiovascular:  Negative for chest pain and palpitations.   Gastrointestinal:  Negative for abdominal pain, constipation, diarrhea, nausea and vomiting.   Genitourinary:  Negative for dysuria, hematuria, vaginal bleeding and vaginal discharge.   Musculoskeletal:  Negative for back pain.   Neurological:  Negative for headaches.      Objective:     Vital Signs (Most Recent):  Temp: 98.4 °F (36.9 °C) (10/04/23 1517)  Pulse: 97 (10/04/23 1545)  Resp: 16 (10/04/23 1517)  BP: (!) 135/91 (10/04/23 1544)  SpO2: 98 % (10/04/23 1531) Vital Signs (24h Range):  Temp:  [98.4 °F (36.9 °C)] 98.4 °F (36.9 °C)  Pulse:  [] 97  Resp:  [16] 16  SpO2:  [97  %-99 %] 98 %  BP: (122-139)/(87-98) 135/91        There is no height or weight on file to calculate BMI.    FHT: 135bpm; moderate variability; +accels/-decels; (overall reactive and reassuring)  TOCO:  Infrequent     Physical Exam:   Constitutional: She appears well-developed and well-nourished.    HENT:   Head: Normocephalic and atraumatic.    Eyes: EOM are normal.     Cardiovascular:  Normal rate.             Pulmonary/Chest: Effort normal. No respiratory distress.        Abdominal:   Gravid abdomen             Musculoskeletal: Moves all extremeties.       Neurological: She is alert.    Skin: Skin is warm and dry.    Psychiatric: She has a normal mood and affect. Her behavior is normal. Thought content normal.        Cervix:  Dilation:  0.5  Effacement:  0%  Station: -3  Presentation: Vertex     Significant Labs:  Lab Results   Component Value Date    GROUPTRH A POS 03/15/2023    HEPBSAG Non-reactive 03/15/2023    STREPBCULT No Group B Streptococcus isolated 09/19/2023     Recent Labs   Lab 10/04/23  1523   WBC 6.36   RBC 4.43   HGB 11.8*   HCT 36.3*      MCV 82   MCH 26.6*   MCHC 32.5      Recent Labs   Lab 10/03/23  1630 10/04/23  1523    135*   K 4.2 4.6    104   CO2 18* 22*   BUN 6 6   CREATININE 0.8 0.8   GLU 74 95   PROT 6.8 6.3   BILITOT 0.2 0.2   ALKPHOS 156* 151*   ALT 6* 9*   AST 15 15      P/Cr ratio: 0.28

## 2023-10-05 LAB
HIV 1+2 AB+HIV1 P24 AG SERPL QL IA: NEGATIVE
RPR SER QL: NORMAL

## 2023-10-05 PROCEDURE — 96372 THER/PROPH/DIAG INJ SC/IM: CPT

## 2023-10-05 PROCEDURE — 72100003 HC LABOR CARE, EA. ADDL. 8 HRS

## 2023-10-05 PROCEDURE — 86592 SYPHILIS TEST NON-TREP QUAL: CPT | Performed by: OBSTETRICS & GYNECOLOGY

## 2023-10-05 PROCEDURE — 25000003 PHARM REV CODE 250

## 2023-10-05 PROCEDURE — 87389 HIV-1 AG W/HIV-1&-2 AB AG IA: CPT | Performed by: OBSTETRICS & GYNECOLOGY

## 2023-10-05 PROCEDURE — 27000181 HC CABLE, IUPC

## 2023-10-05 PROCEDURE — 11000001 HC ACUTE MED/SURG PRIVATE ROOM

## 2023-10-05 PROCEDURE — 63600175 PHARM REV CODE 636 W HCPCS

## 2023-10-05 PROCEDURE — 51702 INSERT TEMP BLADDER CATH: CPT

## 2023-10-05 PROCEDURE — 25000003 PHARM REV CODE 250: Performed by: STUDENT IN AN ORGANIZED HEALTH CARE EDUCATION/TRAINING PROGRAM

## 2023-10-05 RX ORDER — FAMOTIDINE 10 MG/ML
20 INJECTION INTRAVENOUS 2 TIMES DAILY
Status: DISCONTINUED | OUTPATIENT
Start: 2023-10-05 | End: 2023-10-09

## 2023-10-05 RX ORDER — SODIUM CITRATE AND CITRIC ACID MONOHYDRATE 334; 500 MG/5ML; MG/5ML
30 SOLUTION ORAL ONCE
Status: COMPLETED | OUTPATIENT
Start: 2023-10-05 | End: 2023-10-06

## 2023-10-05 RX ORDER — FAMOTIDINE 10 MG/ML
20 INJECTION INTRAVENOUS ONCE
Status: COMPLETED | OUTPATIENT
Start: 2023-10-05 | End: 2023-10-06

## 2023-10-05 RX ORDER — FAMOTIDINE 10 MG/ML
20 INJECTION INTRAVENOUS 2 TIMES DAILY
Status: DISCONTINUED | OUTPATIENT
Start: 2023-10-05 | End: 2023-10-05

## 2023-10-05 RX ORDER — FENTANYL/BUPIVACAINE/NS/PF 2MCG/ML-.1
PLASTIC BAG, INJECTION (ML) INJECTION
Status: DISPENSED
Start: 2023-10-05 | End: 2023-10-06

## 2023-10-05 RX ORDER — FENTANYL/BUPIVACAINE/NS/PF 2MCG/ML-.1
PLASTIC BAG, INJECTION (ML) INJECTION CONTINUOUS
Status: DISCONTINUED | OUTPATIENT
Start: 2023-10-05 | End: 2023-10-06

## 2023-10-05 RX ADMIN — BETAMETHASONE SODIUM PHOSPHATE AND BETAMETHASONE ACETATE 12 MG: 3; 3 INJECTION, SUSPENSION INTRA-ARTICULAR; INTRALESIONAL; INTRAMUSCULAR at 05:10

## 2023-10-05 RX ADMIN — SODIUM CHLORIDE, POTASSIUM CHLORIDE, SODIUM LACTATE AND CALCIUM CHLORIDE: 600; 310; 30; 20 INJECTION, SOLUTION INTRAVENOUS at 02:10

## 2023-10-05 RX ADMIN — CALCIUM CARBONATE (ANTACID) CHEW TAB 500 MG 500 MG: 500 CHEW TAB at 03:10

## 2023-10-05 RX ADMIN — FAMOTIDINE 20 MG: 10 INJECTION, SOLUTION INTRAVENOUS at 01:10

## 2023-10-05 RX ADMIN — PROCHLORPERAZINE EDISYLATE 5 MG: 5 INJECTION INTRAMUSCULAR; INTRAVENOUS at 07:10

## 2023-10-05 RX ADMIN — ONDANSETRON 8 MG: 8 TABLET, ORALLY DISINTEGRATING ORAL at 04:10

## 2023-10-05 RX ADMIN — FAMOTIDINE 20 MG: 10 INJECTION, SOLUTION INTRAVENOUS at 08:10

## 2023-10-05 RX ADMIN — MAGNESIUM SULFATE HEPTAHYDRATE 2 G/HR: 40 INJECTION, SOLUTION INTRAVENOUS at 03:10

## 2023-10-05 NOTE — ANESTHESIA PREPROCEDURE EVALUATION
[facility]  Anesthesia Pre-Operative Evaluation         Patient Name: Nina Durant  YOB: 1995  MRN: [id number]    10/04/2023      Nina Durant is a 28 y.o. female  @ 35w5d who presents with newly elevated PCR in setting of gHTN. Denies headache, abd pain, oliguria. PMHx includes crohn's disease. She denies asthma, cardiopulmonary disease, coagulopathy, or spinal pathology.      OB History    Para Term  AB Living   1             SAB IAB Ectopic Multiple Live Births                  # Outcome Date GA Lbr Shayan/2nd Weight Sex Delivery Anes PTL Lv   1 Current                Review of patient's allergies indicates:   Allergen Reactions    Latex, natural rubber      Mother states allergic to latex gloves     Adhesive Rash     Band-aids       Wt Readings from Last 1 Encounters:   10/04/23 1358 69.5 kg (153 lb 3.5 oz)       BP Readings from Last 3 Encounters:   10/04/23 (!) 144/99   10/03/23 (!) 124/97   10/03/23 (!) 174/102       Patient Active Problem List   Diagnosis    Heartburn    Crohn's disease of small intestine with intestinal obstruction    Iron deficiency anemia due to chronic blood loss    Vitamin D deficiency    Amenorrhea    Encounter for supervision of normal first pregnancy in third trimester    Gestational hypertension, antepartum    Supervision of high risk pregnancy in third trimester    Gestational hypertension, third trimester    32 weeks gestation of pregnancy       Past Surgical History:   Procedure Laterality Date    COLONOSCOPY N/A 12/10/2015    Procedure: COLONOSCOPY;  Surgeon: Tala Gonzalez MD;  Location: [address]);  Service: Endoscopy;  Laterality: N/A;    COLONOSCOPY N/A 2016    Procedure: COLONOSCOPY;  Surgeon: Tala Gonzalez MD;  Location: [facility] ([address] [place] [address]);  Service: Endoscopy;  Laterality: N/A;    COLONOSCOPY N/A 2021    Procedure: COLONOSCOPY;   Surgeon: True Treadwell MD;  Location: [facility];  Service: General;  Laterality: N/A;    COLONOSCOPY N/A 7/8/2021    Procedure: COLONOSCOPY;  Surgeon: Talib Rutledge MD;  [address]  Service: Endoscopy;  Laterality: N/A;  Covid-19 test 7/6/21 at [facility]    COLONOSCOPY N/A 9/27/2022    Procedure: COLONOSCOPY;  Surgeon: Talib Rutledge MD;  [facility]  Service: Endoscopy;  Laterality: N/A;  golytely    COLONOSCOPY W/ BIOPSIES  01/21/2021    ESOPHAGOGASTRODUODENOSCOPY      ESOPHAGOGASTRODUODENOSCOPY N/A 7/8/2021    Procedure: EGD (ESOPHAGOGASTRODUODENOSCOPY);  Surgeon: Talib Rutledge MD;  Location: [address]);  Service: Endoscopy;  Laterality: N/A;  Covid-19 test 7/6/21 at [facility]  7/6 returned pt's call- [facility] with arrival time-rb  patient aware she moved up 30 minutes from 930am to 900am, patient aware to be here at 8am 7/8/21 -     ESOPHAGOGASTRODUODENOSCOPY N/A 9/27/2022    Procedure: ESOPHAGOGASTRODUODENOSCOPY (EGD);  Surgeon: Talib Rutledge MD;  Location: [address]);  Service: Endoscopy;  Laterality: N/A;  NOT Vaccinated.Cov[facility] test [facility] 9/24 at [facility], [facility]  Clear liquids up to 4 hrs prior/ AM prep [id number]    TONSILLECTOMY      TYMPANOSTOMY TUBE PLACEMENT         Social History     Socioeconomic History    Marital status:    Tobacco Use    Smoking status: Never    Smokeless tobacco: Never    Tobacco comments:     mom smokes   Substance and Sexual Activity    Alcohol use: No    Drug use: No    Sexual activity: Yes     Partners: Male   Social History Narrative    Lives with mom, sister.    2 cats.    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     Social Determinants of Health     Financial Resource Strain: Low Risk  (3/30/2021)    Overall Financial Resource Strain (CARDIA)     Difficulty of Paying Living Expenses: Not  very hard   Food Insecurity: No Food Insecurity (3/30/2021)    Hunger Vital Sign     Worried About Running Out of Food in the Last Year: Never true     Ran Out of Food in the Last Year: Never true   Transportation Needs: No Transportation Needs (3/30/2021)    PRAPARE - Transportation     Lack of Transportation (Medical): No     Lack of Transportation (Non-Medical): No   Physical Activity: Inactive (3/30/2021)    Exercise Vital Sign     Days of Exercise per Week: 0 days     Minutes of Exercise per Session: 0 min   Stress: No Stress Concern Present (3/30/2021)    [employer] [employer] - Occupational Stress Questionnaire     Feeling of Stress : Only a little   Social Connections: Unknown (3/30/2021)    Social Connection and Isolation Panel [NHANES]     Frequency of Communication with Friends and Family: More than three times a week     Frequency of Social Gatherings with Friends and Family: More than three times a week     Attends [employer] Services: Patient refused     Active Member of Clubs or Organizations: Patient refused     Attends Club or Organization Meetings: Patient refused     Marital Status: Patient refused   Housing Stability: Low Risk  (3/30/2021)    Housing Stability Vital Sign     Unable to Pay for Housing in the Last Year: No     Number of Places Lived in the Last Year: 1     Unstable Housing in the Last Year: No         Chemistry        Component Value Date/Time     (L) 10/04/2023 1523    K 4.6 10/04/2023 1523     10/04/2023 1523    CO2 22 (L) 10/04/2023 1523    BUN 6 10/04/2023 1523    CREATININE 0.8 10/04/2023 1523    GLU 95 10/04/2023 1523        Component Value Date/Time    CALCIUM 9.2 10/04/2023 1523    ALKPHOS 151 (H) 10/04/2023 1523    AST 15 10/04/2023 1523    ALT 9 (L) 10/04/2023 1523    BILITOT 0.2 10/04/2023 1523    ESTGFRAFRICA >60.0 06/13/2022 1300    EGFRNONAA >60.0 06/13/2022 1300            Lab Results   Component Value Date    WBC 6.36  "10/04/2023    HGB 11.8 (L) 10/04/2023    HCT 36.3 (L) 10/04/2023    MCV 82 10/04/2023     10/04/2023       No results for input(s): "PT", "INR", "PROTIME", "APTT" in the last 72 hours.            Pre-op Assessment    I have reviewed the Patient Summary Reports.     I have reviewed the Nursing Notes. I have reviewed the NPO Status.   I have reviewed the Medications.     Review of Systems  Anesthesia Hx:  No problems with previous Anesthesia  History of prior surgery of interest to airway management or planning: Denies Family Hx of Anesthesia complications.   Denies Personal Hx of Anesthesia complications.   Social:  Non-Smoker, No Alcohol Use    Hematology/Oncology:  Hematology Normal   Oncology Normal     EENT/Dental:EENT/Dental Normal   Cardiovascular:   Exercise tolerance: good Denies CAD.    Denies Dysrhythmias.     Pulmonary:   Denies COPD.  Denies Sleep Apnea.    Hepatic/GI:   Denies GERD. IBD   Neurological:   Denies Neuromuscular Disease.    Endocrine:   Denies Diabetes.    Psych:   Denies Psychiatric History.          Physical Exam  General: Well nourished, Cooperative, Alert and Oriented    Airway:  Mallampati: III   Mouth Opening: Normal  TM Distance: Normal  Tongue: Normal  Neck ROM: Normal ROM    Dental:  Intact    Chest/Lungs:  Clear to auscultation, Normal Respiratory Rate    Heart:  Rate: Normal  Rhythm: Regular Rhythm  Sounds: Normal    Abdomen:  Nontender, Soft, Normal        Anesthesia Plan  Type of Anesthesia, risks & benefits discussed:    Anesthesia Type: Gen ETT, Epidural, Spinal, CSE  Intra-op Monitoring Plan: Standard ASA Monitors  Post Op Pain Control Plan: multimodal analgesia  Induction:  IV  Airway Plan: Video  Informed Consent: Informed consent signed with the Patient and all parties understand the risks and agree with anesthesia plan.  All questions answered.   ASA Score: 3  Day of Surgery Review of History & Physical: I have interviewed and examined the patient. I have reviewed " the patient's H&P dated: There are no significant changes.     Ready For Surgery From Anesthesia Perspective.     .

## 2023-10-05 NOTE — PROGRESS NOTES
"LABOR NOTE    S:  MD to bedside for routine cervical exam. Epidural working:  yes  No pt concerns at this time    O: /76   Pulse 99   Temp 97.8 °F (36.6 °C) (Oral)   Resp 18   Ht 5' 1" (1.549 m)   Wt 69.4 kg (153 lb)   LMP 01/18/2023 (Exact Date)   SpO2 99%   Breastfeeding No   BMI 28.91 kg/m²     FHT: 110 bpm, moderate variability, +accels, rare variable decelerations, Cat 2 reassuring with moderate variability  CTX: q 2 min  SVE: 4/60/-3, IUPC placed    TIMELINE:  0100: 1/60/-3; starting pit  0530: 3/60-3; camejo balloon out  0930: 3/60-3, attempted AROM but unsuccessful   1330: 4/60/-3, AROM clear   1630: 4/60/-3, IUPC placed    PLAN:    Continue Close Maternal/Fetal Monitoring  Pit @ 14 s/p pitocin break. Pitocin Augmentation per protocol  Recheck 4 hours or PRN  Began discussion about failed IOL time frames and answered pts and partners questions    "

## 2023-10-05 NOTE — NURSING
Report received from Malaika Casanova RN. Care of pt assumed. Delivery, blood, copper IUD, circumcision, and anesthesia consents signed, witnessed, and in Epic.    Pt being induced for severe pre-eclampsia. Magnesium sulfate, LR, and pitocin infusing per order. Pt denies CP, SOB, HA, vision changes, RUQ pain. Pt resting comfortably with epidural. Plan of care reviewed with pt. Pt verbalized understanding.    1205--MD Maciel notified of low FHR baseline.    1408--Pitocin at 30 mu. MD Sixto notified.    1527--Pitocin break per MD Sixto. Pitocin turned off.    1557--Pitocin restarted at 14mu.    Pt with mild to moderate blood pressures throughout the shift. Denies HA, CP, SOB, vision changes, RUQ pain. IUPC placed per MD. Frequent position changes and peanut ball utilized to encourage opening of the pelvic inlet. Pt still comfortable with epidural. Second dose of BMZ administered as ordered.    Report given to RENNY Longoria. Care of pt relinquished.    Malorie Patino RN

## 2023-10-05 NOTE — PROGRESS NOTES
"LABOR NOTE    S:  MD to bedside for routine cervical exam. Epidural working:  yes  No pt concerns at this time    O: /83   Pulse 102   Temp 98.6 °F (37 °C) (Oral)   Resp 18   Ht 5' 1" (1.549 m)   Wt 69.4 kg (153 lb)   LMP 01/18/2023 (Exact Date)   SpO2 98%   Breastfeeding No   BMI 28.91 kg/m²     FHT: 115 bpm, moderate variability, +accels, -decels, Cat 1 (reassuring)  CTX: q 2-3 minutes, pit @ 28  SVE: 4/60/-3, AROM clear     TIMELINE:  0100: 1/60/-3; starting pit  0530: 3/60-3; camejo balloon out  0930: 3/60-3, attempted AROM but unsuccessful   1330: 4/60/-3, AROM clear     PLAN:    Continue Close Maternal/Fetal Monitoring  Pitocin Augmentation per protocol  Recheck 4 hours or PRN    Emmy Henson MD   PGY-4, OB-GYN            "

## 2023-10-05 NOTE — PROGRESS NOTES
10/04/23 2349   TeleStork Tre Note - Strip   Strip Reviewed by Tre Nurse? Yes   TeleStork Tre Note - Communication   [place] Nurse Communicated with Bedside Nurse Regarding: Fetal Status;Maternal Vital Signs   TeleStork Tre Note - Notification   Nurse Notified? Yes   Name of Nurse Kamini LEWIS

## 2023-10-05 NOTE — ANESTHESIA PROCEDURE NOTES
Epidural    Patient location during procedure: OB   Reason for block: primary anesthetic   Reason for block: labor analgesia requested by patient and obstetrician  Diagnosis: iup   Start time: 10/4/2023 10:58 PM  Timeout: 10/4/2023 10:55 PM  End time: 10/4/2023 11:08 PM  Surgery related to: Vaginal Delivery    Staffing  Performing Provider: Winter Chance MD  Authorizing Provider: Suzan Maldonado MD    Staffing  Performed by: Winter Chance MD  Authorized by: Suzan Maldonado MD        Preanesthetic Checklist  Completed: patient identified, IV checked, risks and benefits discussed, surgical consent, monitors and equipment checked, pre-op evaluation, timeout performed, anesthesia consent given, hand hygiene performed and patient being monitored  Preparation  Patient position: sitting  Prep: ChloraPrep  Patient monitoring: Pulse Ox  Reason for block: primary anesthetic   Epidural  Skin Anesthetic: lidocaine 1%  Skin Wheal: 3 mL  Administration type: continuous  Approach: midline  Interspace: L3-4    Injection technique: KENNY air  Needle and Epidural Catheter  Needle type: Tuohy   Needle gauge: 17  Needle length: 3.5 inches  Needle insertion depth: 6 cm  Catheter type: springwound and multi-orifice  Catheter size: 19 G  Catheter at skin depth: 10 cm  Insertion Attempts: 1  Test dose: 3 mL of lidocaine 1.5% with Epi 1-to-200,000  Additional Documentation: incremental injection, negative aspiration for heme and CSF, no paresthesia on injection, no signs/symptoms of IV or SA injection, no significant pain on injection and no significant complaints from patient  Needle localization: anatomical landmarks  Medications:  Volume per aspiration: 5 mL  Time between aspirations: 5 minutes   Assessment  Ease of block: easy  Patient's tolerance of the procedure: comfortable throughout block and no complaints No inadvertent dural puncture with Tuohy.  Dural puncture not performed with spinal needle

## 2023-10-05 NOTE — PROGRESS NOTES
"LABOR NOTE    S:  MD to bedside for routine cervical exam. Epidural working:  yes  No pt concerns at this time    O: /89   Pulse 110   Temp 98.9 °F (37.2 °C)   Resp 18   Ht 5' 1" (1.549 m)   Wt 69.4 kg (153 lb)   LMP 01/18/2023 (Exact Date)   SpO2 99%   Breastfeeding No   BMI 28.91 kg/m²     FHT: 120 bpm, moderate variability, +accels, -decels, Cat 1 (reassuring)  CTX: q 3 minutes  SVE: 1/60/-3    TIMELINE:  0100: 1/60/-3; starting pit    PLAN:    Continue Close Maternal/Fetal Monitoring  Pitocin Augmentation per protocol  Recheck 4 hours or PRN      Edinson Rothman MD MS  OB/Gyn  PGY-1      "

## 2023-10-05 NOTE — PROGRESS NOTES
"LABOR NOTE    S:  MD to bedside for routine cervical exam. Epidural working:  yes  No pt concerns at this time    O: /70   Pulse 106   Temp 98.6 °F (37 °C) (Oral)   Resp 16   Ht 5' 1" (1.549 m)   Wt 69.4 kg (153 lb)   LMP 01/18/2023 (Exact Date)   SpO2 97%   Breastfeeding No   BMI 28.91 kg/m²     FHT: 115 bpm, moderate variability, +accels, -decels, Cat 1 (reassuring)  CTX: q 2-3 minutes, pit @ 12  SVE: 3/60/-3    TIMELINE:  0100: 1/60/-3; starting pit  0530: 3/60-3; camejo balloon out  0930: 3/60-3, attempted AROM but unsuccessful     PLAN:    Continue Close Maternal/Fetal Monitoring  Pitocin Augmentation per protocol  Recheck 4 hours or PRN    Rodrigue Vines MD  Obstetrics and Gynecology- PGY1        "

## 2023-10-05 NOTE — INTERVAL H&P NOTE
The patient has been examined and the H&P has been reviewed:    I concur with the findings and no changes have occurred since H&P was written.    SVE: 0.5/70/-3  Fetus cephalic  FHT: 130 baseline, +accel, -decel, mod variability    Active Hospital Problems    Diagnosis  POA    *Severe Pre-eclampsia in third trimester [O14.93]  Unknown    Gestational hypertension, third trimester [O13.3]  Unknown    32 weeks gestation of pregnancy [Z3A.32]  Not Applicable    Encounter for induction of labor [Z34.90]  Not Applicable    Crohn's disease of small intestine with intestinal obstruction [K50.012]  Yes      Resolved Hospital Problems   No resolved problems to display.     Plan: Admit to L&D for induction of labor secondary to pre-eclampsia with SF. Will start with camejo balloon and pitocin for induction agent. Consents signed in chart. All questions answered.    Annabella Frank MD PGY-2  Obstetrics and Gynecology

## 2023-10-05 NOTE — PROGRESS NOTES
"LABOR NOTE    S:  MD to bedside for routine cervical exam. Epidural working:  yes  No pt concerns at this time    O: /69   Pulse 82   Temp 98.3 °F (36.8 °C) (Oral)   Resp 18   Ht 5' 1" (1.549 m)   Wt 69.4 kg (153 lb)   LMP 01/18/2023 (Exact Date)   SpO2 98%   Breastfeeding No   BMI 28.91 kg/m²     FHT: 115 bpm, moderate variability, +accels, -decels, Cat 1 (reassuring)  CTX: q 2-3 minutes, pit @ 12  SVE: 3/60/-3    TIMELINE:  0100: 1/60/-3; starting pit  0530: 3/60-3; camejo balloon out    PLAN:    Continue Close Maternal/Fetal Monitoring  Pitocin Augmentation per protocol  Recheck 4 hours or PRN      Edinson Rothman MD MS  OB/Gyn  PGY-1      "

## 2023-10-05 NOTE — PLAN OF CARE
Patient has been screened for Social Work discharge planning needs. Based on documentation in medical record, no discharge planning needs are anticipated at this time. Should any discharge planning needs arise, please consult .        10/05/23 1523   OB SCREEN   Assessment Type Discharge Planning Assessment   Source of Information health record   Received Prenatal Care Yes   Any indications/suspicions for None   Is this a teen pregnancy No   Is the baby in NICU No   Indication for adoption/Safe Haven No   Indication for DME/post-acute needs No   HIV (+) No   Any congenital  disorders No   Fetal demise/ death No

## 2023-10-05 NOTE — CARE UPDATE
MD notified of sustained severe Bps.    Temp:  [98.4 °F (36.9 °C)-98.6 °F (37 °C)] 98.6 °F (37 °C)  Pulse:  [] 87  Resp:  [16] 16  SpO2:  [97 %-100 %] 100 %  BP: (126-177)/() 160/103    Will administer labetalol 20 mg IV. Patient now meets criteria for pre-eclampsia with SF. Will start IV magnesium and admit for induction of labor.    Annabella Frank MD PGY-2  Obstetrics and Gynecology

## 2023-10-05 NOTE — PROGRESS NOTES
Brief social rounds     Explained my role as OB Staff  Questions answered  Continue active management of labor  Reviewed FHT - Cat I

## 2023-10-06 PROBLEM — Z98.891 STATUS POST CESAREAN DELIVERY: Status: ACTIVE | Noted: 2023-10-06

## 2023-10-06 PROBLEM — Z34.90 ENCOUNTER FOR INDUCTION OF LABOR: Status: RESOLVED | Noted: 2023-10-04 | Resolved: 2023-10-06

## 2023-10-06 LAB
BASOPHILS # BLD AUTO: 0.02 K/UL (ref 0–0.2)
BASOPHILS NFR BLD: 0.1 % (ref 0–1.9)
DIFFERENTIAL METHOD: ABNORMAL
EOSINOPHIL # BLD AUTO: 0 K/UL (ref 0–0.5)
EOSINOPHIL NFR BLD: 0 % (ref 0–8)
ERYTHROCYTE [DISTWIDTH] IN BLOOD BY AUTOMATED COUNT: 13.4 % (ref 11.5–14.5)
HCT VFR BLD AUTO: 25.3 % (ref 37–48.5)
HGB BLD-MCNC: 8.3 G/DL (ref 12–16)
IMM GRANULOCYTES # BLD AUTO: 0.1 K/UL (ref 0–0.04)
IMM GRANULOCYTES NFR BLD AUTO: 0.5 % (ref 0–0.5)
LYMPHOCYTES # BLD AUTO: 1.4 K/UL (ref 1–4.8)
LYMPHOCYTES NFR BLD: 7.9 % (ref 18–48)
MCH RBC QN AUTO: 26.8 PG (ref 27–31)
MCHC RBC AUTO-ENTMCNC: 32.8 G/DL (ref 32–36)
MCV RBC AUTO: 82 FL (ref 82–98)
MONOCYTES # BLD AUTO: 1.5 K/UL (ref 0.3–1)
MONOCYTES NFR BLD: 8 % (ref 4–15)
NEUTROPHILS # BLD AUTO: 15.2 K/UL (ref 1.8–7.7)
NEUTROPHILS NFR BLD: 83.5 % (ref 38–73)
NRBC BLD-RTO: 0 /100 WBC
PLATELET # BLD AUTO: 213 K/UL (ref 150–450)
PMV BLD AUTO: 11.9 FL (ref 9.2–12.9)
RBC # BLD AUTO: 3.1 M/UL (ref 4–5.4)
WBC # BLD AUTO: 18.2 K/UL (ref 3.9–12.7)

## 2023-10-06 PROCEDURE — 59514 PR CESAREAN DELIVERY ONLY: ICD-10-PCS | Mod: AT,,, | Performed by: OBSTETRICS & GYNECOLOGY

## 2023-10-06 PROCEDURE — 25000003 PHARM REV CODE 250: Performed by: ANESTHESIOLOGY

## 2023-10-06 PROCEDURE — 71000039 HC RECOVERY, EACH ADD'L HOUR: Performed by: OBSTETRICS & GYNECOLOGY

## 2023-10-06 PROCEDURE — 63600175 PHARM REV CODE 636 W HCPCS: Performed by: STUDENT IN AN ORGANIZED HEALTH CARE EDUCATION/TRAINING PROGRAM

## 2023-10-06 PROCEDURE — 58300 PR INSERT INTRAUTERINE DEVICE: ICD-10-PCS | Mod: 59,,, | Performed by: OBSTETRICS & GYNECOLOGY

## 2023-10-06 PROCEDURE — 36415 COLL VENOUS BLD VENIPUNCTURE: CPT

## 2023-10-06 PROCEDURE — 25000003 PHARM REV CODE 250

## 2023-10-06 PROCEDURE — 37000009 HC ANESTHESIA EA ADD 15 MINS: Performed by: OBSTETRICS & GYNECOLOGY

## 2023-10-06 PROCEDURE — 58300 INSERT INTRAUTERINE DEVICE: CPT | Mod: 59,,, | Performed by: OBSTETRICS & GYNECOLOGY

## 2023-10-06 PROCEDURE — 36004724 HC OB OR TIME LEV III - 1ST 15 MIN: Performed by: OBSTETRICS & GYNECOLOGY

## 2023-10-06 PROCEDURE — 59514 CESAREAN DELIVERY ONLY: CPT | Mod: AT,,, | Performed by: OBSTETRICS & GYNECOLOGY

## 2023-10-06 PROCEDURE — 25000003 PHARM REV CODE 250: Performed by: STUDENT IN AN ORGANIZED HEALTH CARE EDUCATION/TRAINING PROGRAM

## 2023-10-06 PROCEDURE — 85025 COMPLETE CBC W/AUTO DIFF WBC: CPT

## 2023-10-06 PROCEDURE — 71000033 HC RECOVERY, INTIAL HOUR: Performed by: OBSTETRICS & GYNECOLOGY

## 2023-10-06 PROCEDURE — 63600175 PHARM REV CODE 636 W HCPCS

## 2023-10-06 PROCEDURE — 11000001 HC ACUTE MED/SURG PRIVATE ROOM

## 2023-10-06 PROCEDURE — 37000008 HC ANESTHESIA 1ST 15 MINUTES: Performed by: OBSTETRICS & GYNECOLOGY

## 2023-10-06 PROCEDURE — 36004725 HC OB OR TIME LEV III - EA ADD 15 MIN: Performed by: OBSTETRICS & GYNECOLOGY

## 2023-10-06 PROCEDURE — 25000003 PHARM REV CODE 250: Performed by: OBSTETRICS & GYNECOLOGY

## 2023-10-06 RX ORDER — DIPHENHYDRAMINE HCL 25 MG
25 CAPSULE ORAL EVERY 4 HOURS PRN
Status: CANCELLED | OUTPATIENT
Start: 2023-10-06

## 2023-10-06 RX ORDER — OXYCODONE HYDROCHLORIDE 5 MG/1
5 TABLET ORAL EVERY 4 HOURS PRN
Status: ACTIVE | OUTPATIENT
Start: 2023-10-06 | End: 2023-10-07

## 2023-10-06 RX ORDER — PROCHLORPERAZINE EDISYLATE 5 MG/ML
5 INJECTION INTRAMUSCULAR; INTRAVENOUS EVERY 6 HOURS PRN
Status: CANCELLED | OUTPATIENT
Start: 2023-10-06

## 2023-10-06 RX ORDER — DOCUSATE SODIUM 100 MG/1
200 CAPSULE, LIQUID FILLED ORAL 2 TIMES DAILY
Status: CANCELLED | OUTPATIENT
Start: 2023-10-06

## 2023-10-06 RX ORDER — FENTANYL/BUPIVACAINE/NS/PF 2MCG/ML-.1
PLASTIC BAG, INJECTION (ML) INJECTION
Status: COMPLETED
Start: 2023-10-06 | End: 2023-10-06

## 2023-10-06 RX ORDER — MORPHINE SULFATE 0.5 MG/ML
INJECTION, SOLUTION EPIDURAL; INTRATHECAL; INTRAVENOUS
Status: DISCONTINUED | OUTPATIENT
Start: 2023-10-06 | End: 2023-10-06

## 2023-10-06 RX ORDER — SODIUM BICARBONATE 42 MG/ML
INJECTION, SOLUTION INTRAVENOUS
Status: DISCONTINUED | OUTPATIENT
Start: 2023-10-06 | End: 2023-10-06

## 2023-10-06 RX ORDER — ZOLPIDEM TARTRATE 5 MG/1
5 TABLET ORAL ONCE
Status: COMPLETED | OUTPATIENT
Start: 2023-10-06 | End: 2023-10-06

## 2023-10-06 RX ORDER — MISOPROSTOL 200 UG/1
800 TABLET ORAL ONCE AS NEEDED
Status: CANCELLED | OUTPATIENT
Start: 2023-10-06 | End: 2035-03-04

## 2023-10-06 RX ORDER — ONDANSETRON 8 MG/1
8 TABLET, ORALLY DISINTEGRATING ORAL EVERY 8 HOURS PRN
Status: CANCELLED | OUTPATIENT
Start: 2023-10-06

## 2023-10-06 RX ORDER — PRENATAL WITH FERROUS FUM AND FOLIC ACID 3080; 920; 120; 400; 22; 1.84; 3; 20; 10; 1; 12; 200; 27; 25; 2 [IU]/1; [IU]/1; MG/1; [IU]/1; MG/1; MG/1; MG/1; MG/1; MG/1; MG/1; UG/1; MG/1; MG/1; MG/1; MG/1
1 TABLET ORAL DAILY
Status: CANCELLED | OUTPATIENT
Start: 2023-10-06

## 2023-10-06 RX ORDER — OXYTOCIN/RINGER'S LACTATE 30/500 ML
95 PLASTIC BAG, INJECTION (ML) INTRAVENOUS ONCE
Status: CANCELLED | OUTPATIENT
Start: 2023-10-06 | End: 2023-10-06

## 2023-10-06 RX ORDER — AMOXICILLIN 250 MG
1 CAPSULE ORAL NIGHTLY PRN
Status: CANCELLED | OUTPATIENT
Start: 2023-10-06

## 2023-10-06 RX ORDER — MISOPROSTOL 200 UG/1
800 TABLET ORAL ONCE AS NEEDED
Status: CANCELLED | OUTPATIENT
Start: 2023-10-06

## 2023-10-06 RX ORDER — ADHESIVE BANDAGE
30 BANDAGE TOPICAL 2 TIMES DAILY PRN
Status: CANCELLED | OUTPATIENT
Start: 2023-10-07

## 2023-10-06 RX ORDER — SIMETHICONE 80 MG
1 TABLET,CHEWABLE ORAL EVERY 6 HOURS PRN
Status: CANCELLED | OUTPATIENT
Start: 2023-10-06

## 2023-10-06 RX ORDER — IBUPROFEN 600 MG/1
600 TABLET ORAL EVERY 6 HOURS
Status: CANCELLED | OUTPATIENT
Start: 2023-10-07

## 2023-10-06 RX ORDER — OXYTOCIN/RINGER'S LACTATE 30/500 ML
334 PLASTIC BAG, INJECTION (ML) INTRAVENOUS ONCE AS NEEDED
Status: CANCELLED | OUTPATIENT
Start: 2023-10-06 | End: 2035-03-04

## 2023-10-06 RX ORDER — FENTANYL CITRATE 50 UG/ML
INJECTION, SOLUTION INTRAMUSCULAR; INTRAVENOUS
Status: DISCONTINUED | OUTPATIENT
Start: 2023-10-06 | End: 2023-10-06

## 2023-10-06 RX ORDER — LIDOCAINE HCL/EPINEPHRINE/PF 2%-1:200K
VIAL (ML) INJECTION
Status: DISCONTINUED | OUTPATIENT
Start: 2023-10-06 | End: 2023-10-06

## 2023-10-06 RX ORDER — DEXAMETHASONE SODIUM PHOSPHATE 4 MG/ML
INJECTION, SOLUTION INTRA-ARTICULAR; INTRALESIONAL; INTRAMUSCULAR; INTRAVENOUS; SOFT TISSUE
Status: DISCONTINUED | OUTPATIENT
Start: 2023-10-06 | End: 2023-10-06

## 2023-10-06 RX ORDER — ENOXAPARIN SODIUM 100 MG/ML
40 INJECTION SUBCUTANEOUS EVERY 24 HOURS
Status: CANCELLED | OUTPATIENT
Start: 2023-10-07

## 2023-10-06 RX ORDER — BISACODYL 10 MG
10 SUPPOSITORY, RECTAL RECTAL ONCE AS NEEDED
Status: CANCELLED | OUTPATIENT
Start: 2023-10-06 | End: 2035-03-04

## 2023-10-06 RX ORDER — METHYLERGONOVINE MALEATE 0.2 MG/ML
200 INJECTION INTRAVENOUS ONCE AS NEEDED
Status: CANCELLED | OUTPATIENT
Start: 2023-10-06 | End: 2035-03-04

## 2023-10-06 RX ORDER — CARBOPROST TROMETHAMINE 250 UG/ML
250 INJECTION, SOLUTION INTRAMUSCULAR
Status: CANCELLED | OUTPATIENT
Start: 2023-10-06

## 2023-10-06 RX ORDER — KETOROLAC TROMETHAMINE 30 MG/ML
30 INJECTION, SOLUTION INTRAMUSCULAR; INTRAVENOUS EVERY 6 HOURS
Status: COMPLETED | OUTPATIENT
Start: 2023-10-06 | End: 2023-10-07

## 2023-10-06 RX ORDER — OXYTOCIN 10 [USP'U]/ML
10 INJECTION, SOLUTION INTRAMUSCULAR; INTRAVENOUS ONCE AS NEEDED
Status: CANCELLED | OUTPATIENT
Start: 2023-10-06 | End: 2035-03-04

## 2023-10-06 RX ORDER — LABETALOL HYDROCHLORIDE 5 MG/ML
INJECTION, SOLUTION INTRAVENOUS
Status: COMPLETED
Start: 2023-10-06 | End: 2023-10-06

## 2023-10-06 RX ORDER — KETOROLAC TROMETHAMINE 30 MG/ML
INJECTION, SOLUTION INTRAMUSCULAR; INTRAVENOUS
Status: DISCONTINUED | OUTPATIENT
Start: 2023-10-06 | End: 2023-10-06

## 2023-10-06 RX ORDER — TRANEXAMIC ACID 10 MG/ML
1000 INJECTION, SOLUTION INTRAVENOUS EVERY 30 MIN PRN
Status: CANCELLED | OUTPATIENT
Start: 2023-10-06

## 2023-10-06 RX ORDER — OXYTOCIN 10 [USP'U]/ML
INJECTION, SOLUTION INTRAMUSCULAR; INTRAVENOUS
Status: DISCONTINUED | OUTPATIENT
Start: 2023-10-06 | End: 2023-10-06

## 2023-10-06 RX ORDER — ACETAMINOPHEN 10 MG/ML
INJECTION, SOLUTION INTRAVENOUS
Status: DISCONTINUED | OUTPATIENT
Start: 2023-10-06 | End: 2023-10-06

## 2023-10-06 RX ORDER — DIPHENOXYLATE HYDROCHLORIDE AND ATROPINE SULFATE 2.5; .025 MG/1; MG/1
2 TABLET ORAL EVERY 6 HOURS PRN
Status: CANCELLED | OUTPATIENT
Start: 2023-10-06

## 2023-10-06 RX ORDER — ACETAMINOPHEN 325 MG/1
650 TABLET ORAL EVERY 6 HOURS
Status: COMPLETED | OUTPATIENT
Start: 2023-10-06 | End: 2023-10-07

## 2023-10-06 RX ORDER — BUPIVACAINE HYDROCHLORIDE 2.5 MG/ML
INJECTION, SOLUTION EPIDURAL; INFILTRATION; INTRACAUDAL
Status: DISPENSED
Start: 2023-10-06 | End: 2023-10-06

## 2023-10-06 RX ORDER — ONDANSETRON 2 MG/ML
4 INJECTION INTRAMUSCULAR; INTRAVENOUS EVERY 6 HOURS PRN
Status: DISPENSED | OUTPATIENT
Start: 2023-10-06 | End: 2023-10-07

## 2023-10-06 RX ORDER — PROCHLORPERAZINE EDISYLATE 5 MG/ML
5 INJECTION INTRAMUSCULAR; INTRAVENOUS EVERY 6 HOURS PRN
Status: DISCONTINUED | OUTPATIENT
Start: 2023-10-06 | End: 2023-10-10 | Stop reason: HOSPADM

## 2023-10-06 RX ORDER — ENOXAPARIN SODIUM 100 MG/ML
40 INJECTION SUBCUTANEOUS EVERY 24 HOURS
Status: DISCONTINUED | OUTPATIENT
Start: 2023-10-07 | End: 2023-10-10 | Stop reason: HOSPADM

## 2023-10-06 RX ORDER — SODIUM CHLORIDE 0.9 % (FLUSH) 0.9 %
10 SYRINGE (ML) INJECTION
Status: CANCELLED | OUTPATIENT
Start: 2023-10-06

## 2023-10-06 RX ORDER — SODIUM CHLORIDE, SODIUM LACTATE, POTASSIUM CHLORIDE, CALCIUM CHLORIDE 600; 310; 30; 20 MG/100ML; MG/100ML; MG/100ML; MG/100ML
INJECTION, SOLUTION INTRAVENOUS CONTINUOUS PRN
Status: DISCONTINUED | OUTPATIENT
Start: 2023-10-06 | End: 2023-10-06

## 2023-10-06 RX ORDER — NIFEDIPINE 30 MG/1
30 TABLET, EXTENDED RELEASE ORAL DAILY
Status: DISCONTINUED | OUTPATIENT
Start: 2023-10-06 | End: 2023-10-10 | Stop reason: HOSPADM

## 2023-10-06 RX ORDER — ONDANSETRON 2 MG/ML
INJECTION INTRAMUSCULAR; INTRAVENOUS
Status: DISCONTINUED | OUTPATIENT
Start: 2023-10-06 | End: 2023-10-06

## 2023-10-06 RX ORDER — OXYCODONE HYDROCHLORIDE 10 MG/1
10 TABLET ORAL EVERY 4 HOURS PRN
Status: DISPENSED | OUTPATIENT
Start: 2023-10-06 | End: 2023-10-07

## 2023-10-06 RX ORDER — OXYCODONE AND ACETAMINOPHEN 5; 325 MG/1; MG/1
1 TABLET ORAL EVERY 4 HOURS PRN
Status: CANCELLED | OUTPATIENT
Start: 2023-10-06

## 2023-10-06 RX ORDER — AZITHROMYCIN 100 MG/ML
INJECTION, POWDER, LYOPHILIZED, FOR SOLUTION INTRAVENOUS
Status: COMPLETED
Start: 2023-10-06 | End: 2023-10-06

## 2023-10-06 RX ORDER — OXYTOCIN/RINGER'S LACTATE 30/500 ML
95 PLASTIC BAG, INJECTION (ML) INTRAVENOUS ONCE AS NEEDED
Status: CANCELLED | OUTPATIENT
Start: 2023-10-06 | End: 2035-03-04

## 2023-10-06 RX ORDER — LABETALOL HYDROCHLORIDE 5 MG/ML
20 INJECTION, SOLUTION INTRAVENOUS ONCE
Status: COMPLETED | OUTPATIENT
Start: 2023-10-06 | End: 2023-10-06

## 2023-10-06 RX ORDER — LANOLIN ALCOHOL/MO/W.PET/CERES
1 CREAM (GRAM) TOPICAL 2 TIMES DAILY
Status: DISCONTINUED | OUTPATIENT
Start: 2023-10-07 | End: 2023-10-10 | Stop reason: HOSPADM

## 2023-10-06 RX ORDER — OXYCODONE AND ACETAMINOPHEN 10; 325 MG/1; MG/1
1 TABLET ORAL EVERY 4 HOURS PRN
Status: CANCELLED | OUTPATIENT
Start: 2023-10-06

## 2023-10-06 RX ADMIN — OXYCODONE HYDROCHLORIDE 10 MG: 10 TABLET ORAL at 10:10

## 2023-10-06 RX ADMIN — ONDANSETRON HYDROCHLORIDE 4 MG: 2 INJECTION INTRAMUSCULAR; INTRAVENOUS at 08:10

## 2023-10-06 RX ADMIN — LIDOCAINE HYDROCHLORIDE,EPINEPHRINE BITARTRATE 10 ML: 20; .005 INJECTION, SOLUTION EPIDURAL; INFILTRATION; INTRACAUDAL; PERINEURAL at 08:10

## 2023-10-06 RX ADMIN — FAMOTIDINE 20 MG: 10 INJECTION, SOLUTION INTRAVENOUS at 07:10

## 2023-10-06 RX ADMIN — AZITHROMYCIN 500 MG: 500 INJECTION, POWDER, LYOPHILIZED, FOR SOLUTION INTRAVENOUS at 07:10

## 2023-10-06 RX ADMIN — OXYTOCIN 10 UNITS: 10 INJECTION, SOLUTION INTRAMUSCULAR; INTRAVENOUS at 09:10

## 2023-10-06 RX ADMIN — KETOROLAC TROMETHAMINE 30 MG: 30 INJECTION, SOLUTION INTRAMUSCULAR; INTRAVENOUS at 02:10

## 2023-10-06 RX ADMIN — FENTANYL CITRATE 100 MCG: 0.05 INJECTION, SOLUTION INTRAMUSCULAR; INTRAVENOUS at 08:10

## 2023-10-06 RX ADMIN — FAMOTIDINE 20 MG: 10 INJECTION, SOLUTION INTRAVENOUS at 08:10

## 2023-10-06 RX ADMIN — Medication 14 MILLI-UNITS/MIN: at 12:10

## 2023-10-06 RX ADMIN — OXYCODONE HYDROCHLORIDE 10 MG: 10 TABLET ORAL at 08:10

## 2023-10-06 RX ADMIN — MAGNESIUM SULFATE HEPTAHYDRATE 2 G/HR: 40 INJECTION, SOLUTION INTRAVENOUS at 11:10

## 2023-10-06 RX ADMIN — SODIUM BICARBONATE 0.5 MEQ: 42 INJECTION, SOLUTION INTRAVENOUS at 08:10

## 2023-10-06 RX ADMIN — SODIUM CHLORIDE, POTASSIUM CHLORIDE, SODIUM LACTATE AND CALCIUM CHLORIDE: 600; 310; 30; 20 INJECTION, SOLUTION INTRAVENOUS at 02:10

## 2023-10-06 RX ADMIN — SODIUM CHLORIDE, POTASSIUM CHLORIDE, SODIUM LACTATE AND CALCIUM CHLORIDE: 600; 310; 30; 20 INJECTION, SOLUTION INTRAVENOUS at 01:10

## 2023-10-06 RX ADMIN — LABETALOL HYDROCHLORIDE 20 MG: 5 INJECTION INTRAVENOUS at 11:10

## 2023-10-06 RX ADMIN — DEXAMETHASONE SODIUM PHOSPHATE 4 MG: 4 INJECTION, SOLUTION INTRAMUSCULAR; INTRAVENOUS at 08:10

## 2023-10-06 RX ADMIN — SODIUM CITRATE AND CITRIC ACID MONOHYDRATE 30 ML: 500; 334 SOLUTION ORAL at 07:10

## 2023-10-06 RX ADMIN — ACETAMINOPHEN 650 MG: 325 TABLET, FILM COATED ORAL at 05:10

## 2023-10-06 RX ADMIN — MORPHINE SULFATE 1 MG: 0.5 INJECTION, SOLUTION EPIDURAL; INTRATHECAL; INTRAVENOUS at 09:10

## 2023-10-06 RX ADMIN — Medication 95 MILLI-UNITS/MIN: at 09:10

## 2023-10-06 RX ADMIN — SODIUM CHLORIDE, SODIUM LACTATE, POTASSIUM CHLORIDE, AND CALCIUM CHLORIDE: 600; 310; 30; 20 INJECTION, SOLUTION INTRAVENOUS at 08:10

## 2023-10-06 RX ADMIN — KETOROLAC TROMETHAMINE 30 MG: 30 INJECTION, SOLUTION INTRAMUSCULAR; INTRAVENOUS at 09:10

## 2023-10-06 RX ADMIN — LABETALOL HYDROCHLORIDE 20 MG: 5 INJECTION, SOLUTION INTRAVENOUS at 11:10

## 2023-10-06 RX ADMIN — OXYTOCIN 5 UNITS: 10 INJECTION, SOLUTION INTRAMUSCULAR; INTRAVENOUS at 09:10

## 2023-10-06 RX ADMIN — CALCIUM CARBONATE (ANTACID) CHEW TAB 500 MG 500 MG: 500 CHEW TAB at 12:10

## 2023-10-06 RX ADMIN — OXYCODONE HYDROCHLORIDE 10 MG: 10 TABLET ORAL at 02:10

## 2023-10-06 RX ADMIN — KETOROLAC TROMETHAMINE 30 MG: 30 INJECTION, SOLUTION INTRAMUSCULAR; INTRAVENOUS at 08:10

## 2023-10-06 RX ADMIN — ZOLPIDEM TARTRATE 5 MG: 5 TABLET ORAL at 07:10

## 2023-10-06 RX ADMIN — ACETAMINOPHEN 1000 MG: 10 INJECTION INTRAVENOUS at 08:10

## 2023-10-06 RX ADMIN — NIFEDIPINE 30 MG: 30 TABLET, FILM COATED, EXTENDED RELEASE ORAL at 01:10

## 2023-10-06 NOTE — L&D DELIVERY NOTE
SUMMARY     Date of Procedure: 10/6/2023     Procedure:   Primary Low Transverse  section via pfannenstiel incision  Paragard IUD placement    Surgeon(s) and Role:     * Pina Figueroa MD - Primary     * Emmy Henson MD - Resident - Assisting     * Rodrigue Goodman MD - Resident - Assisting    Pre-Operative Diagnosis: Failed induction of labor, unspecified type [O61.9]    Post-Operative Diagnosis: Post-Op Diagnosis Codes:     * Failed induction of labor, unspecified type [O61.9]    Anesthesia: Spinal/Epidural    Findings:    1. Single viable  male infant, with APGARS 5/8, weight 2410g.   2. Normal appearing uterus, ovaries, and fallopian tubes.  3. Normal placenta    Estimated Blood Loss:  845 mL           Total IV Fluids: see anesthesia note     UOP: see anesthesia note    Procedure Details   The patient was seen in the Holding Room. The risks, benefits, complications, treatment options, and expected outcomes were discussed with the patient.  The patient concurred with the proposed plan, giving informed consent.  The patient was taken to Operating Room, identified as Nina Durant and the procedure verified as  Delivery. A Time Out was held and the above information confirmed.    After induction of anesthesia, the patient was prepped and draped in the usual sterile manner while placed in a dorsal supine position with a left lateral tilt.  A camejo catheter was also placed per nursing. Preoperative antibiotics Ancef g and azithromycin 500 mg were administered. An allis test was performed to confirm adequate anesthesia.  A Pfannenstiel skin incision was made and carried down through the subcutaneous tissue to the fascia. Fascial incision was made and extended transversely. The fascia was grasped with [facility] clamps and  from the underlying rectus tissue superiorly and inferiorly. The peritoneum was identified, found to be free of adherent bowel, and  entered. The peritoneal incision was extended longitudinally. The vesico-uterine peritoneum was identified, and bladder blade was inserted. A low transverse uterine incision was made with knife and extended with cephalo caudal traction. The amniotic sac was ruptured upon entry to the hysterotomy and the infant was noted to be in cephalic presentation. The head was brought to the incision and elevated out of the pelvis. The patient delivered a single viable male infant without difficulty.  Infant weighed 2410 grams with Apgar scores of 5/8 at one and five minutes respectively. After the umbilical cord was clamped and cut, cord blood was obtained for evaluation. The placenta was removed intact, appeared normal, and was discarded. The uterus was exteriorized. The uterine outline, tubes and ovaries appeared normal. The Paragard IUD was placed to the fundus and and the strings were tucked into the lower uterine segment. The uterine incision was closed with running locked sutures of 1 chromic. Excellent hemostasis was observed.  The uterus was then returned to the abdominal cavity. Incision was reinspected and good hemostasis was noted. The peritoneum and muscle were reapproximated with 2-0 vicryl and 1 chromic respectively. The fascia was then reapproximated with running sutures of 0 Vicryl. The subcutaneous fat was reapproximated with 2-0 vicryl, and skin was reapproximated with 4-0 monocryl in a subcuticular fashion.    Instrument, sponge, and needle counts were correct prior the abdominal closure and at the conclusion of the case.     The patient tolerated procedure well and was taken to the recovery room in stable condition after the procedure.    Complications: No    Estimated Blood Loss (EBL): 845 cc    Condition: Good    Disposition: PACU - hemodynamically stable.    Attestation: Good          Specimens:   Specimen (24h ago, onward)      None          Delivery Information for Arnold Wood Aschebrock    Birth  "information:  YOB: 2021   Time of birth: 9:00 AM   Sex: male   Head Delivery Date/Time: 10/6/2023  9:00 AM   Delivery type: , Low Transverse   Gestational Age: 36w0d        Delivery Providers    Delivering clinician: Pina Figueroa MD   Provider Role    Rodrigue Goodman MD Resident    Emmy Henson MD Resident    Rosanna Littlejohn RN Circulator    Jocelyne Clinton,  Surgical Tech    Génesis Rios RN Registered Nurse    Eden, Emely Malorie, RN Registered Nurse    Herminia Alberts MD Anesthesiologist    Casper Hogue, DO Resident              Measurements    Weight: 2410 g  Weight (lbs): 5 lb 5 oz  Length: 48.3 cm  Length (in): 19"  Head circumference: 33 cm  Chest circumference: 29.8 cm         Apgars    Living status: Living  Apgar Component Scores:  1 min.:  5 min.:  10 min.:  15 min.:  20 min.:    Skin color:  0  1       Heart rate:  2  2       Reflex irritability:  1  2       Muscle tone:  1  1       Respiratory effort:  1  2       Total:  5  8       Apgars assigned by: HANH RIOS RN         Operative Delivery    Forceps attempted?: No  Vacuum extractor attempted?: No         Shoulder Dystocia    Shoulder dystocia present?: No           Presentation    Presentation: Vertex  Position: Right Occiput Anterior           Interventions/Resuscitation    Method: Bulb Suctioning, Tactile Stimulation, Deep Suctioning, CPAP, PPV       Cord    Vessels: 3 vessels  Complications: Body  Cord Around: shoulders  Delayed Cord Clamping?: No  Cord Clamped Date/Time: 10/6/2023  9:00 AM  Cord Blood Disposition: Sent with Baby  Gases Sent?: No  Stem Cell Collection (by MD): No       Placenta    Placenta delivery date/time: 10/6/2023 0902  Placenta removal: Manual removal  Placenta appearance: Intact  Placenta disposition: Discarded           Labor Events:       labor: Yes     Labor Onset Date/Time:         Dilation Complete Date/Time:         Start Pushing Date/Time:     "     Start Pushing Date/Time:       Rupture Date/Time: 10/05/23  1320         Rupture type: ARM (Artificial Rupture)         Fluid Amount:       Fluid Color: Clear               steroids: Full Course     Antibiotics given for GBS: No     Induction: balloon dilation (Cutler)     Indications for induction:  Severe Preeclampsia     Augmentation: oxytocin;amniotomy     Indications for augmentation: Ineffective Contraction Pattern     Labor complications:       Additional complications:          Cervical ripening:                     Delivery:      Episiotomy:       Indication for Episiotomy:       Perineal Lacerations:   Repaired:      Periurethral Laceration:   Repaired:     Labial Laceration:   Repaired:     Sulcus Laceration:   Repaired:     Vaginal Laceration:   Repaired:     Cervical Laceration:   Repaired:     Repair suture:       Repair # of packets:       Last Value - EBL - Nursing (mL):       Sum - EBL - Nursing (mL): 0     Last Value - EBL - Anesthesia (mL):      Calculated QBL (mL): 845      Vaginal Sweep Performed:       Surgicount Correct:       Vaginal Packing:   Quantity:       Other providers:       Anesthesia    Method: General          Details (if applicable):  Trial of Labor Yes    Categorization: Primary    Priority: Urgent   Indications for : Failed induction;Fetal heart rate abnormalities   Incision Type: low transverse     Additional  information:  Forceps:    Vacuum:    Breech:    Observed anomalies    Other (Comments):       Rodrigue Vines MD  Obstetrics and Gynecology- PGY1    I agree with operative note. I was scrubbed and immediately available during entire surgical procedure performed by the resident(s).

## 2023-10-06 NOTE — PROGRESS NOTES
"LABOR NOTE    S:  MD to bedside for routine cervical exam. Epidural working:  yes  No pt concerns at this time    O: /81   Pulse 98   Temp 98.1 °F (36.7 °C) (Oral)   Resp 18   Ht 5' 1" (1.549 m)   Wt 69.4 kg (153 lb)   LMP 01/18/2023 (Exact Date)   SpO2 99%   Breastfeeding No   BMI 28.91 kg/m²     FHT: 100 bpm, moderate variability, +accels, -decels , Cat 2 reassuring with moderate variability and large acceleration with fetal scalp stimulation  CTX: q 3 min  SVE: 5/60/-1, IUPC in place    TIMELINE:  0100: 1/60/-3; starting pit  0530: 3/60-3; camejo balloon out  0930: 3/60-3, attempted AROM but unsuccessful   1330: 4/60/-3, AROM clear   1630: 4/60/-3, IUPC placed  2130: 5/60/-1, IUPC in place, pit @30 mU/min    PLAN:  Continue Close Maternal/Fetal Monitoring  Pitocin Augmentation per protocol  Recheck 4 hours or PRN    Niecy Orantes M.D.  OB/GYN PGY- 4    "

## 2023-10-06 NOTE — TRANSFER OF CARE
"Anesthesia Transfer of Care Note    Patient: Nina Durant    Procedure(s) Performed: * No procedures listed *    Patient location: Labor and Delivery    Anesthesia Type: epidural    Transport from OR: Transported from OR on room air with adequate spontaneous ventilation    Post pain: adequate analgesia    Post vital signs: stable    Level of consciousness: awake, alert and oriented    Nausea/Vomiting: no nausea/vomiting    Complications: none    Transfer of care protocol was followed      Last vitals:   Visit Vitals  /88   Pulse 108   Temp 35.7 °C (96.2 °F) (Axillary)   Resp 18   Ht 5' 1" (1.549 m)   Wt 69.4 kg (153 lb)   LMP 01/18/2023 (Exact Date)   SpO2 99%   Breastfeeding No   BMI 28.91 kg/m²     "

## 2023-10-06 NOTE — PROGRESS NOTES
"LABOR NOTE    S:  MD to bedside for routine cervical exam. Epidural working:  yes  No pt concerns at this time    O: BP (!) 148/91   Pulse 101   Temp 98.1 °F (36.7 °C)   Resp 18   Ht 5' 1" (1.549 m)   Wt 69.4 kg (153 lb)   LMP 2023 (Exact Date)   SpO2 100%   Breastfeeding No   BMI 28.91 kg/m²     FHT: 100 bpm, moderate variability, +accels, -decels , Cat 2 reassuring with moderate variability and continued accelerations with fetal scalp stimulation  CTX: q 3 min  SVE: /-1, IUPC in place    TIMELINE:  0100: 3; starting pit  0530: 3/60-3; camejo balloon out  0930: 3/60-3, attempted AROM but unsuccessful   1330: /-3, AROM clear   1630: -3, IUPC placed  2130: -1, IUPC in place, pit @30 mU/min  0145: 1, IUPC in place MVUs not adequate   0500: -1, MVUs not adequate    PLAN:  Discussed meeting criteria for failed induction of labor at this time. Fetus with continued low baseline but moderate variability, accels, and good response to fetal scalp stimulation. Recommended proceeding with  delivery at this time, however patient strongly desires to continue to attempts at vaginal delivery. She requests to the be rechecked at 0700 when her primary OB returns to the hospital. If she is not in active labor at that time she would like to proceed with  delivery.  Continue Close Maternal/Fetal Monitoring  Pitocin Augmentation per protocol  Recheck 2 hours or PRN    Niecy Orantes M.D.  OB/GYN PGY- 4    "

## 2023-10-06 NOTE — ANESTHESIA POSTPROCEDURE EVALUATION
Anesthesia Post Evaluation    Patient: Nina Millsbrobhavik    Procedure(s) Performed: * No procedures listed *    [facility] Anesthesia Post Op Evaluation      Vitals Value Taken Time   BP *** 10/06/23 0944   Temp *** 10/06/23 0944   Pulse *** 10/06/23 0944   Resp *** 10/06/23 0944   SpO2 *** 10/06/23 0944         No case tracking events are documented in the log.      Pain/Timothy Score: No data recorded

## 2023-10-06 NOTE — CARE UPDATE
Myself and Dr. Figueroa to bedside.   Repeat SVE 60/-1, significant scalp molding noted, unchanged from previous.     FHR baseline 95, moderate BTBV, + accels, no decels, category 2, overall reassuring.     Discussed recommendation for  delivery in the setting of failed IOL. Patient amenable to proceeding with  delivery. All questions answered. Anesthesia at bedside to assess epidural status. Pitocin off.     CN, Staff, Anesthesia aware. To OR for  delivery due to failed IOL.     Emmy Henson MD   PGY-4, OB-GYN

## 2023-10-06 NOTE — LACTATION NOTE
10/06/23 9917   Maternal Assessment   Breast Shape Bilateral:;round   Breast Density Right:;soft   Areola Right:;elastic   Nipples Right:;everted   Maternal Infant Feeding   Maternal Emotional State anxious     Baby at the breast sleeping. Pt shared that baby previously breast feed well. LC provided Pt education on behaviors of pre-term babies. Pt explained that she is sleepy and wants to initiate pump so FOB can feed. LC acknowledged understanding and explained volume expectation initiating pump. LC discussed donor milk as option for supplementation. Nurse made aware of Pt's desire. LC assisted with hand expression and feeding baby. LC encouraged max skin to skin. Lactation Basics education completed. LC reviewed Breastfeeding Guide and encouraged tracking feeds and output. Encouraged use of STS, frequent feeds based on baby's cues or at least every three hours, and avoiding artificial nipples. Pt verbalized understanding and questions answered. Pt aware to call LC for assistance with feeding.

## 2023-10-07 LAB
BASOPHILS # BLD AUTO: 0.01 K/UL (ref 0–0.2)
BASOPHILS NFR BLD: 0.1 % (ref 0–1.9)
CREAT SERPL-MCNC: 0.8 MG/DL (ref 0.5–1.4)
DIFFERENTIAL METHOD: ABNORMAL
EOSINOPHIL # BLD AUTO: 0 K/UL (ref 0–0.5)
EOSINOPHIL NFR BLD: 0 % (ref 0–8)
ERYTHROCYTE [DISTWIDTH] IN BLOOD BY AUTOMATED COUNT: 13.3 % (ref 11.5–14.5)
EST. GFR  (NO RACE VARIABLE): >60 ML/MIN/1.73 M^2
HCT VFR BLD AUTO: 29.5 % (ref 37–48.5)
HGB BLD-MCNC: 9.7 G/DL (ref 12–16)
IMM GRANULOCYTES # BLD AUTO: 0.05 K/UL (ref 0–0.04)
IMM GRANULOCYTES NFR BLD AUTO: 0.4 % (ref 0–0.5)
LYMPHOCYTES # BLD AUTO: 1.4 K/UL (ref 1–4.8)
LYMPHOCYTES NFR BLD: 11.1 % (ref 18–48)
MCH RBC QN AUTO: 27.1 PG (ref 27–31)
MCHC RBC AUTO-ENTMCNC: 32.9 G/DL (ref 32–36)
MCV RBC AUTO: 82 FL (ref 82–98)
MONOCYTES # BLD AUTO: 0.9 K/UL (ref 0.3–1)
MONOCYTES NFR BLD: 7.7 % (ref 4–15)
NEUTROPHILS # BLD AUTO: 9.8 K/UL (ref 1.8–7.7)
NEUTROPHILS NFR BLD: 80.7 % (ref 38–73)
NRBC BLD-RTO: 0 /100 WBC
PLATELET # BLD AUTO: 163 K/UL (ref 150–450)
PMV BLD AUTO: 12.1 FL (ref 9.2–12.9)
RBC # BLD AUTO: 3.58 M/UL (ref 4–5.4)
WBC # BLD AUTO: 12.12 K/UL (ref 3.9–12.7)

## 2023-10-07 PROCEDURE — 63600175 PHARM REV CODE 636 W HCPCS

## 2023-10-07 PROCEDURE — 85025 COMPLETE CBC W/AUTO DIFF WBC: CPT

## 2023-10-07 PROCEDURE — 11000001 HC ACUTE MED/SURG PRIVATE ROOM

## 2023-10-07 PROCEDURE — 63600175 PHARM REV CODE 636 W HCPCS: Performed by: STUDENT IN AN ORGANIZED HEALTH CARE EDUCATION/TRAINING PROGRAM

## 2023-10-07 PROCEDURE — 99232 PR SUBSEQUENT HOSPITAL CARE,LEVL II: ICD-10-PCS | Mod: ,,, | Performed by: OBSTETRICS & GYNECOLOGY

## 2023-10-07 PROCEDURE — 36415 COLL VENOUS BLD VENIPUNCTURE: CPT

## 2023-10-07 PROCEDURE — 25000003 PHARM REV CODE 250

## 2023-10-07 PROCEDURE — 25000003 PHARM REV CODE 250: Performed by: STUDENT IN AN ORGANIZED HEALTH CARE EDUCATION/TRAINING PROGRAM

## 2023-10-07 PROCEDURE — 99232 SBSQ HOSP IP/OBS MODERATE 35: CPT | Mod: ,,, | Performed by: OBSTETRICS & GYNECOLOGY

## 2023-10-07 PROCEDURE — 82565 ASSAY OF CREATININE: CPT

## 2023-10-07 PROCEDURE — 25000003 PHARM REV CODE 250: Performed by: OBSTETRICS & GYNECOLOGY

## 2023-10-07 RX ORDER — OXYCODONE HYDROCHLORIDE 5 MG/1
5 TABLET ORAL EVERY 4 HOURS PRN
Status: DISCONTINUED | OUTPATIENT
Start: 2023-10-07 | End: 2023-10-10 | Stop reason: HOSPADM

## 2023-10-07 RX ORDER — SENNOSIDES 8.6 MG/1
8.6 TABLET ORAL DAILY PRN
Status: DISCONTINUED | OUTPATIENT
Start: 2023-10-07 | End: 2023-10-10 | Stop reason: HOSPADM

## 2023-10-07 RX ORDER — HYDROCODONE BITARTRATE AND ACETAMINOPHEN 10; 325 MG/1; MG/1
1 TABLET ORAL EVERY 6 HOURS PRN
Status: DISCONTINUED | OUTPATIENT
Start: 2023-10-07 | End: 2023-10-07

## 2023-10-07 RX ORDER — HYDROCODONE BITARTRATE AND ACETAMINOPHEN 5; 325 MG/1; MG/1
1 TABLET ORAL EVERY 6 HOURS PRN
Status: DISCONTINUED | OUTPATIENT
Start: 2023-10-07 | End: 2023-10-07

## 2023-10-07 RX ORDER — IBUPROFEN 400 MG/1
800 TABLET ORAL EVERY 8 HOURS
Status: DISCONTINUED | OUTPATIENT
Start: 2023-10-07 | End: 2023-10-10 | Stop reason: HOSPADM

## 2023-10-07 RX ORDER — LIDOCAINE 50 MG/G
1 PATCH TOPICAL
Status: DISCONTINUED | OUTPATIENT
Start: 2023-10-07 | End: 2023-10-10 | Stop reason: HOSPADM

## 2023-10-07 RX ORDER — OXYCODONE HYDROCHLORIDE 10 MG/1
10 TABLET ORAL EVERY 4 HOURS PRN
Status: DISCONTINUED | OUTPATIENT
Start: 2023-10-07 | End: 2023-10-10 | Stop reason: HOSPADM

## 2023-10-07 RX ORDER — ACETAMINOPHEN 325 MG/1
650 TABLET ORAL EVERY 6 HOURS
Status: DISCONTINUED | OUTPATIENT
Start: 2023-10-07 | End: 2023-10-10 | Stop reason: HOSPADM

## 2023-10-07 RX ORDER — DOCUSATE SODIUM 100 MG/1
100 CAPSULE, LIQUID FILLED ORAL 2 TIMES DAILY
Status: DISCONTINUED | OUTPATIENT
Start: 2023-10-07 | End: 2023-10-10 | Stop reason: HOSPADM

## 2023-10-07 RX ADMIN — FERROUS SULFATE TAB 325 MG (65 MG ELEMENTAL FE) 1 EACH: 325 (65 FE) TAB at 09:10

## 2023-10-07 RX ADMIN — ACETAMINOPHEN 650 MG: 325 TABLET, FILM COATED ORAL at 12:10

## 2023-10-07 RX ADMIN — ACETAMINOPHEN 650 MG: 325 TABLET, FILM COATED ORAL at 09:10

## 2023-10-07 RX ADMIN — OXYCODONE HYDROCHLORIDE 10 MG: 10 TABLET ORAL at 02:10

## 2023-10-07 RX ADMIN — DOCUSATE SODIUM 100 MG: 100 CAPSULE, LIQUID FILLED ORAL at 09:10

## 2023-10-07 RX ADMIN — ONDANSETRON 4 MG: 2 INJECTION INTRAMUSCULAR; INTRAVENOUS at 09:10

## 2023-10-07 RX ADMIN — SIMETHICONE 80 MG: 80 TABLET, CHEWABLE ORAL at 06:10

## 2023-10-07 RX ADMIN — MAGNESIUM SULFATE HEPTAHYDRATE 2 G/HR: 40 INJECTION, SOLUTION INTRAVENOUS at 06:10

## 2023-10-07 RX ADMIN — KETOROLAC TROMETHAMINE 30 MG: 30 INJECTION, SOLUTION INTRAMUSCULAR; INTRAVENOUS at 09:10

## 2023-10-07 RX ADMIN — SODIUM CHLORIDE, POTASSIUM CHLORIDE, SODIUM LACTATE AND CALCIUM CHLORIDE: 600; 310; 30; 20 INJECTION, SOLUTION INTRAVENOUS at 02:10

## 2023-10-07 RX ADMIN — ACETAMINOPHEN 650 MG: 325 TABLET, FILM COATED ORAL at 01:10

## 2023-10-07 RX ADMIN — IBUPROFEN 800 MG: 400 TABLET ORAL at 05:10

## 2023-10-07 RX ADMIN — KETOROLAC TROMETHAMINE 30 MG: 30 INJECTION, SOLUTION INTRAMUSCULAR; INTRAVENOUS at 02:10

## 2023-10-07 RX ADMIN — ACETAMINOPHEN 650 MG: 325 TABLET, FILM COATED ORAL at 06:10

## 2023-10-07 RX ADMIN — SIMETHICONE 80 MG: 80 TABLET, CHEWABLE ORAL at 09:10

## 2023-10-07 RX ADMIN — OXYCODONE HYDROCHLORIDE 10 MG: 10 TABLET ORAL at 09:10

## 2023-10-07 RX ADMIN — SENNOSIDES 8.6 MG: 8.6 TABLET, FILM COATED ORAL at 06:10

## 2023-10-07 RX ADMIN — HYDROCODONE BITARTRATE AND ACETAMINOPHEN 1 TABLET: 5; 325 TABLET ORAL at 06:10

## 2023-10-07 RX ADMIN — FERROUS SULFATE TAB 325 MG (65 MG ELEMENTAL FE) 1 EACH: 325 (65 FE) TAB at 01:10

## 2023-10-07 RX ADMIN — NIFEDIPINE 30 MG: 30 TABLET, FILM COATED, EXTENDED RELEASE ORAL at 09:10

## 2023-10-07 RX ADMIN — FAMOTIDINE 20 MG: 10 INJECTION, SOLUTION INTRAVENOUS at 09:10

## 2023-10-07 RX ADMIN — SODIUM CHLORIDE, POTASSIUM CHLORIDE, SODIUM LACTATE AND CALCIUM CHLORIDE 500 ML: 600; 310; 30; 20 INJECTION, SOLUTION INTRAVENOUS at 01:10

## 2023-10-07 NOTE — CARE UPDATE
Nurses noticed a clot the size of a tennis ball and resident was called to patient room.    Patient denies any dizziness, SOB, fatigue, blurry vision or weakness. She just reports feeling exhausted from a long day.    Temp:  [96.2 °F (35.7 °C)-98.2 °F (36.8 °C)] 98 °F (36.7 °C)  Pulse:  [] 87  Resp:  [15-18] 18  SpO2:  [96 %-100 %] 96 %  BP: (121-166)/() 135/84      Physical Exam  Vitals wnl as above   Uterine fundus feels firm, no more bleeding expressed after fundal massage.   Clot was on a ysabel in the room measuring size of tennis ball (about 300 cc)    Plan   - QBL from pLTCS was 845 cc  - Will continue to monitor bleeding and symptoms  - Order stat CBC if another episode of bleeding.     Rodrigue Vines MD  Obstetrics and Gynecology- PGY1

## 2023-10-07 NOTE — PROGRESS NOTES
POSTPARTUM PROGRESS NOTE    Subjective:     PPD/POD#: 1   Procedure: Primary LTCS (failed IOL)   EGA: 36w0d   N/V: No   F/C: No   Abd Pain: Mild, well-controlled with oral pain medication   Lochia: Moderate. Patient has had an additional 460cc of bleeding postop. She reports a slow trickle of bleeding with the passage of one clot   Voiding: Yes   Ambulating: No   Bowel fnc: No   Contraception: Immediate post-placental ParaGard IUD placed     Patient reports extreme exhaustion this AM. She states that she has not been able to sleep for the past 3 days. She is currently receiving magnesium sulfate. She has passed one tennis ball sized clot overnight and she is having persistent trickling. Per RN, the patient has been firm with every check.     Objective:      Temp:  [96.2 °F (35.7 °C)-98.8 °F (37.1 °C)] 98.2 °F (36.8 °C)  Pulse:  [] 89  Resp:  [15-18] 18  SpO2:  [94 %-100 %] 98 %  BP: ()/() 116/72    Abdomen: Soft, appropriately tender   Uterus: Firm, no fundal tenderness   Incision: Bandage in place without shadowing     Bimanual exam: IUD strings palpated from the cervical os. No clots palpated in the vagina. No CMT. No fundal tenderness. No notable active bleeding from the vagina.     Bedside ultrasound: inconclusive. Will repeat as needed    Lab Review    Recent Labs   Lab 10/03/23  1630 10/04/23  1523    135*   K 4.2 4.6    104   CO2 18* 22*   BUN 6 6   CREATININE 0.8 0.8   GLU 74 95   PROT 6.8 6.3   BILITOT 0.2 0.2   ALKPHOS 156* 151*   ALT 6* 9*   AST 15 15         Recent Labs   Lab 10/03/23  1630 10/04/23  1523 10/06/23  2234   WBC 8.02 6.36 18.20*   HGB 12.6 11.8* 8.3*   HCT 38.9 36.3* 25.3*   MCV 81* 82 82    195 213           I/O    Intake/Output Summary (Last 24 hours) at 10/7/2023 0506  Last data filed at 10/7/2023 0330  Gross per 24 hour   Intake 2895.09 ml   Output 4702 ml   Net -1806.91 ml          Assessment and Plan:   Postpartum care:  - Patient extremely  tired  - Currently on Magnesium sulfate   - Continue routine management and advances.    PreE w/SF (BP)  - BP as above  - asymptomatic  - preE labs as above  - Mag: continue  - Hypertensive agent Procardia 30mg   - 500cc bolus of LR given for decreased UOP  - UOP now improved: 0.84 cc/kg/hr  - Repeat Cr this AM due to     Anemia   - H/H as above  - QBL: 845mL + 219mL + 263mL  - STAT CBC overnight: 8.3/25.3  - Repeat CBC pending this AM  - asymptomatic  - iron/colace    Brynn Youssef MD  Obstetrics and Gynecology - PGY1

## 2023-10-07 NOTE — PLAN OF CARE
Pt stable; hypotensive with occasional moderate range blood pressures overnight. Continuous magnesium sulfate and lactated ringers infusing. Pt denies HA, SOB, vision changes, RUQ pain, and n/v. Cutler in place, not ambulating at this time. SCDs in place. Pain well controlled with PRN pain meds. Dressing dry and intact with no signs of infection. Fundus midline and firm throughout shift. FOB at bedside. Parents responding to infant cues. Plan of care reviewed with patient, questions encouraged and answered. Will continue to round and intervene as needed.

## 2023-10-07 NOTE — NURSING
MD notified per orders of patient's low urine output and hypotension. LR bolus ordered and administered. Pt asymptomatic and stable. Will continue to monitor.

## 2023-10-07 NOTE — NURSING
Pt has full feeling in legs, no signs of distress. He and SCDs DC. Pt stood up at bedside. Pericare given and edu demonstrated. Underwear and pads with tucks put on. Encouraged fluid intake, ice water given. states understanding on timing to void and to call out when voided. Basin cleaned, peppermint oil applied. Bed ysabel pad changed. Pt denies any other needs or concerns.

## 2023-10-07 NOTE — LACTATION NOTE
10/07/23 1241   Maternal Assessment   Breast Shape Bilateral:;round   Breast Density Bilateral:;soft   Areola Bilateral:;elastic   Nipples Bilateral:;everted   Maternal Infant Feeding   Maternal Emotional State assist needed   Equipment Type   Breast Pump Type double electric, hospital grade   Breast Pump Flange Type hard   Breast Pump Flange Size 21 mm;24 mm   Breast Pumping   Breast Pumping Interventions post-feed pumping encouraged;frequent pumping encouraged   Breast Pumping double electric breast pump utilized     LC assisted with pumping. 1.5 ml of colostrum drawn up in syringe. LC reviewed storage guidelines of fresh expressed breastmilk. Based on baby's gestational age, Pt aware to attempt to latch on cue or at least eight times in twenty-four hours; pump after each feeding; and supplement with ebm and donor milk. LC reviewed volume expectations for baby's age and discussed appropriate method of supplementation based on amount of supplementation. All questions answered.Pt aware to call for assistance for feedings.

## 2023-10-07 NOTE — ANESTHESIA POSTPROCEDURE EVALUATION
Anesthesia Post Evaluation    Patient: Nina Durant    Procedure(s) Performed: * No procedures listed *    Final Anesthesia Type: epidural      Patient location during evaluation: labor & delivery  Patient participation: Yes- Able to Participate  Level of consciousness: awake and alert, awake and oriented  Post-procedure vital signs: reviewed and stable  Pain management: adequate  Airway patency: patent  ADRIEL mitigation strategies: Multimodal analgesia and Use of major conduction anesthesia (spinal/epidural) or peripheral nerve block  PONV status at discharge: No PONV  Anesthetic complications: no      Cardiovascular status: blood pressure returned to baseline and hemodynamically stable  Respiratory status: unassisted, spontaneous ventilation and room air  Hydration status: euvolemic  Follow-up not needed.          Vitals Value Taken Time   /70 10/07/23 1232   Temp 36.7 °C (98.1 °F) 10/07/23 1232   Pulse 88 10/07/23 1232   Resp 18 10/07/23 1232   SpO2 98 % 10/07/23 1232         No case tracking events are documented in the log.      Pain/Timothy Score: Pain Rating Prior to Med Admin: 5 (10/7/2023 12:01 PM)  Pain Rating Post Med Admin: 4 (10/7/2023 12:32 PM)

## 2023-10-07 NOTE — NURSING
"Upon rounds, pt notified RN of vaginal bleeding "trickling". Fundus firm, midline with moderate bleeding. After 30 second fundal massage, large gush of blood and clot noted. Resident notified and to bedside. Pt reports feeling tired but otherwise asymptomatic and VSS. QBL 219ml, running total of 1064 since delivery. No new orders at this time.   "

## 2023-10-07 NOTE — PROGRESS NOTES
10/06/23 2200   Vital Signs   BP (!) 94/53  (MD notified)        Dr. Youssef notified of pt's hypotension. Pt sleeping between care and asymptomatic. MD to order STAT CBC.

## 2023-10-07 NOTE — PLAN OF CARE
Patient safety maintained, side rails up, bed low and locked position. Pt ambulating and voiding independently.  Pain well controlled with PRN pain medication. Fundus midline, firm, with moderate  lochia. Patient responding to infant cues.  Incision site dressing clean, dry, and intact.  Significant other at bedside and assisting in patient's care. Will continue to monitor.      Problem: Maternal-Fetal Wellbeing  Goal: Optimal Maternal-Fetal Wellbeing  Outcome: Met     Problem: Device-Related Complication Risk (Anesthesia/Analgesia, Neuraxial)  Goal: Safe Infusion Delivery Completion  Outcome: Met     Problem: Infection (Anesthesia/Analgesia, Neuraxial)  Goal: Absence of Infection Signs and Symptoms  Outcome: Met     Problem: Nausea and Vomiting (Anesthesia/Analgesia, Neuraxial)  Goal: Nausea and Vomiting Relief  Outcome: Met     Problem: Pain (Anesthesia/Analgesia, Neuraxial)  Goal: Effective Pain Control  Outcome: Met     Problem: Respiratory Compromise (Anesthesia/Analgesia, Neuraxial)  Goal: Effective Oxygenation and Ventilation  Outcome: Met     Problem: Sensorimotor Impairment (Anesthesia/Analgesia, Neuraxial)  Goal: Baseline Motor Function  Outcome: Met     Problem: Urinary Retention (Anesthesia/Analgesia, Neuraxial)  Goal: Effective Urinary Elimination  Outcome: Met     Problem:  Fall Injury Risk  Goal: Absence of Fall, Infant Drop and Related Injury  Outcome: Ongoing, Progressing     Problem: Adult Inpatient Plan of Care  Goal: Plan of Care Review  Outcome: Ongoing, Progressing  Goal: Patient-Specific Goal (Individualized)  Outcome: Ongoing, Progressing  Goal: Absence of Hospital-Acquired Illness or Injury  Outcome: Ongoing, Progressing  Goal: Optimal Comfort and Wellbeing  Outcome: Ongoing, Progressing  Goal: Readiness for Transition of Care  Outcome: Ongoing, Progressing     Problem: Hypertensive Disorders in Pregnancy  Goal: Maternal-Fetal Stabilization  Outcome: Ongoing, Progressing      Problem: Infection  Goal: Absence of Infection Signs and Symptoms  Outcome: Ongoing, Progressing     Problem: Skin Injury Risk Increased  Goal: Skin Health and Integrity  Outcome: Ongoing, Progressing     Problem: Fall Injury Risk  Goal: Absence of Fall and Fall-Related Injury  Outcome: Ongoing, Progressing     Problem: Adjustment to Role Transition (Postpartum  Delivery)  Goal: Successful Maternal Role Transition  Outcome: Ongoing, Progressing     Problem: Bleeding (Postpartum  Delivery)  Goal: Hemostasis  Outcome: Ongoing, Progressing     Problem: Infection (Postpartum  Delivery)  Goal: Absence of Infection Signs and Symptoms  Outcome: Ongoing, Progressing     Problem: Pain (Postpartum  Delivery)  Goal: Acceptable Pain Control  Outcome: Ongoing, Progressing     Problem: Postoperative Nausea and Vomiting (Postpartum  Delivery)  Goal: Nausea and Vomiting Relief  Outcome: Ongoing, Progressing     Problem: Postoperative Urinary Retention (Postpartum  Delivery)  Goal: Effective Urinary Elimination  Outcome: Ongoing, Progressing

## 2023-10-07 NOTE — NURSING
Pt has full feeling in legs, no signs of distress. Pt ambulated to Bathroom with standby assistance. Unable to void, Underwear and  ice pack with tucks put on. hands washed. Encouraged fluid intake, ice water given. states understanding on timing to void and to call out when voided. Basin cleaned, peppermint oil applied. Bed ysabel pad changed. Pt denies any other needs or concerns.

## 2023-10-07 NOTE — NURSING
Pt notified RN of increased vaginal bleeding. Fundus firm, midline with continuous trickling of vaginal bleeding during fundal massage. Fiona-pad saturated. . Urine output improved. Pt reports feeling tired but otherwise asymptomatic. MD notified. Dr. Youssef and Dr. Strong to bedside to assess.

## 2023-10-07 NOTE — NURSING
Pt has full feeling in legs, no signs of distress. Pt ambulated to Bathroom with standby assistance. Void; Pericare given and edu demonstrated. Underwear and pads put on. hands washed.  Basin cleaned. Bed ysabel pad changed. Pt denies any other needs or concerns.

## 2023-10-07 NOTE — PROGRESS NOTES
Lab Documentation:    Order Type: Written Order placed in [facility]    Patient in for lab visit only per provider treatment plan.

## 2023-10-07 NOTE — ED NOTES
High Risk  POD#1 s/p 1LTCS (failed IOL)   H/H: 12/36 > QBL 845mL + 219ml clot, total QBL 1064 ml > 8.3/25.3   Dx: PreE w/ SF (BP), PPH, Crohn's  BC: ppParagard!  Meds: *MAG* (del 0845), Procardia 30 (10/6), ibuprofen, norco, labetolol 20 (1115), fe/colace PP Lovenox-Yes  Labs: 12/36/195  Cr 0.8  AST/ALT 15/9  PC 0.28  BP: ()/() 99/56  MALE [ ] circ consent/orders  [ ] circ done  [p] ppd1 [ ]watch UOP

## 2023-10-07 NOTE — NURSING
Pt called, feeling gushes while breastfeeding. Not actively bleeding, Uterus firm and midline. Pads changed out and weigh, pericare given. QBL-83 ml, No signs of distress, VSS. Resident updated, no new orders.

## 2023-10-08 PROCEDURE — 25000003 PHARM REV CODE 250

## 2023-10-08 PROCEDURE — 25000003 PHARM REV CODE 250: Performed by: STUDENT IN AN ORGANIZED HEALTH CARE EDUCATION/TRAINING PROGRAM

## 2023-10-08 PROCEDURE — 63600175 PHARM REV CODE 636 W HCPCS: Performed by: STUDENT IN AN ORGANIZED HEALTH CARE EDUCATION/TRAINING PROGRAM

## 2023-10-08 PROCEDURE — 11000001 HC ACUTE MED/SURG PRIVATE ROOM

## 2023-10-08 PROCEDURE — 99232 SBSQ HOSP IP/OBS MODERATE 35: CPT | Mod: ,,, | Performed by: OBSTETRICS & GYNECOLOGY

## 2023-10-08 PROCEDURE — 99232 PR SUBSEQUENT HOSPITAL CARE,LEVL II: ICD-10-PCS | Mod: ,,, | Performed by: OBSTETRICS & GYNECOLOGY

## 2023-10-08 RX ORDER — POLYETHYLENE GLYCOL 3350 17 G/17G
17 POWDER, FOR SOLUTION ORAL DAILY PRN
Status: DISCONTINUED | OUTPATIENT
Start: 2023-10-08 | End: 2023-10-10 | Stop reason: HOSPADM

## 2023-10-08 RX ADMIN — SIMETHICONE 80 MG: 80 TABLET, CHEWABLE ORAL at 09:10

## 2023-10-08 RX ADMIN — POLYETHYLENE GLYCOL 3350 17 G: 17 POWDER, FOR SOLUTION ORAL at 02:10

## 2023-10-08 RX ADMIN — ENOXAPARIN SODIUM 40 MG: 40 INJECTION SUBCUTANEOUS at 04:10

## 2023-10-08 RX ADMIN — FERROUS SULFATE TAB 325 MG (65 MG ELEMENTAL FE) 1 EACH: 325 (65 FE) TAB at 10:10

## 2023-10-08 RX ADMIN — FERROUS SULFATE TAB 325 MG (65 MG ELEMENTAL FE) 1 EACH: 325 (65 FE) TAB at 09:10

## 2023-10-08 RX ADMIN — OXYCODONE HYDROCHLORIDE 10 MG: 10 TABLET ORAL at 01:10

## 2023-10-08 RX ADMIN — SIMETHICONE 80 MG: 80 TABLET, CHEWABLE ORAL at 06:10

## 2023-10-08 RX ADMIN — SIMETHICONE 80 MG: 80 TABLET, CHEWABLE ORAL at 12:10

## 2023-10-08 RX ADMIN — ACETAMINOPHEN 650 MG: 325 TABLET, FILM COATED ORAL at 10:10

## 2023-10-08 RX ADMIN — ACETAMINOPHEN 650 MG: 325 TABLET, FILM COATED ORAL at 04:10

## 2023-10-08 RX ADMIN — LIDOCAINE 1 PATCH: 50 PATCH CUTANEOUS at 12:10

## 2023-10-08 RX ADMIN — IBUPROFEN 800 MG: 400 TABLET ORAL at 10:10

## 2023-10-08 RX ADMIN — OXYCODONE HYDROCHLORIDE 10 MG: 10 TABLET ORAL at 04:10

## 2023-10-08 RX ADMIN — NIFEDIPINE 30 MG: 30 TABLET, FILM COATED, EXTENDED RELEASE ORAL at 10:10

## 2023-10-08 RX ADMIN — DOCUSATE SODIUM 100 MG: 100 CAPSULE, LIQUID FILLED ORAL at 09:10

## 2023-10-08 RX ADMIN — SIMETHICONE 80 MG: 80 TABLET, CHEWABLE ORAL at 10:10

## 2023-10-08 RX ADMIN — OXYCODONE HYDROCHLORIDE 10 MG: 10 TABLET ORAL at 10:10

## 2023-10-08 RX ADMIN — OXYCODONE HYDROCHLORIDE 10 MG: 10 TABLET ORAL at 06:10

## 2023-10-08 RX ADMIN — IBUPROFEN 800 MG: 400 TABLET ORAL at 07:10

## 2023-10-08 RX ADMIN — ACETAMINOPHEN 650 MG: 325 TABLET, FILM COATED ORAL at 05:10

## 2023-10-08 RX ADMIN — ACETAMINOPHEN 650 MG: 325 TABLET, FILM COATED ORAL at 09:10

## 2023-10-08 RX ADMIN — IBUPROFEN 800 MG: 400 TABLET ORAL at 12:10

## 2023-10-08 RX ADMIN — LIDOCAINE 1 PATCH: 50 PATCH CUTANEOUS at 07:10

## 2023-10-08 NOTE — PROGRESS NOTES
POSTPARTUM PROGRESS NOTE    Subjective:     PPD/POD#: 2   Procedure: Primary LTCS (failed IOL)   EGA: 36w0d   N/V: No   F/C: No   Abd Pain: Mild, well-controlled with oral pain medication   Lochia: Bleeding overall mild. Passage of one large clot overnight approximately 180cc, since then bleeding minimal.    Voiding: Yes   Ambulating: Yes   Bowel fnc: Yes,  had BM this AM    Contraception: Immediate post-placental ParaGard IUD placed     Fatigue resolved after discontinuation of magnesium. Denies any symptoms of anemia this AM.     Objective:      Temp:  [97.5 °F (36.4 °C)-98.5 °F (36.9 °C)] 98.1 °F (36.7 °C)  Pulse:  [] 90  Resp:  [16-20] 20  SpO2:  [94 %-99 %] 96 %  BP: ()/(58-90) 133/81    Abdomen: Soft, appropriately tender   Uterus: Firm at the umbilicus, no fundal tenderness   Incision: Bandage in place without shadowing     Lab Review    Recent Labs   Lab 10/03/23  1630 10/04/23  1523 10/07/23  0551    135*  --    K 4.2 4.6  --     104  --    CO2 18* 22*  --    BUN 6 6  --    CREATININE 0.8 0.8 0.8   GLU 74 95  --    PROT 6.8 6.3  --    BILITOT 0.2 0.2  --    ALKPHOS 156* 151*  --    ALT 6* 9*  --    AST 15 15  --          Recent Labs   Lab 10/04/23  1523 10/06/23  2234 10/07/23  0551   WBC 6.36 18.20* 12.12   HGB 11.8* 8.3* 9.7*   HCT 36.3* 25.3* 29.5*   MCV 82 82 82    213 163           I/O    Intake/Output Summary (Last 24 hours) at 10/8/2023 0159  Last data filed at 10/8/2023 0151  Gross per 24 hour   Intake 3141.74 ml   Output 7259 ml   Net -4117.26 ml          Assessment and Plan:   Postpartum care:  - Patient extremely tired  - Currently on Magnesium sulfate   - Continue routine management and advances.    PreE w/SF (BP)  - BP as above  - asymptomatic  - preE labs as above  - Mag: completed 24 hour postpartum course   - Hypertensive agent Procardia 30mg, BP well controlled   - UP adequate    Anemia   - H/H as above  - QBL: 1690 total (845cc intraop)  - H/H as above  -  asymptomatic  - iron/colace      Emmy Henson MD   PGY-4, OB-GYN

## 2023-10-08 NOTE — LACTATION NOTE
Lactation visited pt, assisted with latching the baby to left breast in football hold. Baby sleepy but after a few attempts was able to latch with good sucks, occasional swallows audible. Breast compression helped keep him feeding. Pt switched the baby to the right breast. He was more awakes and fed with good sucks. Due to the baby's weight and early gestation , it was recommended to supplement the baby with EBM or DM after the feeding and pump to protect milk supply. Pt verbalized understanding the breastfeeding plan. Pt knowledgeable on the use of the Symphony pump, cleaning and sterilizing breast pump parts and storage of EBM.    10/08/23 1315   Maternal Assessment   Breast Shape Bilateral:;round   Breast Density Bilateral:;soft   Areola Bilateral:;elastic   Nipples Bilateral:;everted   Maternal Infant Feeding   Maternal Emotional State assist needed   Infant Positioning clutch/football   Signs of Milk Transfer audible swallow;infant jaw motion present   Nipple Shape After Feeding, Left round   Latch Assistance yes   Breastfeeding Supplementation   Method of Supplementation bottle   Equipment Type   Breast Pump Type double electric, hospital grade   Breast Pump Flange Type hard   Breast Pump Flange Size 21 mm   Breast Pumping   Breast Pumping Interventions post-feed pumping encouraged   Breast Pumping double electric breast pump utilized

## 2023-10-08 NOTE — PROGRESS NOTES
10/08/23 0145   Reproductive   Uterus WDL WDL   Uterus Position midline   Uterus Consistency firm without massage   Fundal Height at umbilicus   Breast Care (Postpartum) warm shower encouraged   Lochia 1-3 Days WDL   Lochia 1-3 Days WDL ex   Lochia Color rubra;clots present  (420g weight clot Dr. Henson notified and at bedside to assess pt.)   Lochia Amount moderate (less than 15 cm on pad/hr)   Lochia Odor none     Dr. Henson notified of large clot patient passed. MD at bedside to assess patient and clot. No new orders at this time. Pt is stable, will continue to monitor appropriately. Pt aware of warning signs to notify staff about while in the hospital and at home.

## 2023-10-09 PROBLEM — D64.9 ANEMIA: Status: ACTIVE | Noted: 2023-10-09

## 2023-10-09 PROCEDURE — 99232 PR SUBSEQUENT HOSPITAL CARE,LEVL II: ICD-10-PCS | Mod: ,,, | Performed by: OBSTETRICS & GYNECOLOGY

## 2023-10-09 PROCEDURE — 99232 SBSQ HOSP IP/OBS MODERATE 35: CPT | Mod: ,,, | Performed by: OBSTETRICS & GYNECOLOGY

## 2023-10-09 PROCEDURE — 63600175 PHARM REV CODE 636 W HCPCS: Performed by: STUDENT IN AN ORGANIZED HEALTH CARE EDUCATION/TRAINING PROGRAM

## 2023-10-09 PROCEDURE — 25000003 PHARM REV CODE 250

## 2023-10-09 PROCEDURE — 25000003 PHARM REV CODE 250: Performed by: STUDENT IN AN ORGANIZED HEALTH CARE EDUCATION/TRAINING PROGRAM

## 2023-10-09 PROCEDURE — 11000001 HC ACUTE MED/SURG PRIVATE ROOM

## 2023-10-09 RX ORDER — FAMOTIDINE 20 MG/1
20 TABLET, FILM COATED ORAL 2 TIMES DAILY
Status: DISCONTINUED | OUTPATIENT
Start: 2023-10-09 | End: 2023-10-09

## 2023-10-09 RX ORDER — FAMOTIDINE 20 MG/1
20 TABLET, FILM COATED ORAL 2 TIMES DAILY
Status: DISCONTINUED | OUTPATIENT
Start: 2023-10-09 | End: 2023-10-10 | Stop reason: HOSPADM

## 2023-10-09 RX ADMIN — SIMETHICONE 80 MG: 80 TABLET, CHEWABLE ORAL at 11:10

## 2023-10-09 RX ADMIN — LIDOCAINE 1 PATCH: 50 PATCH CUTANEOUS at 08:10

## 2023-10-09 RX ADMIN — ENOXAPARIN SODIUM 40 MG: 40 INJECTION SUBCUTANEOUS at 06:10

## 2023-10-09 RX ADMIN — OXYCODONE HYDROCHLORIDE 10 MG: 10 TABLET ORAL at 02:10

## 2023-10-09 RX ADMIN — IBUPROFEN 800 MG: 400 TABLET ORAL at 11:10

## 2023-10-09 RX ADMIN — FAMOTIDINE 20 MG: 20 TABLET ORAL at 09:10

## 2023-10-09 RX ADMIN — DOCUSATE SODIUM 100 MG: 100 CAPSULE, LIQUID FILLED ORAL at 08:10

## 2023-10-09 RX ADMIN — FERROUS SULFATE TAB 325 MG (65 MG ELEMENTAL FE) 1 EACH: 325 (65 FE) TAB at 08:10

## 2023-10-09 RX ADMIN — ACETAMINOPHEN 650 MG: 325 TABLET, FILM COATED ORAL at 11:10

## 2023-10-09 RX ADMIN — NIFEDIPINE 30 MG: 30 TABLET, FILM COATED, EXTENDED RELEASE ORAL at 08:10

## 2023-10-09 RX ADMIN — OXYCODONE HYDROCHLORIDE 10 MG: 10 TABLET ORAL at 08:10

## 2023-10-09 RX ADMIN — IBUPROFEN 800 MG: 400 TABLET ORAL at 03:10

## 2023-10-09 RX ADMIN — FAMOTIDINE 20 MG: 20 TABLET ORAL at 08:10

## 2023-10-09 RX ADMIN — OXYCODONE HYDROCHLORIDE 10 MG: 10 TABLET ORAL at 06:10

## 2023-10-09 RX ADMIN — ACETAMINOPHEN 650 MG: 325 TABLET, FILM COATED ORAL at 03:10

## 2023-10-09 RX ADMIN — ACETAMINOPHEN 650 MG: 325 TABLET, FILM COATED ORAL at 06:10

## 2023-10-09 NOTE — PROGRESS NOTES
POSTPARTUM PROGRESS NOTE    Subjective:     PPD/POD#: 3   Procedure: Primary LTCS (failed IOL)   EGA: 36w0d   N/V: No   F/C: No   Abd Pain: Mild, well-controlled with oral pain medication   Lochia: Bleeding overall mild. No clots passed in the last 24h   Voiding: Yes   Ambulating: Yes   Bowel fnc: Yes   Contraception: Immediate post-placental ParaGard IUD placed     Denies dizziness, SOB, vision changes. Patient desires to go home tomorrow morning.     Objective:      Temp:  [97.8 °F (36.6 °C)-98.4 °F (36.9 °C)] 98.2 °F (36.8 °C)  Pulse:  [71-99] 71  Resp:  [16-18] 16  SpO2:  [97 %-100 %] 99 %  BP: (121-142)/(75-82) 137/81    Abdomen: Soft, appropriately tender   Uterus: Firm at the umbilicus, no fundal tenderness   Incision: Bandage in place without shadowing     Lab Review    Recent Labs   Lab 10/03/23  1630 10/04/23  1523 10/07/23  0551    135*  --    K 4.2 4.6  --     104  --    CO2 18* 22*  --    BUN 6 6  --    CREATININE 0.8 0.8 0.8   GLU 74 95  --    PROT 6.8 6.3  --    BILITOT 0.2 0.2  --    ALKPHOS 156* 151*  --    ALT 6* 9*  --    AST 15 15  --          Recent Labs   Lab 10/04/23  1523 10/06/23  2234 10/07/23  0551   WBC 6.36 18.20* 12.12   HGB 11.8* 8.3* 9.7*   HCT 36.3* 25.3* 29.5*   MCV 82 82 82    213 163           I/O  No intake or output data in the 24 hours ending 10/09/23 0835       Assessment and Plan:   Postpartum care:  - Patient meeting all post partum milestones  - Continue routine management and advances.    PreE w/SF (BP)  - BP as above  - asymptomatic  - preE labs as above, PC 0.28  - s/p mag  - Hypertensive agent Procardia 30mg (10/6), BP well controlled   - UOP adequate    Anemia / PPH  - H/H as above  - QBL: 1690 total (845cc intraop)  - asymptomatic  - minimal vaginal bleeding.   - iron/colace      Rodrigue Vines MD  Obstetrics and Gynecology- PGY1

## 2023-10-09 NOTE — PLAN OF CARE
VSS. Patient safety maintained, side rails up, bed low and locked position. Pt ambulating and voiding independently.  Pain well controlled with PRN pain medication. Fundus midline, firm, with light lochia. Patient responding to infant cues. Hydrocolloid dressing in place, unable to visualize incision; no s/s of infection. Significant other at bedside and assisting in patient's care. Will continue to monitor and intervene as necessary.

## 2023-10-10 VITALS
TEMPERATURE: 99 F | HEIGHT: 61 IN | DIASTOLIC BLOOD PRESSURE: 88 MMHG | WEIGHT: 154.75 LBS | BODY MASS INDEX: 29.22 KG/M2 | HEART RATE: 103 BPM | RESPIRATION RATE: 18 BRPM | OXYGEN SATURATION: 98 % | SYSTOLIC BLOOD PRESSURE: 134 MMHG

## 2023-10-10 PROCEDURE — 25000003 PHARM REV CODE 250

## 2023-10-10 PROCEDURE — 99238 HOSP IP/OBS DSCHRG MGMT 30/<: CPT | Mod: ,,, | Performed by: OBSTETRICS & GYNECOLOGY

## 2023-10-10 PROCEDURE — 99238 PR HOSPITAL DISCHARGE DAY,<30 MIN: ICD-10-PCS | Mod: ,,, | Performed by: OBSTETRICS & GYNECOLOGY

## 2023-10-10 RX ORDER — AMOXICILLIN 250 MG
1 CAPSULE ORAL DAILY
Qty: 14 TABLET | Refills: 0 | Status: SHIPPED | OUTPATIENT
Start: 2023-10-10 | End: 2024-04-02

## 2023-10-10 RX ORDER — DEXTROMETHORPHAN HYDROBROMIDE, GUAIFENESIN 5; 100 MG/5ML; MG/5ML
650 LIQUID ORAL EVERY 6 HOURS PRN
Qty: 30 TABLET | Refills: 0 | Status: SHIPPED | OUTPATIENT
Start: 2023-10-10 | End: 2024-04-02

## 2023-10-10 RX ORDER — FERROUS SULFATE 324(65)MG
325 TABLET, DELAYED RELEASE (ENTERIC COATED) ORAL DAILY
Qty: 30 TABLET | Refills: 0 | Status: SHIPPED | OUTPATIENT
Start: 2023-10-10 | End: 2024-04-02

## 2023-10-10 RX ORDER — NIFEDIPINE 30 MG/1
30 TABLET, EXTENDED RELEASE ORAL DAILY
Qty: 60 TABLET | Refills: 5 | Status: SHIPPED | OUTPATIENT
Start: 2023-10-10 | End: 2023-10-16 | Stop reason: ALTCHOICE

## 2023-10-10 RX ORDER — IBUPROFEN 600 MG/1
600 TABLET ORAL EVERY 6 HOURS PRN
Qty: 30 TABLET | Refills: 0 | Status: SHIPPED | OUTPATIENT
Start: 2023-10-10 | End: 2024-04-02

## 2023-10-10 RX ORDER — OXYCODONE HYDROCHLORIDE 5 MG/1
5 TABLET ORAL EVERY 6 HOURS PRN
Qty: 15 TABLET | Refills: 0 | Status: SHIPPED | OUTPATIENT
Start: 2023-10-10 | End: 2024-04-02

## 2023-10-10 RX ADMIN — FAMOTIDINE 20 MG: 20 TABLET ORAL at 08:10

## 2023-10-10 RX ADMIN — OXYCODONE HYDROCHLORIDE 10 MG: 10 TABLET ORAL at 11:10

## 2023-10-10 RX ADMIN — ACETAMINOPHEN 650 MG: 325 TABLET, FILM COATED ORAL at 11:10

## 2023-10-10 RX ADMIN — NIFEDIPINE 30 MG: 30 TABLET, FILM COATED, EXTENDED RELEASE ORAL at 08:10

## 2023-10-10 RX ADMIN — IBUPROFEN 800 MG: 400 TABLET ORAL at 08:10

## 2023-10-10 RX ADMIN — FERROUS SULFATE TAB 325 MG (65 MG ELEMENTAL FE) 1 EACH: 325 (65 FE) TAB at 08:10

## 2023-10-10 RX ADMIN — DOCUSATE SODIUM 100 MG: 100 CAPSULE, LIQUID FILLED ORAL at 08:10

## 2023-10-10 RX ADMIN — OXYCODONE HYDROCHLORIDE 10 MG: 10 TABLET ORAL at 06:10

## 2023-10-10 RX ADMIN — ACETAMINOPHEN 650 MG: 325 TABLET, FILM COATED ORAL at 06:10

## 2023-10-10 RX ADMIN — SIMETHICONE 80 MG: 80 TABLET, CHEWABLE ORAL at 08:10

## 2023-10-10 NOTE — PLAN OF CARE
VSS, No issues during shift.  Discharge education given to patient. Patient verbalized understanding.  BP check in 1 week.  Follow up with OB in 6 weeks. Awaiting transport for discharge. Will continue to monitor. Sylvia Marks RN

## 2023-10-10 NOTE — PLAN OF CARE
Lactation note:  The mother is expected to be discharged home today. Using the breastfeeding guide, breastfeeding information for discharge was reviewed, including sore nipples and breast engorgement. Lc able to assist mom with latch and keeping baby suckling by using breast compression/stimulation. Infant latched after after attempts and able to keep sucking with more flow. Mom to offer extra milk after nursing. She is pumping up to 2 oz of milk at a time. The feeding plan for baby at home was reviewed. The mother will continue to feed her baby with cues and at least 8 times in 24 hours, wake baby to feed at least every 3 hours due to prematurity. Offer extra milk after nursing until baby content. The patient was taught how to perform hand expression and she has a rental breast pump and will obtain one from insurance. The lactation phone number is on the board to call for further needs.  Additional resources were placed on her AVS to be given at discharge. A lactation outpatient visit was scheduled for 10/27/23 at 0930.

## 2023-10-10 NOTE — LACTATION NOTE
"   10/10/23 0900   Maternal Assessment   Breast Shape Bilateral:;round   Breast Density Bilateral:;filling   Areola Bilateral:;elastic   Nipples everted;Bilateral:   Left Nipple Symptoms tender   Right Nipple Symptoms tender   Maternal Infant Feeding   Infant Positioning cross-cradle   Signs of Milk Transfer audible swallow;infant jaw motion present   Pain with Feeding yes  ("2-3")   Pain Location nipple, right   Pain Description soreness   Latch Assistance yes   Breast Pumping   Breast Pumping Interventions post-feed pumping encouraged   Community Referrals   Community Referrals outpatient lactation program;pediatric care provider;support group     Lactation note:  The infant is expected to be discharged home today. The infant has lost 7.9% weight from birth and has had 3 voids and 2 stools in last 24 hours. Using the breastfeeding guide, the family was given breastfeeding information for discharge home. LC able to assist with deeper latch and to keep infant nursing with stimulation/breast compression. Swallows heard with compression. The feeding plan for home was reviewed. The mother will continue to feed the infant with cues 8 or more times in 24 hours until content, wake at least every 3 hours due to prematurity.  Mom to offer more milk after nursing until baby content. The mother has a rental breast pump and will acquire another one from insurance. The mother is aware of resources for breastfeeding assistance at home in her breastfeeding guide and on AVS. An outpatient lactation appt was made for 10/27/23 to reassess latch and milk transfer when baby is term.  phone number on board provided for further needs.    "

## 2023-10-10 NOTE — DISCHARGE SUMMARY
Delivery Discharge Summary  Obstetrics      Primary OB Clinician: Pina Figueroa MD      Admission date: 10/4/2023  Discharge date: 10/10/2023    Disposition: To home, self care    Discharge Diagnosis List:      Patient Active Problem List   Diagnosis    Heartburn    Crohn's disease of small intestine with intestinal obstruction    Iron deficiency anemia due to chronic blood loss    Vitamin D deficiency    Amenorrhea    Encounter for supervision of normal first pregnancy in third trimester    Gestational hypertension, antepartum    Supervision of high risk pregnancy in third trimester    Gestational hypertension, third trimester    32 weeks gestation of pregnancy    Severe Pre-eclampsia in third trimester    Status post  delivery    PPH (postpartum hemorrhage)    Anemia       Procedure: [facility]    Hospital Course:  Nina Durant is a 28 y.o. now , POD #4 who was admitted on 10/4/2023 at 36w0d for IOL 2/2 preE w/SF. Patient was subsequently admitted to labor and delivery unit with signed consents.     Labor course was complicated by failed IOL, and decision was made to proceed with delivery via  which was performed without complications. Please see delivery note for further details. She received a postplacental Paraguard IUD.     Her postpartum course was complicated by passage of large clots, with cumulative QBL 1590cc (845cc intra op). She developed acute blood loss anemia, however remained asymptomatic and H/H remained stable (see below). She was continued on iron/colace.    Patients blood pressures remained stable on procardia XL 30mg postpartum. She remained asymptomatic and labs were stable and WNL. On discharge day, patient's pain is controlled with oral pain medications. Pt is tolerating ambulation without SOB or CP, and regular diet without N/V. Reports lochia is mild. Denies any HA, vision changes, F/C, LE swelling. Denies any breast pain/soreness.    Pt in stable  "condition and ready for discharge. She has been instructed to start and/or continue medications and follow up with her obstetrics provider as listed below.    Pertinent studies:  CBC  Recent Labs   Lab 10/04/23  1523 10/06/23  2234 10/07/23  0551   WBC 6.36 18.20* 12.12   HGB 11.8* 8.3* 9.7*   HCT 36.3* 25.3* 29.5*   MCV 82 82 82    213 163        Temp:  [97.4 °F (36.3 °C)-99.2 °F (37.3 °C)] 98.8 °F (37.1 °C)  Pulse:  [] 103  Resp:  [14-18] 18  SpO2:  [97 %-100 %] 98 %  BP: (120-143)/(76-88) 134/88    Physical Exam:   Abdomen: Soft, appropriately tender   Uterus: Firm at the umbilicus, no fundal tenderness   Incision: Bandage in place without shadowing       Immunization History   Administered Date(s) Administered    DTP 1997, 1997, 1997, 1998, 2001    DTaP 1997, 1997, 1997, 1998, 2001    Hep B Immune Globulin 1997    Hepatitis B 1997, 1998, 1998    Hepatitis B, Pediatric/Adolescent 1997    HiB PRP-OMP 1997, 1997, 1998    Hib-HbOC 1997, 1997, 1998    IPV 1997, 1997, 1998, 2001    MMR 1998, 2001    Meningococcal 2008, 2015    Meningococcal Conjugate (MCV4P) 2008, 2015    OPV 1997, 1997, 1998, 2001    Pneumococcal Conjugate - 7 Valent 1997, 1997    Tdap 2008, 2019, 2023    Varicella 1997, 1998, 1998        Delivery:    Episiotomy:     Lacerations:     Repair suture:     Repair # of packets:     Blood loss (ml):       Birth information:  YOB: 2021   Time of birth: 9:00 AM   Sex: male   Delivery type: , Low Transverse   Gestational Age: 36w0d     Measurements    Weight: 2410 g  Weight (lbs): 5 lb 5 oz  Length: 48.3 cm  Length (in): 19"  Head circumference: 33 cm  Chest circumference: 29.8 cm         Delivery Clinician: Delivery " Providers    Delivering clinician: Pina Figueroa MD   Provider Role    Rodrigue Goodman MD Resident    Emmy Henson MD Resident    Rosanna Littlejohn RN Circulator    Jocelyne Clinton, [facility]    Génesis Rios RN Registered Nurse    Eden, Emely Malorie, RN Registered Nurse    Herminia Alberts MD Anesthesiologist    Casper Hogue, DO Resident             Additional  information:  Forceps:    Vacuum:    Breech:    Observed anomalies      Living?:     Apgars    Living status: Living  Apgar Component Scores:  1 min.:  5 min.:  10 min.:  15 min.:  20 min.:    Skin color:  0  1       Heart rate:  2  2       Reflex irritability:  1  2       Muscle tone:  1  1       Respiratory effort:  1  2       Total:  5  8       Apgars assigned by: HANH RIOS RN         Placenta: Delivered:       appearance    Patient Instructions:   Current Discharge Medication List        START taking these medications    Details   acetaminophen (TYLENOL) 650 MG TbSR Take 1 tablet (650 mg total) by mouth every 6 (six) hours as needed.  Qty: 30 tablet, Refills: 0      ferrous sulfate 324 mg (65 mg iron) TbEC Take 1 tablet (324 mg total) by mouth once daily.  Qty: 30 tablet, Refills: 0      ibuprofen (ADVIL,MOTRIN) 600 MG tablet Take 1 tablet (600 mg total) by mouth every 6 (six) hours as needed for Pain.  Qty: 30 tablet, Refills: 0      NIFEdipine (PROCARDIA-XL) 30 MG (OSM) 24 hr tablet Take 1 tablet (30 mg total) by mouth once daily.  Qty: 60 tablet, Refills: 5    Comments: .      oxyCODONE (ROXICODONE) 5 MG immediate release tablet Take 1 tablet (5 mg total) by mouth every 6 (six) hours as needed for Pain.  Qty: 15 tablet, Refills: 0      senna-docusate 8.6-50 mg (SENNA WITH DOCUSATE SODIUM) 8.6-50 mg per tablet Take 1 tablet by mouth once daily.  Qty: 14 tablet, Refills: 0           CONTINUE these medications which have NOT CHANGED    Details   prenatal 25/iron fum/folic/dha (PRENATAL-1 ORAL) Take by mouth.            STOP taking these medications       aspirin (ECOTRIN) 81 MG EC tablet Comments:   Reason for Stopping:         diphenhydrAMINE (BENADRYL) 25 mg capsule Comments:   Reason for Stopping:         famotidine (PEPCID) 10 MG tablet Comments:   Reason for Stopping:         gentamicin (GARAMYCIN) 0.3 % (3 mg/gram) ophthalmic ointment Comments:   Reason for Stopping:         LIDOcaine HCL 2% (XYLOCAINE) 2 % jelly Comments:   Reason for Stopping:         promethazine (PHENERGAN) 25 MG tablet Comments:   Reason for Stopping:         TRI-SPRINTEC, 28, 0.18/0.215/0.25 mg-35 mcg (28) tablet Comments:   Reason for Stopping:         ustekinumab (STELARA) 90 mg/mL Syrg syringe Comments:   Reason for Stopping:               Discharge Procedure Orders   BREAST PUMP FOR HOME USE     Order Specific Question Answer Comments   Type of pump: Electric    Weight: 69.4 kg (153 lb)    Length of need (1-99 months): 99      Diet Adult Regular     Lifting restrictions   Order Comments: No lifting more than infant for 6 weeks.     No driving until:   Order Comments: No driving until no longer taking pain medication and feels comfortable stepping on the brake.     Pelvic Rest   Order Comments: Nothing in vagina including intercourse for 6 weeks.     Notify your health care provider if you experience any of the following:  temperature >100.4     Notify your health care provider if you experience any of the following:  persistent nausea and vomiting or diarrhea     Notify your health care provider if you experience any of the following:  severe uncontrolled pain     Notify your health care provider if you experience any of the following:  redness, tenderness, or signs of infection (pain, swelling, redness, odor or green/yellow discharge around incision site)     Notify your health care provider if you experience any of the following:  severe persistent headache     Notify your health care provider if you experience any of the following:   persistent dizziness, light-headedness, or visual disturbances     Notify your health care provider if you experience any of the following:  increased confusion or weakness     Notify your health care provider if you experience any of the following:   Order Comments: Heavy vaginal bleeding - saturating 1 pad per hour for 2 consecutive hours.     Activity as tolerated        Follow-up Information       Pina Figueroa MD Follow up.    Specialty: Obstetrics and Gynecology  Why: BP check  Contact information:  [address] [facility] [address] [phone]               Pina Figueroa MD Follow up in 6 week(s).    Specialty: Obstetrics and Gynecology  Why: Postpartum visit  Contact information:  [address] [facility] [address] [phone]                                Génesis Sagastume MD   OB/GYN PGY-2

## 2023-10-10 NOTE — PROGRESS NOTES
Mother Baby Care Guide reviewed. Pt verbalized understanding that she must follow up with her OB in three days for a BP check (pt denies SS of HTN at this time) and then again in two weeks for a mood check. Pt also verbalized understanding that she will follow up in six weeks for her postpartum check.

## 2023-10-11 ENCOUNTER — PATIENT MESSAGE (OUTPATIENT)
Dept: OBSTETRICS AND GYNECOLOGY | Facility: CLINIC | Age: 28
End: 2023-10-11

## 2023-10-16 ENCOUNTER — POSTPARTUM VISIT (OUTPATIENT)
Dept: OBSTETRICS AND GYNECOLOGY | Facility: CLINIC | Age: 28
End: 2023-10-16
Payer: MEDICAID

## 2023-10-16 VITALS
WEIGHT: 138.88 LBS | BODY MASS INDEX: 26.22 KG/M2 | DIASTOLIC BLOOD PRESSURE: 88 MMHG | SYSTOLIC BLOOD PRESSURE: 128 MMHG | HEIGHT: 61 IN

## 2023-10-16 PROCEDURE — 99213 OFFICE O/P EST LOW 20 MIN: CPT | Mod: PBBFAC,PN | Performed by: OBSTETRICS & GYNECOLOGY

## 2023-10-16 PROCEDURE — 99999 PR PBB SHADOW E&M-EST. PATIENT-LVL III: CPT | Mod: PBBFAC,,, | Performed by: OBSTETRICS & GYNECOLOGY

## 2023-10-16 PROCEDURE — 59430 PR CARE AFTER DELIVERY ONLY: ICD-10-PCS | Mod: ,,, | Performed by: OBSTETRICS & GYNECOLOGY

## 2023-10-16 PROCEDURE — 99999 PR PBB SHADOW E&M-EST. PATIENT-LVL III: ICD-10-PCS | Mod: PBBFAC,,, | Performed by: OBSTETRICS & GYNECOLOGY

## 2023-10-16 NOTE — PROGRESS NOTES
"Past medical, surgical, social, family, and obstetric histories; medications; prior records and results; and available outside records were reviewed and updated in the EMR.  Pertinent findings were noted below.    Reason for Visit   Postpartum Follow-up (PP 2 weeks c- section check )    HPI   28 y.o. female  presents for postpartum visit and BP check    Patient's last menstrual period was 2023 (exact date).    Delivery: pLTCS 2/2 failed IOL  Hospitalization: Complicated by Pre-E w/SF, discharged on procardia 30  Ambulating, tolerating Po, moving bowels: yes  Pain controlled: yes  Bleeding: mild  Breastfeeding: yes  Breast pain or engorgement: no  Postpartum depression: mood stable  Incision: clean, dry and intact  Contraception: ParaGard    Pap: , NILM  Mammogram: N/A  Allergies: Latex, natural rubber and Adhesive    Exam   /88   Ht 5' 1" (1.549 m)   Wt 63 kg (138 lb 14.2 oz)   LMP 2023 (Exact Date)   Breastfeeding Yes   BMI 26.24 kg/m²     Physical Exam  Constitutional:       General: She is not in acute distress.     Appearance: Normal appearance. She is well-developed.   Pulmonary:      Effort: No respiratory distress.   Abdominal:      Comments: Pfannenstiel incision well-healing without surrounding erythema or induration   Vitals reviewed.       Assessment and Plan   Postpartum care following  delivery      Postpartum  Doing well  Exam: wnl  Mood / depression screening: mood stable  Lactation referral: N/A  Pre-eclampsia with severe features  BP today 128/88, patient can discontinue procardia. Will take BP at home in 1 week and message provider     Follow-up: 6 week postpartum visit    "

## 2023-10-24 DIAGNOSIS — K50.012 CROHN'S DISEASE OF SMALL INTESTINE WITH INTESTINAL OBSTRUCTION: ICD-10-CM

## 2023-10-24 RX ORDER — USTEKINUMAB 90 MG/ML
90 INJECTION, SOLUTION SUBCUTANEOUS
Qty: 1 ML | Refills: 3 | Status: ACTIVE | OUTPATIENT
Start: 2023-10-24

## 2023-10-24 NOTE — TELEPHONE ENCOUNTER
IBD medications Stelara 90 mg q 8 weeks    Refill request for  Stelara 90 mg q 8 weeks    Allergies reviewed Yes    Drug Monitoring labs/frequency for all IBD meds:    CBC / CMP Q 6 months  TB Quantiferon Yearly  HBsAg / HBcAb yearly    Lab Results   Component Value Date    HEPBSAG Non-reactive 03/15/2023    HEPBCAB Negative 06/13/2022     Lab Results   Component Value Date    TBGOLDPLUS Negative 05/23/2023     Lab Results   Component Value Date    VZNOBZLP42TP 22 (L) 05/23/2023    RVNCZISQ14 318 05/23/2023     Lab Results   Component Value Date    WBC 12.12 10/07/2023    HGB 9.7 (L) 10/07/2023    HCT 29.5 (L) 10/07/2023    MCV 82 10/07/2023     10/07/2023     Lab Results   Component Value Date    CREATININE 0.8 10/07/2023    ALBUMIN 2.6 (L) 10/04/2023    BILITOT 0.2 10/04/2023    ALKPHOS 151 (H) 10/04/2023    AST 15 10/04/2023    ALT 9 (L) 10/04/2023    GGT 14 04/26/2017       Lab due date (mo/yr):  3/2024    Labs scheduled: No    Next appt: 4/2/2024    RX refill sent to provider for amount until next labs: Yes

## 2023-10-29 ENCOUNTER — PATIENT MESSAGE (OUTPATIENT)
Dept: OBSTETRICS AND GYNECOLOGY | Facility: CLINIC | Age: 28
End: 2023-10-29
Payer: MEDICAID

## 2023-11-14 ENCOUNTER — PATIENT MESSAGE (OUTPATIENT)
Dept: OBSTETRICS AND GYNECOLOGY | Facility: CLINIC | Age: 28
End: 2023-11-14

## 2023-11-14 ENCOUNTER — POSTPARTUM VISIT (OUTPATIENT)
Dept: OBSTETRICS AND GYNECOLOGY | Facility: CLINIC | Age: 28
End: 2023-11-14
Payer: MEDICAID

## 2023-11-14 VITALS
WEIGHT: 135.13 LBS | BODY MASS INDEX: 25.53 KG/M2 | DIASTOLIC BLOOD PRESSURE: 80 MMHG | SYSTOLIC BLOOD PRESSURE: 118 MMHG

## 2023-11-14 DIAGNOSIS — B37.0 THRUSH: ICD-10-CM

## 2023-11-14 DIAGNOSIS — T83.9XXA COMPLICATION OF INTRAUTERINE DEVICE (IUD), UNSPECIFIED COMPLICATION, INITIAL ENCOUNTER: Primary | ICD-10-CM

## 2023-11-14 PROCEDURE — 99999 PR PBB SHADOW E&M-EST. PATIENT-LVL III: CPT | Mod: PBBFAC,,, | Performed by: OBSTETRICS & GYNECOLOGY

## 2023-11-14 PROCEDURE — 99999 PR PBB SHADOW E&M-EST. PATIENT-LVL III: ICD-10-PCS | Mod: PBBFAC,,, | Performed by: OBSTETRICS & GYNECOLOGY

## 2023-11-14 PROCEDURE — 99213 OFFICE O/P EST LOW 20 MIN: CPT | Mod: PBBFAC,PN | Performed by: OBSTETRICS & GYNECOLOGY

## 2023-11-14 PROCEDURE — 99213 OFFICE O/P EST LOW 20 MIN: CPT | Mod: TH,S$PBB,, | Performed by: OBSTETRICS & GYNECOLOGY

## 2023-11-14 PROCEDURE — 99213 PR OFFICE/OUTPT VISIT, EST, LEVL III, 20-29 MIN: ICD-10-PCS | Mod: TH,S$PBB,, | Performed by: OBSTETRICS & GYNECOLOGY

## 2023-11-14 RX ORDER — DOXYLAMINE SUCCINATE 25 MG
TABLET ORAL
Qty: 92 G | Refills: 0 | Status: SHIPPED | OUTPATIENT
Start: 2023-11-14 | End: 2024-04-02

## 2023-11-14 NOTE — PROGRESS NOTES
Past medical, surgical, social, family, and obstetric histories; medications; prior records and results; and available outside records were reviewed and updated in the EMR.  Pertinent findings were noted below.    Reason for Visit   Postpartum Care    HPI   28 y.o. female     Patient's last menstrual period was 2023 (exact date).    Delivery: pLTC/S on 10/6/2023 due to failed IOL. Labor was complicated by PreE w/ SF @ 35 weeks  Hospitalization: c/b PPH and HTN requiring Procardia, discontinued on PPD #10  Ambulating, tolerating Po, moving bowels: Y  Pain controlled: Y  Bleeding: N  Breastfeeding: Y  Breast pain or engorgement: Yes, believes she has nipple thrush (infant being treated for oral thrush), having sharp, shooting pains at nipples  Postpartum depression: N  Incision: Y  Contraception: ParaGard (placed intraoperatively)    Pap:  NILM  Mammogram: N/A  Allergies: Latex, natural rubber and Adhesive    Exam   /80   Wt 61.3 kg (135 lb 2.3 oz)   LMP 2023 (Exact Date)   Breastfeeding Yes   BMI 25.53 kg/m²     Physical Exam  Constitutional:       General: She is not in acute distress.     Appearance: She is not ill-appearing.   Genitourinary:      Vulva normal.      Right Labia: No rash, lesions or Bartholin's cyst.     Left Labia: No lesions, Bartholin's cyst or rash.     No vaginal discharge or bleeding.      No cervical discharge or lesion.      No IUD strings visualized.      Pelvic exam was performed with patient in the lithotomy position.   Pulmonary:      Effort: No respiratory distress.   Abdominal:      General: A surgical scar is present. There is no distension.      Palpations: Abdomen is soft. There is no mass.      Tenderness: There is no abdominal tenderness. There is no guarding or rebound.      Comments: Incision: Clean, dry, and intact; no erythema, induration, or drainage   Exam conducted with a chaperone present.       Assessment and Plan   Complication of  intrauterine device (IUD), unspecified complication, initial encounter  -     US Pelvis Complete w/ Transvag for IUD (xpd); Future; Expected date: 11/14/2023    Thrush  -     miconazole (MICOTIN) 2 % cream; Apply small amount to nipple after every feed  Dispense: 92 g; Refill: 0      Postpartum  Doing well  Exam: WNL, pfannenstiel incision well healing  Mood / depression screening: EPDS score 2, no issues  Lactation referral: N/A  Contraception: Paragard IUD placed intraoperatively, no strings visualized - will obtain pelvic US to ensure correct placement   Based on lactation recommendations will treat nipple candida:  The most effective treatment for topical thrush is Miconazole (Daktarin) cream (2%), which should be applied to the nipple in small amounts after every feed. In mild cases, expect improvement within a couple of days. In more severe cases, it may take 3 to 5 days or longer. Sometimes, symptoms get worse before they get better.  If nipples are very inflamed, a mild steroid cream (hydrocortisone 1%) can be used to facilitate healing.  If symptoms do not improve, oral fluconazole may be required in addition to the above. A higher initial dose of 150-400mg is prescribed, followed by 100-200mg daily for at least 10 days. It may take a week or longer for the pain to disappear.   Mothers may also be prescribed nystatin (Nystan) cream or ointment, or clotrimazole (Canestan) cream. However, both are considered to be less effective than other options and clotrimazole is associated with allergic reactions.    Follow-up: for annual WWE or PRN

## 2023-11-15 ENCOUNTER — HOSPITAL ENCOUNTER (OUTPATIENT)
Dept: RADIOLOGY | Facility: OTHER | Age: 28
Discharge: HOME | End: 2023-11-15
Attending: OBSTETRICS & GYNECOLOGY
Payer: MEDICAID

## 2023-11-15 DIAGNOSIS — T83.9XXA COMPLICATION OF INTRAUTERINE DEVICE (IUD), UNSPECIFIED COMPLICATION, INITIAL ENCOUNTER: ICD-10-CM

## 2023-11-15 PROCEDURE — 76830 TRANSVAGINAL US NON-OB: CPT | Mod: 26,,, | Performed by: RADIOLOGY

## 2023-11-15 PROCEDURE — 76830 TRANSVAGINAL US NON-OB: CPT | Mod: TC

## 2023-11-15 PROCEDURE — 76856 US EXAM PELVIC COMPLETE: CPT | Mod: 26,,, | Performed by: RADIOLOGY

## 2023-11-15 PROCEDURE — 76830 US PELVIS COMP WITH TRANSVAG NON-OB (XPD): ICD-10-PCS | Mod: 26,,, | Performed by: RADIOLOGY

## 2023-11-15 PROCEDURE — 76856 US PELVIS COMP WITH TRANSVAG NON-OB (XPD): ICD-10-PCS | Mod: 26,,, | Performed by: RADIOLOGY

## 2023-12-19 ENCOUNTER — PATIENT MESSAGE (OUTPATIENT)
Dept: ADMINISTRATIVE | Facility: OTHER | Age: 28
End: 2023-12-19
Payer: MEDICAID

## 2024-01-03 ENCOUNTER — PATIENT MESSAGE (OUTPATIENT)
Dept: GASTROENTEROLOGY | Facility: CLINIC | Age: 29
End: 2024-01-03
Payer: COMMERCIAL

## 2024-01-08 PROBLEM — O13.3 GESTATIONAL HYPERTENSION, THIRD TRIMESTER: Status: RESOLVED | Noted: 2023-10-04 | Resolved: 2024-01-08

## 2024-01-11 ENCOUNTER — PATIENT MESSAGE (OUTPATIENT)
Dept: OBSTETRICS AND GYNECOLOGY | Facility: CLINIC | Age: 29
End: 2024-01-11
Payer: COMMERCIAL

## 2024-01-15 PROBLEM — O14.93 PRE-ECLAMPSIA IN THIRD TRIMESTER: Status: RESOLVED | Noted: 2023-10-04 | Resolved: 2024-01-15

## 2024-04-02 ENCOUNTER — OFFICE VISIT (OUTPATIENT)
Dept: GASTROENTEROLOGY | Facility: CLINIC | Age: 29
End: 2024-04-02
Payer: MEDICAID

## 2024-04-02 VITALS
HEART RATE: 98 BPM | HEIGHT: 61 IN | SYSTOLIC BLOOD PRESSURE: 112 MMHG | OXYGEN SATURATION: 99 % | DIASTOLIC BLOOD PRESSURE: 76 MMHG | WEIGHT: 137.56 LBS | BODY MASS INDEX: 25.97 KG/M2 | TEMPERATURE: 98 F

## 2024-04-02 DIAGNOSIS — E55.9 VITAMIN D DEFICIENCY: ICD-10-CM

## 2024-04-02 DIAGNOSIS — K50.012 CROHN'S DISEASE OF SMALL INTESTINE WITH INTESTINAL OBSTRUCTION: Primary | ICD-10-CM

## 2024-04-02 DIAGNOSIS — Z79.899 IMMUNOSUPPRESSION DUE TO DRUG THERAPY: ICD-10-CM

## 2024-04-02 DIAGNOSIS — D84.821 IMMUNOSUPPRESSION DUE TO DRUG THERAPY: ICD-10-CM

## 2024-04-02 DIAGNOSIS — D50.0 IRON DEFICIENCY ANEMIA DUE TO CHRONIC BLOOD LOSS: ICD-10-CM

## 2024-04-02 PROBLEM — O09.93 SUPERVISION OF HIGH RISK PREGNANCY IN THIRD TRIMESTER: Status: RESOLVED | Noted: 2023-10-04 | Resolved: 2024-04-02

## 2024-04-02 PROCEDURE — 1159F MED LIST DOCD IN RCRD: CPT | Mod: CPTII,S$GLB,, | Performed by: INTERNAL MEDICINE

## 2024-04-02 PROCEDURE — G2211 COMPLEX E/M VISIT ADD ON: HCPCS | Mod: S$GLB,,, | Performed by: INTERNAL MEDICINE

## 2024-04-02 PROCEDURE — 3008F BODY MASS INDEX DOCD: CPT | Mod: CPTII,S$GLB,, | Performed by: INTERNAL MEDICINE

## 2024-04-02 PROCEDURE — 3074F SYST BP LT 130 MM HG: CPT | Mod: CPTII,S$GLB,, | Performed by: INTERNAL MEDICINE

## 2024-04-02 PROCEDURE — 99213 OFFICE O/P EST LOW 20 MIN: CPT | Mod: S$GLB,,, | Performed by: INTERNAL MEDICINE

## 2024-04-02 PROCEDURE — 3078F DIAST BP <80 MM HG: CPT | Mod: CPTII,S$GLB,, | Performed by: INTERNAL MEDICINE

## 2024-04-02 PROCEDURE — 1160F RVW MEDS BY RX/DR IN RCRD: CPT | Mod: CPTII,S$GLB,, | Performed by: INTERNAL MEDICINE

## 2024-04-02 NOTE — PROGRESS NOTES
[facility]  Inflammatory Bowel Disease   Follow Up Note         TODAY'S VISIT DATE:  2024    Reason for Visit:    Chief Complaint   Patient presents with    Crohn's Disease     PCP: Marti Colin      History of Present Illness:  Nina Durant who is a 28 y.o. female is being seen today at the [facility] on 2024 as a follow-up visit for inflammatory bowel disease- Crohn's disease.  Following last visit in 10/2023, patient was admitted for management of pre-eclampsia  requiring delivery via  in at 36 weeks; post-partum course was complicated by development of passage of large clots with development of acute blood loss anemia (Hgb 9-10 from 12-13 previously). Patient was given IV iron and discharged on temporary course of PO supplementation. Post-partum, patient denies recurrent vaginal bleeding. She has remained on Stelara 90 mg Q8 weeks with last dose given on 2024. She reports having 1-2 formed non-bloody stools daily without associated abdominal or rectal pain. She is still currently breast feeding. Her next dose of Stelara is due on .     IBD History:  She has a long history of suspected Crohn's disease but the diagnosis has not been clearly made. When she was a child she had significant gastrointestinal issues. In , she had an upper endoscopy performed for upper abdominal discomfort and reflux symptoms. This was fairly unremarkable including biopsies. She was started on Protonix and had some improvement. In  she was seen again because of ongoing issues and had an upper endoscopy and colonoscopy at that time.  The upper endoscopy revealed some mild erosions in the duodenum and biopsies were consistent with acute inflammation.  The colonoscopy showed congestion of the ileocecal valve and erythema, congestion, and friability of the terminal ileum; biopsies of the colon were normal; biopsy of the terminal  ileum showed no inflammation. She was treated with Budesonide and had some improvement in symptoms.  A repeat scope in 2016 was completely normal including biopsies.  I saw her when I was at [facility] in 2018. She had ongoing symptoms that seemed to be more consistent with chronic constipation issues as well as some functional gastrointestinal issues. She was started on Dicyclomine and this caused an increase in rectal bleeding so it was stopped.  With an increase in water she began having better bowel movements and most of her symptoms improved. Pantoprazole also help with her upper GI issues.  A repeat upper endoscopy and colonoscopy were performed.  The upper endoscopy was unrevealing.  The colonoscopy did show some erythematous mucosa in the terminal ileum.  Biopsies were not consistent with Crohn's, but did show a significant amount of eosinophils within the terminal ileum as well as in the colon (colon was normal appearing).  In January of 2021 she began having more rectal bleeding issues as well as worsening vomiting, increased right lower quadrant epigastric pain, and more diarrhea.  She went to the emergency room and had labs done that were unrevealing.  A CT scan was also unrevealing.  A colonoscopy that was done in January 2021 revealed some congestion and inflammatory polyps in the terminal ileum as well as a suspected ileal stricture; the colon was completely normal.  Biopsies were not consistent with Crohn's disease and only showed 1 area of active inflammation in the terminal ileum.  A subsequent MR enterography showed some thickening of the terminal ileum with some mild inflammatory changes as well mild narrowing with some upstream dilation of the ileum.  She was started on Budesonide 9 mg daily which provided minimal improvement.  In July 2021, she underwent a repeat colonoscopy that again showed ileitis as well as some narrowing of the terminal ileum which was able to be traversed with the colonoscopy  scope.  Biopsies showed chronic inflammatory changes consistent with Crohn's disease.  She started Stelara on 2021.  At visit in 2022, she was noted to have nausea and vomiting.  Underwent EGD/Colonoscopy in 2022 with notable ileal stricture 5 cm proximal to IC valve but no active inflammation in the ileum and normal EGD; biopsies unremarkable. She became pregnant in  with subsequent development of pre-eclampsia requiring delivery via  in 10/2023 at 36 weeks; post-partum course was complicated by development of passage of large clots with development of acute blood loss anemia.     IBD Detail:  Dx Date:  Symptoms since , diagnosis confirmed in   Disease type/distribution:  Crohn's/ileal inflammation  Current Treatment:  Stelara 90 mg every 8 weeks Start Date:  2021  Response:  Endoscopic and histologic remission  Optimized:  Not yet  Adverse reactions:  None  Prior surgeries:  None  CRP Elevation:  No  Calprotectin:  Mild elevation  Disease Complications:  Mild stenosis of the terminal ileum  Extraintestinal manifestations:  Back pain  Prior treatments:   Steroids:  Partial response to Budesonide  5ASA:  No response  IMM:  None  TNF Inh:  None   Anti-Integrin:  None   IL 12/23:  Current therapy  COURTNEY Inh:  None    Previous Clinical Trials:  None    Last Colonoscopy:    2022: Perianal skin tags found on perianal exam. Internal hemorrhoids. Anal papilla(e) were hypertrophied. Scar in the terminal ileum. Stricture in the ileum, 5 cm from the ileocecal valve. Dilated. Lesion not amenable to dilation, and not attempted. The terminal ileum contained some pseudopolyps but no signs of active inflammation; ileal biopsies without histopathologic abnormalitis. Simple Endoscopic Score for Crohn's Disease: 3, mucosal inflammatory changes.    Other Endoscopies:    EGD 2022: Normal esophagus. Z-line regular, 36 cm from the incisors. Normal stomach. Biopsies negative for H.pylori.  "Normal examined duodenum.     Imaging:   MRE:  March 2021-inflammatory changes of the terminal ileum with suspected stricture   CT:  Previous studies reviewed   Other:  Previous studies reviewed    Pertinent Labs:  Lab Results   Component Value Date    SEDRATE 7 04/26/2017    CRP 22.3 (H) 11/22/2022     Lab Results   Component Value Date    TTGIGA 11 04/26/2017     04/26/2017     No results found for: "TSH", "FREET4"  Lab Results   Component Value Date    RSSBQYFE27YX 22 (L) 05/23/2023    HFTRGFTD48 318 05/23/2023     Lab Results   Component Value Date    HEPBSAG Non-reactive 03/15/2023    HEPBCAB Negative 06/13/2022    HEPCAB Non-reactive 03/15/2023     Lab Results   Component Value Date    RLX07SVGX Negative 10/05/2023     Lab Results   Component Value Date    NIL 0.66751 05/23/2023    TBAG 0.04 12/18/2015    TBAGNIL 0.00 12/18/2015    MITOGENNIL 3.800 05/23/2023    TBGOLD Negative 12/18/2015     Lab Results   Component Value Date    TPMTRESULT 27.6 12/10/2015     No results found for: "STOOLCULTURE", "MVQXRUCFNI6O", "ZLYOAHKNPZ3I", "CDIFFICILEAN", "CDIFFTOX", "CDIFFICILEBY"  Lab Results   Component Value Date    CALPROTECTIN 40.8 02/24/2022       Therapeutic Drug Monitoring Labs:  No results found for: "PROMETH"  No results found for: "ANSADAINIT", "INFLIXIMAB", "INFLIXINTERP"    Vaccinations:  Lab Results   Component Value Date    HEPBSAB Negative 08/23/2021     Lab Results   Component Value Date    HEPAIGG Negative 08/23/2021     Lab Results   Component Value Date    VARICELLAZOS 1294.00 03/15/2023    VARICELLAINT Positive 03/15/2023     Immunization History   Administered Date(s) Administered    DTP 1997, 1997, 1997, 08/14/1998, 08/25/2001    DTaP 1997, 1997, 1997, 08/14/1998, 08/25/2001    Hep B Immune Globulin 1997    Hepatitis B 1997, 05/12/1998, 08/14/1998    Hepatitis B, Pediatric/Adolescent 1997    HiB PRP-OMP 1997, 1997, 08/14/1998 "    Hib-HbOC 1997, 1997, 08/14/1998    IPV 1997, 1997, 08/14/1998, 08/25/2001    MMR 05/12/1998, 08/25/2001    Meningococcal 09/24/2008, 07/29/2015    Meningococcal Conjugate (MCV4P) 09/24/2008, 07/29/2015    OPV 1997, 1997, 08/14/1998, 08/25/2001    Pneumococcal Conjugate - 7 Valent 1997, 1997    Tdap 09/24/2008, 02/20/2019, 08/11/2023    Varicella 1997, 05/12/1998, 08/14/1998         Review of Systems  Review of Systems   Constitutional:  Negative for chills, fever and weight loss.   HENT:  Negative for sore throat.    Eyes:  Negative for pain, discharge and redness.   Respiratory:  Negative for cough, shortness of breath and wheezing.    Cardiovascular:  Negative for chest pain, orthopnea and leg swelling.   Gastrointestinal:  Negative for abdominal pain, blood in stool, constipation, diarrhea, heartburn, melena and vomiting.   Genitourinary:  Negative for dysuria, frequency and urgency.   Musculoskeletal:  Negative for joint pain and myalgias.   Skin:  Negative for itching and rash.   Neurological:  Negative for focal weakness and seizures.   Endo/Heme/Allergies:  Does not bruise/bleed easily.   Psychiatric/Behavioral:  Negative for depression.      Past Medical, Surgical, Family, and Social History reviewed.     Home Medications:   Medication List with Changes/Refills   Current Medications    USTEKINUMAB (STELARA) 90 MG/ML SYRG SYRINGE    Inject 1 mL (90 mg total) into the skin every 8 weeks.   Discontinued Medications    ACETAMINOPHEN (TYLENOL) 650 MG TBSR    Take 1 tablet (650 mg total) by mouth every 6 (six) hours as needed.    FERROUS SULFATE 324 MG (65 MG IRON) TBEC    Take 1 tablet (324 mg total) by mouth once daily.    IBUPROFEN (ADVIL,MOTRIN) 600 MG TABLET    Take 1 tablet (600 mg total) by mouth every 6 (six) hours as needed for Pain.    MICONAZOLE (MICOTIN) 2 % CREAM    Apply small amount to nipple after every feed    OXYCODONE (ROXICODONE) 5 MG  "IMMEDIATE RELEASE TABLET    Take 1 tablet (5 mg total) by mouth every 6 (six) hours as needed for Pain.    PRENATAL 25/IRON FUM/FOLIC/DHA (PRENATAL-1 ORAL)    Take by mouth.    SENNA-DOCUSATE 8.6-50 MG (SENNA WITH DOCUSATE SODIUM) 8.6-50 MG PER TABLET    Take 1 tablet by mouth once daily.       Physical Exam:  Vital Signs:  /76 (BP Location: Left arm, Patient Position: Sitting, BP Method: Small (Automatic))   Pulse 98   Temp 98.4 °F (36.9 °C) (Tympanic)   Ht 5' 1" (1.549 m)   Wt 62.4 kg (137 lb 9.1 oz)   SpO2 99%   BMI 25.99 kg/m²   Body mass index is 25.99 kg/m².    Physical Exam  Vitals and nursing note reviewed.   Constitutional:       General: She is not in acute distress.     Appearance: Normal appearance. She is well-developed.   Eyes:      General: No scleral icterus.  Cardiovascular:      Rate and Rhythm: Normal rate and regular rhythm.      Heart sounds: Normal heart sounds. No murmur heard.  Pulmonary:      Effort: Pulmonary effort is normal.      Breath sounds: Normal breath sounds. No wheezing or rales.   Abdominal:      General: Bowel sounds are normal. There is no distension.      Palpations: Abdomen is soft.      Tenderness: There is no abdominal tenderness.   Musculoskeletal:      Right lower leg: No edema.      Left lower leg: No edema.   Skin:     General: Skin is warm and dry.      Coloration: Skin is not jaundiced.   Neurological:      Mental Status: She is alert and oriented to person, place, and time.       Labs: Reviewed and pertinent noted above    Assessment/Plan:  Nina Durant is a 28 y.o. female with Crohn's disease. The following issues were addresssed:    1. Crohn's disease of small intestine with intestinal obstruction    2. Iron deficiency anemia due to chronic blood loss    3. Vitamin D deficiency    4. Immunosuppression due to drug therapy      1. Crohn's disease: Patient continues to do well on Stelara 90 mg Q8 weeks; will continue current regimen for now " with next dose planned for 4/19/2024. We will also obtain updated labs including annual Hepatitis B and TB screening. We will determine timing for repeat colonoscopy at next follow-up visit.     2. Iron deficiency:  Repeat CBC and iron studies ordered to follow-up levels after heavy post-partum bleeding in 10/2023.     3. Vit D Deficiency: Repeat level ordered.       # IBD specific health maintenance:  Colon cancer surveillance:  No colonic disease    Annual:  - Eye exam:  Not applicable  - Skin exam (if on IMM/TNF):  Recommend annual skin exam  - reminded pt to use sunblock/hats/sunprotective clothing  - PAP (if immunosuppressed):  Recommend annual exam with gynecologist    DEXA: N/A    Vitamin D:  Recommend supplementation-recheck in the near future    Vaccines:    Influenza:  Recommend annual vaccination   Pneumovax:     PCV13:  Discuss in the future    PSV23:  Discuss in the future   HAV:  Check serology   HBV:  Check serology   Tdap:  Up-to-date   MMR:  Completed series   VZV:  Completed series-check serology   HZV:  Not applicable   HPV:  Discuss in the future   Meningococcus:  Completed series   COVID:  Recommend vaccination    Follow up: Follow up in about 6 months (around 10/2/2024).        Christiano Mcdonough MD, PGY-VI  Gastroenterology Fellow  [facility]

## 2024-04-02 NOTE — PROGRESS NOTES
IBD PATIENT INTAKE:    Confirm current PCP: Marti Colin  If no PCP-  number given to establish [phone]: Yes    IBD THERAPY (name, dose/frequency):  Stelara  Last dose:  02/23/2024    Next dose:  04/19/2024    Antibiotics (past 30 Days):  No  If yes   Indication:  Name of antibiotic:  Completion date:

## 2024-04-03 ENCOUNTER — LAB VISIT (OUTPATIENT)
Dept: LAB | Facility: HOSPITAL | Age: 29
End: 2024-04-03
Attending: INTERNAL MEDICINE
Payer: COMMERCIAL

## 2024-04-03 DIAGNOSIS — E55.9 VITAMIN D DEFICIENCY: ICD-10-CM

## 2024-04-03 DIAGNOSIS — K50.012 CROHN'S DISEASE OF SMALL INTESTINE WITH INTESTINAL OBSTRUCTION: ICD-10-CM

## 2024-04-03 DIAGNOSIS — D50.0 IRON DEFICIENCY ANEMIA DUE TO CHRONIC BLOOD LOSS: ICD-10-CM

## 2024-04-03 LAB
25(OH)D3+25(OH)D2 SERPL-MCNC: 12 NG/ML (ref 30–96)
ALBUMIN SERPL BCP-MCNC: 4.1 G/DL (ref 3.5–5.2)
ALP SERPL-CCNC: 105 U/L (ref 55–135)
ALT SERPL W/O P-5'-P-CCNC: 13 U/L (ref 10–44)
ANION GAP SERPL CALC-SCNC: 8 MMOL/L (ref 8–16)
AST SERPL-CCNC: 17 U/L (ref 10–40)
BASOPHILS # BLD AUTO: 0.04 K/UL (ref 0–0.2)
BASOPHILS NFR BLD: 1 % (ref 0–1.9)
BILIRUB SERPL-MCNC: 0.3 MG/DL (ref 0.1–1)
BUN SERPL-MCNC: 4 MG/DL (ref 6–20)
CALCIUM SERPL-MCNC: 9.6 MG/DL (ref 8.7–10.5)
CHLORIDE SERPL-SCNC: 105 MMOL/L (ref 95–110)
CO2 SERPL-SCNC: 26 MMOL/L (ref 23–29)
CREAT SERPL-MCNC: 0.8 MG/DL (ref 0.5–1.4)
CRP SERPL-MCNC: 1.5 MG/L (ref 0–8.2)
DIFFERENTIAL METHOD BLD: ABNORMAL
EOSINOPHIL # BLD AUTO: 0.1 K/UL (ref 0–0.5)
EOSINOPHIL NFR BLD: 2.9 % (ref 0–8)
ERYTHROCYTE [DISTWIDTH] IN BLOOD BY AUTOMATED COUNT: 17.4 % (ref 11.5–14.5)
EST. GFR  (NO RACE VARIABLE): >60 ML/MIN/1.73 M^2
FERRITIN SERPL-MCNC: 8 NG/ML (ref 20–300)
GLUCOSE SERPL-MCNC: 87 MG/DL (ref 70–110)
HBV CORE AB SERPL QL IA: NORMAL
HBV SURFACE AG SERPL QL IA: NORMAL
HCT VFR BLD AUTO: 35.7 % (ref 37–48.5)
HGB BLD-MCNC: 11 G/DL (ref 12–16)
IMM GRANULOCYTES # BLD AUTO: 0.01 K/UL (ref 0–0.04)
IMM GRANULOCYTES NFR BLD AUTO: 0.2 % (ref 0–0.5)
IRON SERPL-MCNC: 26 UG/DL (ref 30–160)
LYMPHOCYTES # BLD AUTO: 1.4 K/UL (ref 1–4.8)
LYMPHOCYTES NFR BLD: 32.2 % (ref 18–48)
MCH RBC QN AUTO: 22.4 PG (ref 27–31)
MCHC RBC AUTO-ENTMCNC: 30.8 G/DL (ref 32–36)
MCV RBC AUTO: 73 FL (ref 82–98)
MONOCYTES # BLD AUTO: 0.4 K/UL (ref 0.3–1)
MONOCYTES NFR BLD: 8.8 % (ref 4–15)
NEUTROPHILS # BLD AUTO: 2.3 K/UL (ref 1.8–7.7)
NEUTROPHILS NFR BLD: 54.9 % (ref 38–73)
NRBC BLD-RTO: 0 /100 WBC
PLATELET # BLD AUTO: 292 K/UL (ref 150–450)
PMV BLD AUTO: 10.7 FL (ref 9.2–12.9)
POTASSIUM SERPL-SCNC: 3.9 MMOL/L (ref 3.5–5.1)
PROT SERPL-MCNC: 7.7 G/DL (ref 6–8.4)
RBC # BLD AUTO: 4.92 M/UL (ref 4–5.4)
SATURATED IRON: 5 % (ref 20–50)
SODIUM SERPL-SCNC: 139 MMOL/L (ref 136–145)
TOTAL IRON BINDING CAPACITY: 551 UG/DL (ref 250–450)
TRANSFERRIN SERPL-MCNC: 372 MG/DL (ref 200–375)
VIT B12 SERPL-MCNC: 534 PG/ML (ref 210–950)
WBC # BLD AUTO: 4.19 K/UL (ref 3.9–12.7)

## 2024-04-03 PROCEDURE — 87340 HEPATITIS B SURFACE AG IA: CPT | Performed by: INTERNAL MEDICINE

## 2024-04-03 PROCEDURE — 86706 HEP B SURFACE ANTIBODY: CPT | Performed by: INTERNAL MEDICINE

## 2024-04-03 PROCEDURE — 86140 C-REACTIVE PROTEIN: CPT | Performed by: INTERNAL MEDICINE

## 2024-04-03 PROCEDURE — 80053 COMPREHEN METABOLIC PANEL: CPT | Performed by: INTERNAL MEDICINE

## 2024-04-03 PROCEDURE — 83540 ASSAY OF IRON: CPT | Performed by: INTERNAL MEDICINE

## 2024-04-03 PROCEDURE — 82306 VITAMIN D 25 HYDROXY: CPT | Performed by: INTERNAL MEDICINE

## 2024-04-03 PROCEDURE — 36415 COLL VENOUS BLD VENIPUNCTURE: CPT | Performed by: INTERNAL MEDICINE

## 2024-04-03 PROCEDURE — 82607 VITAMIN B-12: CPT | Performed by: INTERNAL MEDICINE

## 2024-04-03 PROCEDURE — 85025 COMPLETE CBC W/AUTO DIFF WBC: CPT | Performed by: INTERNAL MEDICINE

## 2024-04-03 PROCEDURE — 82728 ASSAY OF FERRITIN: CPT | Performed by: INTERNAL MEDICINE

## 2024-04-03 PROCEDURE — 86480 TB TEST CELL IMMUN MEASURE: CPT | Performed by: INTERNAL MEDICINE

## 2024-04-03 PROCEDURE — 86704 HEP B CORE ANTIBODY TOTAL: CPT | Performed by: INTERNAL MEDICINE

## 2024-04-04 LAB
GAMMA INTERFERON BACKGROUND BLD IA-ACNC: 0 IU/ML
M TB IFN-G CD4+ BCKGRND COR BLD-ACNC: 0 IU/ML
M TB IFN-G CD4+ BCKGRND COR BLD-ACNC: 0.01 IU/ML
MITOGEN IGNF BCKGRD COR BLD-ACNC: 4.52 IU/ML
TB GOLD PLUS: NEGATIVE

## 2024-04-06 LAB
HBV SURFACE AB SER QL IA: NEGATIVE
HBV SURFACE AB SERPL IA-ACNC: <3 MIU/ML

## 2024-04-30 ENCOUNTER — OFFICE VISIT (OUTPATIENT)
Dept: OBSTETRICS AND GYNECOLOGY | Facility: CLINIC | Age: 29
End: 2024-04-30
Payer: COMMERCIAL

## 2024-04-30 VITALS
SYSTOLIC BLOOD PRESSURE: 118 MMHG | BODY MASS INDEX: 25.94 KG/M2 | DIASTOLIC BLOOD PRESSURE: 72 MMHG | HEIGHT: 61 IN | WEIGHT: 137.38 LBS

## 2024-04-30 DIAGNOSIS — Z30.09 BIRTH CONTROL COUNSELING: ICD-10-CM

## 2024-04-30 DIAGNOSIS — R10.2 PELVIC PAIN: ICD-10-CM

## 2024-04-30 DIAGNOSIS — T83.39XA OTHER MECHANICAL COMPLICATION OF INTRAUTERINE CONTRACEPTIVE DEVICE, INITIAL ENCOUNTER: ICD-10-CM

## 2024-04-30 DIAGNOSIS — N89.8 VAGINAL DISCHARGE: Primary | ICD-10-CM

## 2024-04-30 PROCEDURE — 3008F BODY MASS INDEX DOCD: CPT | Mod: CPTII,S$GLB,, | Performed by: OBSTETRICS & GYNECOLOGY

## 2024-04-30 PROCEDURE — 3078F DIAST BP <80 MM HG: CPT | Mod: CPTII,S$GLB,, | Performed by: OBSTETRICS & GYNECOLOGY

## 2024-04-30 PROCEDURE — 99213 OFFICE O/P EST LOW 20 MIN: CPT | Mod: S$GLB,,, | Performed by: OBSTETRICS & GYNECOLOGY

## 2024-04-30 PROCEDURE — 3074F SYST BP LT 130 MM HG: CPT | Mod: CPTII,S$GLB,, | Performed by: OBSTETRICS & GYNECOLOGY

## 2024-04-30 PROCEDURE — 99999 PR PBB SHADOW E&M-EST. PATIENT-LVL III: CPT | Mod: PBBFAC,,, | Performed by: OBSTETRICS & GYNECOLOGY

## 2024-04-30 NOTE — PROGRESS NOTES
"Past medical, surgical, social, family, and obstetric histories; medications; prior records and results; and available outside records were reviewed and updated in the EMR.  Pertinent findings were noted below.    Reason for Visit   Contraception    HPI   28 y.o. female     Patient's last menstrual period was 2024.    Having right sided pain periodically since CS - felt like that was "tighter" side after surgery  Having cervical mucus discharge since placement of IUD. No itching. No odor.  Pelvic US was done at PP visit when missing IUD strings were noted - it was in correct location    Contraception: ParaGard  Pap:  NILM  Mammogram: N/A    Exam   /72   Ht 5' 1" (1.549 m)   Wt 62.3 kg (137 lb 5.6 oz)   LMP 2024   Breastfeeding Yes   BMI 25.95 kg/m²     Physical Exam  Constitutional:       General: She is not in acute distress.     Appearance: Normal appearance. She is normal weight. She is not ill-appearing.     Genitourinary:    Vulva, vagina and uterus normal.   Right adnexum displays tenderness (mild on deep palpation). Right adnexum displays no mass. Left adnexum displays no mass and no tenderness. Cervix is normal. Unable to visualize IUD strings.Uerus contour normal  HENT:      Head: Normocephalic and atraumatic.   Pulmonary:      Effort: Pulmonary effort is normal.   Abdominal:      Comments: Pfannenstiel with keloid 75% of incision (left aspect)   Musculoskeletal:         General: Normal range of motion.   Neurological:      General: No focal deficit present.      Mental Status: She is alert and oriented to person, place, and time.   Psychiatric:         Mood and Affect: Mood normal.         Behavior: Behavior normal.         Thought Content: Thought content normal.         Judgment: Judgment normal.   Vitals reviewed.       Assessment and Plan   Vaginal discharge    Pelvic pain    Birth control counseling      Reassured patient re: normal discharge  Keloid on left aspect of " incision - would recommend kenalog injection at future surgery incision sites  Reassured patient re: normal pelvic US and IUD location. Explained to patient that IUDs don't migrate after placement. Desires to keep IUD at this time. Planning on removal when infant is 12 months old to achieve another pregnancy.

## 2024-05-15 DIAGNOSIS — K50.012 CROHN'S DISEASE OF SMALL INTESTINE WITH INTESTINAL OBSTRUCTION: ICD-10-CM

## 2024-05-15 RX ORDER — USTEKINUMAB 90 MG/ML
INJECTION, SOLUTION SUBCUTANEOUS
Qty: 1 ML | Refills: 2 | Status: SHIPPED | OUTPATIENT
Start: 2024-05-15

## 2024-05-15 NOTE — TELEPHONE ENCOUNTER
"Primary provider: SU    IBD medications Stelara 90 mg every 8 weeks Start Date: November 4, 2021     Refill request for stelara     Allergies reviewed Yes    Drug Monitoring labs/frequency for all IBD meds:  CBC CMP q3mo; TB and hep B yearly     Lab Results   Component Value Date    HEPBSAG Non-reactive 04/03/2024    HEPBCAB Non-reactive 04/03/2024     Lab Results   Component Value Date    TBGOLDPLUS Negative 04/03/2024     No results found for: "QUANTNILVALU", "QUANTIFERON", "QUANTTBGDPL"   Lab Results   Component Value Date    PSLLAXJA29KD 12 (L) 04/03/2024    XSICRXXC73 534 04/03/2024     Lab Results   Component Value Date    WBC 4.19 04/03/2024    HGB 11.0 (L) 04/03/2024    HCT 35.7 (L) 04/03/2024    MCV 73 (L) 04/03/2024     04/03/2024     Lab Results   Component Value Date    CREATININE 0.8 04/03/2024    ALBUMIN 4.1 04/03/2024    BILITOT 0.3 04/03/2024    ALKPHOS 105 04/03/2024    AST 17 04/03/2024    ALT 13 04/03/2024    GGT 14 04/26/2017       Lab due date (mo/yr):  10/2024    Labs scheduled: no - but pt has OV that month    Next appt: 10/1/2024    RX refill sent to provider for amount until next labs: Yes      "

## 2024-08-13 ENCOUNTER — OFFICE VISIT (OUTPATIENT)
Dept: OBSTETRICS AND GYNECOLOGY | Facility: CLINIC | Age: 29
End: 2024-08-13
Payer: COMMERCIAL

## 2024-08-13 VITALS
DIASTOLIC BLOOD PRESSURE: 86 MMHG | BODY MASS INDEX: 24.47 KG/M2 | WEIGHT: 129.63 LBS | SYSTOLIC BLOOD PRESSURE: 118 MMHG | HEIGHT: 61 IN

## 2024-08-13 DIAGNOSIS — Z30.432 ENCOUNTER FOR IUD REMOVAL: Primary | ICD-10-CM

## 2024-08-13 PROCEDURE — 99999 PR PBB SHADOW E&M-EST. PATIENT-LVL III: CPT | Mod: PBBFAC,,, | Performed by: OBSTETRICS & GYNECOLOGY

## 2024-08-13 PROCEDURE — 99499 UNLISTED E&M SERVICE: CPT | Mod: S$GLB,,, | Performed by: OBSTETRICS & GYNECOLOGY

## 2024-08-13 NOTE — PROCEDURES
Removal of IUD    Date/Time: 8/13/2024 3:30 PM    Performed by: Anna Tirado MD  Authorized by: Anna Tirado MD    Consent obtained:  Prior to procedure the appropriate consent was completed and verified  Consent given by:  Patient  Procedure risks and benefits discussed: yes    Patient questions answered: yes    Patient agrees, verbalizes understanding, and wants to proceed: yes    Implant grasped by: forceps  Removal due to infection and inflammatory reaction: no    Other reason for removal:  Desires pregnancy  Removal due to mechanical complications: no    Removed with no complications: yes     Patient counseled on immediate return of fertility and to start a PNV. no      Anna Tirado MD  [facility]   OBGYN PGY3

## 2024-09-08 ENCOUNTER — PATIENT MESSAGE (OUTPATIENT)
Dept: GASTROENTEROLOGY | Facility: CLINIC | Age: 29
End: 2024-09-08
Payer: COMMERCIAL

## 2024-09-09 PROBLEM — Z3A.32 32 WEEKS GESTATION OF PREGNANCY: Status: RESOLVED | Noted: 2023-10-04 | Resolved: 2024-09-09

## 2024-10-10 ENCOUNTER — OFFICE VISIT (OUTPATIENT)
Dept: GASTROENTEROLOGY | Facility: CLINIC | Age: 29
End: 2024-10-10
Payer: COMMERCIAL

## 2024-10-10 ENCOUNTER — LAB VISIT (OUTPATIENT)
Dept: LAB | Facility: HOSPITAL | Age: 29
End: 2024-10-10
Attending: INTERNAL MEDICINE
Payer: COMMERCIAL

## 2024-10-10 VITALS
OXYGEN SATURATION: 98 % | HEART RATE: 103 BPM | WEIGHT: 130.5 LBS | TEMPERATURE: 99 F | HEIGHT: 61 IN | BODY MASS INDEX: 24.64 KG/M2

## 2024-10-10 DIAGNOSIS — Z79.899 IMMUNOSUPPRESSION DUE TO DRUG THERAPY: ICD-10-CM

## 2024-10-10 DIAGNOSIS — E55.9 VITAMIN D DEFICIENCY: ICD-10-CM

## 2024-10-10 DIAGNOSIS — D84.821 IMMUNOSUPPRESSION DUE TO DRUG THERAPY: ICD-10-CM

## 2024-10-10 DIAGNOSIS — K50.012 CROHN'S DISEASE OF SMALL INTESTINE WITH INTESTINAL OBSTRUCTION: ICD-10-CM

## 2024-10-10 DIAGNOSIS — D50.0 IRON DEFICIENCY ANEMIA DUE TO CHRONIC BLOOD LOSS: ICD-10-CM

## 2024-10-10 DIAGNOSIS — K50.012 CROHN'S DISEASE OF SMALL INTESTINE WITH INTESTINAL OBSTRUCTION: Primary | ICD-10-CM

## 2024-10-10 LAB
25(OH)D3+25(OH)D2 SERPL-MCNC: 18 NG/ML (ref 30–96)
ALBUMIN SERPL BCP-MCNC: 4 G/DL (ref 3.5–5.2)
ALP SERPL-CCNC: 90 U/L (ref 55–135)
ALT SERPL W/O P-5'-P-CCNC: 7 U/L (ref 10–44)
ANION GAP SERPL CALC-SCNC: 7 MMOL/L (ref 8–16)
AST SERPL-CCNC: 14 U/L (ref 10–40)
BASOPHILS # BLD AUTO: 0.03 K/UL (ref 0–0.2)
BASOPHILS NFR BLD: 0.7 % (ref 0–1.9)
BILIRUB SERPL-MCNC: 0.4 MG/DL (ref 0.1–1)
BUN SERPL-MCNC: 5 MG/DL (ref 6–20)
CALCIUM SERPL-MCNC: 9.3 MG/DL (ref 8.7–10.5)
CHLORIDE SERPL-SCNC: 107 MMOL/L (ref 95–110)
CO2 SERPL-SCNC: 25 MMOL/L (ref 23–29)
CREAT SERPL-MCNC: 0.8 MG/DL (ref 0.5–1.4)
CRP SERPL-MCNC: 0.6 MG/L (ref 0–8.2)
DIFFERENTIAL METHOD BLD: ABNORMAL
EOSINOPHIL # BLD AUTO: 0.1 K/UL (ref 0–0.5)
EOSINOPHIL NFR BLD: 3.1 % (ref 0–8)
ERYTHROCYTE [DISTWIDTH] IN BLOOD BY AUTOMATED COUNT: 14.1 % (ref 11.5–14.5)
EST. GFR  (NO RACE VARIABLE): >60 ML/MIN/1.73 M^2
FERRITIN SERPL-MCNC: 7 NG/ML (ref 20–300)
GLUCOSE SERPL-MCNC: 95 MG/DL (ref 70–110)
HCT VFR BLD AUTO: 34.7 % (ref 37–48.5)
HGB BLD-MCNC: 10.9 G/DL (ref 12–16)
IMM GRANULOCYTES # BLD AUTO: 0 K/UL (ref 0–0.04)
IMM GRANULOCYTES NFR BLD AUTO: 0 % (ref 0–0.5)
IRON SERPL-MCNC: 40 UG/DL (ref 30–160)
LYMPHOCYTES # BLD AUTO: 1.5 K/UL (ref 1–4.8)
LYMPHOCYTES NFR BLD: 35.4 % (ref 18–48)
MCH RBC QN AUTO: 24.9 PG (ref 27–31)
MCHC RBC AUTO-ENTMCNC: 31.4 G/DL (ref 32–36)
MCV RBC AUTO: 79 FL (ref 82–98)
MONOCYTES # BLD AUTO: 0.4 K/UL (ref 0.3–1)
MONOCYTES NFR BLD: 9 % (ref 4–15)
NEUTROPHILS # BLD AUTO: 2.1 K/UL (ref 1.8–7.7)
NEUTROPHILS NFR BLD: 51.8 % (ref 38–73)
NRBC BLD-RTO: 0 /100 WBC
PLATELET # BLD AUTO: 262 K/UL (ref 150–450)
PMV BLD AUTO: 11.1 FL (ref 9.2–12.9)
POTASSIUM SERPL-SCNC: 3.5 MMOL/L (ref 3.5–5.1)
PROT SERPL-MCNC: 6.9 G/DL (ref 6–8.4)
RBC # BLD AUTO: 4.37 M/UL (ref 4–5.4)
SATURATED IRON: 9 % (ref 20–50)
SODIUM SERPL-SCNC: 139 MMOL/L (ref 136–145)
TOTAL IRON BINDING CAPACITY: 457 UG/DL (ref 250–450)
TRANSFERRIN SERPL-MCNC: 309 MG/DL (ref 200–375)
VIT B12 SERPL-MCNC: 791 PG/ML (ref 210–950)
WBC # BLD AUTO: 4.13 K/UL (ref 3.9–12.7)

## 2024-10-10 PROCEDURE — 86140 C-REACTIVE PROTEIN: CPT | Performed by: INTERNAL MEDICINE

## 2024-10-10 PROCEDURE — 82728 ASSAY OF FERRITIN: CPT | Performed by: INTERNAL MEDICINE

## 2024-10-10 PROCEDURE — G2211 COMPLEX E/M VISIT ADD ON: HCPCS | Mod: S$GLB,,, | Performed by: INTERNAL MEDICINE

## 2024-10-10 PROCEDURE — 1159F MED LIST DOCD IN RCRD: CPT | Mod: CPTII,S$GLB,, | Performed by: INTERNAL MEDICINE

## 2024-10-10 PROCEDURE — 85025 COMPLETE CBC W/AUTO DIFF WBC: CPT | Performed by: INTERNAL MEDICINE

## 2024-10-10 PROCEDURE — 1160F RVW MEDS BY RX/DR IN RCRD: CPT | Mod: CPTII,S$GLB,, | Performed by: INTERNAL MEDICINE

## 2024-10-10 PROCEDURE — 83540 ASSAY OF IRON: CPT | Performed by: INTERNAL MEDICINE

## 2024-10-10 PROCEDURE — 3008F BODY MASS INDEX DOCD: CPT | Mod: CPTII,S$GLB,, | Performed by: INTERNAL MEDICINE

## 2024-10-10 PROCEDURE — 99214 OFFICE O/P EST MOD 30 MIN: CPT | Mod: S$GLB,,, | Performed by: INTERNAL MEDICINE

## 2024-10-10 PROCEDURE — 80053 COMPREHEN METABOLIC PANEL: CPT | Performed by: INTERNAL MEDICINE

## 2024-10-10 PROCEDURE — 36415 COLL VENOUS BLD VENIPUNCTURE: CPT | Performed by: INTERNAL MEDICINE

## 2024-10-10 PROCEDURE — 82607 VITAMIN B-12: CPT | Performed by: INTERNAL MEDICINE

## 2024-10-10 PROCEDURE — 82306 VITAMIN D 25 HYDROXY: CPT | Performed by: INTERNAL MEDICINE

## 2024-10-10 NOTE — PROGRESS NOTES
[facility]  Inflammatory Bowel Disease   Follow Up Note         TODAY'S VISIT DATE:  10/10/2024    Reason for Visit:    Chief Complaint   Patient presents with    Crohn's Disease     PCP: Marti Colin      History of Present Illness:  Nina Durant who is a 29 y.o. female is being seen today at the [facility] on 10/10/2024 as a follow-up visit for inflammatory bowel disease- Crohn's disease.  She is doing great.  She reports that she is having 1-2 formed bowel movements daily with no significant abdominal pain, nausea, vomiting.  She denies any fevers or chills.  She has not had any side effects associated with her Stelara use.  She is still taking this every 8 weeks.  Her last dose was October 4 and her next dose is on November 22.  She has not been taking iron supplementation.  Her last iron studies did show low iron levels.  She reports that in the past iron supplementation has not worked very well for her and has caused a lot of constipation issues.  She also has only been taking vitamin-D along with a multivitamin.  Denies any other complaints today.    IBD History:  She has a long history of suspected Crohn's disease but the diagnosis has not been clearly made. When she was a child she had significant gastrointestinal issues. In 2014, she had an upper endoscopy performed for upper abdominal discomfort and reflux symptoms. This was fairly unremarkable including biopsies. She was started on Protonix and had some improvement. In 2015 she was seen again because of ongoing issues and had an upper endoscopy and colonoscopy at that time.  The upper endoscopy revealed some mild erosions in the duodenum and biopsies were consistent with acute inflammation.  The colonoscopy showed congestion of the ileocecal valve and erythema, congestion, and friability of the terminal ileum; biopsies of the colon were normal; biopsy of the terminal ileum showed  no inflammation. She was treated with Budesonide and had some improvement in symptoms.  A repeat scope in 2016 was completely normal including biopsies.  I saw her when I was at [facility] in 2018. She had ongoing symptoms that seemed to be more consistent with chronic constipation issues as well as some functional gastrointestinal issues. She was started on Dicyclomine and this caused an increase in rectal bleeding so it was stopped.  With an increase in water she began having better bowel movements and most of her symptoms improved. Pantoprazole also help with her upper GI issues.  A repeat upper endoscopy and colonoscopy were performed.  The upper endoscopy was unrevealing.  The colonoscopy did show some erythematous mucosa in the terminal ileum.  Biopsies were not consistent with Crohn's, but did show a significant amount of eosinophils within the terminal ileum as well as in the colon (colon was normal appearing).  In January of 2021 she began having more rectal bleeding issues as well as worsening vomiting, increased right lower quadrant epigastric pain, and more diarrhea.  She went to the emergency room and had labs done that were unrevealing.  A CT scan was also unrevealing.  A colonoscopy that was done in January 2021 revealed some congestion and inflammatory polyps in the terminal ileum as well as a suspected ileal stricture; the colon was completely normal.  Biopsies were not consistent with Crohn's disease and only showed 1 area of active inflammation in the terminal ileum.  A subsequent MR enterography showed some thickening of the terminal ileum with some mild inflammatory changes as well mild narrowing with some upstream dilation of the ileum.  She was started on Budesonide 9 mg daily which provided minimal improvement.  In July 2021, she underwent a repeat colonoscopy that again showed ileitis as well as some narrowing of the terminal ileum which was able to be traversed with the colonoscopy scope.   Biopsies showed chronic inflammatory changes consistent with Crohn's disease.  She started Stelara on 2021.  At visit in 2022, she was noted to have nausea and vomiting.  Underwent EGD/Colonoscopy in 2022 with notable ileal stricture 5 cm proximal to IC valve but no active inflammation in the ileum and normal EGD; biopsies unremarkable. She became pregnant in  with subsequent development of pre-eclampsia requiring delivery via  in 10/2023 at 36 weeks; post-partum course was complicated by development of passage of large clots with development of acute blood loss anemia.     IBD Detail:  Dx Date:  Symptoms since , diagnosis confirmed in   Disease type/distribution:  Crohn's/ileal inflammation  Current Treatment:  Stelara 90 mg every 8 weeks Start Date:  2021  Response:  Endoscopic and histologic remission  Optimized:  Not yet  Adverse reactions:  None  Prior surgeries:  None  CRP Elevation:  No  Calprotectin:  Mild elevation  Disease Complications:  Mild stenosis of the terminal ileum  Extraintestinal manifestations:  Back pain  Prior treatments:   Steroids:  Partial response to Budesonide  5ASA:  No response  IMM:  None  TNF Inh:  None   Anti-Integrin:  None   IL 12/23:  Current therapy  COURTNEY Inh:  None    Previous Clinical Trials:  None    Last Colonoscopy:    2022: Perianal skin tags found on perianal exam. Internal hemorrhoids. Anal papilla(e) were hypertrophied. Scar in the terminal ileum. Stricture in the ileum, 5 cm from the ileocecal valve. Dilated. Lesion not amenable to dilation, and not attempted. The terminal ileum contained some pseudopolyps but no signs of active inflammation; ileal biopsies without histopathologic abnormalitis. Simple Endoscopic Score for Crohn's Disease: 3, mucosal inflammatory changes.    Other Endoscopies:    EGD 2022: Normal esophagus. Z-line regular, 36 cm from the incisors. Normal stomach. Biopsies negative for H.pylori. Normal  "examined duodenum.     Imaging:   MRE:  March 2021-inflammatory changes of the terminal ileum with suspected stricture   CT:  Previous studies reviewed   Other:  Previous studies reviewed    Pertinent Labs:  Lab Results   Component Value Date    SEDRATE 7 04/26/2017    CRP 1.5 04/03/2024     Lab Results   Component Value Date    TTGIGA 11 04/26/2017     04/26/2017     No results found for: "TSH", "FREET4"  Lab Results   Component Value Date    QXYWZSLR16MR 12 (L) 04/03/2024    OSPJVEJA63 534 04/03/2024     Lab Results   Component Value Date    HEPBSAG Non-reactive 04/03/2024    HEPBCAB Non-reactive 04/03/2024    HEPCAB Non-reactive 03/15/2023     Lab Results   Component Value Date    DSQ74MNHP Negative 10/05/2023     Lab Results   Component Value Date    NIL 0.32346 04/03/2024    TBAG 0.04 12/18/2015    TBAGNIL 0.00 12/18/2015    MITOGENNIL 4.519 04/03/2024    TBGOLD Negative 12/18/2015     Lab Results   Component Value Date    TPMTRESULT 27.6 12/10/2015     No results found for: "STOOLCULTURE", "VRFDRTOMVP3U", "OSMWFDCAWR1E", "CDIFFICILEAN", "CDIFFTOX", "CDIFFICILEBY"  Lab Results   Component Value Date    CALPROTECTIN 40.8 02/24/2022       Therapeutic Drug Monitoring Labs:  No results found for: "PROMETH"  No results found for: "ANSADAINIT", "INFLIXIMAB", "INFLIXINTERP"    Vaccinations:  Lab Results   Component Value Date    HEPBSAB Negative 08/23/2021     Lab Results   Component Value Date    HEPAIGG Negative 08/23/2021     Lab Results   Component Value Date    VARICELLAZOS 1294.00 03/15/2023    VARICELLAINT Positive 03/15/2023     Immunization History   Administered Date(s) Administered    DTP 1997, 1997, 1997, 08/14/1998, 08/25/2001    DTaP 1997, 1997, 1997, 08/14/1998, 08/25/2001    Hep B Immune Globulin 1997    Hepatitis B 1997, 05/12/1998, 08/14/1998    Hepatitis B, Pediatric/Adolescent 1997, 1997, 05/12/1998, 08/14/1998    HiB PRP-OMP " "1997, 1997, 08/14/1998    Hib-HbOC 1997, 1997, 08/14/1998    IPV 1997, 1997, 08/14/1998, 08/25/2001    MMR 05/12/1998, 08/25/2001    Meningococcal 09/24/2008, 07/29/2015    Meningococcal Conjugate (MCV4P) 09/24/2008, 07/29/2015    OPV 1997, 1997, 08/14/1998, 08/25/2001    Pneumococcal Conjugate - 7 Valent 1997, 1997    Tdap 09/24/2008, 02/20/2019, 08/11/2023    Varicella 1997, 05/12/1998, 08/14/1998         Review of Systems  Review of Systems   Constitutional:  Negative for chills, fever and weight loss.   HENT:  Negative for sore throat.    Eyes:  Negative for pain, discharge and redness.   Respiratory:  Negative for cough, shortness of breath and wheezing.    Cardiovascular:  Negative for chest pain, orthopnea and leg swelling.   Gastrointestinal:  Negative for abdominal pain, blood in stool, constipation, diarrhea, heartburn, melena and vomiting.   Genitourinary:  Negative for dysuria, frequency and urgency.   Musculoskeletal:  Negative for joint pain and myalgias.   Skin:  Negative for itching and rash.   Neurological:  Negative for focal weakness and seizures.   Endo/Heme/Allergies:  Does not bruise/bleed easily.   Psychiatric/Behavioral:  Negative for depression.      Past Medical, Surgical, Family, and Social History reviewed.     Home Medications:   Medication List with Changes/Refills   Current Medications    STELARA 90 MG/ML SYRG SYRINGE    INJECT 1 SYRINGE SUBCUTANEOUSLY  EVERY 8 WEEKS       Physical Exam:  Vital Signs:  Pulse 103   Temp 98.8 °F (37.1 °C)   Ht 5' 1" (1.549 m)   Wt 59.2 kg (130 lb 8.2 oz)   SpO2 98%   BMI 24.66 kg/m²   Body mass index is 24.66 kg/m².    Physical Exam  Vitals and nursing note reviewed.   Constitutional:       General: She is not in acute distress.     Appearance: Normal appearance. She is well-developed. She is not ill-appearing or toxic-appearing.   Eyes:      General: No scleral icterus.  Abdominal: "      General: Bowel sounds are normal. There is no distension.      Palpations: Abdomen is soft.      Tenderness: There is no abdominal tenderness.   Skin:     General: Skin is warm and dry.      Coloration: Skin is not pale.      Findings: No erythema or rash.   Neurological:      General: No focal deficit present.      Mental Status: She is alert and oriented to person, place, and time.   Psychiatric:         Mood and Affect: Mood normal.         Behavior: Behavior normal.         Thought Content: Thought content normal.         Judgment: Judgment normal.     Labs: Reviewed and pertinent noted above    Assessment/Plan:  Nina Durant is a 29 y.o. female with Crohn's disease. The following issues were addresssed:    1. Crohn's disease of small intestine with intestinal obstruction    2. Iron deficiency anemia due to chronic blood loss    3. Vitamin D deficiency    4. Immunosuppression due to drug therapy      1. Crohn's disease:  She continues to do extremely well.  We will continue Stelara every 8 weeks.  Update labs today.  Plan for colonoscopy in December.    2. Iron deficiency:  We will plan to repeat CBC and iron studies today.  If still iron deficient we will arrange for IV iron since she has not tolerated oral iron in the past.    3. Vit D Deficiency:  Plan to repeat since she has been taking some supplementation.  We will likely need higher dose.    4. Immunosuppression: Recommend flu shot today.  Patient declines.      # IBD specific health maintenance:  Colon cancer surveillance:  No colonic disease    Annual:  - Eye exam:  Not applicable  - Skin exam (if on IMM/TNF):  Recommend annual skin exam  - reminded pt to use sunblock/hats/sunprotective clothing  - PAP (if immunosuppressed):  Recommend annual exam with gynecologist    DEXA: N/A    Vitamin D:  Recommend supplementation-recheck in the near future    Vaccines:    Influenza:  Recommend today-patient declines   Pneumovax:  Review in the  future   HAV:  Discuss in the future   HBV:  Discuss in the future   Tdap:  Up-to-date   MMR:  Immune   VZV:  Immune   HZV:  Not applicable   HPV:  Discuss in the future   Meningococcus:  Completed series   COVID:  Recommend vaccination    Follow up: Follow up in about 6 months (around 4/10/2025).    Visit today is associated with current or anticipated ongoing medical care related to this patient's single serious condition/complex condition (Crohn's disease).    Talib Rutledge MD  Department of Gastroenterology  Inflammatory Bowel Disease

## 2024-10-10 NOTE — PATIENT INSTRUCTIONS
Continue Stelara  Labs today  We will set up IV iron  Colonoscopy in December  Follow up in 6 months

## 2024-10-11 ENCOUNTER — PATIENT MESSAGE (OUTPATIENT)
Dept: GASTROENTEROLOGY | Facility: HOSPITAL | Age: 29
End: 2024-10-11
Payer: COMMERCIAL

## 2024-10-11 RX ORDER — ERGOCALCIFEROL 1.25 MG/1
50000 CAPSULE ORAL
Qty: 12 CAPSULE | Refills: 1 | Status: SHIPPED | OUTPATIENT
Start: 2024-10-11 | End: 2025-03-22

## 2024-10-15 ENCOUNTER — TELEPHONE (OUTPATIENT)
Dept: ENDOSCOPY | Facility: HOSPITAL | Age: 29
End: 2024-10-15
Payer: COMMERCIAL

## 2024-10-15 NOTE — TELEPHONE ENCOUNTER
"Contacted the patient to schedule an endoscopy procedure(s) colonoscopy. The patient did not answer the call and left a voice message requesting a call back.          ---- Message -----   From: Talib Rutledge MD   Sent: 10/10/2024   1:53 PM CDT   To: [facility]     Procedure: Colonoscopy     Diagnosis: Crohn's disease     Procedure Timin-12 weeks     *If within 4 weeks selected, please giuliano as high priority*     *If greater than 12 weeks, please select "5-12 weeks" and delay sending until 3 months prior to requested date*     Location: Any Site     Additional Scheduling Information: No scheduling concerns     Prep Specifications:Standard prep     Is the patient taking a GLP-1 Agonist:no     Have you attached a patient to this message: yes   "

## 2024-10-23 DIAGNOSIS — K50.012 CROHN'S DISEASE OF SMALL INTESTINE WITH INTESTINAL OBSTRUCTION: ICD-10-CM

## 2024-10-23 RX ORDER — USTEKINUMAB 90 MG/ML
INJECTION, SOLUTION SUBCUTANEOUS
Qty: 1 ML | Refills: 2 | Status: SHIPPED | OUTPATIENT
Start: 2024-10-23

## 2024-10-23 NOTE — TELEPHONE ENCOUNTER
Primary provider: [facility]    IBD medications Stelara 90 mg every 8 weeks     Refill request for Stelara 90 mg every 8 weeks     Allergies reviewed Yes    Drug Monitoring labs/frequency for all IBD meds:  CBC CMP q6 months, TB and HepB yearly    Lab Results   Component Value Date    HEPBSAG Non-reactive 04/03/2024    HEPBCAB Non-reactive 04/03/2024     Lab Results   Component Value Date    TBGOLDPLUS Negative 04/03/2024        Lab Results   Component Value Date    WBC 4.13 10/10/2024    HGB 10.9 (L) 10/10/2024    HCT 34.7 (L) 10/10/2024    MCV 79 (L) 10/10/2024     10/10/2024     Lab Results   Component Value Date    CREATININE 0.8 10/10/2024    ALBUMIN 4.0 10/10/2024    BILITOT 0.4 10/10/2024    ALKPHOS 90 10/10/2024    AST 14 10/10/2024    ALT 7 (L) 10/10/2024    GGT 14 04/26/2017       Lab due date (mo/yr):  4/2025    Labs scheduled: no - but has Dr Rutledge appt 4/15/24    Next appt:  4/15/24    RX refill sent to provider for amount until next labs: Yes

## 2024-10-29 ENCOUNTER — INFUSION (OUTPATIENT)
Dept: INFUSION THERAPY | Facility: HOSPITAL | Age: 29
End: 2024-10-29
Attending: NURSE PRACTITIONER
Payer: COMMERCIAL

## 2024-10-29 VITALS
TEMPERATURE: 98 F | HEART RATE: 69 BPM | DIASTOLIC BLOOD PRESSURE: 65 MMHG | RESPIRATION RATE: 16 BRPM | OXYGEN SATURATION: 100 % | SYSTOLIC BLOOD PRESSURE: 105 MMHG

## 2024-10-29 DIAGNOSIS — D50.0 IRON DEFICIENCY ANEMIA DUE TO CHRONIC BLOOD LOSS: Primary | ICD-10-CM

## 2024-10-29 PROCEDURE — 25000003 PHARM REV CODE 250: Performed by: INTERNAL MEDICINE

## 2024-10-29 PROCEDURE — 63600175 PHARM REV CODE 636 W HCPCS: Performed by: INTERNAL MEDICINE

## 2024-10-29 PROCEDURE — 96365 THER/PROPH/DIAG IV INF INIT: CPT

## 2024-10-29 RX ORDER — EPINEPHRINE 0.3 MG/.3ML
0.3 INJECTION SUBCUTANEOUS ONCE AS NEEDED
OUTPATIENT
Start: 2024-11-05

## 2024-10-29 RX ORDER — DIPHENHYDRAMINE HYDROCHLORIDE 50 MG/ML
50 INJECTION INTRAMUSCULAR; INTRAVENOUS ONCE AS NEEDED
OUTPATIENT
Start: 2024-11-05

## 2024-10-29 RX ORDER — SODIUM CHLORIDE 0.9 % (FLUSH) 0.9 %
10 SYRINGE (ML) INJECTION
OUTPATIENT
Start: 2024-11-05

## 2024-10-29 RX ORDER — HEPARIN 100 UNIT/ML
500 SYRINGE INTRAVENOUS
OUTPATIENT
Start: 2024-11-05

## 2024-10-29 RX ADMIN — IRON SUCROSE 300 MG: 20 INJECTION, SOLUTION INTRAVENOUS at 02:10

## 2024-11-07 ENCOUNTER — INFUSION (OUTPATIENT)
Dept: INFUSION THERAPY | Facility: HOSPITAL | Age: 29
End: 2024-11-07
Attending: NURSE PRACTITIONER
Payer: COMMERCIAL

## 2024-11-07 VITALS
RESPIRATION RATE: 18 BRPM | DIASTOLIC BLOOD PRESSURE: 66 MMHG | TEMPERATURE: 98 F | OXYGEN SATURATION: 98 % | SYSTOLIC BLOOD PRESSURE: 119 MMHG | HEART RATE: 85 BPM

## 2024-11-07 DIAGNOSIS — D50.0 IRON DEFICIENCY ANEMIA DUE TO CHRONIC BLOOD LOSS: Primary | ICD-10-CM

## 2024-11-07 PROCEDURE — 96365 THER/PROPH/DIAG IV INF INIT: CPT

## 2024-11-07 PROCEDURE — 25000003 PHARM REV CODE 250: Performed by: INTERNAL MEDICINE

## 2024-11-07 PROCEDURE — 96366 THER/PROPH/DIAG IV INF ADDON: CPT

## 2024-11-07 PROCEDURE — 63600175 PHARM REV CODE 636 W HCPCS: Performed by: INTERNAL MEDICINE

## 2024-11-07 RX ORDER — HEPARIN 100 UNIT/ML
500 SYRINGE INTRAVENOUS
OUTPATIENT
Start: 2024-11-12

## 2024-11-07 RX ORDER — DIPHENHYDRAMINE HYDROCHLORIDE 50 MG/ML
50 INJECTION INTRAMUSCULAR; INTRAVENOUS ONCE AS NEEDED
OUTPATIENT
Start: 2024-11-12

## 2024-11-07 RX ORDER — EPINEPHRINE 0.3 MG/.3ML
0.3 INJECTION SUBCUTANEOUS ONCE AS NEEDED
OUTPATIENT
Start: 2024-11-12

## 2024-11-07 RX ORDER — SODIUM CHLORIDE 0.9 % (FLUSH) 0.9 %
10 SYRINGE (ML) INJECTION
OUTPATIENT
Start: 2024-11-12

## 2024-11-07 RX ADMIN — IRON SUCROSE 300 MG: 20 INJECTION, SOLUTION INTRAVENOUS at 02:11

## 2024-11-07 NOTE — PLAN OF CARE
Pt ambulated in clinic without difficulty. Pt received Venofer 300mg IVPB, via R AC, 20g. Pt tolerated infusion well. VSS, in NAD. No s/s of a reaction. Pt provided a calendar printout of upcoming appointment. Pt amenable.      Problem: Fatigue  Goal: Improved Activity Tolerance  Outcome: Progressing

## 2024-11-14 ENCOUNTER — INFUSION (OUTPATIENT)
Dept: INFUSION THERAPY | Facility: HOSPITAL | Age: 29
End: 2024-11-14
Attending: NURSE PRACTITIONER
Payer: COMMERCIAL

## 2024-11-14 VITALS
SYSTOLIC BLOOD PRESSURE: 112 MMHG | HEART RATE: 71 BPM | DIASTOLIC BLOOD PRESSURE: 62 MMHG | OXYGEN SATURATION: 100 % | TEMPERATURE: 98 F | RESPIRATION RATE: 16 BRPM

## 2024-11-14 DIAGNOSIS — D50.0 IRON DEFICIENCY ANEMIA DUE TO CHRONIC BLOOD LOSS: Primary | ICD-10-CM

## 2024-11-14 PROCEDURE — 96365 THER/PROPH/DIAG IV INF INIT: CPT

## 2024-11-14 PROCEDURE — 63600175 PHARM REV CODE 636 W HCPCS: Performed by: INTERNAL MEDICINE

## 2024-11-14 PROCEDURE — 25000003 PHARM REV CODE 250: Performed by: INTERNAL MEDICINE

## 2024-11-14 RX ORDER — HEPARIN 100 UNIT/ML
500 SYRINGE INTRAVENOUS
Status: DISCONTINUED | OUTPATIENT
Start: 2024-11-14 | End: 2024-11-14 | Stop reason: HOSPADM

## 2024-11-14 RX ORDER — SODIUM CHLORIDE 0.9 % (FLUSH) 0.9 %
10 SYRINGE (ML) INJECTION
OUTPATIENT
Start: 2024-11-21

## 2024-11-14 RX ORDER — HEPARIN 100 UNIT/ML
500 SYRINGE INTRAVENOUS
OUTPATIENT
Start: 2024-11-21

## 2024-11-14 RX ORDER — SODIUM CHLORIDE 0.9 % (FLUSH) 0.9 %
10 SYRINGE (ML) INJECTION
Status: DISCONTINUED | OUTPATIENT
Start: 2024-11-14 | End: 2024-11-14 | Stop reason: HOSPADM

## 2024-11-14 RX ORDER — DIPHENHYDRAMINE HYDROCHLORIDE 50 MG/ML
50 INJECTION INTRAMUSCULAR; INTRAVENOUS ONCE AS NEEDED
OUTPATIENT
Start: 2024-11-21

## 2024-11-14 RX ORDER — EPINEPHRINE 0.3 MG/.3ML
0.3 INJECTION SUBCUTANEOUS ONCE AS NEEDED
OUTPATIENT
Start: 2024-11-21

## 2024-11-14 RX ADMIN — IRON SUCROSE 300 MG: 20 INJECTION, SOLUTION INTRAVENOUS at 03:11

## 2024-11-14 NOTE — PLAN OF CARE
Arrived on unit ambulatory with steady gait for Venofer 300mg IV infusion. PIV started R AC #22 jelco secured with tegaderm. Good blood return flushes easily. Venofer connected to run per pump over 1/2 hours. PIV d/c after infusion. Coban dressing applied. Tolerated infusion without any difficulty. Patient left independent in NAD.

## 2024-11-22 ENCOUNTER — PATIENT MESSAGE (OUTPATIENT)
Dept: GASTROENTEROLOGY | Facility: CLINIC | Age: 29
End: 2024-11-22
Payer: COMMERCIAL

## 2024-12-03 ENCOUNTER — CLINICAL SUPPORT (OUTPATIENT)
Dept: OBSTETRICS AND GYNECOLOGY | Facility: CLINIC | Age: 29
End: 2024-12-03
Payer: COMMERCIAL

## 2024-12-03 ENCOUNTER — PATIENT MESSAGE (OUTPATIENT)
Dept: OBSTETRICS AND GYNECOLOGY | Facility: CLINIC | Age: 29
End: 2024-12-03

## 2024-12-03 DIAGNOSIS — N91.2 AMENORRHEA: Primary | ICD-10-CM

## 2024-12-03 PROCEDURE — 99999 PR PBB SHADOW E&M-EST. PATIENT-LVL II: CPT | Mod: PBBFAC,,,

## 2024-12-03 RX ORDER — PRENATAL NO.42/FOLIC ACID 1.4 MG
TABLET CHEW,IMMED AND DELAY REL,BIPHASE ORAL
COMMUNITY

## 2024-12-20 ENCOUNTER — HOSPITAL ENCOUNTER (OUTPATIENT)
Dept: PERINATAL CARE | Facility: OTHER | Age: 29
Discharge: HOME | End: 2024-12-20
Attending: OBSTETRICS & GYNECOLOGY
Payer: COMMERCIAL

## 2024-12-20 ENCOUNTER — OFFICE VISIT (OUTPATIENT)
Dept: OBSTETRICS AND GYNECOLOGY | Facility: CLINIC | Age: 29
End: 2024-12-20
Payer: COMMERCIAL

## 2024-12-20 VITALS
BODY MASS INDEX: 25.05 KG/M2 | DIASTOLIC BLOOD PRESSURE: 87 MMHG | HEIGHT: 61 IN | WEIGHT: 132.69 LBS | SYSTOLIC BLOOD PRESSURE: 111 MMHG

## 2024-12-20 DIAGNOSIS — N91.2 AMENORRHEA: ICD-10-CM

## 2024-12-20 DIAGNOSIS — O09.299 HX OF PREECLAMPSIA, PRIOR PREGNANCY, CURRENTLY PREGNANT: ICD-10-CM

## 2024-12-20 DIAGNOSIS — Z32.01 POSITIVE PREGNANCY TEST: Primary | ICD-10-CM

## 2024-12-20 DIAGNOSIS — R11.0 NAUSEA: ICD-10-CM

## 2024-12-20 PROCEDURE — 87086 URINE CULTURE/COLONY COUNT: CPT

## 2024-12-20 PROCEDURE — 84156 ASSAY OF PROTEIN URINE: CPT

## 2024-12-20 PROCEDURE — 76801 OB US < 14 WKS SINGLE FETUS: CPT

## 2024-12-20 PROCEDURE — 99999 PR PBB SHADOW E&M-EST. PATIENT-LVL III: CPT | Mod: PBBFAC,,,

## 2024-12-20 PROCEDURE — 87491 CHLMYD TRACH DNA AMP PROBE: CPT

## 2024-12-20 PROCEDURE — 76801 OB US < 14 WKS SINGLE FETUS: CPT | Mod: 26,,, | Performed by: OBSTETRICS & GYNECOLOGY

## 2024-12-20 RX ORDER — ASPIRIN 81 MG/1
81 TABLET ORAL DAILY
Qty: 30 TABLET | Refills: 11 | Status: SHIPPED | OUTPATIENT
Start: 2024-12-20 | End: 2025-12-20

## 2024-12-20 RX ORDER — ONDANSETRON 4 MG/1
4 TABLET, FILM COATED ORAL EVERY 6 HOURS PRN
Qty: 30 TABLET | Refills: 0 | Status: SHIPPED | OUTPATIENT
Start: 2024-12-20

## 2024-12-20 NOTE — PATIENT INSTRUCTIONS
LABOR AND DELIVERY PHONE NUMBER, [phone] (OPEN , LOCATED ON 6TH FLOOR OF HOSPITAL)  [address] PHONE NUMBER, [phone] (OPEN MON-FRI, 8a-5p)     1) Eat small frequent meals through the day versus three large meals  2) Try ginger ale or sprite to help settle the stomach - you can also take ginger capsules (250mg 4 times per day)  3) Eat crackers or dry toast before getting out of bed in the morning   4) Stay hydrated by drinking plenty of water-do not immediately eat or drink something after vomiting. Give your stomach a rest for 20-30 minutes. Slowly reintroduce ice chips, small sips water, crackers, etc.    5) Try OTC unisom (1/2 tablet) and vitamin B6 - take the 25mg b6 twice a day and then both together at night before bed. This can help with the nausea in the morning and is safe to use during pregnancy.  6) Sea bands (Accupressure wrist bands)    If you are unable to keep anything down and constantly vomiting for more that 24 hours, let the office know so that dehydration can be avoided. We would recommend being seen in the emergency department if this is the case.       Topic  General Pregnancy Information Recommended   (Unless Otherwise Contraindicated Or Restrictions Given To You By Your OB Doctor)      1. Anticipated course of prenatal care      Visits: will be Every 4 wks until 28 weeks, then every 2 weeks until 36 weeks, and then weekly until delivery.   Urine will be collected at each Obstetric visit        2. Nutrition and weight gain    Daily pre-chris vitamin (recommend taking at night)   Additional 300 calories needed daily  No Sushi, hotdogs, unpasteurized products (milk/cheeses). No large fish such as: shark, margaret mackerel, tile, sword fish   Incorporate 12 ounces of smaller seafood/week and no more than 6oz of albacore tuna   Caffeine: 200 mg/day or 2 cups of caffeine/day   Weight gain recommendations are based off of BMI before pregnancy. Generally patients who with normal weight prior  pregnancy it is recommended 25-35 pounds of weight gain during the pregnancy with an estimated weekly gain of 1 pound/wk in 2nd and 3rd Trimester.    3. Toxoplasmosis precautions  If cats are in the home avoid changing litter boxes and if you need to change the litter box recommended you use gloves   4. Sexual Activity  Sexual activity is okay unless you are put on restrictions by your provider. I recommend urinating after intercourse    5. Exercise  Generally pre-pregnancy routine is okay to continue   Drink plenty fluids for hydration   Stop any activity that causes heavy cramping like a period or bright red bleeding and contact your provider  No extreme or contact sports   No exercise on your back for an extended period of time after 20 weeks    6. Hot Tub/Saunas  Avoid hot tubes and saunas    7. Hair Treatments  Because of the lack of scientific studies on the effects of chemical treatments on your hair, we must advise that you do it at your own risk. If you choose to treat your hair, we recommend that you wait until after 12 weeks gestation. At this time there is no reason to believe that normal hair treatment is associated with onsequences to the baby.    8. Vaccines  Influenza vaccine is recommended by CDC during flu season   Tdap (pertussis or whopping cough) recommended each pregnancy between 27 and 36 weeks   Tdap booster recommended for family and other planned direct caregivers    9. Water  Water is an important nutrient in a good diet. However, it cannot be stressed enough that during pregnancy water is essential. The body has increased circulation through blood vessels, and without a large increase in water, pregnant women will be dehydrated. It plays an important role in decreasing constipation, preventing  contractions, decreasing swelling, and preventing dizziness. We recommend that you drink 8-10 glasses of water per day.    10. Smoking/Alcohol/Illicit Drug Use  No safe Level   Can lead to  problems with pregnancy   Growth of the developing fetus    labor (delivery before 37 weeks)    rupture of the membranes (water breaking before 37 weeks)   Premature separation of the placenta (which may cause bleeding)   American College of Obstetricians and Gynecologists endorses abstinence   Can lead to babies with disabilities    11. Environmental or work hazards  Unless otherwise restricted you may continue work throughout the pregnancy   Notify your provider of any work hazards or chemical exposure concerns   12. Travel    Safe to travel up to 35 weeks   Continue to wear a seatbelt and airbags are still recommended   Drink plenty fluids   Blood clots are a concern during pregnancy with long travel. Recommend compression stockings and moving around at least every 2 hours and staying hydrated.    13. Use of medications, vitamins, herbs, OTC drugs    Any medications not on the list provided to you from our clinic or given to you by one of our providers we recommend calling to make sure the medication is safe for you and baby.    14. Domestic Violence    Please notify office immediately of any concerns or violence so that we can help direct you to assistance needed   [place] [employer]: [phone]    15. Childbirth classes    List of Childbirth classes from [facility] is available    16. Selecting a Pediatrician  Selecting a pediatrician before delivery is recommended  You can interview pediatricians before delivery    17. Fetal Monitoring    A simple test of your babys well-being is a kick count. After 26 weeks, fetal motion of any kind should be monitored. Further discussion at that time   18.  Labor Signs    Water break, leaking fluids from Vagina prior 37 weeks  Regular contractions, Contractions that are more than 5-6/hour, getting stronger and painful with lower back pain, does not go away with rest and fluids    19. Postpartum Family Planning    Multiple  options available from short term methods to long term reversible and irreversible methods   Discuss with provider as you get closer to delivery    20. Dental    It is recommended that you get an annual dental cleaning    21. Breastfeeding    Classes offered at [facility] and it is recommended to take a class    22. Lifting In 2013, the National [place] [employer] Safety and Health (NIOSH) published clinical guidelines for occupational lifting in uncomplicated pregnancies. The recommended weight limits are based on gestational age, intermittent versus repetitive lifting, time (hours/day) spent lifting, and lifting height from floor and distance in 3 front of body. In this guideline, the maximum permissible weight for a woman less than 20 weeks of gestation performing infrequent lifting is 36 pounds (16 kgs) and the maximum permissible weight at >=20 weeks is 26 pounds (12 kgs). For repetitive lifting >=1 hour/day, the maximum weights in the first and second half of pregnancy are 18 pounds (8 kgs) and 13 pounds (6 kgs), respectively, and for repetitive lifting <1 hour/day, the maximum weights are 30 pounds (14 kgs) and 22 pounds (10 kgs), respectively. Although not based on high quality evidence, these guidelines are a reasonable reference for counseling pregnant women     23. Scheduling and Provider Availability    Your Obstetric Doctor is usually here weekly but not every day. We recommend you make 3-4 advanced appointments at a time to accommodate your personal needs and work/school obligations.   We ask that you come 15 minutes prior your scheduled appointment.   For same day appointments (not routine appointments) there is a Nurse Practitioner or another obstetric provider available. Please let the  aware you are an OB patient requesting a same day appointment.      24. Recommended Phone [facility]      MEDICATIONS IN PREGNANCY     While some medications are considered safe to take  during pregnancy, the effects of other medications on your unborn baby are unknown. Therefore, it is very important to pay special attention to medications you take while you are pregnant.   If you were taking prescription medications before you became pregnant, please ask your health care provider about continuing these medications as soon as you find out that you are pregnant. Do not stop taking any medications without discussing with your health care provider. Your health care provider will weigh the benefits and risks to your pregnancy when making his or her recommendation. With some medications, the risk of not taking them may be more serious than the potential risk of taking them.   If you are prescribed any new medication, please inform your health care provider that you are pregnant. Be sure to discuss the risks and benefits of the newly prescribed medication with your health care provider before taking the medication. We are always available to answer any questions about new medications and how they relate to your pregnancy.     Are Alternative Pregnancy Medicine Therapies Safe?   Many pregnant women believe natural products can be safely used to relieve nausea, backache, and other annoying symptoms of pregnancy, but many of these so-called natural products have not been tested for their safety and effectiveness. Therefore, it is very important to check with your health care provider before taking any alternative therapies. She will not recommend a product or therapy until it is shown to be safe and effective.     Which Over the Counter Drugs Are Safe?   Prenatal vitamins, now available without a prescription, are safe and important to take during pregnancy. Ask your health care provider about the safety of taking other vitamins, herbal remedies and supplements during pregnancy. Most herbal preparations and supplements have not been proven to be safe during pregnancy. Generally, you should not take any  over-the-counter medication unless it is necessary. The following medications and home remedies have no known harmful effects during pregnancy when taken according to the package directions. If you want to know about the safety of any other medications not listed here, please contact your health care provider.      Problem:  Safe to Take:    Pain relief, headache, and fever  Acetaminophen - Tylenol, Anacin Aspirin-Free    Heartburn  Acid neutralizers - Maalox, Mylanta, Rolaids, Tums, Gaviscon   Histamine-blockers - Pepcid, Zantac, Prilosec    Gas pains and bloating  Simethicone - Gas-X, Maalox Anti-Gas, Mylanta Gas, Mylicon    Nausea  Donna - beverages, tablets, candies   Vitamin B6   Emetrol (if not diabetic)   Sea bands   Anti-histamines - Sleep-alma, Benadryl, Bonnine, Dramamine    Cough  Guaifenesin (expectorant) - Hytuss, Mucinex, Robitussin   Dextromethorphan (antitussive) - Benylin, Delsym, Scot-Tussin DM   Guaifenesin plus dextromethorphan - Benylin Expectorant, Robitussin DM    Congestion  Pseudoephedrine - Sudafed, Actifed, Dristan, Neosynephrine   Vicks VapoRub   Saline nasal drops or spray    Sore throat  Throat lozenges - Sucrets, Cepacol, Cepastat, Ricola   Chloroseptic Spray   Warm salt/water gargle    Allergy relief  Chlorpheniramine - Chlor-Trimeton, Triaminic   Loratadine - Alavert, Claritin, Tavist ND, Triaminic Allerchews   Cetirizine - Zyrtec   Diphenhydramine -Benadryl, Diphenhist    Rashes  Hydrocortisone cream or ointment   Caladryl lotion or cream   Benadryl cream   Oatmeal bath (Aveeno)    Diarrhea  Loperamide - Imodium, Kaopectate, Maalox Anti-Diarrheal, Pepto Bismol    Constipation  Fiber supplements - Metamucil, Citrucel, Fiberall/Fibercon, Benefiber   Stool softeners - Colace, Senekot, Dulcolax   Milk of Magnesia    Hemorrhoids  Warm baths   Witch hazel preparations - Tucks medicated pads   Steroid preparations - Anusol-HC, Preparation H    Insomnia  Diphenhydramine - Benadryl, Unisom  SleepGels, Nytol, Sominex   Doxylamine succinate - Unisom Nighttime Sleep-Aid    First-aid ointments  Cortaid, Lanacort, Polysporin, Bacitracin, Neosporin    Yeast infections  Call office for appointment      Connected MOM   Using Wireless Technology to Manage Your Prenatal Health   Congratulations! Your team here at [facility] is excited for the upcoming addition to your family and is ready to support you over the course of your pregnancy. One of the ways we are prepared to help you is through our exciting new Digital Medicine Program, Connected MOM. Connected MOM stands for Connected Maternity Online Monitoring and is offered free of charge as a way to help our expectant mothers manage their pregnancy with fewer visits to the obstetrician.    In the fast-paced environment we live in, patients demand convenient, reliable access to healthcare at their fingertips. Recommended by The American College of Obstetricians and Gynecologists (ACOG), the standard prenatal care model for low-risk pregnancies can average anywhere between 12-14 in-office prenatal visits.    Through this innovative program, participating mothers-to-be receive a wireless scale, wireless blood pressure cuff and an at-home urine protein test kit. Through the use of a smartphone, patients can easily send their weight, blood pressure readings and urine protein test results digitally in real-time from the comfort of their own homes. Results are sent directly to their [facility] account, the systems online patient portal which connects directly to the patients Electronic Medical Record. A specialized Connected MOM care team - comprised of the patients obstetrician, a dedicated health  and a  - reviews the data and provides medical recommendations as needed. This allows expectant mothers to receive care proactively, wherever they are, while minimizing the need for in-person visits and thus minimizing  disruption to their regular daily activities.    How it Works At the initial prenatal visit, interested patients can work with their obstetrician to sign up for the program. After the appointment, expectant mothers can head to the [employer], a first-of-its-kind retail experience created by [facility] offering the latest in cutting-edge, interactive healthcare technology, to receive a Connected MOM kit containing all of the digital tools needed for remote monitoring. Once enrolled, patients weigh themselves regularly and take their blood pressure once a week. Instead of coming to three office appointments at weeks 20, 30, 37 the patient will have the appointment at home. They will take their blood pressure, weight and perform three at-home urine protein tests at these home visits. Results are directly transmitted into the EMR, and notifications and messages from the care team are communicated via [facility].     TECH SUPPORT [phone]  [email or web address]      Chromosomal testing  The sequential screen is a test done around 11-13 weeks that consists of an ultrasound that measures the nuchal fold (neck thickness) of baby and blood work on the same day. You get a second set of labs done a few weeks later after 15 weeks. Based on all three of these results, they are able to tell you if you are considered to be low risk or high risk regarding neural tube defects and chromosomal abnormalities like Down Syndrome. If you are at all interested, I usually place the order today so we do not miss the window. Someone will give you a phone call in a week or two to schedule this. If you do not want it, just tell them no thank you. This test is typically covered by your insurance and is performed by the Maternal Fetal Medicine (MFM) department here at [facility]. It will not tell you the sex of the baby.     OR     2.  Call [phone] to see about coverage and any out of pocket costs regarding the [id number] (MT21) testing.  This is done any day after 10 weeks and is blood work only. It checks for any chromosomal abnormalities like Down Syndrome. You can also find out the sex of the baby if you choose to know. Once you find out coverage and decide to proceed, send either myself or your doctor a message and we can see what date you can do it. It is done at our second floor lab at [facility].

## 2024-12-20 NOTE — PROGRESS NOTES
HISTORY OF PRESENT ILLNESS:    Nina Durant is a 29 y.o. female, ,  Patient's last menstrual period was 2024 (exact date).  for a routine exam complaining of amenorrhea and positive home UPT. She has been having a lot of nausea, and feeling that her equilibrium off. Nausea does not respond to B6/Unisom. She feels better when she lays down with cold ice on her neck. She has a history of iron deficiency anemia. She has received three iron infusions recently. Her last infusion was . She also has a history of Crohn's disease but has been in remission for a a few years. This is her second pregnancy with the same partner. Last pregnancy she developed GHTN at 29-30 weeks that progressed to PreE at 35 weeks. She had a  for her last delivery due to failed induction & fetal  intolerance. She is interested in a TOLAC. She is taking a daily PNV. Denies genetic or congenital anomalies. 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 This is the extent of the patient's complaints at this time.     Past Medical History:   Diagnosis Date    Crohn's disease     Insomnia        Past Surgical History:   Procedure Laterality Date     SECTION N/A 10/06/2023    Procedure:  SECTION;  Surgeon: Pina Figueroa MD;  Location: [facility] ([facility];  Service: OB/GYN;  Laterality: N/A;     SECTION  10/6/23    COLONOSCOPY N/A 12/10/2015    Procedure: COLONOSCOPY;  Surgeon: Tala Gonzalez MD;  Location: [address]);  Service: Endoscopy;  Laterality: N/A;    COLONOSCOPY N/A 2016    Procedure: COLONOSCOPY;  Surgeon: Tala Gonzalez MD;  Location: [facility] ([address] [place] [address]);  Service: Endoscopy;  Laterality: N/A;    COLONOSCOPY N/A 2021    Procedure: COLONOSCOPY;  Surgeon: True Treadwell MD;  Location: [facility];  Service: General;  Laterality: N/A;    COLONOSCOPY N/A 2021    Procedure: COLONOSCOPY;  Surgeon: Talib Rutledge MD;  Location: [facility]  (4TH FLR);  Service: Endoscopy;  Laterality: N/A;  Covid-19 test 7/6/21 at [facility]    COLONOSCOPY N/A 09/27/2022    Procedure: COLONOSCOPY;  Surgeon: Talib Rutledge MD;  [facility]  Service: Endoscopy;  Laterality: N/A;  golytely    COLONOSCOPY W/ BIOPSIES  01/21/2021    ESOPHAGOGASTRODUODENOSCOPY      ESOPHAGOGASTRODUODENOSCOPY N/A 07/08/2021    Procedure: EGD (ESOPHAGOGASTRODUODENOSCOPY);  Surgeon: Talib Rutledge MD;  [facility]  Service: Endoscopy;  Laterality: N/A;  Covid-19 test 7/6/21 at [facility]  7/6 returned pt's call- [facility] with arrival time-rb  patient aware she moved up 30 minutes from 930am to 900am, patient aware to be here at 8am 7/8/21 -     ESOPHAGOGASTRODUODENOSCOPY N/A 09/27/2022    Procedure: ESOPHAGOGASTRODUODENOSCOPY (EGD);  Surgeon: Talib Rutledge MD;  Location: [facility] ([address] [place] [address]);  Service: Endoscopy;  Laterality: N/A;  NOT Vaccinated.Covid test on 9/24 at [facility], [facility]  Clear liquids up to 4 hrs prior/ AM prep [id number]    TONSILLECTOMY      TYMPANOSTOMY TUBE PLACEMENT         MEDICATIONS AND ALLERGIES:      Current Outpatient Medications:     ergocalciferol (ERGOCALCIFEROL) 50,000 unit Cap, Take 1 capsule (50,000 Units total) by mouth every 7 days. for 24 doses, Disp: 12 capsule, Rfl: 1    prenatal comb no.42-folic acid (PRENA1 CHEW) 1.4 mg chbp, Take by mouth., Disp: , Rfl:     STELARA 90 mg/mL Syrg syringe, INJECT 1 SYRINGE SUBCUTANEOUSLY  EVERY 8 WEEKS, Disp: 1 mL, Rfl: 2    Review of patient's allergies indicates:   Allergen Reactions    Latex, natural rubber      Mother states allergic to latex gloves     Adhesive Rash     Band-aids       Family History   Problem Relation Name Age of Onset    Lupus Mother      Heart attacks under age 50 Father My father.     Heart disease Father My father.     Hypertension Father My father.     Cardiomyopathy Maternal Grandmother      Dementia  Maternal Grandfather      Heart disease Paternal Grandmother      Heart disease Paternal Grandfather      No Known Problems Sister         Social History     Socioeconomic History    Marital status:    Tobacco Use    Smoking status: Never    Smokeless tobacco: Never    Tobacco comments:     mom smokes   Substance and Sexual Activity    Alcohol use: No    Drug use: No    Sexual activity: Yes     Partners: Male     Birth control/protection: None   Social History Narrative    Lives with mom, sister.    2 cats.    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     Social Drivers of Health     Financial Resource Strain: Low Risk  (3/30/2021)    Overall Financial Resource Strain (CARDIA)     Difficulty of Paying Living Expenses: Not very hard   Food Insecurity: No Food Insecurity (3/30/2021)    Hunger Vital Sign     Worried About Running Out of Food in the Last Year: Never true     Ran Out of Food in the Last Year: Never true   Transportation Needs: No Transportation Needs (3/30/2021)    PRAPARE - Transportation     Lack of Transportation (Medical): No     Lack of Transportation (Non-Medical): No   Physical Activity: Inactive (3/30/2021)    Exercise Vital Sign     Days of Exercise per Week: 0 days     Minutes of Exercise per Session: 0 min   Stress: No Stress Concern Present (3/30/2021)    [employer] - Occupational Stress Questionnaire     Feeling of Stress : Only a little   Housing Stability: Low Risk  (3/30/2021)    Housing Stability Vital Sign     Unable to Pay for Housing in the Last Year: No     Number of Places Lived in the Last Year: 1     Unstable Housing in the Last Year: No       COMPREHENSIVE GYN HISTORY:  PAP History: Denies abnormal Paps.  Infection History: Denies STDs. Denies PID.  Benign History: Denies uterine fibroids. Denies ovarian cysts. Denies endometriosis. Denies other conditions.  Cancer History: Denies cervical cancer.  "Denies uterine cancer or hyperplasia. Denies ovarian cancer. Denies vulvar cancer or pre-cancer. Denies vaginal cancer or pre-cancer. Denies breast cancer. Denies colon cancer.  Sexual Activity History: Reports currently being sexually active  Menstrual History: None.  Contraception: None    ROS:  GENERAL: No weight changes. No swelling. No fatigue. No fever.  CARDIOVASCULAR: No chest pain. No shortness of breath. No leg cramps.   NEUROLOGICAL: No headaches. No vision changes.  BREASTS: No pain. No lumps. No discharge.  ABDOMEN: No pain. +nausea. No vomiting. No diarrhea. No constipation.  REPRODUCTIVE: No abnormal bleeding. No pelvic pain  VULVA: No pain. No lesions. No itching.  VAGINA: No relaxation. No itching. No odor. No discharge. No lesions.  URINARY: No incontinence. No nocturia. No frequency. No dysuria.    /87 (BP Location: Left arm, Patient Position: Sitting)   Ht 5' 1" (1.549 m)   Wt 60.2 kg (132 lb 11.5 oz)   LMP 2024 (Exact Date)   Breastfeeding No   BMI 25.08 kg/m²     PE:  Physical Exam:   Constitutional: She is oriented to person, place, and time. She appears well-developed and well-nourished.        Pulmonary/Chest: Effort normal.                      Neurological: She is alert and oriented to person, place, and time.     Psychiatric: She has a normal mood and affect. Her behavior is normal. Judgment and thought content normal.     Talk visit only. Decline pelvic exam.    PROCEDURES:  UPT Positive  Genprobe sent on urine  Pap utd      DIAGNOSIS:  Gyn exam  IUP with stated LMP of Patient's last menstrual period was 2024 (exact date).    PLAN:Routine prenatal care    MEDICATIONS PRESCRIBED:  PNV  Zofran  ASA 81 mg at 12 weeks    LABS AND TESTS ORDERED:  New Ob Labs  Baseline CMP & PC ratio      1st TRIMESTER COUNSELING: Discussed all, booklet provided  Common complaints of pregnancy  HIV and other routine prenatal tests including  genetic screening  Risk factors " identified by prenatal history  Anticipated course of prenatal care  Nutrition and weight gain counseling  Toxoplasmosis precautions (Cats/Raw Meat)  Sexual activity and exercise  Environmental/Work hazards  Travel  Tobacco (Ask, Advise, Assess, Assist, and Arrange), as well as alcohol and drug use  Use of any medications (Including supplements, Vitamins, Herbs, or OTC Drugs)  Indications for Ultrasound  Domestic violence  Seat belt use  Childbirth classes/Hospital facilities     TERATOLOGY COUNSELING: Discussed options for SS, MT21 and carrier screening.  Discussed time constraints on SS. Desires MT21. Wants to know the gender so they can announce in [place] world      FOLLOW-UP for a New Ob Visit in    4  weeks with Dr. Figueroa  -discussed to call clinic or L&D/ER if after hours for pain/bleeding

## 2024-12-21 LAB
CREAT UR-MCNC: 146 MG/DL (ref 15–325)
PROT UR-MCNC: 10 MG/DL (ref 0–15)
PROT/CREAT UR: 0.07 MG/G{CREAT} (ref 0–0.2)

## 2024-12-23 ENCOUNTER — PATIENT MESSAGE (OUTPATIENT)
Dept: OBSTETRICS AND GYNECOLOGY | Facility: CLINIC | Age: 29
End: 2024-12-23
Payer: COMMERCIAL

## 2024-12-24 ENCOUNTER — TELEPHONE (OUTPATIENT)
Dept: OBSTETRICS AND GYNECOLOGY | Facility: CLINIC | Age: 29
End: 2024-12-24
Payer: COMMERCIAL

## 2024-12-24 ENCOUNTER — PATIENT MESSAGE (OUTPATIENT)
Dept: OBSTETRICS AND GYNECOLOGY | Facility: CLINIC | Age: 29
End: 2024-12-24
Payer: COMMERCIAL

## 2024-12-24 NOTE — TELEPHONE ENCOUNTER
----- Message from Marti sent at 12/24/2024  9:18 AM CST -----  Pt called said she missed a call from the office she can be reached at [phone]

## 2024-12-24 NOTE — TELEPHONE ENCOUNTER
----- Message from JOSE Suazo sent at 12/23/2024  7:11 PM CST -----  Regarding: MT21  Lizandro,    Can you please get pt scheduled for her MT21 labs please?    Thank you,  Lidia

## 2024-12-30 ENCOUNTER — PATIENT MESSAGE (OUTPATIENT)
Dept: GASTROENTEROLOGY | Facility: CLINIC | Age: 29
End: 2024-12-30
Payer: COMMERCIAL

## 2024-12-30 NOTE — TELEPHONE ENCOUNTER
Called [facility] and spoke with Allyson Matias  - confirmed [facility] currently out of stock   - no date on when this will be resolved   - when asked for a solution to the problem representative disconnected the phone

## 2025-01-03 ENCOUNTER — LAB VISIT (OUTPATIENT)
Dept: LAB | Facility: OTHER | Age: 30
End: 2025-01-03
Attending: NURSE PRACTITIONER
Payer: COMMERCIAL

## 2025-01-03 DIAGNOSIS — Z36.0 ENCOUNTER FOR ANTENATAL SCREENING FOR CHROMOSOMAL ANOMALIES: ICD-10-CM

## 2025-01-03 PROCEDURE — 36415 COLL VENOUS BLD VENIPUNCTURE: CPT

## 2025-01-10 ENCOUNTER — PATIENT MESSAGE (OUTPATIENT)
Dept: OBSTETRICS AND GYNECOLOGY | Facility: CLINIC | Age: 30
End: 2025-01-10
Payer: COMMERCIAL

## 2025-01-10 ENCOUNTER — TELEPHONE (OUTPATIENT)
Dept: OBSTETRICS AND GYNECOLOGY | Facility: CLINIC | Age: 30
End: 2025-01-10
Payer: COMMERCIAL

## 2025-01-10 LAB
ABOUT THE TEST: NORMAL
APPROVED BY: NORMAL
FETAL FRACTION: NORMAL
FETAL SEX RESULT: NORMAL
GESTATIONAL AGE > 9: YES
GESTATIONAL AGE: NORMAL
LAB DIRECTOR COMMENTS: NORMAL
LIMITATIONS:: NORMAL
MONOSOMY X RESULT: NOT DETECTED
NEGATIVE PREDICTIVE VALUE: NORMAL
NOTE: NORMAL
PERFORMANCE CHARACTERISTICS: NORMAL
PERFORMANCE: NORMAL
POSITIVE PREDICTIVE VALUE: NORMAL
RESULT: NEGATIVE
SERVICE CMNT 04-IMP: NORMAL
TEST METHODOLOGY:: NORMAL
TRISOMY 13 (T13): NEGATIVE
TRISOMY 18: NEGATIVE
TRISOMY 21 (T21): NEGATIVE
XXX (TRIPLE X SYNDROME): NOT DETECTED
XXY (KLINEFELTER SYNDROME): NOT DETECTED
XYY (JACOBS SYNDROME): NOT DETECTED

## 2025-01-10 NOTE — TELEPHONE ENCOUNTER
Called pt to inform her of negative MT21 results. She would like the gender sent in a message so her & her  can read it together tonight.

## 2025-01-12 NOTE — PROGRESS NOTES
Past medical, surgical, social, family, and obstetric histories; medications; prior records and results; and available outside records were reviewed and updated in the EMR.  Pertinent findings were noted below.    Reason for Visit   Initial Prenatal Visit    HPI   29 y.o. female     New patient: No. This pregnancy is planned. This pregnancy is desired.    Patient's last menstrual period was 2024 (exact date).    P1: History of PreE w/ SF, pLTCS (Failed IOL, 36w), PPH    Keloid scar at left aspect     Past Medical History: Crohn's Disease (On Stelara every 8w), Fe Deficiency Anemia (2025)     Nausea:  Yes  Vomiting: No  Cramping: No  Bleeding:  No    Family history of bleeding disorders, birth defects, or mental disability: DENIES  Complications with prior pregnancy:  See above.  History of sexually transmitted infections: No    Pap: 3/22/2023, NILM  Mammogram: N/A  Allergies: Latex, natural rubber and Adhesive  OB History    Para Term  AB Living   2 1   1   1   SAB IAB Ectopic Multiple Live Births         0 1      # Outcome Date GA Lbr Shayan/2nd Weight Sex Type Anes PTL Lv   2 Current            1  10/06/23 36w0d  2.41 kg (5 lb 5 oz) M CS-LTranv Gen Y YAZ       Exam   /82   Wt 60 kg (132 lb 4.4 oz)   LMP 2024 (Exact Date)   BMI 24.99 kg/m²     Gen: NAD   Abdomen: Uterus < umbilicus   Assessment and Plan   Supervision of high risk pregnancy, antepartum  -     US MFM Procedure (Viewpoint); Future    Nausea  -     ondansetron (ZOFRAN) 4 MG tablet; Take 1 tablet (4 mg total) by mouth every 6 (six) hours as needed for Nausea.  Dispense: 30 tablet; Refill: 2    History of pre-eclampsia    Other iron deficiency anemia    Status post primary low transverse  section    Crohn's disease of small intestine with intestinal obstruction        Dating  Dating U/S= 2024 --> JADA= 2025 --> EGA 11w4d   Aneuploidy screening: NIPT Low Risk   Anatomy Ultrasound  Ordered.   OTC PNV  H/o PreE:   Baseline Labs:   PCR: 0.07   Plts: 243   Cr: 0.7  AST/ALT: 15/16  Continue ASA 81 mg starting at 12w   H/o pLTCS (Failed IOL):    Calculator: 50%   Crohn's:   Follows with GI  Stable on current regimen.   Fe Deficiency Anemia:   S/p Fe Infusion x3   Repeat CBC 2T     - Pain and bleeding precautions given    - Return to clinic in 4 weeks    Louisa St MD/MPH  OB/GYN PGY-4  [facility]

## 2025-01-14 ENCOUNTER — INITIAL PRENATAL (OUTPATIENT)
Dept: OBSTETRICS AND GYNECOLOGY | Facility: CLINIC | Age: 30
End: 2025-01-14
Payer: COMMERCIAL

## 2025-01-14 VITALS
BODY MASS INDEX: 24.99 KG/M2 | WEIGHT: 132.25 LBS | SYSTOLIC BLOOD PRESSURE: 118 MMHG | DIASTOLIC BLOOD PRESSURE: 82 MMHG

## 2025-01-14 DIAGNOSIS — Z87.59 HISTORY OF PRE-ECLAMPSIA: ICD-10-CM

## 2025-01-14 DIAGNOSIS — R11.0 NAUSEA: ICD-10-CM

## 2025-01-14 DIAGNOSIS — Z98.891 STATUS POST PRIMARY LOW TRANSVERSE CESAREAN SECTION: ICD-10-CM

## 2025-01-14 DIAGNOSIS — D50.8 OTHER IRON DEFICIENCY ANEMIA: ICD-10-CM

## 2025-01-14 DIAGNOSIS — K50.012 CROHN'S DISEASE OF SMALL INTESTINE WITH INTESTINAL OBSTRUCTION: ICD-10-CM

## 2025-01-14 DIAGNOSIS — O09.90 SUPERVISION OF HIGH RISK PREGNANCY, ANTEPARTUM: Primary | ICD-10-CM

## 2025-01-14 PROCEDURE — 99999 PR PBB SHADOW E&M-EST. PATIENT-LVL III: CPT | Mod: PBBFAC,,, | Performed by: OBSTETRICS & GYNECOLOGY

## 2025-01-14 PROCEDURE — 0500F INITIAL PRENATAL CARE VISIT: CPT | Mod: CPTII,S$GLB,, | Performed by: OBSTETRICS & GYNECOLOGY

## 2025-01-14 RX ORDER — ONDANSETRON 4 MG/1
4 TABLET, FILM COATED ORAL EVERY 6 HOURS PRN
Qty: 30 TABLET | Refills: 2 | Status: SHIPPED | OUTPATIENT
Start: 2025-01-14

## 2025-01-31 ENCOUNTER — TELEPHONE (OUTPATIENT)
Dept: OBSTETRICS AND GYNECOLOGY | Facility: CLINIC | Age: 30
End: 2025-01-31
Payer: COMMERCIAL

## 2025-02-07 NOTE — PROGRESS NOTES
Pregnancy dating, labs, ultrasound reports, prenatal testing, and problem list; prior records and results; and available outside records were reviewed and updated in EMR.  Pertinent findings were noted below.    Reason for Visit: Routine Prenatal Visit (Constipation)    15w3d by Estimated Date of Delivery: 8/1/25    Sometimes goes a week without BM    Blood pressure 106/75, weight 60 kg (132 lb 4.4 oz), last menstrual period 07/31/2024, not currently breastfeeding.  TWG 2#    Supervision of high risk pregnancy, antepartum    Hx of preeclampsia, prior pregnancy, currently pregnant      No cramping or bleeding.  Not yet feeling fetal movement.  Labs reviewed and up to date.  Start miralax qDay  Anatomy US scheduled 3/11  Declines flu vaccine    Pain and bleeding precautions given  Follow-up: 4 weeks

## 2025-02-10 ENCOUNTER — ROUTINE PRENATAL (OUTPATIENT)
Dept: OBSTETRICS AND GYNECOLOGY | Facility: CLINIC | Age: 30
End: 2025-02-10
Payer: COMMERCIAL

## 2025-02-10 VITALS
WEIGHT: 132.25 LBS | BODY MASS INDEX: 24.99 KG/M2 | SYSTOLIC BLOOD PRESSURE: 106 MMHG | DIASTOLIC BLOOD PRESSURE: 75 MMHG

## 2025-02-10 DIAGNOSIS — O09.90 SUPERVISION OF HIGH RISK PREGNANCY, ANTEPARTUM: Primary | ICD-10-CM

## 2025-02-10 DIAGNOSIS — O09.299 HX OF PREECLAMPSIA, PRIOR PREGNANCY, CURRENTLY PREGNANT: ICD-10-CM

## 2025-02-10 PROCEDURE — 99999 PR PBB SHADOW E&M-EST. PATIENT-LVL III: CPT | Mod: PBBFAC,,, | Performed by: OBSTETRICS & GYNECOLOGY

## 2025-02-10 PROCEDURE — 0502F SUBSEQUENT PRENATAL CARE: CPT | Mod: CPTII,S$GLB,, | Performed by: OBSTETRICS & GYNECOLOGY

## 2025-02-14 ENCOUNTER — PATIENT MESSAGE (OUTPATIENT)
Dept: OTHER | Facility: OTHER | Age: 30
End: 2025-02-14
Payer: COMMERCIAL

## 2025-02-21 ENCOUNTER — PATIENT MESSAGE (OUTPATIENT)
Dept: OTHER | Facility: OTHER | Age: 30
End: 2025-02-21
Payer: COMMERCIAL

## 2025-03-03 ENCOUNTER — TELEPHONE (OUTPATIENT)
Dept: GASTROENTEROLOGY | Facility: CLINIC | Age: 30
End: 2025-03-03
Payer: COMMERCIAL

## 2025-03-03 NOTE — TELEPHONE ENCOUNTER
----- Message from RENNY Salazar sent at 3/3/2025  2:14 PM CST -----  Regarding: FW: Prior Authorization question  Contact: [phone]    ----- Message -----  From: Monty Carver  Sent: 3/3/2025  10:04 AM CST  To: Maty [facility] Staff  Subject: Prior Authorization question                     Hi,Who called: [facility] Prior Authorization Dept Reason: Called to request a call to discuss a few questions for the Authorization of the pt's ustekinumab (STELARA) 90 mg/mL Syrg syringe . Pls call Mrs. Stewart at [phone] to discuss. Case Reference number is [id number] request for reviewProvider's name: Dr. Rutledge Additional Information: Thank you.

## 2025-03-03 NOTE — TELEPHONE ENCOUNTER
Contacted DylanRrubio @ [phone] for clarification on what additional information is needed for Becki ADRIAN. PA has already received an approval.    [facility] rep confirmed no further information is needed for this PA as it has already been approved.     Notified rep of Pat's message requesting additional information. Rep states no further information is needed for this PA request.

## 2025-03-03 NOTE — TELEPHONE ENCOUNTER
PA APPROVED (Stelara 90MG/ML syringe)   (Key: [id number])   Authorized through: 03/03/ 2026           Approval letter uploaded in media.

## 2025-03-03 NOTE — TELEPHONE ENCOUNTER
PA SUBMITTED (Stelara 90MG/ML syringe) via CMM  (Key: [id number])          Will follow up on determination.

## 2025-03-10 ENCOUNTER — ROUTINE PRENATAL (OUTPATIENT)
Dept: OBSTETRICS AND GYNECOLOGY | Facility: CLINIC | Age: 30
End: 2025-03-10
Payer: COMMERCIAL

## 2025-03-10 VITALS
WEIGHT: 134.69 LBS | DIASTOLIC BLOOD PRESSURE: 68 MMHG | BODY MASS INDEX: 25.45 KG/M2 | SYSTOLIC BLOOD PRESSURE: 106 MMHG

## 2025-03-10 DIAGNOSIS — Z98.891 HISTORY OF CESAREAN DELIVERY: ICD-10-CM

## 2025-03-10 DIAGNOSIS — O09.299 HX OF PREECLAMPSIA, PRIOR PREGNANCY, CURRENTLY PREGNANT: ICD-10-CM

## 2025-03-10 DIAGNOSIS — O09.90 SUPERVISION OF HIGH RISK PREGNANCY, ANTEPARTUM: Primary | ICD-10-CM

## 2025-03-10 PROCEDURE — 0502F SUBSEQUENT PRENATAL CARE: CPT | Mod: CPTII,S$GLB,, | Performed by: OBSTETRICS & GYNECOLOGY

## 2025-03-10 PROCEDURE — 99999 PR PBB SHADOW E&M-EST. PATIENT-LVL II: CPT | Mod: PBBFAC,,, | Performed by: OBSTETRICS & GYNECOLOGY

## 2025-03-10 NOTE — PROGRESS NOTES
Pregnancy dating, labs, ultrasound reports, prenatal testing, and problem list; prior records and results; and available outside records were reviewed and updated in EMR.  Pertinent findings were noted below.    Reason for Visit: Routine Prenatal Visit    19w3d by Estimated Date of Delivery: 25    Compliant with ASA    Blood pressure 106/68, weight 61.1 kg (134 lb 11.2 oz), last menstrual period 2024, not currently breastfeeding.  TWG 4#    Supervision of high risk pregnancy, antepartum    Hx of preeclampsia, prior pregnancy, currently pregnant    History of  delivery      No contractions, bleeding, or loss of fluid.  +fetal movement.  Labs reviewed and up to date.  Anatomy US tomorrow    Pain and bleeding precautions given  Follow-up: 4 weeks

## 2025-03-11 ENCOUNTER — PROCEDURE VISIT (OUTPATIENT)
Dept: MATERNAL FETAL MEDICINE | Facility: CLINIC | Age: 30
End: 2025-03-11
Payer: COMMERCIAL

## 2025-03-11 DIAGNOSIS — O09.90 SUPERVISION OF HIGH RISK PREGNANCY, ANTEPARTUM: ICD-10-CM

## 2025-03-11 PROCEDURE — 76805 OB US >/= 14 WKS SNGL FETUS: CPT | Mod: S$GLB,,, | Performed by: OBSTETRICS & GYNECOLOGY

## 2025-03-12 DIAGNOSIS — Z36.2 ENCOUNTER FOR FOLLOW-UP ULTRASOUND OF FETAL ANATOMY: Primary | ICD-10-CM

## 2025-03-14 ENCOUNTER — PATIENT MESSAGE (OUTPATIENT)
Dept: OTHER | Facility: OTHER | Age: 30
End: 2025-03-14
Payer: COMMERCIAL

## 2025-04-07 NOTE — PROGRESS NOTES
Pregnancy dating, labs, ultrasound reports, prenatal testing, and problem list; prior records and results; and available outside records were reviewed and updated in EMR.  Pertinent findings were noted below.    Reason for Visit   Routine Prenatal Visit    HPI   29 y.o., at 23w3d by Estimated Date of Delivery: 25    Has follow up anatomy/growth US scheduled today.     Doing well without complaints     TWG#9 lbs    Contractions: Occasional santi bender   Bleeding: No   Loss of fluid: No   Fetal movement: Yes   Nausea: No   Vomiting: No   Headache: No     Exam   /70   Wt 64 kg (141 lb 1.5 oz)   LMP 2024 (Exact Date)   BMI 26.66 kg/m²     GENERAL: No acute distress  ABD: Gravid, fundal height 23 weeks     Assessment and Plan   Supervision of high risk pregnancy, antepartum  -     CBC Auto Differential; Future; Expected date: 2025  -     OB Glucose Screen; Future; Expected date: 2025    Hx of preeclampsia, prior pregnancy, currently pregnant    History of  delivery    Crohn's disease of small intestine with intestinal obstruction      Targeted anatomy scan scheduled for today  Monthly growth starting at 28 weeks, PNT at 32 weeks  Glucola and CBC at next visit      labor and Pain and bleeding precautions given  Follow-up: 4 weeks     Yen Zuniga MD  PGY-4 OB/GYN

## 2025-04-08 ENCOUNTER — PROCEDURE VISIT (OUTPATIENT)
Dept: MATERNAL FETAL MEDICINE | Facility: CLINIC | Age: 30
End: 2025-04-08
Payer: COMMERCIAL

## 2025-04-08 ENCOUNTER — ROUTINE PRENATAL (OUTPATIENT)
Dept: OBSTETRICS AND GYNECOLOGY | Facility: CLINIC | Age: 30
End: 2025-04-08
Payer: COMMERCIAL

## 2025-04-08 VITALS
WEIGHT: 141.13 LBS | DIASTOLIC BLOOD PRESSURE: 70 MMHG | BODY MASS INDEX: 26.66 KG/M2 | SYSTOLIC BLOOD PRESSURE: 116 MMHG

## 2025-04-08 DIAGNOSIS — O09.299 HX OF PREECLAMPSIA, PRIOR PREGNANCY, CURRENTLY PREGNANT: ICD-10-CM

## 2025-04-08 DIAGNOSIS — Z98.891 HISTORY OF CESAREAN DELIVERY: ICD-10-CM

## 2025-04-08 DIAGNOSIS — K50.012 CROHN'S DISEASE OF SMALL INTESTINE WITH INTESTINAL OBSTRUCTION: ICD-10-CM

## 2025-04-08 DIAGNOSIS — O09.90 SUPERVISION OF HIGH RISK PREGNANCY, ANTEPARTUM: Primary | ICD-10-CM

## 2025-04-08 DIAGNOSIS — Z36.89 ENCOUNTER FOR ULTRASOUND TO ASSESS FETAL GROWTH: Primary | ICD-10-CM

## 2025-04-08 DIAGNOSIS — Z36.2 ENCOUNTER FOR FOLLOW-UP ULTRASOUND OF FETAL ANATOMY: ICD-10-CM

## 2025-04-08 PROCEDURE — 76816 OB US FOLLOW-UP PER FETUS: CPT | Mod: S$GLB,,, | Performed by: OBSTETRICS & GYNECOLOGY

## 2025-04-08 PROCEDURE — 0502F SUBSEQUENT PRENATAL CARE: CPT | Mod: CPTII,S$GLB,, | Performed by: OBSTETRICS & GYNECOLOGY

## 2025-04-08 PROCEDURE — 99999 PR PBB SHADOW E&M-EST. PATIENT-LVL II: CPT | Mod: PBBFAC,,, | Performed by: OBSTETRICS & GYNECOLOGY

## 2025-04-11 ENCOUNTER — PATIENT MESSAGE (OUTPATIENT)
Dept: OTHER | Facility: OTHER | Age: 30
End: 2025-04-11
Payer: COMMERCIAL

## 2025-04-15 ENCOUNTER — PATIENT MESSAGE (OUTPATIENT)
Dept: OBSTETRICS AND GYNECOLOGY | Facility: CLINIC | Age: 30
End: 2025-04-15
Payer: COMMERCIAL

## 2025-04-15 ENCOUNTER — OFFICE VISIT (OUTPATIENT)
Dept: GASTROENTEROLOGY | Facility: CLINIC | Age: 30
End: 2025-04-15
Payer: COMMERCIAL

## 2025-04-15 VITALS
OXYGEN SATURATION: 97 % | WEIGHT: 144.38 LBS | HEIGHT: 61 IN | TEMPERATURE: 98 F | SYSTOLIC BLOOD PRESSURE: 125 MMHG | DIASTOLIC BLOOD PRESSURE: 82 MMHG | BODY MASS INDEX: 27.26 KG/M2 | HEART RATE: 88 BPM

## 2025-04-15 DIAGNOSIS — K50.012 CROHN'S DISEASE OF SMALL INTESTINE WITH INTESTINAL OBSTRUCTION: ICD-10-CM

## 2025-04-15 DIAGNOSIS — K50.012 CROHN'S DISEASE OF SMALL INTESTINE WITH INTESTINAL OBSTRUCTION: Primary | ICD-10-CM

## 2025-04-15 DIAGNOSIS — D50.0 IRON DEFICIENCY ANEMIA DUE TO CHRONIC BLOOD LOSS: ICD-10-CM

## 2025-04-15 DIAGNOSIS — Z79.899 IMMUNOSUPPRESSION DUE TO DRUG THERAPY: ICD-10-CM

## 2025-04-15 DIAGNOSIS — Z3A.24 24 WEEKS GESTATION OF PREGNANCY: ICD-10-CM

## 2025-04-15 DIAGNOSIS — E55.9 VITAMIN D DEFICIENCY: ICD-10-CM

## 2025-04-15 DIAGNOSIS — D84.821 IMMUNOSUPPRESSION DUE TO DRUG THERAPY: ICD-10-CM

## 2025-04-15 DIAGNOSIS — L55.9 SUNBURN: ICD-10-CM

## 2025-04-15 DIAGNOSIS — K59.00 CONSTIPATION, UNSPECIFIED CONSTIPATION TYPE: ICD-10-CM

## 2025-04-15 PROCEDURE — 3074F SYST BP LT 130 MM HG: CPT | Mod: CPTII,S$GLB,, | Performed by: INTERNAL MEDICINE

## 2025-04-15 PROCEDURE — 99214 OFFICE O/P EST MOD 30 MIN: CPT | Mod: S$GLB,,, | Performed by: INTERNAL MEDICINE

## 2025-04-15 PROCEDURE — 1159F MED LIST DOCD IN RCRD: CPT | Mod: CPTII,S$GLB,, | Performed by: INTERNAL MEDICINE

## 2025-04-15 PROCEDURE — G2211 COMPLEX E/M VISIT ADD ON: HCPCS | Mod: S$GLB,,, | Performed by: INTERNAL MEDICINE

## 2025-04-15 PROCEDURE — 3008F BODY MASS INDEX DOCD: CPT | Mod: CPTII,S$GLB,, | Performed by: INTERNAL MEDICINE

## 2025-04-15 PROCEDURE — 3079F DIAST BP 80-89 MM HG: CPT | Mod: CPTII,S$GLB,, | Performed by: INTERNAL MEDICINE

## 2025-04-15 NOTE — PATIENT INSTRUCTIONS
Continue Stelara  Please let us know if any changes to your expected delivery date or any issues with the delivery   Make sure pediatrician aware you are on Stelara as live vaccines may be delayed until baby after 6 months of age  For constipation, can increase Miralax frequency or try Colace  Add labs to 4/29/25- CMP, TB, HepB Core Ab, Iron studies, Vitamin D  6 month follow up - will give you a call to schedule.

## 2025-04-15 NOTE — PROGRESS NOTES
[facility]  Inflammatory Bowel Disease   Follow Up Note         TODAY'S VISIT DATE:  4/15/2025    Reason for Visit:    Chief Complaint   Patient presents with    Crohn's Disease     PCP: Marti Colin      History of Present Illness:  Nina Durant who is a 29 y.o. female is being seen today at the [facility] on 04/15/2025 as a follow-up visit for inflammatory bowel disease- Crohn's disease.   She is doing well on Stelara 90 mg every 8 weeks.  She denies any late or missed Stelara doses.  She is currently 24 weeks and 4 days pregnant.   She has been following up with OBGYN for routine pre-chris visits.  Her pregnancy is progressing well without any complications.  She reports some constipation associated with pregnancy.  She is having 1 formed bowel movement every 2-3 days.  She is staying hydrated and taking Miralax 1 cap every few days and rarely using a partial saline enema about once a month.  She is tolerating Miralax well at her current dose but has not tried Miralax 1 cap daily yet due to concerns about abdominal discomfort after taking a Miralax dose post-partum with her last pregnancy.   She denies abdominal pain, blood in the stool, nausea, vomiting, fevers, or chills.  In 2024 her labs showed mild anemia and low iron levels.  She completed three Venofer 300 mg weekly infusions in October to 2024.  Her repeat CBC in 2024 showed normal blood counts.  Her 2024 colonoscopy was delayed due to pregnancy.  Patient reports getting sunburnt easily and her sunburns take a long time to resolve.  She is wondering if this may be related to Stelara.  She does use sunscreen regularly and usually wears skin protective clothing.        IBD History:  She has a long history of suspected Crohn's disease but the diagnosis has not been clearly made. When she was a child she had significant gastrointestinal issues. In  2014, she had an upper endoscopy performed for upper abdominal discomfort and reflux symptoms. This was fairly unremarkable including biopsies. She was started on Protonix and had some improvement. In 2015 she was seen again because of ongoing issues and had an upper endoscopy and colonoscopy at that time.  The upper endoscopy revealed some mild erosions in the duodenum and biopsies were consistent with acute inflammation.  The colonoscopy showed congestion of the ileocecal valve and erythema, congestion, and friability of the terminal ileum; biopsies of the colon were normal; biopsy of the terminal ileum showed no inflammation. She was treated with Budesonide and had some improvement in symptoms.  A repeat scope in 2016 was completely normal including biopsies.  I saw her when I was at [facility] in 2018. She had ongoing symptoms that seemed to be more consistent with chronic constipation issues as well as some functional gastrointestinal issues. She was started on Dicyclomine and this caused an increase in rectal bleeding so it was stopped.  With an increase in water she began having better bowel movements and most of her symptoms improved. Pantoprazole also help with her upper GI issues.  A repeat upper endoscopy and colonoscopy were performed.  The upper endoscopy was unrevealing.  The colonoscopy did show some erythematous mucosa in the terminal ileum.  Biopsies were not consistent with Crohn's, but did show a significant amount of eosinophils within the terminal ileum as well as in the colon (colon was normal appearing).  In January of 2021 she began having more rectal bleeding issues as well as worsening vomiting, increased right lower quadrant epigastric pain, and more diarrhea.  She went to the emergency room and had labs done that were unrevealing.  A CT scan was also unrevealing.  A colonoscopy that was done in January 2021 revealed some congestion and inflammatory polyps in the terminal ileum as well as a  suspected ileal stricture; the colon was completely normal.  Biopsies were not consistent with Crohn's disease and only showed 1 area of active inflammation in the terminal ileum.  A subsequent MR enterography showed some thickening of the terminal ileum with some mild inflammatory changes as well mild narrowing with some upstream dilation of the ileum.  She was started on Budesonide 9 mg daily which provided minimal improvement.  In 2021, she underwent a repeat colonoscopy that again showed ileitis as well as some narrowing of the terminal ileum which was able to be traversed with the colonoscopy scope.  Biopsies showed chronic inflammatory changes consistent with Crohn's disease.  She started Stelara on 2021.  At visit in 2022, she was noted to have nausea and vomiting.  Underwent EGD/Colonoscopy in 2022 with notable ileal stricture 5 cm proximal to IC valve but no active inflammation in the ileum and normal EGD; biopsies unremarkable. She became pregnant in  with subsequent development of pre-eclampsia requiring delivery via  in 10/2023 at 36 weeks; post-partum course was complicated by development of passage of large clots with development of acute blood loss anemia.  She was scheduled to have a follow up colonoscopy in  but due to becoming pregnant this was delayed.    IBD Detail:  Dx Date:  Symptoms since , diagnosis confirmed in   Disease type/distribution:  Crohn's/ileal inflammation  Current Treatment:  Stelara 90 mg every 8 weeks Start Date:  2021  Response:  Endoscopic and histologic remission  Optimized:  Not yet  Adverse reactions:  None  Prior surgeries:  None  CRP Elevation:  No  Calprotectin:  Mild elevation  Disease Complications:  Mild stenosis of the terminal ileum  Extraintestinal manifestations:  Back pain  Prior treatments:   Steroids:  Partial response to Budesonide  5ASA:  No response  IMM:  None  TNF Inh:  None   Anti-Integrin:   "None   IL 12/23:  Current therapy  COURTNEY Inh:  None    Previous Clinical Trials:  None    Last Colonoscopy:    9/2022: Perianal skin tags found on perianal exam. Internal hemorrhoids. Anal papilla(e) were hypertrophied. Scar in the terminal ileum. Stricture in the ileum, 5 cm from the ileocecal valve. Dilated. Lesion not amenable to dilation, and not attempted. The terminal ileum contained some pseudopolyps but no signs of active inflammation; ileal biopsies without histopathologic abnormalitis. Simple Endoscopic Score for Crohn's Disease: 3, mucosal inflammatory changes.    Other Endoscopies:    EGD 9/2022: Normal esophagus. Z-line regular, 36 cm from the incisors. Normal stomach. Biopsies negative for H.pylori. Normal examined duodenum.     Imaging:   MRE:  March 2021-inflammatory changes of the terminal ileum with suspected stricture   CT:  Previous studies reviewed   Other:  Previous studies reviewed    Pertinent Labs:  Lab Results   Component Value Date    SEDRATE 7 04/26/2017    CRP 0.6 10/10/2024     Lab Results   Component Value Date    TTGIGA 11 04/26/2017     04/26/2017     No results found for: "TSH", "FREET4"  Lab Results   Component Value Date    GHTQGYIE82XR 18 (L) 10/10/2024    PUQCFTLJ09 791 10/10/2024     Lab Results   Component Value Date    HEPBSAG Non-reactive 12/20/2024    HEPBCAB Non-reactive 04/03/2024    HEPCAB Negative 12/20/2024     Lab Results   Component Value Date    QIF59NLLR Negative 12/20/2024     Lab Results   Component Value Date    NIL 0.88345 04/03/2024    TBAG 0.04 12/18/2015    TBAGNIL 0.00 12/18/2015    MITOGENNIL 4.519 04/03/2024    TBGOLD Negative 12/18/2015     Lab Results   Component Value Date    TPMTRESULT 27.6 12/10/2015     No results found for: "STOOLCULTURE", "SHPXLVYHWI7A", "DTSTILPEHP9N", "CDIFFICILEAN", "CDIFFTOX", "CDIFFICILEBY"  Lab Results   Component Value Date    CALPROTECTIN 40.8 02/24/2022       Therapeutic Drug Monitoring Labs:  No results found for: " ""PROMETH"  No results found for: "ANSADAINIT", "INFLIXIMAB", "INFLIXINTERP"    Vaccinations:  Lab Results   Component Value Date    HEPBSAB Negative 08/23/2021     Lab Results   Component Value Date    HEPAIGG Negative 08/23/2021     Lab Results   Component Value Date    VARICELLAZOS 5.570 12/20/2024    VARICELLAINT Positive (A) 12/20/2024     Immunization History   Administered Date(s) Administered    DTP 1997, 1997, 1997, 08/14/1998, 08/25/2001    DTaP 1997, 1997, 1997, 08/14/1998, 08/25/2001    Hep B Immune Globulin 1997    Hepatitis B 1997, 05/12/1998, 08/14/1998    Hepatitis B, Pediatric/Adolescent 1997, 1997, 05/12/1998, 08/14/1998    HiB PRP-OMP 1997, 1997, 08/14/1998    Hib-HbOC 1997, 1997, 08/14/1998    IPV 1997, 1997, 08/14/1998, 08/25/2001    MMR 05/12/1998, 08/25/2001    Meningococcal 09/24/2008, 07/29/2015    Meningococcal Conjugate (MCV4P) 09/24/2008, 07/29/2015    OPV 1997, 1997, 08/14/1998, 08/25/2001    Pneumococcal Conjugate - 7 Valent 1997, 1997    Tdap 09/24/2008, 02/20/2019, 08/11/2023    Varicella 1997, 05/12/1998, 08/14/1998         Review of Systems  Review of Systems   Constitutional:  Negative for chills, fever and weight loss.   HENT:  Negative for sore throat.    Eyes:  Negative for pain, discharge and redness.   Respiratory:  Negative for cough, shortness of breath and wheezing.    Cardiovascular:  Negative for chest pain, orthopnea and leg swelling.   Gastrointestinal:  Negative for abdominal pain, blood in stool, constipation, diarrhea, heartburn, melena and vomiting.   Genitourinary:  Negative for dysuria, frequency and urgency.   Musculoskeletal:  Negative for joint pain and myalgias.   Skin:  Negative for itching and rash.   Neurological:  Negative for focal weakness and seizures.   Endo/Heme/Allergies:  Does not bruise/bleed easily.   Psychiatric/Behavioral: " " Negative for depression.      Past Medical, Surgical, Family, and Social History reviewed.     Home Medications:   Medication List with Changes/Refills   Current Medications    ASPIRIN (ECOTRIN) 81 MG EC TABLET    Take 1 tablet (81 mg total) by mouth once daily.    ONDANSETRON (ZOFRAN) 4 MG TABLET    Take 1 tablet (4 mg total) by mouth every 6 (six) hours as needed for Nausea.    POLYETHYLENE GLYCOL 3350 (MIRALAX ORAL)    Take by mouth. 1 cap every few days    PRENATAL COMB NO.42-FOLIC ACID (PRENA1 CHEW) 1.4 MG CHBP    Take by mouth.    USTEKINUMAB (STELARA) 90 MG/ML SYRG SYRINGE    Inject 1 mL (90 mg total) into the skin every 8 weeks.       Physical Exam:  Vital Signs:  /82 (BP Location: Left arm, Patient Position: Sitting)   Pulse 88   Temp 97.8 °F (36.6 °C) (Skin)   Ht 5' 1" (1.549 m)   Wt 65.5 kg (144 lb 6.4 oz)   LMP 07/31/2024 (Exact Date)   SpO2 97%   BMI 27.28 kg/m²   Body mass index is 27.28 kg/m².    Physical Exam  Vitals and nursing note reviewed.   Constitutional:       General: She is not in acute distress.     Appearance: Normal appearance. She is well-developed. She is not ill-appearing or toxic-appearing.   Eyes:      General: No scleral icterus.  Abdominal:      General: Bowel sounds are normal. There is no distension.      Palpations: Abdomen is soft.      Tenderness: There is no abdominal tenderness.      Comments: Gravid uterus   Skin:     General: Skin is warm and dry.      Coloration: Skin is not pale.      Findings: No erythema or rash.   Neurological:      General: No focal deficit present.      Mental Status: She is alert and oriented to person, place, and time.   Psychiatric:         Mood and Affect: Mood normal.         Behavior: Behavior normal.         Thought Content: Thought content normal.         Judgment: Judgment normal.       Labs: Reviewed and pertinent noted above    Assessment/Plan:  Nina Durant is a 29 y.o. female with Crohn's disease. The " following issues were addresssed:    1. Crohn's disease of small intestine with intestinal obstruction    2. 24 weeks gestation of pregnancy    3. Constipation, unspecified constipation type    4. Iron deficiency anemia due to chronic blood loss    5. Vitamin D deficiency    6. Sunburn    7. Immunosuppression due to drug therapy        1. Crohn's disease:  She continues to do well.  We will continue Stelara every 8 weeks.  We reviewed that Stelara is safe in pregnancy and breastfeeding.  We discussed that continuing Stelara is important to keep Crohn's under control during pregnancy for best maternal and fetal outcomes.  Patient's expected delivery date is August 1, 2025. We calculated out her future Stelara doses, and her expected delivery date is in the middle a Stelara dosing interval which is favorable.  Patient advised to keep IBD clinic updated if any changes to expected to delivery date or complications with labor and delivery. Patient also advised to make sure baby's pediatrician is aware she is on Stelara as live vaccines for baby may be deferred until after 6 months of age.  Routine labs coordinated with patient's pre-existing April 29, 2025 lab appointment.  Defer repeat colonoscopy until post-partum.  Follow up in 6 months (will be 2 months post-partum).     2. Pregnancy: Continue follow up with OBGYN.  Crohn's treatment during pregnancy as stated above.    3. Constipation: Encouraged continued hydration.  Patient advised she can try more consistent daily Miralax dosing.  If she does not tolerate that, she can try colace over the counter.     4. Iron deficiency:  In October 2024 labs showed mild anemia and low iron levels.  She completed three Venofer 300 mg weekly infusions in October to November 2024.  Her repeat CBC in December 2024 showed normal blood counts.  Re-check CBC and iron studies with labs on April 29, 2025.     5. Vit D Deficiency:  In October 2024 labs showed low Vitamin D level  (18).   Patient took Vitamin D2 50,000 IU weekly for about 2 months and then stopped. She is currently taking a pre- vitamin.  Re-check Vitamin D with labs on 2025.     6. Sunburn: We reviewed that Stelara is not associated with photosensitivity.  Encourage adequate SPF and skin protective clothing.     7. Immunosuppression:  Total body skin exam is up to date.   Plan to discuss Prevnar 20, Havrix, and Heplisav vaccines at next visit post-partum.     # IBD specific health maintenance:  Colon cancer surveillance:  No colonic disease    Annual:  - Eye exam:  Not applicable  - Skin exam (if on IMM/TNF):  Recommend annual skin exam- up to date  -  reminded pt to use sunblock/hats/sunprotective clothing  - PAP (if immunosuppressed):  Recommend annual exam with gynecologist    DEXA: N/A    Vitamin D:  Re-check Vitamin D level with labs on 2025.     Vaccines:    Influenza:  Recommend yearly   Pneumovax:  Discuss in the future    HAV:  Discuss in the future   HBV:  Discuss in the future    Tdap:  Up-to-date   MMR:  Immune   VZV:  Immune   HZV:  Not applicable   HPV:  Discuss in the future   Meningococcus:  Completed series   COVID:  per CDC recommendations    I personally examined the patient and discussed above plan in collaboration with Blanca LewisD.        Follow up: Follow up in about 6 months (around 10/15/2025).    Visit today is associated with current or anticipated ongoing medical care related to this patient's single serious condition/complex condition (Crohn's disease).    Talib Rutledge MD  Department of Gastroenterology  Inflammatory Bowel Disease

## 2025-04-15 NOTE — TELEPHONE ENCOUNTER
"Primary provider: Dr. Talib Rutledge    IBD medications: Stelara 90 mg every 8 weeks     Refill request for:      Pended Medication(s)   Requested Prescriptions     Pending Prescriptions Disp Refills    STELARA 90 mg/mL Syrg syringe [Pharmacy Med Name: STELARA  90MG PREFILLED SYRINGE  ML PFS] 1 mL 1     Sig: INJECT 1 SYRINGE SUBCUTANEOUSLY  EVERY 8 WEEKS           Allergies reviewed: Yes    Drug Monitoring labs/frequency for all IBD meds:    CBC and CMP every 6 months  TB and Hep B every 12 months    Lab Results   Component Value Date    HEPBSAG Non-reactive 12/20/2024    HEPBCAB Non-reactive 04/03/2024     Lab Results   Component Value Date    TBGOLDPLUS Negative 04/03/2024     No results found for: "QUANTNILVALU", "QUANTIFERON", "QUANTTBGDPL"   No results found for: "TSPOTSCREN"  Lab Results   Component Value Date    OCBRMUZE45NQ 18 (L) 10/10/2024    XAZYXNEI06 791 10/10/2024     Lab Results   Component Value Date    WBC 5.42 12/20/2024    HGB 13.5 12/20/2024    HCT 40.2 12/20/2024    MCV 81 (L) 12/20/2024     12/20/2024     Lab Results   Component Value Date    CREATININE 0.7 12/20/2024    ALBUMIN 3.9 12/20/2024    BILITOT 0.1 12/20/2024    ALKPHOS 69 12/20/2024    AST 15 12/20/2024    ALT 16 12/20/2024    GGT 14 04/26/2017     No results found for: "CHOL", "HDL", "LDLCALC", "TRIG"    Lab due date (mo/yr):  4/2025    Labs scheduled: No - will notify staff    Next appt: 4/15/2025    RX refill sent to provider for amount until next labs: No- appt scheduled for 4/29/25. Will send for one month.       "

## 2025-04-16 RX ORDER — USTEKINUMAB 90 MG/ML
INJECTION, SOLUTION SUBCUTANEOUS
Qty: 1 ML | Refills: 0 | Status: SHIPPED | OUTPATIENT
Start: 2025-04-16

## 2025-04-22 ENCOUNTER — TELEPHONE (OUTPATIENT)
Dept: GASTROENTEROLOGY | Facility: CLINIC | Age: 30
End: 2025-04-22
Payer: COMMERCIAL

## 2025-04-25 ENCOUNTER — PATIENT MESSAGE (OUTPATIENT)
Dept: OTHER | Facility: OTHER | Age: 30
End: 2025-04-25
Payer: COMMERCIAL

## 2025-04-28 NOTE — PROGRESS NOTES
Pregnancy dating, labs, ultrasound reports, prenatal testing, and problem list; prior records and results; and available outside records were reviewed and updated in EMR.  Pertinent findings were noted below.    Reason for Visit   Routine Prenatal Visit (C/O pelvic pressure )    HPI   29 y.o., at 26w4d by Estimated Date of Delivery: 25    No issues with Crohns    Contractions: No   Bleeding: No   Loss of fluid: No   Fetal movement: Yes   Nausea: No   Vomiting: No   Headache: No     Exam   /74   Wt 67 kg (147 lb 11.3 oz)   LMP 2024 (Exact Date)   BMI 27.91 kg/m²     TW#  GENERAL: No acute distress  ABD: Gravid    Assessment and Plan   Supervision of high risk pregnancy, antepartum    Hx of preeclampsia, prior pregnancy, currently pregnant    History of  delivery    Crohn's disease with other complication, unspecified gastrointestinal tract location        Serial growth scheduled  PNT to start at 32wk  2T labs today     labor, Pain and bleeding, and preE precautions given  Follow-up: 2 weeks

## 2025-04-29 ENCOUNTER — RESULTS FOLLOW-UP (OUTPATIENT)
Dept: OBSTETRICS AND GYNECOLOGY | Facility: CLINIC | Age: 30
End: 2025-04-29

## 2025-04-29 ENCOUNTER — ROUTINE PRENATAL (OUTPATIENT)
Dept: OBSTETRICS AND GYNECOLOGY | Facility: CLINIC | Age: 30
End: 2025-04-29
Payer: COMMERCIAL

## 2025-04-29 ENCOUNTER — LAB VISIT (OUTPATIENT)
Dept: LAB | Facility: OTHER | Age: 30
End: 2025-04-29
Payer: COMMERCIAL

## 2025-04-29 VITALS
WEIGHT: 147.69 LBS | SYSTOLIC BLOOD PRESSURE: 120 MMHG | DIASTOLIC BLOOD PRESSURE: 74 MMHG | BODY MASS INDEX: 27.91 KG/M2

## 2025-04-29 DIAGNOSIS — O09.90 SUPERVISION OF HIGH RISK PREGNANCY, ANTEPARTUM: Primary | ICD-10-CM

## 2025-04-29 DIAGNOSIS — K50.918 CROHN'S DISEASE WITH OTHER COMPLICATION, UNSPECIFIED GASTROINTESTINAL TRACT LOCATION: ICD-10-CM

## 2025-04-29 DIAGNOSIS — O09.299 HX OF PREECLAMPSIA, PRIOR PREGNANCY, CURRENTLY PREGNANT: ICD-10-CM

## 2025-04-29 DIAGNOSIS — Z98.891 HISTORY OF CESAREAN DELIVERY: ICD-10-CM

## 2025-04-29 DIAGNOSIS — O09.90 SUPERVISION OF HIGH RISK PREGNANCY, ANTEPARTUM: ICD-10-CM

## 2025-04-29 DIAGNOSIS — K50.012 CROHN'S DISEASE OF SMALL INTESTINE WITH INTESTINAL OBSTRUCTION: ICD-10-CM

## 2025-04-29 LAB
25(OH)D3+25(OH)D2 SERPL-MCNC: 37 NG/ML (ref 30–96)
ABSOLUTE EOSINOPHIL (OHS): 0.11 K/UL
ABSOLUTE MONOCYTE (OHS): 0.42 K/UL (ref 0.3–1)
ABSOLUTE NEUTROPHIL COUNT (OHS): 4.64 K/UL (ref 1.8–7.7)
ALBUMIN SERPL BCP-MCNC: 2.7 G/DL (ref 3.5–5.2)
ALP SERPL-CCNC: 66 UNIT/L (ref 40–150)
ALT SERPL W/O P-5'-P-CCNC: 11 UNIT/L (ref 10–44)
ANION GAP (OHS): 8 MMOL/L (ref 8–16)
AST SERPL-CCNC: 13 UNIT/L (ref 11–45)
BASOPHILS # BLD AUTO: 0.01 K/UL
BASOPHILS NFR BLD AUTO: 0.2 %
BILIRUB SERPL-MCNC: 0.2 MG/DL (ref 0.1–1)
BUN SERPL-MCNC: 5 MG/DL (ref 6–20)
CALCIUM SERPL-MCNC: 8.7 MG/DL (ref 8.7–10.5)
CHLORIDE SERPL-SCNC: 105 MMOL/L (ref 95–110)
CO2 SERPL-SCNC: 23 MMOL/L (ref 23–29)
CREAT SERPL-MCNC: 0.7 MG/DL (ref 0.5–1.4)
ERYTHROCYTE [DISTWIDTH] IN BLOOD BY AUTOMATED COUNT: 12 % (ref 11.5–14.5)
FERRITIN SERPL-MCNC: 13 NG/ML (ref 20–300)
GFR SERPLBLD CREATININE-BSD FMLA CKD-EPI: >60 ML/MIN/1.73/M2
GLUCOSE SERPL-MCNC: 112 MG/DL (ref 70–140)
GLUCOSE SERPL-MCNC: 116 MG/DL (ref 70–110)
HBV CORE AB SERPL QL IA: NORMAL
HCT VFR BLD AUTO: 37.2 % (ref 37–48.5)
HGB BLD-MCNC: 12.3 GM/DL (ref 12–16)
IMM GRANULOCYTES # BLD AUTO: 0.07 K/UL (ref 0–0.04)
IMM GRANULOCYTES NFR BLD AUTO: 1.1 % (ref 0–0.5)
IRON SATN MFR SERPL: 6 % (ref 20–50)
IRON SERPL-MCNC: 31 UG/DL (ref 30–160)
LYMPHOCYTES # BLD AUTO: 1 K/UL (ref 1–4.8)
MCH RBC QN AUTO: 29.1 PG (ref 27–31)
MCHC RBC AUTO-ENTMCNC: 33.1 G/DL (ref 32–36)
MCV RBC AUTO: 88 FL (ref 82–98)
NUCLEATED RBC (/100WBC) (OHS): 0 /100 WBC
PLATELET # BLD AUTO: 219 K/UL (ref 150–450)
PMV BLD AUTO: 10.8 FL (ref 9.2–12.9)
POTASSIUM SERPL-SCNC: 3.8 MMOL/L (ref 3.5–5.1)
PROT SERPL-MCNC: 6.5 GM/DL (ref 6–8.4)
RBC # BLD AUTO: 4.23 M/UL (ref 4–5.4)
RELATIVE EOSINOPHIL (OHS): 1.8 %
RELATIVE LYMPHOCYTE (OHS): 16 % (ref 18–48)
RELATIVE MONOCYTE (OHS): 6.7 % (ref 4–15)
RELATIVE NEUTROPHIL (OHS): 74.2 % (ref 38–73)
SODIUM SERPL-SCNC: 136 MMOL/L (ref 136–145)
TIBC SERPL-MCNC: 533 UG/DL (ref 250–450)
TRANSFERRIN SERPL-MCNC: 360 MG/DL (ref 200–375)
WBC # BLD AUTO: 6.25 K/UL (ref 3.9–12.7)

## 2025-04-29 PROCEDURE — 80053 COMPREHEN METABOLIC PANEL: CPT

## 2025-04-29 PROCEDURE — 36415 COLL VENOUS BLD VENIPUNCTURE: CPT

## 2025-04-29 PROCEDURE — 99999 PR PBB SHADOW E&M-EST. PATIENT-LVL II: CPT | Mod: PBBFAC,,, | Performed by: OBSTETRICS & GYNECOLOGY

## 2025-04-29 PROCEDURE — 86704 HEP B CORE ANTIBODY TOTAL: CPT

## 2025-04-29 PROCEDURE — 82306 VITAMIN D 25 HYDROXY: CPT

## 2025-04-29 PROCEDURE — 85025 COMPLETE CBC W/AUTO DIFF WBC: CPT

## 2025-04-29 PROCEDURE — 0502F SUBSEQUENT PRENATAL CARE: CPT | Mod: CPTII,S$GLB,, | Performed by: OBSTETRICS & GYNECOLOGY

## 2025-04-29 PROCEDURE — 82950 GLUCOSE TEST: CPT

## 2025-04-29 PROCEDURE — 86480 TB TEST CELL IMMUN MEASURE: CPT

## 2025-04-29 PROCEDURE — 83540 ASSAY OF IRON: CPT

## 2025-04-29 PROCEDURE — 82728 ASSAY OF FERRITIN: CPT

## 2025-04-30 ENCOUNTER — RESULTS FOLLOW-UP (OUTPATIENT)
Dept: GASTROENTEROLOGY | Facility: CLINIC | Age: 30
End: 2025-04-30
Payer: COMMERCIAL

## 2025-04-30 DIAGNOSIS — Z79.899 IMMUNOSUPPRESSION DUE TO DRUG THERAPY: ICD-10-CM

## 2025-04-30 DIAGNOSIS — K50.012 CROHN'S DISEASE OF SMALL INTESTINE WITH INTESTINAL OBSTRUCTION: Primary | ICD-10-CM

## 2025-04-30 DIAGNOSIS — D84.821 IMMUNOSUPPRESSION DUE TO DRUG THERAPY: ICD-10-CM

## 2025-05-01 LAB
MITOGEN MINUS NIL (OHS): 0.19
NIL TB SYNCED (OHS): 0.01
QUANTIFERON GOLD INTERP (OHS): ABNORMAL
TB1 AG MINUS NIL (OHS): 0.01
TB2 AG MINUS NIL (OHS): 0

## 2025-05-01 NOTE — TELEPHONE ENCOUNTER
Called and spoke with the patient  - T-Spot lab scheduled at [facility] 5/5/at 11am

## 2025-05-05 ENCOUNTER — LAB VISIT (OUTPATIENT)
Dept: LAB | Facility: HOSPITAL | Age: 30
End: 2025-05-05
Attending: INTERNAL MEDICINE
Payer: COMMERCIAL

## 2025-05-05 DIAGNOSIS — K50.012 CROHN'S DISEASE OF SMALL INTESTINE WITH INTESTINAL OBSTRUCTION: ICD-10-CM

## 2025-05-05 DIAGNOSIS — D84.821 IMMUNOSUPPRESSION DUE TO DRUG THERAPY: ICD-10-CM

## 2025-05-05 DIAGNOSIS — Z79.899 IMMUNOSUPPRESSION DUE TO DRUG THERAPY: ICD-10-CM

## 2025-05-05 PROCEDURE — 86481 TB AG RESPONSE T-CELL SUSP: CPT

## 2025-05-05 PROCEDURE — 36415 COLL VENOUS BLD VENIPUNCTURE: CPT

## 2025-05-07 LAB
IGNF NEG CNTRL BLD: NORMAL
M TB IFN-G BLD-IMP: NEGATIVE
MITOGEN IGNF.SPOT COUNT BLD: NORMAL
T-SPOT PANEL A SPOT COUNT: 4
T-SPOT PANEL B SPOT COUNT: 1

## 2025-05-08 ENCOUNTER — RESULTS FOLLOW-UP (OUTPATIENT)
Dept: GASTROENTEROLOGY | Facility: HOSPITAL | Age: 30
End: 2025-05-08

## 2025-05-09 ENCOUNTER — PATIENT MESSAGE (OUTPATIENT)
Dept: OTHER | Facility: OTHER | Age: 30
End: 2025-05-09
Payer: COMMERCIAL

## 2025-05-09 ENCOUNTER — RESULTS FOLLOW-UP (OUTPATIENT)
Dept: OBSTETRICS AND GYNECOLOGY | Facility: CLINIC | Age: 30
End: 2025-05-09

## 2025-05-09 ENCOUNTER — PROCEDURE VISIT (OUTPATIENT)
Dept: MATERNAL FETAL MEDICINE | Facility: CLINIC | Age: 30
End: 2025-05-09
Payer: COMMERCIAL

## 2025-05-09 DIAGNOSIS — Z36.89 ENCOUNTER FOR ULTRASOUND TO ASSESS FETAL GROWTH: ICD-10-CM

## 2025-05-09 PROCEDURE — 76816 OB US FOLLOW-UP PER FETUS: CPT | Mod: S$GLB,,, | Performed by: OBSTETRICS & GYNECOLOGY

## 2025-05-12 ENCOUNTER — CLINICAL SUPPORT (OUTPATIENT)
Dept: OBSTETRICS AND GYNECOLOGY | Facility: CLINIC | Age: 30
End: 2025-05-12
Payer: COMMERCIAL

## 2025-05-12 ENCOUNTER — ROUTINE PRENATAL (OUTPATIENT)
Dept: OBSTETRICS AND GYNECOLOGY | Facility: CLINIC | Age: 30
End: 2025-05-12
Payer: COMMERCIAL

## 2025-05-12 VITALS
BODY MASS INDEX: 28.51 KG/M2 | WEIGHT: 150.88 LBS | DIASTOLIC BLOOD PRESSURE: 82 MMHG | SYSTOLIC BLOOD PRESSURE: 110 MMHG

## 2025-05-12 DIAGNOSIS — Z23 NEED FOR TDAP VACCINATION: Primary | ICD-10-CM

## 2025-05-12 DIAGNOSIS — Z34.80 SUPERVISION OF OTHER NORMAL PREGNANCY, ANTEPARTUM: Primary | ICD-10-CM

## 2025-05-12 PROBLEM — Z34.03 ENCOUNTER FOR SUPERVISION OF NORMAL FIRST PREGNANCY IN THIRD TRIMESTER: Status: RESOLVED | Noted: 2023-08-23 | Resolved: 2025-05-12

## 2025-05-12 PROCEDURE — 99999 PR PBB SHADOW E&M-EST. PATIENT-LVL I: CPT | Mod: PBBFAC,,,

## 2025-05-12 PROCEDURE — 99999 PR PBB SHADOW E&M-EST. PATIENT-LVL III: CPT | Mod: PBBFAC,,, | Performed by: ADVANCED PRACTICE MIDWIFE

## 2025-05-12 PROCEDURE — 0502F SUBSEQUENT PRENATAL CARE: CPT | Mod: CPTII,S$GLB,, | Performed by: ADVANCED PRACTICE MIDWIFE

## 2025-05-12 NOTE — PROGRESS NOTES
Reason for visit: Routine Prenatal Visit      HPI:   30 y.o., at 28w3d by Estimated Date of Delivery: 25    In with no c/o    - Contractions: denies  - Bleeding: denies  - Loss of fluid: denies  - Fetal movement: reports good FM, reinforced BID  - Nausea: no  - Vomiting: no  - Headache: no      Reviewed:    Past medical, surgical, social, family, and obstetric history: Reviewed and updated in EMR.  Medications: Reviewed and updated in EMR.  Allergies: Latex, natural rubber and Adhesive    Pregnancy dating, labs, ultrasound reports, prenatal testing, and problem list: Reviewed and updated in EMR.  Outside records: na  Independent interpretation of tests: na  Discussion with another healthcare professional: na      Vitals: /82   Wt 68.5 kg (150 lb 14.5 oz)   LMP 2024 (Exact Date)   BMI 28.51 kg/m²     Physical exam:  GENERAL: No acute distress  ABD: Gravid      Assessment and Plan:    Supervision of other normal pregnancy, antepartum        TDAP administered today thru injection clinic  Discussed options for BCM- pt considering paraguard as has used it in the past.      labor precautions given  Follow-up: 2 weeks      I spent a total of 20 minutes on the day of the visit. This includes face to face time and non-face to face time preparing to see the patient (eg, review of tests), Obtaining and/or reviewing separately obtained history, Documenting clinical information in the electronic or other health record, Independently interpreting results and communicating results to the patient/family/caregiver, or Care coordination.

## 2025-05-12 NOTE — PROGRESS NOTES
Patient reports for Tdap injection. Patient without complaint of pain at this time, injection given. Tolerated well no pain noted post injection advised to wait in lobby 15 minutes and report any adverse reactions.        Site: LD

## 2025-05-22 NOTE — PROGRESS NOTES
Pregnancy dating, labs, ultrasound reports, prenatal testing, and problem list; prior records and results; and available outside records were reviewed and updated in EMR.  Pertinent findings were noted below.    Reason for Visit: No chief complaint on file.    30w4d by Estimated Date of Delivery: 25    Blood pressure 118/70, weight 70 kg (154 lb 5.2 oz), last menstrual period 2024, not currently breastfeeding.  TWG 24#    Supervision of high risk pregnancy, antepartum  -     HIV 1/2 Ag/Ab (4th Gen); Future; Expected date: 2025  -     CBC Auto Differential; Future; Expected date: 2025  -     Treponema Pallidium Antibodies IgG, IgM; Future; Expected date: 2025    Hx of preeclampsia, prior pregnancy, currently pregnant    History of  delivery    Crohn's disease with other complication, unspecified gastrointestinal tract location  -     Prenatal Testing- [facility]; Standing      No contractions, bleeding, or loss of fluid.  +fetal movement.  Labs reviewed and up to date. 3T labs at next visit.  Next growth US on 6/10  Start PNT @ 32wk  Still considering TOLAC - continue to readdress     labor, Pain and bleeding, preE, and fetal movement count precautions given  Follow-up: 2 weeks

## 2025-05-23 ENCOUNTER — PATIENT MESSAGE (OUTPATIENT)
Dept: OTHER | Facility: OTHER | Age: 30
End: 2025-05-23
Payer: COMMERCIAL

## 2025-05-27 ENCOUNTER — ROUTINE PRENATAL (OUTPATIENT)
Dept: OBSTETRICS AND GYNECOLOGY | Facility: CLINIC | Age: 30
End: 2025-05-27
Payer: COMMERCIAL

## 2025-05-27 VITALS
WEIGHT: 154.31 LBS | DIASTOLIC BLOOD PRESSURE: 70 MMHG | SYSTOLIC BLOOD PRESSURE: 118 MMHG | BODY MASS INDEX: 29.16 KG/M2

## 2025-05-27 DIAGNOSIS — O09.90 SUPERVISION OF HIGH RISK PREGNANCY, ANTEPARTUM: Primary | ICD-10-CM

## 2025-05-27 DIAGNOSIS — K50.918 CROHN'S DISEASE WITH OTHER COMPLICATION, UNSPECIFIED GASTROINTESTINAL TRACT LOCATION: ICD-10-CM

## 2025-05-27 DIAGNOSIS — Z98.891 HISTORY OF CESAREAN DELIVERY: ICD-10-CM

## 2025-05-27 DIAGNOSIS — O09.299 HX OF PREECLAMPSIA, PRIOR PREGNANCY, CURRENTLY PREGNANT: ICD-10-CM

## 2025-05-27 PROCEDURE — 99999 PR PBB SHADOW E&M-EST. PATIENT-LVL II: CPT | Mod: PBBFAC,,, | Performed by: OBSTETRICS & GYNECOLOGY

## 2025-05-27 PROCEDURE — 0502F SUBSEQUENT PRENATAL CARE: CPT | Mod: CPTII,S$GLB,, | Performed by: OBSTETRICS & GYNECOLOGY

## 2025-05-28 DIAGNOSIS — K50.012 CROHN'S DISEASE OF SMALL INTESTINE WITH INTESTINAL OBSTRUCTION: ICD-10-CM

## 2025-05-28 RX ORDER — USTEKINUMAB 90 MG/ML
INJECTION, SOLUTION SUBCUTANEOUS
Qty: 1 ML | Refills: 2 | Status: SHIPPED | OUTPATIENT
Start: 2025-05-28

## 2025-05-28 NOTE — TELEPHONE ENCOUNTER
"Primary provider: Dr. Talib Rutledge    IBD medications: Stelara 90 mg every 8 weeks     Refill request for:      Pended Medication(s)   Requested Prescriptions     Pending Prescriptions Disp Refills    STELARA 90 mg/mL Syrg syringe [Pharmacy Med Name: STELARA  90MG PREFILLED SYRINGE  ML PFS] 1 mL 0     Sig: INJECT 1 SYRINGE SUBCUTANEOUSLY  EVERY 8 WEEKS           Allergies reviewed: Yes    Drug Monitoring labs/frequency for all IBD meds:    CBC and CMP every 6 months  TB and Hep B every 12 months    Lab Results   Component Value Date    HEPBSAG Non-reactive 12/20/2024    HEPBCAB Non-Reactive 04/29/2025     Lab Results   Component Value Date    TBGOLDPLUS Negative 04/03/2024     Lab Results   Component Value Date    QUANTNILVALU 0.58736 04/29/2025    QUANTTBGDPL Indeterminate (A) 04/29/2025      No results found for: "TSPOTSCREN"  Lab Results   Component Value Date    ZNMIYZBJ39AB 37 04/29/2025    HQWAEEVX05 791 10/10/2024     Lab Results   Component Value Date    WBC 6.25 04/29/2025    HGB 12.3 04/29/2025    HCT 37.2 04/29/2025    MCV 88 04/29/2025     04/29/2025     Lab Results   Component Value Date    CREATININE 0.7 04/29/2025    ALBUMIN 2.7 (L) 04/29/2025    BILITOT 0.2 04/29/2025    ALKPHOS 66 04/29/2025    AST 13 04/29/2025    ALT 11 04/29/2025    GGT 14 04/26/2017     No results found for: "CHOL", "HDL", "LDLCALC", "TRIG"    Lab due date (mo/yr):  10/2025    Labs scheduled: No - but patient has an OV before or when labs are due     Next appt: 10/22/2025    RX refill sent to provider for amount until next labs: No       "

## 2025-06-06 ENCOUNTER — PATIENT MESSAGE (OUTPATIENT)
Dept: OTHER | Facility: OTHER | Age: 30
End: 2025-06-06
Payer: COMMERCIAL

## 2025-06-09 NOTE — PROGRESS NOTES
Pregnancy dating, labs, ultrasound reports, prenatal testing, and problem list; prior records and results; and available outside records were reviewed and updated in EMR.  Pertinent findings were noted below.    Reason for Visit: Routine Prenatal Visit    32w4d by Estimated Date of Delivery: 25    Blood pressure 120/72, weight 71 kg (156 lb 8.4 oz), last menstrual period 2024, not currently breastfeeding.  TWG 26#    Supervision of high risk pregnancy, antepartum    Hx of preeclampsia, prior pregnancy, currently pregnant    History of  delivery    Crohn's disease with other complication, unspecified gastrointestinal tract location      No contractions, bleeding, or loss of fluid.  +fetal movement.  Labs reviewed - 3T labs today.  MFM appt and growth US today  Start PNT and continue through delivery  NO Crohn's issues - next injection at 37wk     labor, Pain and bleeding, preE, and fetal movement count precautions given  Follow-up: 2 weeks

## 2025-06-10 ENCOUNTER — LAB VISIT (OUTPATIENT)
Dept: LAB | Facility: OTHER | Age: 30
End: 2025-06-10
Attending: OBSTETRICS & GYNECOLOGY
Payer: COMMERCIAL

## 2025-06-10 ENCOUNTER — ROUTINE PRENATAL (OUTPATIENT)
Dept: OBSTETRICS AND GYNECOLOGY | Facility: CLINIC | Age: 30
End: 2025-06-10
Payer: COMMERCIAL

## 2025-06-10 ENCOUNTER — RESULTS FOLLOW-UP (OUTPATIENT)
Dept: OBSTETRICS AND GYNECOLOGY | Facility: CLINIC | Age: 30
End: 2025-06-10

## 2025-06-10 ENCOUNTER — PROCEDURE VISIT (OUTPATIENT)
Dept: MATERNAL FETAL MEDICINE | Facility: CLINIC | Age: 30
End: 2025-06-10
Payer: COMMERCIAL

## 2025-06-10 VITALS — BODY MASS INDEX: 29.58 KG/M2 | DIASTOLIC BLOOD PRESSURE: 72 MMHG | WEIGHT: 156.5 LBS | SYSTOLIC BLOOD PRESSURE: 120 MMHG

## 2025-06-10 DIAGNOSIS — O09.299 HX OF PREECLAMPSIA, PRIOR PREGNANCY, CURRENTLY PREGNANT: ICD-10-CM

## 2025-06-10 DIAGNOSIS — O09.90 SUPERVISION OF HIGH RISK PREGNANCY, ANTEPARTUM: ICD-10-CM

## 2025-06-10 DIAGNOSIS — O09.90 SUPERVISION OF HIGH RISK PREGNANCY, ANTEPARTUM: Primary | ICD-10-CM

## 2025-06-10 DIAGNOSIS — Z36.89 ENCOUNTER FOR ULTRASOUND TO ASSESS FETAL GROWTH: ICD-10-CM

## 2025-06-10 DIAGNOSIS — K50.918 CROHN'S DISEASE WITH OTHER COMPLICATION, UNSPECIFIED GASTROINTESTINAL TRACT LOCATION: ICD-10-CM

## 2025-06-10 DIAGNOSIS — Z98.891 HISTORY OF CESAREAN DELIVERY: ICD-10-CM

## 2025-06-10 LAB
ABSOLUTE EOSINOPHIL (OHS): 0.09 K/UL
ABSOLUTE MONOCYTE (OHS): 0.54 K/UL (ref 0.3–1)
ABSOLUTE NEUTROPHIL COUNT (OHS): 4.12 K/UL (ref 1.8–7.7)
BASOPHILS # BLD AUTO: 0.02 K/UL
BASOPHILS NFR BLD AUTO: 0.3 %
ERYTHROCYTE [DISTWIDTH] IN BLOOD BY AUTOMATED COUNT: 12.6 % (ref 11.5–14.5)
HCT VFR BLD AUTO: 36.1 % (ref 37–48.5)
HGB BLD-MCNC: 11.5 GM/DL (ref 12–16)
HIV 1+2 AB+HIV1 P24 AG SERPL QL IA: NEGATIVE
IMM GRANULOCYTES # BLD AUTO: 0.07 K/UL (ref 0–0.04)
IMM GRANULOCYTES NFR BLD AUTO: 1.2 % (ref 0–0.5)
LYMPHOCYTES # BLD AUTO: 1.21 K/UL (ref 1–4.8)
MCH RBC QN AUTO: 26.2 PG (ref 27–31)
MCHC RBC AUTO-ENTMCNC: 31.9 G/DL (ref 32–36)
MCV RBC AUTO: 82 FL (ref 82–98)
NUCLEATED RBC (/100WBC) (OHS): 0 /100 WBC
PLATELET # BLD AUTO: 223 K/UL (ref 150–450)
PMV BLD AUTO: 11.2 FL (ref 9.2–12.9)
RBC # BLD AUTO: 4.39 M/UL (ref 4–5.4)
RELATIVE EOSINOPHIL (OHS): 1.5 %
RELATIVE LYMPHOCYTE (OHS): 20 % (ref 18–48)
RELATIVE MONOCYTE (OHS): 8.9 % (ref 4–15)
RELATIVE NEUTROPHIL (OHS): 68.1 % (ref 38–73)
T PALLIDUM IGG+IGM SER QL: NEGATIVE
WBC # BLD AUTO: 6.05 K/UL (ref 3.9–12.7)

## 2025-06-10 PROCEDURE — 76816 OB US FOLLOW-UP PER FETUS: CPT | Mod: S$GLB,,, | Performed by: OBSTETRICS & GYNECOLOGY

## 2025-06-10 PROCEDURE — 86593 SYPHILIS TEST NON-TREP QUANT: CPT

## 2025-06-10 PROCEDURE — 87389 HIV-1 AG W/HIV-1&-2 AB AG IA: CPT

## 2025-06-10 PROCEDURE — 0502F SUBSEQUENT PRENATAL CARE: CPT | Mod: CPTII,S$GLB,, | Performed by: OBSTETRICS & GYNECOLOGY

## 2025-06-10 PROCEDURE — 76819 FETAL BIOPHYS PROFIL W/O NST: CPT | Mod: S$GLB,,, | Performed by: OBSTETRICS & GYNECOLOGY

## 2025-06-10 PROCEDURE — 36415 COLL VENOUS BLD VENIPUNCTURE: CPT

## 2025-06-10 PROCEDURE — 85025 COMPLETE CBC W/AUTO DIFF WBC: CPT

## 2025-06-10 PROCEDURE — 99999 PR PBB SHADOW E&M-EST. PATIENT-LVL III: CPT | Mod: PBBFAC,,, | Performed by: OBSTETRICS & GYNECOLOGY

## 2025-06-16 ENCOUNTER — HOSPITAL ENCOUNTER (OUTPATIENT)
Dept: PERINATAL CARE | Facility: OTHER | Age: 30
Discharge: HOME | End: 2025-06-16
Attending: OBSTETRICS & GYNECOLOGY
Payer: COMMERCIAL

## 2025-06-16 DIAGNOSIS — K50.918 CROHN'S DISEASE WITH OTHER COMPLICATION, UNSPECIFIED GASTROINTESTINAL TRACT LOCATION: ICD-10-CM

## 2025-06-16 PROCEDURE — 76815 OB US LIMITED FETUS(S): CPT | Mod: 26,,, | Performed by: OBSTETRICS & GYNECOLOGY

## 2025-06-16 PROCEDURE — 59025 FETAL NON-STRESS TEST: CPT | Mod: 26,,, | Performed by: OBSTETRICS & GYNECOLOGY

## 2025-06-16 PROCEDURE — 76815 OB US LIMITED FETUS(S): CPT

## 2025-06-16 PROCEDURE — 59025 FETAL NON-STRESS TEST: CPT

## 2025-06-19 ENCOUNTER — HOSPITAL ENCOUNTER (OUTPATIENT)
Dept: PERINATAL CARE | Facility: OTHER | Age: 30
Discharge: HOME | End: 2025-06-19
Attending: OBSTETRICS & GYNECOLOGY
Payer: COMMERCIAL

## 2025-06-19 DIAGNOSIS — K50.918 CROHN'S DISEASE WITH OTHER COMPLICATION, UNSPECIFIED GASTROINTESTINAL TRACT LOCATION: ICD-10-CM

## 2025-06-19 PROCEDURE — 59025 FETAL NON-STRESS TEST: CPT | Mod: 26,,, | Performed by: OBSTETRICS & GYNECOLOGY

## 2025-06-19 PROCEDURE — 59025 FETAL NON-STRESS TEST: CPT

## 2025-06-23 ENCOUNTER — ROUTINE PRENATAL (OUTPATIENT)
Dept: OBSTETRICS AND GYNECOLOGY | Facility: CLINIC | Age: 30
End: 2025-06-23
Payer: COMMERCIAL

## 2025-06-23 ENCOUNTER — HOSPITAL ENCOUNTER (OUTPATIENT)
Dept: PERINATAL CARE | Facility: OTHER | Age: 30
Discharge: HOME | End: 2025-06-23
Attending: OBSTETRICS & GYNECOLOGY
Payer: COMMERCIAL

## 2025-06-23 VITALS
SYSTOLIC BLOOD PRESSURE: 124 MMHG | DIASTOLIC BLOOD PRESSURE: 72 MMHG | WEIGHT: 159.63 LBS | BODY MASS INDEX: 30.16 KG/M2

## 2025-06-23 DIAGNOSIS — Z34.80 SUPERVISION OF OTHER NORMAL PREGNANCY, ANTEPARTUM: Primary | ICD-10-CM

## 2025-06-23 DIAGNOSIS — K50.918 CROHN'S DISEASE WITH OTHER COMPLICATION, UNSPECIFIED GASTROINTESTINAL TRACT LOCATION: ICD-10-CM

## 2025-06-23 PROCEDURE — 0502F SUBSEQUENT PRENATAL CARE: CPT | Mod: CPTII,S$GLB,, | Performed by: ADVANCED PRACTICE MIDWIFE

## 2025-06-23 PROCEDURE — 59025 FETAL NON-STRESS TEST: CPT | Mod: 26,,, | Performed by: OBSTETRICS & GYNECOLOGY

## 2025-06-23 PROCEDURE — 99999 PR PBB SHADOW E&M-EST. PATIENT-LVL III: CPT | Mod: PBBFAC,,, | Performed by: ADVANCED PRACTICE MIDWIFE

## 2025-06-23 PROCEDURE — 59025 FETAL NON-STRESS TEST: CPT

## 2025-06-23 NOTE — PROGRESS NOTES
Reason for visit: Routine Prenatal Visit      HPI:   30 y.o., at 34w3d by Estimated Date of Delivery: 25    In with no c/o other than discomfort of pregnancy and her HR occ 130s in PNT and some SOB.    - Contractions: denies  - Bleeding: denies  - Loss of fluid: denies  - Fetal movement: reports good FM.  - Nausea: no  - Vomiting: no  - Headache: no      Reviewed:    Past medical, surgical, social, family, and obstetric history: Reviewed and updated in EMR.  Medications: Reviewed and updated in EMR.  Allergies: Latex, natural rubber and Adhesive    Pregnancy dating, labs, ultrasound reports, prenatal testing, and problem list: Reviewed and updated in EMR.  Outside records: na  Independent interpretation of tests: na  Discussion with another healthcare professional: na      Vitals: /72   Wt 72.4 kg (159 lb 9.8 oz)   LMP 2024 (Exact Date)   BMI 30.16 kg/m²     Physical exam:  GENERAL: No acute distress  ABD: Gravid      Assessment and Plan:    Supervision of other normal pregnancy, antepartum        Discussed physiologic changes in pregnancy that contribute to sensation of SOB, measures to alleviate.   Advised VS are monitored in PNT and if sustained elevated HR the nurses would notify us.      labor precautions given  Follow-up: 2 weeks      I spent a total of 20 minutes on the day of the visit. This includes face to face time and non-face to face time preparing to see the patient (eg, review of tests), Obtaining and/or reviewing separately obtained history, Documenting clinical information in the electronic or other health record, Independently interpreting results and communicating results to the patient/family/caregiver, or Care coordination.

## 2025-06-26 ENCOUNTER — HOSPITAL ENCOUNTER (OUTPATIENT)
Dept: PERINATAL CARE | Facility: OTHER | Age: 30
Discharge: HOME | End: 2025-06-26
Attending: OBSTETRICS & GYNECOLOGY
Payer: COMMERCIAL

## 2025-06-26 DIAGNOSIS — K50.918 CROHN'S DISEASE WITH OTHER COMPLICATION, UNSPECIFIED GASTROINTESTINAL TRACT LOCATION: ICD-10-CM

## 2025-06-26 PROCEDURE — 76815 OB US LIMITED FETUS(S): CPT

## 2025-06-26 PROCEDURE — 59025 FETAL NON-STRESS TEST: CPT | Mod: 26,,, | Performed by: OBSTETRICS & GYNECOLOGY

## 2025-06-26 PROCEDURE — 59025 FETAL NON-STRESS TEST: CPT

## 2025-06-26 PROCEDURE — 76815 OB US LIMITED FETUS(S): CPT | Mod: 26,,, | Performed by: OBSTETRICS & GYNECOLOGY

## 2025-06-27 ENCOUNTER — PATIENT MESSAGE (OUTPATIENT)
Dept: OTHER | Facility: OTHER | Age: 30
End: 2025-06-27
Payer: COMMERCIAL

## 2025-06-29 NOTE — PROGRESS NOTES
Pregnancy dating, labs, ultrasound reports, prenatal testing, and problem list; prior records and results; and available outside records were reviewed and updated in EMR.  Pertinent findings were noted below.    Reason for Visit: Routine Prenatal Visit    35w3d by Estimated Date of Delivery: 25    Blood pressure 120/74, weight 75 kg (165 lb 5.5 oz), last menstrual period 2024, not currently breastfeeding.  TWG 35#    Supervision of high risk pregnancy, antepartum  -     IP OB Labor Induction; Future; Expected date: 2025  -     Group B Streptococcus, PCR    Hx of preeclampsia, prior pregnancy, currently pregnant    History of  delivery    Crohn's disease with other complication, unspecified gastrointestinal tract location    Encounter for initial prescription of intrauterine contraceptive device (IUD)  -     Device Authorization Order      Occasional cramps or contractions, not consistent.  No bleeding or loss of fluid.  +fetal movement.  Labs reviewed and up to date.  Continue PNT and serial growth through delivery  Consents for TOLAC and RC/S & GBS done today   calculated success rate is 56%. Per ACOG guidelines, TOLAC should be recommended for calculated rates >60% and that these women have the same or less maternal morbidity than women who have an elective repeat CD. I discussed the R/B/A with patient including but not limited to uterine rupture (0.5-0.9%), need for emergency CD, hemorrhage and need for hysterectomy. We extensively discussed TOLAC vs scheduled repeat  vs trial of labor with need for repeat  and the risks associated with each. We discussed rates of  success have been showed to be higher if she goes into spontaneous labor on her own but that induction of labor is not contraindicated and would occur with cervical ripening balloon and pitocin if necessary. I discussed that I recommend neuroaxial anesthesia during labor in the event emergency CD is  required.   After counseling the patient desires TOLAC at this time  39wk TOLAC IOL requested unless delivery warranted sooner. If she develops PreE with this pregnancy, she just wants to proceed with repeat CS     labor, Pain and bleeding, preE, and fetal movement count precautions given  Follow-up: 1 week

## 2025-06-30 ENCOUNTER — HOSPITAL ENCOUNTER (OUTPATIENT)
Dept: PERINATAL CARE | Facility: OTHER | Age: 30
Discharge: HOME | End: 2025-06-30
Attending: OBSTETRICS & GYNECOLOGY
Payer: COMMERCIAL

## 2025-06-30 ENCOUNTER — ROUTINE PRENATAL (OUTPATIENT)
Dept: OBSTETRICS AND GYNECOLOGY | Facility: CLINIC | Age: 30
End: 2025-06-30
Payer: COMMERCIAL

## 2025-06-30 VITALS
DIASTOLIC BLOOD PRESSURE: 74 MMHG | WEIGHT: 165.38 LBS | BODY MASS INDEX: 31.24 KG/M2 | SYSTOLIC BLOOD PRESSURE: 120 MMHG

## 2025-06-30 DIAGNOSIS — O09.90 SUPERVISION OF HIGH RISK PREGNANCY, ANTEPARTUM: Primary | ICD-10-CM

## 2025-06-30 DIAGNOSIS — O09.299 HX OF PREECLAMPSIA, PRIOR PREGNANCY, CURRENTLY PREGNANT: ICD-10-CM

## 2025-06-30 DIAGNOSIS — K50.918 CROHN'S DISEASE WITH OTHER COMPLICATION, UNSPECIFIED GASTROINTESTINAL TRACT LOCATION: ICD-10-CM

## 2025-06-30 DIAGNOSIS — Z98.891 HISTORY OF CESAREAN DELIVERY: ICD-10-CM

## 2025-06-30 DIAGNOSIS — Z30.014 ENCOUNTER FOR INITIAL PRESCRIPTION OF INTRAUTERINE CONTRACEPTIVE DEVICE (IUD): ICD-10-CM

## 2025-06-30 PROCEDURE — 59025 FETAL NON-STRESS TEST: CPT

## 2025-06-30 PROCEDURE — 59025 FETAL NON-STRESS TEST: CPT | Mod: 26,,, | Performed by: OBSTETRICS & GYNECOLOGY

## 2025-06-30 PROCEDURE — 76815 OB US LIMITED FETUS(S): CPT

## 2025-06-30 PROCEDURE — 0502F SUBSEQUENT PRENATAL CARE: CPT | Mod: CPTII,S$GLB,, | Performed by: OBSTETRICS & GYNECOLOGY

## 2025-06-30 PROCEDURE — 76815 OB US LIMITED FETUS(S): CPT | Mod: 26,,, | Performed by: OBSTETRICS & GYNECOLOGY

## 2025-06-30 PROCEDURE — 99999 PR PBB SHADOW E&M-EST. PATIENT-LVL II: CPT | Mod: PBBFAC,,, | Performed by: OBSTETRICS & GYNECOLOGY

## 2025-06-30 PROCEDURE — 87653 STREP B DNA AMP PROBE: CPT | Performed by: OBSTETRICS & GYNECOLOGY

## 2025-07-01 LAB — GROUP B STREPTOCOCCUS, PCR (OHS): NEGATIVE

## 2025-07-03 ENCOUNTER — HOSPITAL ENCOUNTER (OUTPATIENT)
Dept: PERINATAL CARE | Facility: OTHER | Age: 30
Discharge: HOME | End: 2025-07-03
Attending: OBSTETRICS & GYNECOLOGY
Payer: COMMERCIAL

## 2025-07-03 DIAGNOSIS — K50.918 CROHN'S DISEASE WITH OTHER COMPLICATION, UNSPECIFIED GASTROINTESTINAL TRACT LOCATION: ICD-10-CM

## 2025-07-03 PROCEDURE — 59025 FETAL NON-STRESS TEST: CPT | Mod: 26,,, | Performed by: OBSTETRICS & GYNECOLOGY

## 2025-07-08 ENCOUNTER — PROCEDURE VISIT (OUTPATIENT)
Dept: MATERNAL FETAL MEDICINE | Facility: CLINIC | Age: 30
End: 2025-07-08
Payer: COMMERCIAL

## 2025-07-08 ENCOUNTER — ROUTINE PRENATAL (OUTPATIENT)
Dept: OBSTETRICS AND GYNECOLOGY | Facility: CLINIC | Age: 30
End: 2025-07-08
Payer: COMMERCIAL

## 2025-07-08 VITALS
BODY MASS INDEX: 31.78 KG/M2 | WEIGHT: 168.19 LBS | DIASTOLIC BLOOD PRESSURE: 82 MMHG | SYSTOLIC BLOOD PRESSURE: 118 MMHG

## 2025-07-08 DIAGNOSIS — Z98.891 HISTORY OF CESAREAN DELIVERY: ICD-10-CM

## 2025-07-08 DIAGNOSIS — O09.90 SUPERVISION OF HIGH RISK PREGNANCY, ANTEPARTUM: Primary | ICD-10-CM

## 2025-07-08 DIAGNOSIS — K50.918 CROHN'S DISEASE WITH OTHER COMPLICATION, UNSPECIFIED GASTROINTESTINAL TRACT LOCATION: ICD-10-CM

## 2025-07-08 DIAGNOSIS — Z36.89 ENCOUNTER FOR ULTRASOUND TO ASSESS FETAL GROWTH: ICD-10-CM

## 2025-07-08 DIAGNOSIS — O09.299 HX OF PREECLAMPSIA, PRIOR PREGNANCY, CURRENTLY PREGNANT: ICD-10-CM

## 2025-07-08 PROCEDURE — 0502F SUBSEQUENT PRENATAL CARE: CPT | Mod: CPTII,S$GLB,, | Performed by: OBSTETRICS & GYNECOLOGY

## 2025-07-08 PROCEDURE — 76819 FETAL BIOPHYS PROFIL W/O NST: CPT | Mod: S$GLB,,, | Performed by: OBSTETRICS & GYNECOLOGY

## 2025-07-08 PROCEDURE — 76816 OB US FOLLOW-UP PER FETUS: CPT | Mod: S$GLB,,, | Performed by: OBSTETRICS & GYNECOLOGY

## 2025-07-08 PROCEDURE — 99999 PR PBB SHADOW E&M-EST. PATIENT-LVL III: CPT | Mod: PBBFAC,,, | Performed by: OBSTETRICS & GYNECOLOGY

## 2025-07-08 NOTE — H&P
HISTORY AND PHYSICAL                                                OBSTETRICS          Subjective:       Nina Durant is a 30 y.o.  female with IUP with EDC of 2025 here for routine prenatal visit prior to elective TOLAC IOL.     Patient denies contractions (only santi bender and pelvic pressure), denies vaginal bleeding, denies LOF.   Fetal Movement: normal.    This IUP is complicated by polyhydraminos, Crohns disease (on Stelara), history of Csx1 (failed IOL), history of PreE (on ASA).    Review of Systems   Constitutional:  Negative for chills and fever.   Eyes:  Negative for visual disturbance.   Respiratory:  Negative for cough and shortness of breath.    Cardiovascular:  Negative for chest pain and leg swelling.   Gastrointestinal:  Negative for abdominal pain, nausea and vomiting.   Genitourinary:  Negative for dysuria, flank pain, frequency, urgency and vaginal bleeding.   Integumentary:  Negative for breast mass.   Neurological:  Negative for syncope and headaches.   Psychiatric/Behavioral:  Negative for depression. The patient is not nervous/anxious.    All other systems reviewed and are negative.  Breast: Negative for mass and mastodynia      PMHx:   Past Medical History:   Diagnosis Date    Crohn's disease     Insomnia        PSHx:   Past Surgical History:   Procedure Laterality Date     SECTION N/A 10/06/2023    Procedure:  SECTION;  Surgeon: Pina Figueroa MD;  Location: [facility] L&D;  Service: OB/GYN;  Laterality: N/A;     SECTION  10/6/23    COLONOSCOPY N/A 12/10/2015    Procedure: COLONOSCOPY;  Surgeon: Tala Gonzalez MD;  Location: [address]);  Service: Endoscopy;  Laterality: N/A;    COLONOSCOPY N/A 2016    Procedure: COLONOSCOPY;  Surgeon: Tala Gonzalez MD;  Location: [address]);  Service: Endoscopy;  Laterality: N/A;    COLONOSCOPY N/A 2021    Procedure: COLONOSCOPY;  Surgeon: True Treadwell,  MD;  Location: [facility];  Service: General;  Laterality: N/A;    COLONOSCOPY N/A 07/08/2021    Procedure: COLONOSCOPY;  Surgeon: Talib Rutledge MD;  [facility]  Service: Endoscopy;  Laterality: N/A;  Covid-19 test 7/6/21 at [facility]    COLONOSCOPY N/A 09/27/2022    Procedure: COLONOSCOPY;  Surgeon: Talib Rutledge MD;  [facility]  Service: Endoscopy;  Laterality: N/A;  golytely    COLONOSCOPY W/ BIOPSIES  01/21/2021    ESOPHAGOGASTRODUODENOSCOPY      ESOPHAGOGASTRODUODENOSCOPY N/A 07/08/2021    Procedure: EGD (ESOPHAGOGASTRODUODENOSCOPY);  Surgeon: Talib Rutledge MD;  Location: [address]);  Service: Endoscopy;  Laterality: N/A;  Covid-19 test 7/6/21 at [facility]  7/6 returned pt's call- lvm with arrival time-rb  patient aware she moved up 30 minutes from 930am to 900am, patient aware to be here at 8am 7/8/21 -     ESOPHAGOGASTRODUODENOSCOPY N/A 09/27/2022    Procedure: ESOPHAGOGASTRODUODENOSCOPY (EGD);  Surgeon: Talib Rutledge MD;  Location: [address]);  Service: Endoscopy;  Laterality: N/A;  NOT Vaccinated.Covid test on 9/24 at [facility], instructions sent to [facility]  Clear liquids up to 4 hrs prior/ AM prep [id number]    TONSILLECTOMY      TYMPANOSTOMY TUBE PLACEMENT         All:   Review of patient's allergies indicates:   Allergen Reactions    Latex, natural rubber      Mother states allergic to latex gloves     Adhesive Rash     Band-aids       Meds: Prescriptions Prior to Admission[1]    SH: Social History[2]    FH:   Family History   Problem Relation Name Age of Onset    Lupus Mother      Heart attacks under age 50 Father My father.     Heart disease Father My father.     Hypertension Father My father.     Cardiomyopathy Maternal Grandmother      Dementia Maternal Grandfather      Heart disease Paternal Grandmother      Heart disease Paternal Grandfather      No Known Problems Sister         OBHx:   OB  History    Para Term  AB Living   2 1 0 1 0 1   SAB IAB Ectopic Multiple Live Births   0 0 0 0 1      # Outcome Date GA Lbr Shayan/2nd Weight Sex Type Anes PTL Lv   2 Current            1  10/06/23 36w0d  2.41 kg (5 lb 5 oz) M CS-LTranv Gen Y YAZ      Name: PATRICIA POPE      Apgar1: 5  Apgar5: 8       Objective:       /82   Wt 76.3 kg (168 lb 3.4 oz)   LMP 2024 (Exact Date)   BMI 31.78 kg/m²     Vitals:    25 0948   BP: 118/82   Weight: 76.3 kg (168 lb 3.4 oz)       General:   alert, appears stated age and cooperative, no apparent distress   HENT:  normocephalic, atraumatic   Eyes:  extraocular movements and conjunctivae normal   Neck:  supple, range of motion normal, no thyromegaly   Lungs:   no respiratory distress   Heart:   regular rate   Abdomen:  soft, non-tender, non-distended but gravid, no rebound or guarding   Extremities negative edema, negative erythema   FHT: To be performed upon admission                 TOCO: To be performed upon admission   Presentations: To be performed upon admission   Cervix: To be performed upon admission   Sterile Speculum Exam: N/A    EFW by Leopold's: N/A-polyhydraminos    Recent Growth Scan: 2025          36w 4d        2947g    41%     Lab Review  Blood Type A POS  GBBS: negative  Rubella: Immune  Treponemal Abs: NR  HIV: negative  HepB: negative  HepC: negative       Assessment:     IUP with EDC 2025 here for routine prenatal visit prior to elective TOLAC IOL    Plan:      Risks, benefits, alternatives and possible complications have been discussed in detail with the patient.   - Consents signed and to chart  - Admit to Labor and Delivery unit  - Neuraxial/regional per Anesthesia  - Draw CBC, T&S  - IOL plan per L&D team  - EFW N/A-polyhydraminos, unable to assess  - Maternal pelvis unproven  - MOC/MOF: interval IUD (scheduled)/breast    Polyhydraminos  - incidental finding  - Normal GTT  - notify pediatrics @  delivery    History of PreE  - serial BP checks  - No s/sx PreE  - continue ASA through delivery    History of Csx1  - in setting of failed IOL while on MgSO4  -  calculated success rate is 56%. Per ACOG guidelines, TOLAC should be recommended for calculated rates >60% and that these women have the same or less maternal morbidity than women who have an elective repeat CD. I discussed the R/B/A with patient including but not limited to uterine rupture (0.5-0.9%), need for emergency CD, hemorrhage and need for hysterectomy. We extensively discussed TOLAC vs scheduled repeat  vs trial of labor with need for repeat  and the risks associated with each. We discussed rates of  success have been showed to be higher if she goes into spontaneous labor on her own but that induction of labor is not contraindicated and would occur with cervical ripening balloon and pitocin if necessary. I discussed that I recommend neuroaxial anesthesia during labor in the event emergency CD is required.   After counseling the patient desires TOLAC but wants to proceed with repeat CS if PreE is diagnosed      Post-Partum Hemorrhage risk - medium         [1] (Not in a hospital admission)  [2]   Social History  Socioeconomic History    Marital status:    Tobacco Use    Smoking status: Never    Smokeless tobacco: Never    Tobacco comments:     mom smokes   Substance and Sexual Activity    Alcohol use: No    Drug use: No    Sexual activity: Yes     Partners: Male     Birth control/protection: None   Social History Narrative    Lives with mom, sister.    2 cats.    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     Social Drivers of Health     Financial Resource Strain: Low Risk  (2025)    Overall Financial Resource Strain (CARDIA)     Difficulty of Paying Living Expenses: Not hard at all   Food Insecurity: No Food Insecurity (2025)    Hunger Vital Sign     Worried About  Running Out of Food in the Last Year: Never true     Ran Out of Food in the Last Year: Never true   Transportation Needs: No Transportation Needs (6/9/2025)    PRAPARE - Transportation     Lack of Transportation (Medical): No     Lack of Transportation (Non-Medical): No   Physical Activity: Insufficiently Active (6/9/2025)    Exercise Vital Sign     Days of Exercise per Week: 1 day     Minutes of Exercise per Session: 30 min   Stress: No Stress Concern Present (6/9/2025)    [employer] [place] [employer] - Occupational Stress Questionnaire     Feeling of Stress : Not at all   Housing Stability: Low Risk  (6/9/2025)    Housing Stability Vital Sign     Unable to Pay for Housing in the Last Year: No     Number of Times Moved in the Last Year: 0     Homeless in the Last Year: No

## 2025-07-08 NOTE — H&P (VIEW-ONLY)
HISTORY AND PHYSICAL                                                OBSTETRICS          Subjective:       Nina Durant is a 30 y.o.  female with IUP with EDC of 2025 here for routine prenatal visit prior to elective TOLAC IOL.     Patient denies contractions (only santi bender and pelvic pressure), denies vaginal bleeding, denies LOF.   Fetal Movement: normal.    This IUP is complicated by polyhydraminos, Crohns disease (on Stelara), history of Csx1 (failed IOL), history of PreE (on ASA).    Review of Systems   Constitutional:  Negative for chills and fever.   Eyes:  Negative for visual disturbance.   Respiratory:  Negative for cough and shortness of breath.    Cardiovascular:  Negative for chest pain and leg swelling.   Gastrointestinal:  Negative for abdominal pain, nausea and vomiting.   Genitourinary:  Negative for dysuria, flank pain, frequency, urgency and vaginal bleeding.   Integumentary:  Negative for breast mass.   Neurological:  Negative for syncope and headaches.   Psychiatric/Behavioral:  Negative for depression. The patient is not nervous/anxious.    All other systems reviewed and are negative.  Breast: Negative for mass and mastodynia      PMHx:   Past Medical History:   Diagnosis Date    Crohn's disease     Insomnia        PSHx:   Past Surgical History:   Procedure Laterality Date     SECTION N/A 10/06/2023    Procedure:  SECTION;  Surgeon: Pina Figueroa MD;  Location: [facility] L&D;  Service: OB/GYN;  Laterality: N/A;     SECTION  10/6/23    COLONOSCOPY N/A 12/10/2015    Procedure: COLONOSCOPY;  Surgeon: Tala Gonzalez MD;  Location: [address]);  Service: Endoscopy;  Laterality: N/A;    COLONOSCOPY N/A 2016    Procedure: COLONOSCOPY;  Surgeon: Tala Gonzalez MD;  Location: [address]);  Service: Endoscopy;  Laterality: N/A;    COLONOSCOPY N/A 2021    Procedure: COLONOSCOPY;  Surgeon: True Treadwell,  MD;  Location: [facility];  Service: General;  Laterality: N/A;    COLONOSCOPY N/A 07/08/2021    Procedure: COLONOSCOPY;  Surgeon: Talib Rutledge MD;  [facility]  Service: Endoscopy;  Laterality: N/A;  Covid-19 test 7/6/21 at [facility]    COLONOSCOPY N/A 09/27/2022    Procedure: COLONOSCOPY;  Surgeon: Talib Rutledge MD;  [facility]  Service: Endoscopy;  Laterality: N/A;  golytely    COLONOSCOPY W/ BIOPSIES  01/21/2021    ESOPHAGOGASTRODUODENOSCOPY      ESOPHAGOGASTRODUODENOSCOPY N/A 07/08/2021    Procedure: EGD (ESOPHAGOGASTRODUODENOSCOPY);  Surgeon: Talib Rutledge MD;  Location: [address]);  Service: Endoscopy;  Laterality: N/A;  Covid-19 test 7/6/21 at [facility]  7/6 returned pt's call- lvm with arrival time-rb  patient aware she moved up 30 minutes from 930am to 900am, patient aware to be here at 8am 7/8/21 -     ESOPHAGOGASTRODUODENOSCOPY N/A 09/27/2022    Procedure: ESOPHAGOGASTRODUODENOSCOPY (EGD);  Surgeon: Talib Rutledge MD;  Location: [address]);  Service: Endoscopy;  Laterality: N/A;  NOT Vaccinated.Covid test on 9/24 at [facility], instructions sent to [facility]  Clear liquids up to 4 hrs prior/ AM prep [id number]    TONSILLECTOMY      TYMPANOSTOMY TUBE PLACEMENT         All:   Review of patient's allergies indicates:   Allergen Reactions    Latex, natural rubber      Mother states allergic to latex gloves     Adhesive Rash     Band-aids       Meds: Prescriptions Prior to Admission[1]    SH: Social History[2]    FH:   Family History   Problem Relation Name Age of Onset    Lupus Mother      Heart attacks under age 50 Father My father.     Heart disease Father My father.     Hypertension Father My father.     Cardiomyopathy Maternal Grandmother      Dementia Maternal Grandfather      Heart disease Paternal Grandmother      Heart disease Paternal Grandfather      No Known Problems Sister         OBHx:   OB  History    Para Term  AB Living   2 1 0 1 0 1   SAB IAB Ectopic Multiple Live Births   0 0 0 0 1      # Outcome Date GA Lbr Shayan/2nd Weight Sex Type Anes PTL Lv   2 Current            1  10/06/23 36w0d  2.41 kg (5 lb 5 oz) M CS-LTranv Gen Y YAZ      Name: PATRICIA POPE      Apgar1: 5  Apgar5: 8       Objective:       /82   Wt 76.3 kg (168 lb 3.4 oz)   LMP 2024 (Exact Date)   BMI 31.78 kg/m²     Vitals:    25 0948   BP: 118/82   Weight: 76.3 kg (168 lb 3.4 oz)       General:   alert, appears stated age and cooperative, no apparent distress   HENT:  normocephalic, atraumatic   Eyes:  extraocular movements and conjunctivae normal   Neck:  supple, range of motion normal, no thyromegaly   Lungs:   no respiratory distress   Heart:   regular rate   Abdomen:  soft, non-tender, non-distended but gravid, no rebound or guarding   Extremities negative edema, negative erythema   FHT: To be performed upon admission                 TOCO: To be performed upon admission   Presentations: To be performed upon admission   Cervix: To be performed upon admission   Sterile Speculum Exam: N/A    EFW by Leopold's: N/A-polyhydraminos    Recent Growth Scan: 2025          36w 4d        2947g    41%     Lab Review  Blood Type A POS  GBBS: negative  Rubella: Immune  Treponemal Abs: NR  HIV: negative  HepB: negative  HepC: negative       Assessment:     IUP with EDC 2025 here for routine prenatal visit prior to elective TOLAC IOL    Plan:      Risks, benefits, alternatives and possible complications have been discussed in detail with the patient.   - Consents signed and to chart  - Admit to Labor and Delivery unit  - Neuraxial/regional per Anesthesia  - Draw CBC, T&S  - IOL plan per L&D team  - EFW N/A-polyhydraminos, unable to assess  - Maternal pelvis unproven  - MOC/MOF: interval IUD (scheduled)/breast    Polyhydraminos  - incidental finding  - Normal GTT  - notify pediatrics @  delivery    History of PreE  - serial BP checks  - No s/sx PreE  - continue ASA through delivery    History of Csx1  - in setting of failed IOL while on MgSO4  -  calculated success rate is 56%. Per ACOG guidelines, TOLAC should be recommended for calculated rates >60% and that these women have the same or less maternal morbidity than women who have an elective repeat CD. I discussed the R/B/A with patient including but not limited to uterine rupture (0.5-0.9%), need for emergency CD, hemorrhage and need for hysterectomy. We extensively discussed TOLAC vs scheduled repeat  vs trial of labor with need for repeat  and the risks associated with each. We discussed rates of  success have been showed to be higher if she goes into spontaneous labor on her own but that induction of labor is not contraindicated and would occur with cervical ripening balloon and pitocin if necessary. I discussed that I recommend neuroaxial anesthesia during labor in the event emergency CD is required.   After counseling the patient desires TOLAC but wants to proceed with repeat CS if PreE is diagnosed      Post-Partum Hemorrhage risk - medium         [1] (Not in a hospital admission)  [2]   Social History  Socioeconomic History    Marital status:    Tobacco Use    Smoking status: Never    Smokeless tobacco: Never    Tobacco comments:     mom smokes   Substance and Sexual Activity    Alcohol use: No    Drug use: No    Sexual activity: Yes     Partners: Male     Birth control/protection: None   Social History Narrative    Lives with mom, sister.    2 cats.    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     Social Drivers of Health     Financial Resource Strain: Low Risk  (2025)    Overall Financial Resource Strain (CARDIA)     Difficulty of Paying Living Expenses: Not hard at all   Food Insecurity: No Food Insecurity (2025)    Hunger Vital Sign     Worried About  Running Out of Food in the Last Year: Never true     Ran Out of Food in the Last Year: Never true   Transportation Needs: No Transportation Needs (6/9/2025)    PRAPARE - Transportation     Lack of Transportation (Medical): No     Lack of Transportation (Non-Medical): No   Physical Activity: Insufficiently Active (6/9/2025)    Exercise Vital Sign     Days of Exercise per Week: 1 day     Minutes of Exercise per Session: 30 min   Stress: No Stress Concern Present (6/9/2025)    [employer] [place] [employer] - Occupational Stress Questionnaire     Feeling of Stress : Not at all   Housing Stability: Low Risk  (6/9/2025)    Housing Stability Vital Sign     Unable to Pay for Housing in the Last Year: No     Number of Times Moved in the Last Year: 0     Homeless in the Last Year: No

## 2025-07-11 ENCOUNTER — HOSPITAL ENCOUNTER (OUTPATIENT)
Dept: PERINATAL CARE | Facility: OTHER | Age: 30
Discharge: HOME | End: 2025-07-11
Attending: OBSTETRICS & GYNECOLOGY
Payer: COMMERCIAL

## 2025-07-11 ENCOUNTER — PATIENT MESSAGE (OUTPATIENT)
Dept: OBSTETRICS AND GYNECOLOGY | Facility: CLINIC | Age: 30
End: 2025-07-11
Payer: COMMERCIAL

## 2025-07-11 DIAGNOSIS — K50.918 CROHN'S DISEASE WITH OTHER COMPLICATION, UNSPECIFIED GASTROINTESTINAL TRACT LOCATION: ICD-10-CM

## 2025-07-11 DIAGNOSIS — R20.8 SKIN PAIN: Primary | ICD-10-CM

## 2025-07-11 PROCEDURE — 76815 OB US LIMITED FETUS(S): CPT | Mod: 26,,, | Performed by: STUDENT IN AN ORGANIZED HEALTH CARE EDUCATION/TRAINING PROGRAM

## 2025-07-11 PROCEDURE — 59025 FETAL NON-STRESS TEST: CPT

## 2025-07-11 PROCEDURE — 59025 FETAL NON-STRESS TEST: CPT | Mod: 26,,, | Performed by: STUDENT IN AN ORGANIZED HEALTH CARE EDUCATION/TRAINING PROGRAM

## 2025-07-11 PROCEDURE — 76815 OB US LIMITED FETUS(S): CPT

## 2025-07-14 ENCOUNTER — HOSPITAL ENCOUNTER (OUTPATIENT)
Dept: PERINATAL CARE | Facility: OTHER | Age: 30
Discharge: HOME | End: 2025-07-14
Attending: OBSTETRICS & GYNECOLOGY
Payer: COMMERCIAL

## 2025-07-14 ENCOUNTER — ROUTINE PRENATAL (OUTPATIENT)
Dept: OBSTETRICS AND GYNECOLOGY | Facility: CLINIC | Age: 30
End: 2025-07-14
Payer: COMMERCIAL

## 2025-07-14 VITALS
SYSTOLIC BLOOD PRESSURE: 137 MMHG | BODY MASS INDEX: 32.37 KG/M2 | DIASTOLIC BLOOD PRESSURE: 97 MMHG | WEIGHT: 171.31 LBS

## 2025-07-14 DIAGNOSIS — O09.90 SUPERVISION OF HIGH RISK PREGNANCY, ANTEPARTUM: Primary | ICD-10-CM

## 2025-07-14 DIAGNOSIS — K50.918 CROHN'S DISEASE WITH OTHER COMPLICATION, UNSPECIFIED GASTROINTESTINAL TRACT LOCATION: ICD-10-CM

## 2025-07-14 PROCEDURE — 0502F SUBSEQUENT PRENATAL CARE: CPT | Mod: CPTII,S$GLB,, | Performed by: ADVANCED PRACTICE MIDWIFE

## 2025-07-14 PROCEDURE — 76815 OB US LIMITED FETUS(S): CPT

## 2025-07-14 PROCEDURE — 59025 FETAL NON-STRESS TEST: CPT

## 2025-07-14 PROCEDURE — 99999 PR PBB SHADOW E&M-EST. PATIENT-LVL III: CPT | Mod: PBBFAC,,, | Performed by: ADVANCED PRACTICE MIDWIFE

## 2025-07-14 PROCEDURE — 59025 FETAL NON-STRESS TEST: CPT | Mod: 26,,, | Performed by: STUDENT IN AN ORGANIZED HEALTH CARE EDUCATION/TRAINING PROGRAM

## 2025-07-14 PROCEDURE — 76815 OB US LIMITED FETUS(S): CPT | Mod: 26,,, | Performed by: STUDENT IN AN ORGANIZED HEALTH CARE EDUCATION/TRAINING PROGRAM

## 2025-07-14 NOTE — PROGRESS NOTES
"  Reason for visit: Routine Prenatal Visit      HPI:   30 y.o., at 37w3d by Estimated Date of Delivery: 25    Pt reports her "skin burning" across top of abdomen- get some relief with cold washcloth    - Contractions: denies  - Bleeding: denies  - Loss of fluid: denies  - Fetal movement: reports good Fm, reinforced BID  - Nausea: no  - Vomiting: no  - Headache: no      Reviewed:    Past medical, surgical, social, family, and obstetric history: Reviewed and updated in EMR.  Medications: Reviewed and updated in EMR.  Allergies: Latex, natural rubber and Adhesive    Pregnancy dating, labs, ultrasound reports, prenatal testing, and problem list: Reviewed and updated in EMR.  Outside records: na  Independent interpretation of tests: na  Discussion with another healthcare professional: na      Vitals: BP (!) 137/97   Wt 77.7 kg (171 lb 4.8 oz)   LMP 2024 (Exact Date)   BMI 32.37 kg/m²     Physical exam:  GENERAL: No acute distress  ABD: Gravid, no rash present      Assessment and Plan:    Supervision of high risk pregnancy, antepartum        Discussed polyhydramnios and possible stretching of skin/ collagen fibers contributing to sensation of burning. Advised continue with compresses, Tylenol PRN.     labor precautions given  Follow-up: 1 weeks      I spent a total of 20 minutes on the day of the visit. This includes face to face time and non-face to face time preparing to see the patient (eg, review of tests), Obtaining and/or reviewing separately obtained history, Documenting clinical information in the electronic or other health record, Independently interpreting results and communicating results to the patient/family/caregiver, or Care coordination.     "

## 2025-07-15 RX ORDER — LIDOCAINE 50 MG/G
1 PATCH TOPICAL DAILY
Qty: 10 PATCH | Refills: 0 | Status: SHIPPED | OUTPATIENT
Start: 2025-07-15 | End: 2026-07-15

## 2025-07-17 ENCOUNTER — HOSPITAL ENCOUNTER (OUTPATIENT)
Dept: PERINATAL CARE | Facility: OTHER | Age: 30
Discharge: HOME | End: 2025-07-17
Attending: OBSTETRICS & GYNECOLOGY
Payer: COMMERCIAL

## 2025-07-17 DIAGNOSIS — K50.918 CROHN'S DISEASE WITH OTHER COMPLICATION, UNSPECIFIED GASTROINTESTINAL TRACT LOCATION: ICD-10-CM

## 2025-07-17 PROCEDURE — 59025 FETAL NON-STRESS TEST: CPT | Mod: 26,,, | Performed by: STUDENT IN AN ORGANIZED HEALTH CARE EDUCATION/TRAINING PROGRAM

## 2025-07-17 PROCEDURE — 59025 FETAL NON-STRESS TEST: CPT

## 2025-07-17 PROCEDURE — 76815 OB US LIMITED FETUS(S): CPT | Mod: 26,,, | Performed by: STUDENT IN AN ORGANIZED HEALTH CARE EDUCATION/TRAINING PROGRAM

## 2025-07-17 PROCEDURE — 76815 OB US LIMITED FETUS(S): CPT

## 2025-07-21 ENCOUNTER — ROUTINE PRENATAL (OUTPATIENT)
Dept: OBSTETRICS AND GYNECOLOGY | Facility: CLINIC | Age: 30
End: 2025-07-21
Payer: COMMERCIAL

## 2025-07-21 ENCOUNTER — HOSPITAL ENCOUNTER (OUTPATIENT)
Dept: PERINATAL CARE | Facility: OTHER | Age: 30
Discharge: HOME | End: 2025-07-21
Attending: OBSTETRICS & GYNECOLOGY
Payer: COMMERCIAL

## 2025-07-21 VITALS — BODY MASS INDEX: 32.41 KG/M2 | WEIGHT: 171.5 LBS | SYSTOLIC BLOOD PRESSURE: 118 MMHG | DIASTOLIC BLOOD PRESSURE: 80 MMHG

## 2025-07-21 DIAGNOSIS — O40.9XX0 POLYHYDRAMNIOS AFFECTING PREGNANCY: ICD-10-CM

## 2025-07-21 DIAGNOSIS — K50.918 CROHN'S DISEASE WITH OTHER COMPLICATION, UNSPECIFIED GASTROINTESTINAL TRACT LOCATION: ICD-10-CM

## 2025-07-21 DIAGNOSIS — Z98.891 HISTORY OF CESAREAN DELIVERY: ICD-10-CM

## 2025-07-21 DIAGNOSIS — O09.90 SUPERVISION OF HIGH RISK PREGNANCY, ANTEPARTUM: Primary | ICD-10-CM

## 2025-07-21 PROCEDURE — 99999 PR PBB SHADOW E&M-EST. PATIENT-LVL III: CPT | Mod: PBBFAC,,, | Performed by: OBSTETRICS & GYNECOLOGY

## 2025-07-21 PROCEDURE — 76819 FETAL BIOPHYS PROFIL W/O NST: CPT | Mod: 26,,, | Performed by: OBSTETRICS & GYNECOLOGY

## 2025-07-21 PROCEDURE — 0502F SUBSEQUENT PRENATAL CARE: CPT | Mod: CPTII,S$GLB,, | Performed by: OBSTETRICS & GYNECOLOGY

## 2025-07-21 PROCEDURE — 76819 FETAL BIOPHYS PROFIL W/O NST: CPT

## 2025-07-21 NOTE — PROGRESS NOTES
Pregnancy dating, labs, ultrasound reports, prenatal testing, and problem list; prior records and results; and available outside records were reviewed and updated in EMR.  Pertinent findings were noted below.    Reason for Visit   Routine OB visit    HPI   30 y.o., at 38w2d by Estimated Date of Delivery: 25    Patient denies any symptoms of preE, including headaches, vision changes, RUQ pain, SOB or chest pain.     Contractions: No   Bleeding: No   Loss of fluid: No   Fetal movement: Yes   Nausea: No   Vomiting: No   Headache: No     Exam   /80   Wt 77.8 kg (171 lb 8.3 oz)   LMP 2024 (Exact Date)   BMI 32.41 kg/m²     TW#  GENERAL: No acute distress  ABD: Gravid    Assessment and Plan   Supervision of high risk pregnancy, antepartum    History of  delivery    Polyhydramnios affecting pregnancy    - desires TOLAC,  calc 56% > IOL scheduled 25. See counseling prior visit.   - Consents signed and H&P done  - MOC: considering paraguard     labor, Labor, Pain and bleeding, preE, and fetal movement count precautions given  Follow-up: for IOL

## 2025-07-25 ENCOUNTER — ANESTHESIA (OUTPATIENT)
Dept: OBSTETRICS AND GYNECOLOGY | Facility: OTHER | Age: 30
End: 2025-07-25
Payer: COMMERCIAL

## 2025-07-25 ENCOUNTER — HOSPITAL ENCOUNTER (INPATIENT)
Facility: OTHER | Age: 30
LOS: 4 days | Discharge: HOME | End: 2025-07-29
Attending: OBSTETRICS & GYNECOLOGY | Admitting: OBSTETRICS & GYNECOLOGY
Payer: COMMERCIAL

## 2025-07-25 ENCOUNTER — ANESTHESIA EVENT (OUTPATIENT)
Dept: OBSTETRICS AND GYNECOLOGY | Facility: OTHER | Age: 30
End: 2025-07-25
Payer: COMMERCIAL

## 2025-07-25 ENCOUNTER — PATIENT MESSAGE (OUTPATIENT)
Dept: OBSTETRICS AND GYNECOLOGY | Facility: OTHER | Age: 30
End: 2025-07-25

## 2025-07-25 DIAGNOSIS — Z98.891 STATUS POST REPEAT LOW TRANSVERSE CESAREAN SECTION: Primary | ICD-10-CM

## 2025-07-25 DIAGNOSIS — Z34.90 ENCOUNTER FOR INDUCTION OF LABOR: ICD-10-CM

## 2025-07-25 DIAGNOSIS — Z98.891 HX OF CESAREAN SECTION: ICD-10-CM

## 2025-07-25 DIAGNOSIS — R00.0 TACHYCARDIA: ICD-10-CM

## 2025-07-25 DIAGNOSIS — O09.90 SUPERVISION OF HIGH RISK PREGNANCY, ANTEPARTUM: ICD-10-CM

## 2025-07-25 PROBLEM — Z87.59 HISTORY OF PRE-ECLAMPSIA: Status: ACTIVE | Noted: 2025-07-25

## 2025-07-25 PROBLEM — O40.3XX0 POLYHYDRAMNIOS IN THIRD TRIMESTER: Status: ACTIVE | Noted: 2025-07-25

## 2025-07-25 PROBLEM — N91.2 AMENORRHEA: Status: RESOLVED | Noted: 2023-03-15 | Resolved: 2025-07-25

## 2025-07-25 PROBLEM — D64.9 ANEMIA: Status: RESOLVED | Noted: 2023-10-09 | Resolved: 2025-07-25

## 2025-07-25 LAB
ABSOLUTE EOSINOPHIL (OHS): 0.12 K/UL
ABSOLUTE MONOCYTE (OHS): 0.71 K/UL (ref 0.3–1)
ABSOLUTE NEUTROPHIL COUNT (OHS): 5.24 K/UL (ref 1.8–7.7)
ALBUMIN SERPL BCP-MCNC: 3.1 G/DL (ref 3.5–5.2)
ALP SERPL-CCNC: 150 UNIT/L (ref 40–150)
ALT SERPL W/O P-5'-P-CCNC: 10 UNIT/L (ref 10–44)
ANION GAP (OHS): 12 MMOL/L (ref 8–16)
AST SERPL-CCNC: 27 UNIT/L (ref 11–45)
BASOPHILS # BLD AUTO: 0.02 K/UL
BASOPHILS NFR BLD AUTO: 0.3 %
BILIRUB SERPL-MCNC: 0.3 MG/DL (ref 0.1–1)
BUN SERPL-MCNC: 3 MG/DL (ref 6–20)
CALCIUM SERPL-MCNC: 8.8 MG/DL (ref 8.7–10.5)
CHLORIDE SERPL-SCNC: 106 MMOL/L (ref 95–110)
CO2 SERPL-SCNC: 17 MMOL/L (ref 23–29)
CREAT SERPL-MCNC: 0.6 MG/DL (ref 0.5–1.4)
CREAT UR-MCNC: 61.9 MG/DL (ref 15–325)
ERYTHROCYTE [DISTWIDTH] IN BLOOD BY AUTOMATED COUNT: 14.6 % (ref 11.5–14.5)
GFR SERPLBLD CREATININE-BSD FMLA CKD-EPI: >60 ML/MIN/1.73/M2
GLUCOSE SERPL-MCNC: 97 MG/DL (ref 70–110)
GROUP & RH: NORMAL
HCT VFR BLD AUTO: 36.1 % (ref 37–48.5)
HGB BLD-MCNC: 11.5 GM/DL (ref 12–16)
IMM GRANULOCYTES # BLD AUTO: 0.05 K/UL (ref 0–0.04)
IMM GRANULOCYTES NFR BLD AUTO: 0.6 % (ref 0–0.5)
INDIRECT COOMBS: NORMAL
LYMPHOCYTES # BLD AUTO: 1.59 K/UL (ref 1–4.8)
MCH RBC QN AUTO: 24.6 PG (ref 27–31)
MCHC RBC AUTO-ENTMCNC: 31.9 G/DL (ref 32–36)
MCV RBC AUTO: 77 FL (ref 82–98)
NUCLEATED RBC (/100WBC) (OHS): 0 /100 WBC
PLATELET # BLD AUTO: 234 K/UL (ref 150–450)
PMV BLD AUTO: 12.2 FL (ref 9.2–12.9)
POTASSIUM SERPL-SCNC: 3.5 MMOL/L (ref 3.5–5.1)
PROT SERPL-MCNC: 6.9 GM/DL (ref 6–8.4)
PROT UR-MCNC: 8 MG/DL
PROT/CREAT UR: 0.13 MG/G{CREAT}
RBC # BLD AUTO: 4.68 M/UL (ref 4–5.4)
RELATIVE EOSINOPHIL (OHS): 1.6 %
RELATIVE LYMPHOCYTE (OHS): 20.6 % (ref 18–48)
RELATIVE MONOCYTE (OHS): 9.2 % (ref 4–15)
RELATIVE NEUTROPHIL (OHS): 67.7 % (ref 38–73)
SODIUM SERPL-SCNC: 135 MMOL/L (ref 136–145)
T PALLIDUM IGG+IGM SER QL: NORMAL
WBC # BLD AUTO: 7.73 K/UL (ref 3.9–12.7)

## 2025-07-25 PROCEDURE — 62326 NJX INTERLAMINAR LMBR/SAC: CPT | Performed by: ANESTHESIOLOGY

## 2025-07-25 PROCEDURE — 80053 COMPREHEN METABOLIC PANEL: CPT

## 2025-07-25 PROCEDURE — 25000003 PHARM REV CODE 250

## 2025-07-25 PROCEDURE — 62326 NJX INTERLAMINAR LMBR/SAC: CPT

## 2025-07-25 PROCEDURE — 63600175 PHARM REV CODE 636 W HCPCS

## 2025-07-25 PROCEDURE — 27200710 HC EPIDURAL INFUSION PUMP SET: Performed by: ANESTHESIOLOGY

## 2025-07-25 PROCEDURE — 82570 ASSAY OF URINE CREATININE: CPT | Performed by: OBSTETRICS & GYNECOLOGY

## 2025-07-25 PROCEDURE — 59200 INSERT CERVICAL DILATOR: CPT

## 2025-07-25 PROCEDURE — 11000001 HC ACUTE MED/SURG PRIVATE ROOM

## 2025-07-25 PROCEDURE — 85025 COMPLETE CBC W/AUTO DIFF WBC: CPT

## 2025-07-25 PROCEDURE — 51702 INSERT TEMP BLADDER CATH: CPT

## 2025-07-25 PROCEDURE — C1751 CATH, INF, PER/CENT/MIDLINE: HCPCS | Performed by: ANESTHESIOLOGY

## 2025-07-25 PROCEDURE — 25000003 PHARM REV CODE 250: Performed by: STUDENT IN AN ORGANIZED HEALTH CARE EDUCATION/TRAINING PROGRAM

## 2025-07-25 PROCEDURE — 86901 BLOOD TYPING SEROLOGIC RH(D): CPT | Performed by: OBSTETRICS & GYNECOLOGY

## 2025-07-25 PROCEDURE — 86593 SYPHILIS TEST NON-TREP QUANT: CPT

## 2025-07-25 RX ORDER — TRANEXAMIC ACID 10 MG/ML
1000 INJECTION, SOLUTION INTRAVENOUS EVERY 30 MIN PRN
Status: DISCONTINUED | OUTPATIENT
Start: 2025-07-25 | End: 2025-07-26

## 2025-07-25 RX ORDER — SODIUM CHLORIDE 9 MG/ML
INJECTION, SOLUTION INTRAVENOUS
Status: DISCONTINUED | OUTPATIENT
Start: 2025-07-25 | End: 2025-07-26

## 2025-07-25 RX ORDER — DIPHENOXYLATE HYDROCHLORIDE AND ATROPINE SULFATE 2.5; .025 MG/1; MG/1
2 TABLET ORAL EVERY 6 HOURS PRN
Status: DISCONTINUED | OUTPATIENT
Start: 2025-07-25 | End: 2025-07-26

## 2025-07-25 RX ORDER — CALCIUM CARBONATE 200(500)MG
500 TABLET,CHEWABLE ORAL 3 TIMES DAILY PRN
Status: DISCONTINUED | OUTPATIENT
Start: 2025-07-25 | End: 2025-07-26

## 2025-07-25 RX ORDER — FENTANYL/BUPIVACAINE/NS/PF 2MCG/ML-.1
PLASTIC BAG, INJECTION (ML) INJECTION
Status: COMPLETED
Start: 2025-07-25 | End: 2025-07-26

## 2025-07-25 RX ORDER — SODIUM CHLORIDE, SODIUM LACTATE, POTASSIUM CHLORIDE, CALCIUM CHLORIDE 600; 310; 30; 20 MG/100ML; MG/100ML; MG/100ML; MG/100ML
INJECTION, SOLUTION INTRAVENOUS CONTINUOUS
Status: DISCONTINUED | OUTPATIENT
Start: 2025-07-25 | End: 2025-07-26

## 2025-07-25 RX ORDER — MISOPROSTOL 200 UG/1
800 TABLET ORAL ONCE AS NEEDED
Status: DISCONTINUED | OUTPATIENT
Start: 2025-07-25 | End: 2025-07-26

## 2025-07-25 RX ORDER — OXYTOCIN-SODIUM CHLORIDE 0.9% IV SOLN 30 UNIT/500ML 30-0.9/5 UT/ML-%
10 SOLUTION INTRAVENOUS ONCE AS NEEDED
Status: DISCONTINUED | OUTPATIENT
Start: 2025-07-25 | End: 2025-07-26

## 2025-07-25 RX ORDER — CARBOPROST TROMETHAMINE 250 UG/ML
250 INJECTION, SOLUTION INTRAMUSCULAR
Status: DISCONTINUED | OUTPATIENT
Start: 2025-07-25 | End: 2025-07-26

## 2025-07-25 RX ORDER — CEFAZOLIN 2 G/1
2 INJECTION, POWDER, FOR SOLUTION INTRAMUSCULAR; INTRAVENOUS ONCE AS NEEDED
Status: COMPLETED | OUTPATIENT
Start: 2025-07-25 | End: 2025-07-26

## 2025-07-25 RX ORDER — OXYTOCIN 10 [USP'U]/ML
10 INJECTION, SOLUTION INTRAMUSCULAR; INTRAVENOUS
Status: DISCONTINUED | OUTPATIENT
Start: 2025-07-25 | End: 2025-07-26

## 2025-07-25 RX ORDER — ONDANSETRON 8 MG/1
8 TABLET, ORALLY DISINTEGRATING ORAL EVERY 8 HOURS PRN
Status: DISCONTINUED | OUTPATIENT
Start: 2025-07-25 | End: 2025-07-26

## 2025-07-25 RX ORDER — OXYTOCIN-SODIUM CHLORIDE 0.9% IV SOLN 30 UNIT/500ML 30-0.9/5 UT/ML-%
95 SOLUTION INTRAVENOUS CONTINUOUS PRN
Status: DISCONTINUED | OUTPATIENT
Start: 2025-07-25 | End: 2025-07-26

## 2025-07-25 RX ORDER — OXYTOCIN-SODIUM CHLORIDE 0.9% IV SOLN 30 UNIT/500ML 30-0.9/5 UT/ML-%
0-32 SOLUTION INTRAVENOUS CONTINUOUS
Status: DISCONTINUED | OUTPATIENT
Start: 2025-07-25 | End: 2025-07-26

## 2025-07-25 RX ORDER — ONDANSETRON HYDROCHLORIDE 2 MG/ML
4 INJECTION, SOLUTION INTRAVENOUS ONCE
Status: COMPLETED | OUTPATIENT
Start: 2025-07-25 | End: 2025-07-26

## 2025-07-25 RX ORDER — OXYTOCIN-SODIUM CHLORIDE 0.9% IV SOLN 30 UNIT/500ML 30-0.9/5 UT/ML-%
95 SOLUTION INTRAVENOUS ONCE AS NEEDED
Status: COMPLETED | OUTPATIENT
Start: 2025-07-25 | End: 2025-07-26

## 2025-07-25 RX ORDER — FENTANYL/BUPIVACAINE/NS/PF 2MCG/ML-.1
PLASTIC BAG, INJECTION (ML) INJECTION
Status: DISPENSED
Start: 2025-07-25 | End: 2025-07-25

## 2025-07-25 RX ORDER — FAMOTIDINE 10 MG/ML
20 INJECTION, SOLUTION INTRAVENOUS ONCE
Status: COMPLETED | OUTPATIENT
Start: 2025-07-25 | End: 2025-07-26

## 2025-07-25 RX ORDER — OXYTOCIN 10 [USP'U]/ML
10 INJECTION, SOLUTION INTRAMUSCULAR; INTRAVENOUS ONCE AS NEEDED
Status: DISCONTINUED | OUTPATIENT
Start: 2025-07-25 | End: 2025-07-26

## 2025-07-25 RX ORDER — OXYTOCIN-SODIUM CHLORIDE 0.9% IV SOLN 30 UNIT/500ML 30-0.9/5 UT/ML-%
10 SOLUTION INTRAVENOUS ONCE AS NEEDED
Status: COMPLETED | OUTPATIENT
Start: 2025-07-25 | End: 2025-07-26

## 2025-07-25 RX ORDER — LIDOCAINE HYDROCHLORIDE 10 MG/ML
10 INJECTION, SOLUTION INFILTRATION; PERINEURAL ONCE AS NEEDED
Status: DISCONTINUED | OUTPATIENT
Start: 2025-07-25 | End: 2025-07-26

## 2025-07-25 RX ORDER — METHYLERGONOVINE MALEATE 0.2 MG/ML
200 INJECTION INTRAVENOUS ONCE AS NEEDED
Status: DISCONTINUED | OUTPATIENT
Start: 2025-07-25 | End: 2025-07-26

## 2025-07-25 RX ORDER — ACETAMINOPHEN 325 MG/1
650 TABLET ORAL EVERY 6 HOURS PRN
Status: DISCONTINUED | OUTPATIENT
Start: 2025-07-25 | End: 2025-07-26

## 2025-07-25 RX ORDER — SIMETHICONE 80 MG
1 TABLET,CHEWABLE ORAL 4 TIMES DAILY PRN
Status: DISCONTINUED | OUTPATIENT
Start: 2025-07-25 | End: 2025-07-26

## 2025-07-25 RX ADMIN — SODIUM CHLORIDE, POTASSIUM CHLORIDE, SODIUM LACTATE AND CALCIUM CHLORIDE 1000 ML: 600; 310; 30; 20 INJECTION, SOLUTION INTRAVENOUS at 05:07

## 2025-07-25 RX ADMIN — SODIUM CHLORIDE, POTASSIUM CHLORIDE, SODIUM LACTATE AND CALCIUM CHLORIDE: 600; 310; 30; 20 INJECTION, SOLUTION INTRAVENOUS at 04:07

## 2025-07-25 RX ADMIN — SODIUM CHLORIDE, POTASSIUM CHLORIDE, SODIUM LACTATE AND CALCIUM CHLORIDE: 600; 310; 30; 20 INJECTION, SOLUTION INTRAVENOUS at 05:07

## 2025-07-25 RX ADMIN — FAMOTIDINE 20 MG: 10 INJECTION, SOLUTION INTRAVENOUS at 11:07

## 2025-07-25 RX ADMIN — FENTANYL CITRATE 8 ML/HR: 50 INJECTION INTRAMUSCULAR; INTRAVENOUS at 05:07

## 2025-07-25 RX ADMIN — Medication 2 MILLI-UNITS/MIN: at 06:07

## 2025-07-25 RX ADMIN — CALCIUM CARBONATE (ANTACID) CHEW TAB 500 MG 500 MG: 500 CHEW TAB at 07:07

## 2025-07-25 RX ADMIN — CALCIUM CARBONATE (ANTACID) CHEW TAB 500 MG 500 MG: 500 CHEW TAB at 10:07

## 2025-07-25 RX ADMIN — SODIUM CHLORIDE, POTASSIUM CHLORIDE, SODIUM LACTATE AND CALCIUM CHLORIDE 500 ML: 600; 310; 30; 20 INJECTION, SOLUTION INTRAVENOUS at 11:07

## 2025-07-25 NOTE — PLAN OF CARE
Problem: Adult Inpatient Plan of Care  Goal: Plan of Care Review  Outcome: Progressing     Problem: Adult Inpatient Plan of Care  Goal: Optimal Comfort and Wellbeing  Outcome: Progressing     Problem: Infection  Goal: Absence of Infection Signs and Symptoms  Outcome: Progressing     Problem: Labor  Goal: Stable Fetal Wellbeing  Outcome: Progressing     Problem: Labor  Goal: Effective Progression to Delivery  Outcome: Progressing     Problem: Labor  Goal: Absence of Infection Signs and Symptoms  Outcome: Progressing     Problem: Labor  Goal: Acceptable Pain Control  Outcome: Progressing     Problem: Labor  Goal: Hemostasis  Outcome: Progressing     Problem: Anesthesia/Analgesia, Neuraxial  Goal: Effective Pain Control  Outcome: Progressing     Problem: Skin Injury Risk Increased  Goal: Skin Health and Integrity  Outcome: Progressing  - position changed every 2 hrs.

## 2025-07-25 NOTE — PROGRESS NOTES
LABOR NOTE    S:  Complaints: No.  Epidural working:  yes  Resident to bedside for routine cervical exam    O: /81   Pulse 103   Temp 98.6 °F (37 °C) (Oral)   Resp 16   LMP 2024 (Exact Date)   SpO2 98%   Breastfeeding No     FHT: Cat 1 (reassuring), baseline 130, mod yasmany, + accels, - decels  CTX: Q2 minutes, pit @ 4  SVE: 2/60/-3    TIMELINE:  0700: 50/-3, IUPC placed, (LD) pit @ 2  1015: 2/60/-3, pit @ 4     A/P: 30 y.o.  at 39w0d, admitted for IOL    Labor management:  - Continue Close Maternal/Fetal Monitoring  - LD Pitocin Augmentation per protocol  - Recheck 2-4 hours or PRN    Khadar Villalpando MD  Obstetrics and Gynecology, PGY-1

## 2025-07-25 NOTE — ANESTHESIA PROCEDURE NOTES
Epidural    Patient location during procedure: OB   Reason for block: primary anesthetic   Reason for block: labor analgesia requested by patient and obstetrician  Diagnosis: IUP   Start time: 7/25/2025 5:22 AM  Timeout: 7/25/2025 5:22 AM  End time: 7/25/2025 5:35 AM  Surgery related to: Vaginal Delivery    Staffing  Performing Provider: Klever Nichols DO  Authorizing Provider: Cindy Mckenzie MD    Staffing  Performed by: Klever Nichols DO  Authorized by: Cindy Mckenzie MD        Preanesthetic Checklist  Completed: patient identified, IV checked, site marked, risks and benefits discussed, surgical consent, monitors and equipment checked, pre-op evaluation, timeout performed, anesthesia consent given, hand hygiene performed and patient being monitored  Preparation  Patient position: sitting  Prep: ChloraPrep  Patient monitoring: Pulse Ox and Blood Pressure  Reason for block: primary anesthetic   Epidural  Skin Anesthetic: lidocaine 1%  Skin Wheal: 3 mL  Administration type: continuous  Approach: midline  Interspace: L3-4    Injection technique: KENNY air  Needle and Epidural Catheter  Needle type: Tuohy   Needle gauge: 17  Needle length: 3.5 inches  Needle insertion depth: 5.5 cm  Catheter type: [employer]  Catheter size: 19 G  Catheter at skin depth: 10 cm  Insertion Attempts: 1  Test dose: 3 mL of lidocaine 1.5% with Epi 1-to-200,000  Additional Documentation: incremental injection, negative aspiration for heme and CSF, no paresthesia on injection, no signs/symptoms of IV or SA injection, no significant pain on injection and no significant complaints from patient  Needle localization: anatomical landmarks  Medications:  Volume per aspiration: 5 mL  Time between aspirations: 5 minutes   Assessment  Ease of block: easy  Patient's tolerance of the procedure: comfortable throughout block and no complaints No inadvertent dural puncture with Tuohy.  Dural puncture performed with spinal needle.

## 2025-07-25 NOTE — PROGRESS NOTES
LABOR NOTE    Resident and upper level to bedside for camejo balloon placement.    S:  Complaints: No.  Epidural working:  yes    O: BP (!) 131/90   Pulse 108   Temp 98.4 °F (36.9 °C)   Resp 17   LMP 2024 (Exact Date)   SpO2 97%   Breastfeeding No     FHT: Cat 1 (reassuring), baseline 135, mod yasmany, + accels, - decels  CTX: irregular  SVE: /-3    TIMELINE:  700: /-3, IUPC placed, (LD) pit @ 2    A/P: 30 y.o.  at 39w0d, admitted for IOL    Labor management:  - Continue Close Maternal/Fetal Monitoring  - LD Pitocin Augmentation per protocol  - Recheck 2-4 hours or PRN        Sylvia Lyons MD   OBGYN, PGY-1

## 2025-07-25 NOTE — PROGRESS NOTES
LABOR NOTE    S:  Complaints: No.  Epidural working:  yes  Resident to bedside for routine cervical exam    O: /63   Pulse 105   Temp 98 °F (36.7 °C) (Oral)   Resp 16   LMP 2024 (Exact Date)   SpO2 98%   Breastfeeding No     FHT: Cat 1 (reassuring), baseline 120bpm, moderate variability, + accels, - decels  CTX: Q2-4 minutes, pit @ 14  SVE: 70/-3; too high to AROM at this time.     TIMELINE:  0700: 50/-3, (LD) pit @ 2  1015: 60/-3, pit @ 4   1245: 3/60/-3  1730: 70/-3    A/P: 30 y.o.  at 39w0d, admitted for IOL    Labor management:  - Continue Close Maternal/Fetal Monitoring  - LD Pitocin Augmentation per protocol  - Given cervical change, okay to continue IOL. Will recheck in 4 hours and possible AROM at this time  - Recheck 2-4 hours or PRN    Brynn Youssef MD  Obstetrics and Gynecology - PGY3

## 2025-07-25 NOTE — PROGRESS NOTES
Denies complaints.  Vitals:    07/25/25 0742   BP:    Pulse: 103   Resp:    Temp:      FHT: cat 1.  Change in baseline to 110s and pitocin stopped.  + acels  CTX: every 1-2 min  SVE:  2/60/-3 ballotable    Lab Results   Component Value Date    WBC 7.73 07/25/2025    HGB 11.5 (L) 07/25/2025    HCT 36.1 (L) 07/25/2025    MCV 77 (L) 07/25/2025     07/25/2025       CMP  Sodium   Date Value Ref Range Status   07/25/2025 135 (L) 136 - 145 mmol/L Final   12/20/2024 137 136 - 145 mmol/L Final     Potassium   Date Value Ref Range Status   07/25/2025 3.5 3.5 - 5.1 mmol/L Final   12/20/2024 3.9 3.5 - 5.1 mmol/L Final     Chloride   Date Value Ref Range Status   07/25/2025 106 95 - 110 mmol/L Final   12/20/2024 106 95 - 110 mmol/L Final     CO2   Date Value Ref Range Status   07/25/2025 17 (L) 23 - 29 mmol/L Final   12/20/2024 22 (L) 23 - 29 mmol/L Final     Glucose   Date Value Ref Range Status   07/25/2025 97 70 - 110 mg/dL Final   12/20/2024 87 70 - 110 mg/dL Final     BUN   Date Value Ref Range Status   07/25/2025 3 (L) 6 - 20 mg/dL Final     Creatinine   Date Value Ref Range Status   07/25/2025 0.6 0.5 - 1.4 mg/dL Final     Calcium   Date Value Ref Range Status   07/25/2025 8.8 8.7 - 10.5 mg/dL Final   12/20/2024 9.8 8.7 - 10.5 mg/dL Final     Protein Total   Date Value Ref Range Status   07/25/2025 6.9 6.0 - 8.4 gm/dL Final     Total Protein   Date Value Ref Range Status   12/20/2024 7.6 6.0 - 8.4 g/dL Final     Albumin   Date Value Ref Range Status   07/25/2025 3.1 (L) 3.5 - 5.2 g/dL Final   12/20/2024 3.9 3.5 - 5.2 g/dL Final     Total Bilirubin   Date Value Ref Range Status   12/20/2024 0.1 0.1 - 1.0 mg/dL Final     Comment:     For infants and newborns, interpretation of results should be based  on gestational age, weight and in agreement with clinical  observations.    Premature Infant recommended reference ranges:  Up to 24 hours.............<8.0 mg/dL  Up to 48 hours............<12.0 mg/dL  3-5  days..................<15.0 mg/dL  6-29 days.................<15.0 mg/dL       Bilirubin Total   Date Value Ref Range Status   07/25/2025 0.3 0.1 - 1.0 mg/dL Final     Comment:     For infants and newborns, interpretation of results should be based   on gestational age, weight and in agreement with clinical   observations.    Premature Infant recommended reference ranges:   0-24 hours:  <8.0 mg/dL   24-48 hours: <12.0 mg/dL   3-5 days:    <15.0 mg/dL   6-29 days:   <15.0 mg/dL     Alkaline Phosphatase   Date Value Ref Range Status   12/20/2024 69 40 - 150 U/L Final     ALP   Date Value Ref Range Status   07/25/2025 150 40 - 150 unit/L Final     AST   Date Value Ref Range Status   07/25/2025 27 11 - 45 unit/L Final   12/20/2024 15 10 - 40 U/L Final     ALT   Date Value Ref Range Status   07/25/2025 10 10 - 44 unit/L Final   12/20/2024 16 10 - 44 U/L Final     Anion Gap   Date Value Ref Range Status   07/25/2025 12 8 - 16 mmol/L Final     eGFR   Date Value Ref Range Status   07/25/2025 >60 >60 mL/min/1.73/m2 Final     Comment:     Estimated GFR calculated using the CKD-EPI creatinine (2021) equation.   12/20/2024 >60 >60 mL/min/1.73 m^2 Final     A/P: tracing stable  GHTN - normal Pre-e labs and some mild elevated BP  Continue induction.

## 2025-07-25 NOTE — INTERVAL H&P NOTE
The patient has been examined and the H&P has been reviewed:    I concur with the findings and no changes have occurred since H&P was written.    Nina Durant is 30 y.o.  at 39w0d wga presenting for IOL.     Temp:  [98.4 °F (36.9 °C)] 98.4 °F (36.9 °C)  Pulse:  [106-107] 107  Resp:  [17] 17  SpO2:  [98 %-99 %] 99 %  BP: (121-127)/(80-89) 121/80    FHT: 130 bpm, moderate variability, +accels, -decels; Cat 1 (reassuring)  [place]: not anjana   Presentation: cephalic by ultrasound    SVE: 0.5/50/-3    1) IOL/TOLAC  - Plan for camejo balloon placement following epidural and cytotec     2) Polyhydramnios   - Notify peds at delivery     3) h/o pre-E   - BP: (121-127)/(80-89) 121/80   - CBC, CMP, and UPCR pending     4) Crohn's    - On Stelara     Contraception: int Funmi Lyons MD   OBGYN, PGY-1      Active Hospital Problems    Diagnosis  POA    *Encounter for induction of labor [Z34.90]  Not Applicable    History of  section (failed IOL on MgSO4) [Z98.891]  Not Applicable    History of pre-eclampsia [Z87.59]  Not Applicable    Polyhydramnios in third trimester [O40.3XX0]  Yes    Crohn's disease of small intestine with intestinal obstruction [K50.012]  Yes      Resolved Hospital Problems   No resolved problems to display.

## 2025-07-25 NOTE — ANESTHESIA PREPROCEDURE EVALUATION
[facility]  Anesthesia Pre-Operative Evaluation         Patient Name: Nina Durant  YOB: 1995  MRN: [id number]    2025      Nina Durant is a 30 y.o. female  @ 39w0d who presents IOL. C/b hx of gHTN, anemia, prior C section.  calculator is 56%. No hx of bleeding disorders, clotting disorders, back problems, blood thinner use, HTN, or asthma.       OB History    Para Term  AB Living   2 1 0 1 0 1   SAB IAB Ectopic Multiple Live Births   0 0 0 0 1      # Outcome Date GA Lbr Shayan/2nd Weight Sex Type Anes PTL Lv   2 Current            1  10/06/23 36w0d  2.41 kg (5 lb 5 oz) M CS-LTranv Gen Y YAZ       Review of patient's allergies indicates:   Allergen Reactions    Latex, natural rubber      Mother states allergic to latex gloves     Adhesive Rash     Band-aids       Wt Readings from Last 1 Encounters:   25 1057 77.8 kg (171 lb 8.3 oz)       BP Readings from Last 3 Encounters:   25 118/80   25 (!) 137/97   25 118/82       Problem List[1]    Past Surgical History:   Procedure Laterality Date     SECTION N/A 10/06/2023    Procedure:  SECTION;  Surgeon: Pina Figueroa MD;  Location: [facility] L&D;  Service: OB/GYN;  Laterality: N/A;     SECTION  10/6/23    COLONOSCOPY N/A 12/10/2015    Procedure: COLONOSCOPY;  Surgeon: Tala Gonzalez MD;  Location: [address]);  Service: Endoscopy;  Laterality: N/A;    COLONOSCOPY N/A 2016    Procedure: COLONOSCOPY;  Surgeon: Tala Gonzalez MD;  Location: [facility] ([address] [place] [address]);  Service: Endoscopy;  Laterality: N/A;    COLONOSCOPY N/A 2021    Procedure: COLONOSCOPY;  Surgeon: True Treadwell MD;  Location: [facility];  Service: General;  Laterality: N/A;    COLONOSCOPY N/A 2021    Procedure: COLONOSCOPY;  Surgeon: Talib Rutledge MD;  Location: [facility] ([address] [place] [address]);  Service: Endoscopy;  Laterality: N/A;   Covid-19 test 7/6/21 at [facility]    COLONOSCOPY N/A 09/27/2022    Procedure: COLONOSCOPY;  Surgeon: Talib Rutledge MD;  Location: [facility] (4TH FLR);  Service: Endoscopy;  Laterality: N/A;  golytely    COLONOSCOPY W/ BIOPSIES  01/21/2021    ESOPHAGOGASTRODUODENOSCOPY      ESOPHAGOGASTRODUODENOSCOPY N/A 07/08/2021    Procedure: EGD (ESOPHAGOGASTRODUODENOSCOPY);  Surgeon: Talib Rutledge MD;  Location: [facility] (4TH FLR);  Service: Endoscopy;  Laterality: N/A;  Covid-19 test 7/6/21 at [facility]  7/6 returned pt's call- lvm with arrival time-rb  patient aware she moved up 30 minutes from 930am to 900am, patient aware to be here at 8am 7/8/21 -     ESOPHAGOGASTRODUODENOSCOPY N/A 09/27/2022    Procedure: ESOPHAGOGASTRODUODENOSCOPY (EGD);  Surgeon: Talib Rutledge MD;  Location: [facility] ([address] [place] [address]);  Service: Endoscopy;  Laterality: N/A;  NOT Vaccinated.Covid test on 9/24 at [facility], instructions sent to [id number]  Clear liquids up to 4 hrs prior/ AM prep [id number]    TONSILLECTOMY      TYMPANOSTOMY TUBE PLACEMENT         Social History[2]      Chemistry        Component Value Date/Time     04/29/2025 1125     12/20/2024 1239    K 3.8 04/29/2025 1125    K 3.9 12/20/2024 1239     04/29/2025 1125     12/20/2024 1239    CO2 23 04/29/2025 1125    CO2 22 (L) 12/20/2024 1239    BUN 5 (L) 04/29/2025 1125    CREATININE 0.7 04/29/2025 1125     (H) 04/29/2025 1125    GLU 87 12/20/2024 1239        Component Value Date/Time    CALCIUM 8.7 04/29/2025 1125    CALCIUM 9.8 12/20/2024 1239    ALKPHOS 66 04/29/2025 1125    ALKPHOS 69 12/20/2024 1239    AST 13 04/29/2025 1125    AST 15 12/20/2024 1239    ALT 11 04/29/2025 1125    ALT 16 12/20/2024 1239    BILITOT 0.2 04/29/2025 1125    BILITOT 0.1 12/20/2024 1239    ESTGFRAFRICA >60.0 06/13/2022 1300    EGFRNONAA >60.0 06/13/2022 1300            Lab Results   Component Value Date    WBC 6.05 06/10/2025    HGB 11.5 (L)  "06/10/2025    HCT 36.1 (L) 06/10/2025    MCV 82 06/10/2025     06/10/2025       No results for input(s): "PT", "INR", "PROTIME", "APTT" in the last 72 hours.        Pre-op Assessment    I have reviewed the Patient Summary Reports.     I have reviewed the Nursing Notes. I have reviewed the NPO Status.   I have reviewed the Medications.     Review of Systems  Anesthesia Hx:             Denies Family Hx of Anesthesia complications.    Denies Personal Hx of Anesthesia complications.                    Cardiovascular:     Hypertension                                              Physical Exam  General: Well nourished, Cooperative, Alert and Oriented    Airway:  Mallampati: II   Tongue: Normal    Dental:  Periodontal disease        Anesthesia Plan  Type of Anesthesia, risks & benefits discussed:    Anesthesia Type: Gen ETT, Epidural, Spinal, CSE  Intra-op Monitoring Plan: Standard ASA Monitors  Post Op Pain Control Plan: multimodal analgesia and IV/PO Opioids PRN  Induction:  IV  Airway Plan: Direct and Video  Informed Consent: Informed consent signed with the Patient and all parties understand the risks and agree with anesthesia plan.  All questions answered. Patient consented to blood products? Yes  ASA Score: 2  Day of Surgery Review of History & Physical: H&P Update referred to the surgeon/provider.    Ready For Surgery From Anesthesia Perspective.     .           [1]   Patient Active Problem List  Diagnosis    Heartburn    Crohn's disease of small intestine with intestinal obstruction    Iron deficiency anemia due to chronic blood loss    Vitamin D deficiency    Amenorrhea    Gestational hypertension, antepartum    Status post  delivery    PPH (postpartum hemorrhage)    Anemia   [2]   Social History  Socioeconomic History    Marital status:    Tobacco Use    Smoking status: Never    Smokeless tobacco: Never    Tobacco comments:     mom smokes   Substance and Sexual Activity    Alcohol use: No    " Drug use: No    Sexual activity: Yes     Partners: Male     Birth control/protection: None   Social History Narrative    Lives with mom, sister.    2 cats.    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     Social Drivers of Health     Financial Resource Strain: Low Risk  (6/9/2025)    Overall Financial Resource Strain (CARDIA)     Difficulty of Paying Living Expenses: Not hard at all   Food Insecurity: No Food Insecurity (6/9/2025)    Hunger Vital Sign     Worried About Running Out of Food in the Last Year: Never true     Ran Out of Food in the Last Year: Never true   Transportation Needs: No Transportation Needs (6/9/2025)    PRAPARE - Transportation     Lack of Transportation (Medical): No     Lack of Transportation (Non-Medical): No   Physical Activity: Insufficiently Active (6/9/2025)    Exercise Vital Sign     Days of Exercise per Week: 1 day     Minutes of Exercise per Session: 30 min   Stress: No Stress Concern Present (6/9/2025)    [employer] - Occupational Stress Questionnaire     Feeling of Stress : Not at all   Housing Stability: Low Risk  (6/9/2025)    Housing Stability Vital Sign     Unable to Pay for Housing in the Last Year: No     Number of Times Moved in the Last Year: 0     Homeless in the Last Year: No

## 2025-07-26 PROBLEM — Z34.90 ENCOUNTER FOR INDUCTION OF LABOR: Status: RESOLVED | Noted: 2025-07-25 | Resolved: 2025-07-26

## 2025-07-26 PROBLEM — Z98.891 STATUS POST REPEAT LOW TRANSVERSE CESAREAN SECTION: Status: ACTIVE | Noted: 2025-07-26

## 2025-07-26 PROBLEM — O40.3XX0 POLYHYDRAMNIOS IN THIRD TRIMESTER: Status: RESOLVED | Noted: 2025-07-25 | Resolved: 2025-07-26

## 2025-07-26 PROCEDURE — 63600175 PHARM REV CODE 636 W HCPCS

## 2025-07-26 PROCEDURE — 25000003 PHARM REV CODE 250

## 2025-07-26 PROCEDURE — 71000033 HC RECOVERY, INTIAL HOUR: Performed by: OBSTETRICS & GYNECOLOGY

## 2025-07-26 PROCEDURE — 25000003 PHARM REV CODE 250: Performed by: STUDENT IN AN ORGANIZED HEALTH CARE EDUCATION/TRAINING PROGRAM

## 2025-07-26 PROCEDURE — 37000009 HC ANESTHESIA EA ADD 15 MINS: Performed by: OBSTETRICS & GYNECOLOGY

## 2025-07-26 PROCEDURE — 63600175 PHARM REV CODE 636 W HCPCS: Performed by: OBSTETRICS & GYNECOLOGY

## 2025-07-26 PROCEDURE — 37000008 HC ANESTHESIA 1ST 15 MINUTES: Performed by: OBSTETRICS & GYNECOLOGY

## 2025-07-26 PROCEDURE — 11000001 HC ACUTE MED/SURG PRIVATE ROOM

## 2025-07-26 PROCEDURE — 27000181 HC CABLE, IUPC

## 2025-07-26 PROCEDURE — 62326 NJX INTERLAMINAR LMBR/SAC: CPT

## 2025-07-26 PROCEDURE — 36004725 HC OB OR TIME LEV III - EA ADD 15 MIN: Performed by: OBSTETRICS & GYNECOLOGY

## 2025-07-26 PROCEDURE — 71000039 HC RECOVERY, EACH ADD'L HOUR: Performed by: OBSTETRICS & GYNECOLOGY

## 2025-07-26 PROCEDURE — 63600175 PHARM REV CODE 636 W HCPCS: Performed by: STUDENT IN AN ORGANIZED HEALTH CARE EDUCATION/TRAINING PROGRAM

## 2025-07-26 PROCEDURE — 36004724 HC OB OR TIME LEV III - 1ST 15 MIN: Performed by: OBSTETRICS & GYNECOLOGY

## 2025-07-26 RX ORDER — MORPHINE SULFATE 0.5 MG/ML
INJECTION, SOLUTION EPIDURAL; INTRATHECAL; INTRAVENOUS
Status: DISCONTINUED | OUTPATIENT
Start: 2025-07-26 | End: 2025-07-26

## 2025-07-26 RX ORDER — FAMOTIDINE 10 MG/ML
INJECTION, SOLUTION INTRAVENOUS
Status: COMPLETED
Start: 2025-07-26 | End: 2025-07-26

## 2025-07-26 RX ORDER — FENTANYL/BUPIVACAINE/NS/PF 2MCG/ML-.1
PLASTIC BAG, INJECTION (ML) INJECTION
Status: COMPLETED
Start: 2025-07-26 | End: 2025-07-26

## 2025-07-26 RX ORDER — PRENATAL WITH FERROUS FUM AND FOLIC ACID 3080; 920; 120; 400; 22; 1.84; 3; 20; 10; 1; 12; 200; 27; 25; 2 [IU]/1; [IU]/1; MG/1; [IU]/1; MG/1; MG/1; MG/1; MG/1; MG/1; MG/1; UG/1; MG/1; MG/1; MG/1; MG/1
1 TABLET ORAL DAILY
Status: DISCONTINUED | OUTPATIENT
Start: 2025-07-26 | End: 2025-07-29 | Stop reason: HOSPADM

## 2025-07-26 RX ORDER — BUPIVACAINE HYDROCHLORIDE 2.5 MG/ML
INJECTION, SOLUTION EPIDURAL; INFILTRATION; INTRACAUDAL; PERINEURAL
Status: DISPENSED
Start: 2025-07-26 | End: 2025-07-26

## 2025-07-26 RX ORDER — TRIAMCINOLONE ACETONIDE 40 MG/ML
40 INJECTION, SUSPENSION INTRA-ARTICULAR; INTRAMUSCULAR ONCE
Status: DISCONTINUED | OUTPATIENT
Start: 2025-07-26 | End: 2025-07-28

## 2025-07-26 RX ORDER — PHENYLEPHRINE HYDROCHLORIDE 10 MG/ML
INJECTION INTRAVENOUS
Status: DISCONTINUED | OUTPATIENT
Start: 2025-07-26 | End: 2025-07-26

## 2025-07-26 RX ORDER — METHYLERGONOVINE MALEATE 0.2 MG/ML
200 INJECTION INTRAVENOUS ONCE AS NEEDED
Status: DISCONTINUED | OUTPATIENT
Start: 2025-07-26 | End: 2025-07-29 | Stop reason: HOSPADM

## 2025-07-26 RX ORDER — BISACODYL 10 MG/1
10 SUPPOSITORY RECTAL ONCE AS NEEDED
Status: DISCONTINUED | OUTPATIENT
Start: 2025-07-26 | End: 2025-07-29 | Stop reason: HOSPADM

## 2025-07-26 RX ORDER — ACETAMINOPHEN 325 MG/1
650 TABLET ORAL EVERY 6 HOURS
Status: DISCONTINUED | OUTPATIENT
Start: 2025-07-26 | End: 2025-07-27

## 2025-07-26 RX ORDER — ADHESIVE BANDAGE
30 BANDAGE TOPICAL 2 TIMES DAILY PRN
Status: DISCONTINUED | OUTPATIENT
Start: 2025-07-27 | End: 2025-07-29 | Stop reason: HOSPADM

## 2025-07-26 RX ORDER — PROCHLORPERAZINE EDISYLATE 5 MG/ML
5 INJECTION INTRAMUSCULAR; INTRAVENOUS EVERY 6 HOURS PRN
Status: DISCONTINUED | OUTPATIENT
Start: 2025-07-26 | End: 2025-07-29 | Stop reason: HOSPADM

## 2025-07-26 RX ORDER — BUPIVACAINE HYDROCHLORIDE 2.5 MG/ML
INJECTION, SOLUTION EPIDURAL; INFILTRATION; INTRACAUDAL; PERINEURAL
Status: COMPLETED
Start: 2025-07-26 | End: 2025-07-26

## 2025-07-26 RX ORDER — IBUPROFEN 400 MG/1
800 TABLET, FILM COATED ORAL EVERY 8 HOURS
Status: DISCONTINUED | OUTPATIENT
Start: 2025-07-27 | End: 2025-07-26

## 2025-07-26 RX ORDER — TRANEXAMIC ACID 10 MG/ML
1000 INJECTION, SOLUTION INTRAVENOUS EVERY 30 MIN PRN
Status: DISCONTINUED | OUTPATIENT
Start: 2025-07-26 | End: 2025-07-29 | Stop reason: HOSPADM

## 2025-07-26 RX ORDER — ONDANSETRON HYDROCHLORIDE 2 MG/ML
INJECTION, SOLUTION INTRAVENOUS
Status: DISCONTINUED | OUTPATIENT
Start: 2025-07-26 | End: 2025-07-26

## 2025-07-26 RX ORDER — TRIAMCINOLONE ACETONIDE 40 MG/ML
INJECTION, SUSPENSION INTRA-ARTICULAR; INTRAMUSCULAR CODE/TRAUMA/SEDATION MEDICATION
Status: DISCONTINUED | OUTPATIENT
Start: 2025-07-26 | End: 2025-07-26 | Stop reason: HOSPADM

## 2025-07-26 RX ORDER — FENTANYL CITRATE 50 UG/ML
INJECTION, SOLUTION INTRAMUSCULAR; INTRAVENOUS
Status: DISCONTINUED | OUTPATIENT
Start: 2025-07-26 | End: 2025-07-26

## 2025-07-26 RX ORDER — IBUPROFEN 400 MG/1
800 TABLET, FILM COATED ORAL
Status: DISCONTINUED | OUTPATIENT
Start: 2025-07-27 | End: 2025-07-29 | Stop reason: HOSPADM

## 2025-07-26 RX ORDER — OXYCODONE HYDROCHLORIDE 5 MG/1
5 TABLET ORAL EVERY 4 HOURS PRN
Status: DISCONTINUED | OUTPATIENT
Start: 2025-07-26 | End: 2025-07-27

## 2025-07-26 RX ORDER — DEXAMETHASONE SODIUM PHOSPHATE 4 MG/ML
INJECTION, SOLUTION INTRA-ARTICULAR; INTRALESIONAL; INTRAMUSCULAR; INTRAVENOUS; SOFT TISSUE
Status: DISCONTINUED | OUTPATIENT
Start: 2025-07-26 | End: 2025-07-26

## 2025-07-26 RX ORDER — OXYTOCIN 10 [USP'U]/ML
10 INJECTION, SOLUTION INTRAMUSCULAR; INTRAVENOUS ONCE AS NEEDED
Status: DISCONTINUED | OUTPATIENT
Start: 2025-07-26 | End: 2025-07-29 | Stop reason: HOSPADM

## 2025-07-26 RX ORDER — SODIUM CHLORIDE 0.9 % (FLUSH) 0.9 %
10 SYRINGE (ML) INJECTION
Status: DISCONTINUED | OUTPATIENT
Start: 2025-07-26 | End: 2025-07-29 | Stop reason: HOSPADM

## 2025-07-26 RX ORDER — OXYTOCIN-SODIUM CHLORIDE 0.9% IV SOLN 30 UNIT/500ML 30-0.9/5 UT/ML-%
10 SOLUTION INTRAVENOUS ONCE AS NEEDED
Status: DISCONTINUED | OUTPATIENT
Start: 2025-07-26 | End: 2025-07-29 | Stop reason: HOSPADM

## 2025-07-26 RX ORDER — DIPHENOXYLATE HYDROCHLORIDE AND ATROPINE SULFATE 2.5; .025 MG/1; MG/1
2 TABLET ORAL EVERY 6 HOURS PRN
Status: DISCONTINUED | OUTPATIENT
Start: 2025-07-26 | End: 2025-07-29 | Stop reason: HOSPADM

## 2025-07-26 RX ORDER — NALOXONE HCL 0.4 MG/ML
0.04 VIAL (ML) INJECTION
Status: DISCONTINUED | OUTPATIENT
Start: 2025-07-26 | End: 2025-07-29 | Stop reason: HOSPADM

## 2025-07-26 RX ORDER — OXYCODONE HYDROCHLORIDE 10 MG/1
10 TABLET ORAL EVERY 4 HOURS PRN
Status: DISCONTINUED | OUTPATIENT
Start: 2025-07-27 | End: 2025-07-27

## 2025-07-26 RX ORDER — MISOPROSTOL 200 UG/1
800 TABLET ORAL ONCE AS NEEDED
Status: DISCONTINUED | OUTPATIENT
Start: 2025-07-26 | End: 2025-07-29 | Stop reason: HOSPADM

## 2025-07-26 RX ORDER — SODIUM CHLORIDE, SODIUM LACTATE, POTASSIUM CHLORIDE, CALCIUM CHLORIDE 600; 310; 30; 20 MG/100ML; MG/100ML; MG/100ML; MG/100ML
INJECTION, SOLUTION INTRAVENOUS CONTINUOUS PRN
Status: DISCONTINUED | OUTPATIENT
Start: 2025-07-26 | End: 2025-07-26

## 2025-07-26 RX ORDER — KETOROLAC TROMETHAMINE 30 MG/ML
30 INJECTION, SOLUTION INTRAMUSCULAR; INTRAVENOUS EVERY 6 HOURS
Status: DISCONTINUED | OUTPATIENT
Start: 2025-07-26 | End: 2025-07-26

## 2025-07-26 RX ORDER — PROCHLORPERAZINE EDISYLATE 5 MG/ML
5 INJECTION INTRAMUSCULAR; INTRAVENOUS EVERY 6 HOURS PRN
Status: ACTIVE | OUTPATIENT
Start: 2025-07-26 | End: 2025-07-27

## 2025-07-26 RX ORDER — FENTANYL/BUPIVACAINE/NS/PF 2MCG/ML-.1
PLASTIC BAG, INJECTION (ML) INJECTION CONTINUOUS PRN
Status: DISCONTINUED | OUTPATIENT
Start: 2025-07-25 | End: 2025-07-26

## 2025-07-26 RX ORDER — ACETAMINOPHEN 325 MG/1
650 TABLET ORAL EVERY 6 HOURS
Status: DISCONTINUED | OUTPATIENT
Start: 2025-07-27 | End: 2025-07-29 | Stop reason: HOSPADM

## 2025-07-26 RX ORDER — DOCUSATE SODIUM 100 MG/1
200 CAPSULE, LIQUID FILLED ORAL 2 TIMES DAILY
Status: DISCONTINUED | OUTPATIENT
Start: 2025-07-26 | End: 2025-07-29 | Stop reason: HOSPADM

## 2025-07-26 RX ORDER — ONDANSETRON 8 MG/1
8 TABLET, ORALLY DISINTEGRATING ORAL EVERY 8 HOURS PRN
Status: DISCONTINUED | OUTPATIENT
Start: 2025-07-26 | End: 2025-07-29 | Stop reason: HOSPADM

## 2025-07-26 RX ORDER — OXYTOCIN-SODIUM CHLORIDE 0.9% IV SOLN 30 UNIT/500ML 30-0.9/5 UT/ML-%
95 SOLUTION INTRAVENOUS CONTINUOUS PRN
Status: DISCONTINUED | OUTPATIENT
Start: 2025-07-26 | End: 2025-07-29 | Stop reason: HOSPADM

## 2025-07-26 RX ORDER — LIDOCAINE HYDROCHLORIDE AND EPINEPHRINE 15; 5 MG/ML; UG/ML
INJECTION, SOLUTION EPIDURAL
Status: DISCONTINUED | OUTPATIENT
Start: 2025-07-26 | End: 2025-07-26

## 2025-07-26 RX ORDER — SODIUM CITRATE AND CITRIC ACID MONOHYDRATE 334; 500 MG/5ML; MG/5ML
SOLUTION ORAL
Status: COMPLETED
Start: 2025-07-26 | End: 2025-07-26

## 2025-07-26 RX ORDER — ONDANSETRON HYDROCHLORIDE 2 MG/ML
4 INJECTION, SOLUTION INTRAVENOUS EVERY 6 HOURS PRN
Status: ACTIVE | OUTPATIENT
Start: 2025-07-26 | End: 2025-07-27

## 2025-07-26 RX ORDER — OXYTOCIN-SODIUM CHLORIDE 0.9% IV SOLN 30 UNIT/500ML 30-0.9/5 UT/ML-%
95 SOLUTION INTRAVENOUS ONCE AS NEEDED
Status: DISCONTINUED | OUTPATIENT
Start: 2025-07-26 | End: 2025-07-29 | Stop reason: HOSPADM

## 2025-07-26 RX ORDER — BUPIVACAINE HYDROCHLORIDE 2.5 MG/ML
INJECTION, SOLUTION EPIDURAL; INFILTRATION; INTRACAUDAL; PERINEURAL CONTINUOUS PRN
Status: DISCONTINUED | OUTPATIENT
Start: 2025-07-26 | End: 2025-07-26

## 2025-07-26 RX ORDER — AMOXICILLIN 250 MG
1 CAPSULE ORAL NIGHTLY PRN
Status: DISCONTINUED | OUTPATIENT
Start: 2025-07-26 | End: 2025-07-29 | Stop reason: HOSPADM

## 2025-07-26 RX ORDER — DIPHENHYDRAMINE HCL 25 MG
25 CAPSULE ORAL EVERY 4 HOURS PRN
Status: DISCONTINUED | OUTPATIENT
Start: 2025-07-26 | End: 2025-07-29 | Stop reason: HOSPADM

## 2025-07-26 RX ORDER — CARBOPROST TROMETHAMINE 250 UG/ML
250 INJECTION, SOLUTION INTRAMUSCULAR
Status: DISCONTINUED | OUTPATIENT
Start: 2025-07-26 | End: 2025-07-29 | Stop reason: HOSPADM

## 2025-07-26 RX ORDER — LIDOCAINE HYDROCHLORIDE 20 MG/ML
INJECTION, SOLUTION EPIDURAL; INFILTRATION; INTRACAUDAL; PERINEURAL
Status: DISCONTINUED | OUTPATIENT
Start: 2025-07-26 | End: 2025-07-26

## 2025-07-26 RX ORDER — SIMETHICONE 80 MG
1 TABLET,CHEWABLE ORAL EVERY 6 HOURS PRN
Status: DISCONTINUED | OUTPATIENT
Start: 2025-07-26 | End: 2025-07-29 | Stop reason: HOSPADM

## 2025-07-26 RX ORDER — OXYCODONE HYDROCHLORIDE 10 MG/1
10 TABLET ORAL EVERY 4 HOURS PRN
Status: DISCONTINUED | OUTPATIENT
Start: 2025-07-26 | End: 2025-07-27

## 2025-07-26 RX ORDER — OXYCODONE HYDROCHLORIDE 5 MG/1
5 TABLET ORAL EVERY 4 HOURS PRN
Status: DISCONTINUED | OUTPATIENT
Start: 2025-07-27 | End: 2025-07-27

## 2025-07-26 RX ORDER — KETOROLAC TROMETHAMINE 30 MG/ML
30 INJECTION, SOLUTION INTRAMUSCULAR; INTRAVENOUS EVERY 6 HOURS
Status: DISPENSED | OUTPATIENT
Start: 2025-07-26 | End: 2025-07-27

## 2025-07-26 RX ORDER — ONDANSETRON HYDROCHLORIDE 2 MG/ML
4 INJECTION, SOLUTION INTRAVENOUS EVERY 12 HOURS PRN
Status: DISCONTINUED | OUTPATIENT
Start: 2025-07-26 | End: 2025-07-29 | Stop reason: HOSPADM

## 2025-07-26 RX ADMIN — FENTANYL CITRATE 100 MCG: 50 INJECTION INTRAMUSCULAR; INTRAVENOUS at 04:07

## 2025-07-26 RX ADMIN — FENTANYL CITRATE 100 MCG: 50 INJECTION INTRAMUSCULAR; INTRAVENOUS at 12:07

## 2025-07-26 RX ADMIN — FAMOTIDINE 20 MG: 10 INJECTION, SOLUTION INTRAVENOUS at 12:07

## 2025-07-26 RX ADMIN — LIDOCAINE HYDROCHLORIDE 12 ML: 20 INJECTION, SOLUTION EPIDURAL; INFILTRATION; INTRACAUDAL at 12:07

## 2025-07-26 RX ADMIN — ACETAMINOPHEN 650 MG: 325 TABLET ORAL at 11:07

## 2025-07-26 RX ADMIN — LIDOCAINE HYDROCHLORIDE 5 ML: 20 INJECTION, SOLUTION EPIDURAL; INFILTRATION; INTRACAUDAL at 12:07

## 2025-07-26 RX ADMIN — CEFAZOLIN 2 G: 330 INJECTION, POWDER, FOR SOLUTION INTRAMUSCULAR; INTRAVENOUS at 12:07

## 2025-07-26 RX ADMIN — ACETAMINOPHEN 650 MG: 325 TABLET ORAL at 06:07

## 2025-07-26 RX ADMIN — ONDANSETRON 8 MG: 8 TABLET, ORALLY DISINTEGRATING ORAL at 05:07

## 2025-07-26 RX ADMIN — BUPIVACAINE HYDROCHLORIDE 2 ML: 2.5 INJECTION, SOLUTION EPIDURAL; INFILTRATION; INTRACAUDAL; PERINEURAL at 04:07

## 2025-07-26 RX ADMIN — BUPIVACAINE HYDROCHLORIDE 10 ML: 2.5 INJECTION, SOLUTION EPIDURAL; INFILTRATION; INTRACAUDAL; PERINEURAL at 11:07

## 2025-07-26 RX ADMIN — DEXAMETHASONE SODIUM PHOSPHATE 4 MG: 4 INJECTION, SOLUTION INTRA-ARTICULAR; INTRALESIONAL; INTRAMUSCULAR; INTRAVENOUS; SOFT TISSUE at 12:07

## 2025-07-26 RX ADMIN — Medication 6 UNITS: at 01:07

## 2025-07-26 RX ADMIN — LIDOCAINE HYDROCHLORIDE,EPINEPHRINE BITARTRATE 3 ML: 15; .005 INJECTION, SOLUTION EPIDURAL; INFILTRATION; INTRACAUDAL; PERINEURAL at 05:07

## 2025-07-26 RX ADMIN — BUPIVACAINE HYDROCHLORIDE 8 ML: 2.5 INJECTION, SOLUTION EPIDURAL; INFILTRATION; INTRACAUDAL; PERINEURAL at 02:07

## 2025-07-26 RX ADMIN — PHENYLEPHRINE HYDROCHLORIDE 0.3 MCG/KG/MIN: 10 INJECTION INTRAVENOUS at 12:07

## 2025-07-26 RX ADMIN — AZITHROMYCIN MONOHYDRATE 500 MG: 500 INJECTION, POWDER, LYOPHILIZED, FOR SOLUTION INTRAVENOUS at 11:07

## 2025-07-26 RX ADMIN — Medication 2 MILLI-UNITS/MIN: at 07:07

## 2025-07-26 RX ADMIN — BUPIVACAINE HYDROCHLORIDE 5 ML/HR: 2.5 INJECTION, SOLUTION EPIDURAL; INFILTRATION; INTRACAUDAL; PERINEURAL at 03:07

## 2025-07-26 RX ADMIN — KETOROLAC TROMETHAMINE 30 MG: 30 INJECTION, SOLUTION INTRAMUSCULAR at 01:07

## 2025-07-26 RX ADMIN — DOCUSATE SODIUM 200 MG: 100 CAPSULE, LIQUID FILLED ORAL at 09:07

## 2025-07-26 RX ADMIN — DIPHENHYDRAMINE HYDROCHLORIDE 25 MG: 25 CAPSULE ORAL at 06:07

## 2025-07-26 RX ADMIN — ONDANSETRON 4 MG: 2 INJECTION INTRAMUSCULAR; INTRAVENOUS at 12:07

## 2025-07-26 RX ADMIN — SODIUM CITRATE AND CITRIC ACID MONOHYDRATE 30 ML: 500; 334 SOLUTION ORAL at 12:07

## 2025-07-26 RX ADMIN — MORPHINE SULFATE 1.5 MG: 0.5 INJECTION, SOLUTION EPIDURAL; INTRATHECAL; INTRAVENOUS at 01:07

## 2025-07-26 RX ADMIN — BUPIVACAINE HYDROCHLORIDE 5 ML: 2.5 INJECTION, SOLUTION EPIDURAL; INFILTRATION; INTRACAUDAL; PERINEURAL at 06:07

## 2025-07-26 RX ADMIN — SODIUM CHLORIDE, SODIUM LACTATE, POTASSIUM CHLORIDE, AND CALCIUM CHLORIDE: 600; 310; 30; 20 INJECTION, SOLUTION INTRAVENOUS at 12:07

## 2025-07-26 RX ADMIN — BUPIVACAINE HYDROCHLORIDE 8 ML: 2.5 INJECTION, SOLUTION EPIDURAL; INFILTRATION; INTRACAUDAL; PERINEURAL at 10:07

## 2025-07-26 RX ADMIN — PHENYLEPHRINE HYDROCHLORIDE 100 MCG: 10 INJECTION INTRAVENOUS at 12:07

## 2025-07-26 RX ADMIN — PHENYLEPHRINE HYDROCHLORIDE 100 MCG: 10 INJECTION INTRAVENOUS at 01:07

## 2025-07-26 RX ADMIN — Medication 95 MILLI-UNITS/MIN: at 02:07

## 2025-07-26 NOTE — NURSING
Pt notified nurse of 7-8 pain with contractions. Position changed multiple times. Due to pain pt was tachycardic with HR ranging between 110-130.   Anesthesia notified of pt discomfort @   [phone]   At these times pt was redosed by anesthesia resident with little to no change in pain level.

## 2025-07-26 NOTE — PLAN OF CARE
Problem: Adult Inpatient Plan of Care  Goal: Plan of Care Review  Outcome: Progressing  Goal: Patient-Specific Goal (Individualized)  Outcome: Progressing  Goal: Absence of Hospital-Acquired Illness or Injury  Outcome: Progressing  Goal: Optimal Comfort and Wellbeing  Outcome: Progressing  Goal: Readiness for Transition of Care  Outcome: Progressing     Problem:  Fall Injury Risk  Goal: Absence of Fall, Infant Drop and Related Injury  Outcome: Progressing     Problem: Infection  Goal: Absence of Infection Signs and Symptoms  Outcome: Progressing     Problem: Anesthesia/Analgesia, Neuraxial  Goal: Safe, Effective Infusion Delivery  Outcome: Progressing  Goal: Stable Patient-Fetal Status  Outcome: Progressing  Goal: Absence of Infection Signs and Symptoms  Outcome: Progressing  Goal: Nausea and Vomiting Relief  Outcome: Progressing  Goal: Effective Pain Control  Outcome: Progressing  Goal: Effective Oxygenation and Ventilation  Outcome: Progressing  Goal: Baseline Motor Function Return  Outcome: Progressing  Goal: Effective Urinary Elimination  Outcome: Progressing     Problem: Skin Injury Risk Increased  Goal: Skin Health and Integrity  Outcome: Progressing

## 2025-07-26 NOTE — PROGRESS NOTES
07/26/25 0236   Vital Signs   Pulse (!) 122     MHR has been elevated between 117-130's. Pt asked if she feels like her heart is racing, pt states that contraction pain is 8/10 . MD  and Anesthesia  notified of pain . Bolus of 250 CC. Anesthesia will come to assess pt.

## 2025-07-26 NOTE — PROGRESS NOTES
LABOR NOTE    S:  Complaints: No.  Epidural working: Patient reports a hot spot that is causing 7/10 abdominal pain. Anesthesia has re-dosed her epidural 3 times throughout her labor course    O: /73   Pulse (!) 119   Temp 98.3 °F (36.8 °C)   Resp 16   LMP 2024 (Exact Date)   SpO2 100%   Breastfeeding No     FHT: Cat 1 (reassuring), baseline 120bpm, moderate variability, + accels, + intermittent early decels  CTX: Q 2-3 minutes, pit decreased by 2 > halved > paused  SVE: /-2; IUPC remains in place    TIMELINE:  0700: -3, (LD) pit @ 2  1015: -3, pit @ 4   1245: 3/60/-3  1730: -3  2115: -3  0200: /-2; AROM clr; IUPC placed; pit @ 18  0345: /-2, pit at 20  0500: /-2    A/P: 30 y.o.  at 39w0d, admitted for IOL    Labor management:  - Continue Close Maternal/Fetal Monitoring  - Will keep pitocin paused for 30 minutes and reasses FHTs at that time  - Recheck 2-4 hours or PRN    Brynn Youssef MD  Obstetrics and Gynecology - PGY3

## 2025-07-26 NOTE — CARE UPDATE
MD to bedside after RN notification that patient is requesting . Patient reports she is very anxious and stressed about the possibility of an emergency. She is concerned that her water has not yet been able to be broken and is worried about the wellbeing of her baby.    Discussed options with patient: proceeding with elective repeat  versus continuing IOL with attempt at controlled AROM with FSE given known polyhydramnios. She is aware of and concerned about risk of cord prolapse. Ideally with FSE rupture, ROM would be more controlled to reduce risk of cord prolapse, however risk is present and reasonable to consider given polyhydramnios and high fetal station. FHT have been very reactive and reassuring throughout labor thus far. No medical indication for  at this time however again reiterated that she is always able to request this if this is her desire.     She reports feeling better after our conversation and our review of her reassuring fetal monitoring. Will plan to return for AROM attempt. If unable to AROM, patient would like to proceed with . Continue close maternal/fetal monitoring.    Anette Frank MD  OB/GYN PGY-2

## 2025-07-26 NOTE — L&D DELIVERY NOTE
[facility] - Labor & Delivery   Section   Operative Note    SUMMARY     Date of Procedure: 2025     Procedure: Repeat Low Transverse  Section    Surgeons and Role:     * Pina Figueroa MD - Primary     * Emely Alva MD - Resident - Assisting    Pre-Operative Diagnosis:   Trial of labor after   Maternal request for repeat   Polyhydramnios   Hx PreE   Chrons disease     Post-Operative Diagnosis:   Same and s/p rLTCS via pfannenstiel incision    Anesthesia: Epidural    Technical Procedures Used: rLTCS via pfannenstiel incision            Description of the Findings of the Procedure:   Previous pfannenstiel incision resected and carried down to moderately adherent fascia and peritoneum   Bladder flap performed, with inadvertent entry into the uterus due to exceedingly thin lower uterine window   Viable female infant weighing 3270 grams delivered via lower uterine hysterotomy. Bilateral rectus muscles were transected 1cm to facilitate fetal delivery  Fascia, subQ, and skin closed via normal standard fashion without complication, with subQ 40mg kenolog placed at the site of pfannenstiel for keloid prevention    Complications: Yes - uterine window noted at time of infant delivery     Blood Loss: 540cc     Procedure Details   The patient was seen in the labor room after failing to progress past 6cm, patient requested at this time to pursue delivery via repeat  section. The risks, benefits, complications, treatment options, and expected outcomes were discussed with the patient.  The patient concurred with the proposed plan, giving informed consent.  The patient was taken to Operating Room, identified as Nina Durant and the procedure verified as repeat  Delivery. A Time Out was held and the above information confirmed.    After induction of anesthesia, the patient was prepped and draped in the usual sterile manner while placed in a dorsal supine  position with a left lateral tilt.  A camejo catheter was already draining clear urine. Preoperative antibiotics Ancef 2 g and azithromycin 500 mg were administered. An allis test was performed confirming adequate anesthesia.  The previous scar tissue was removed in an elliptical fashion using allis clamps and scalpel, at which time the Pfannenstiel incision was made and carried down through the subcutaneous tissue to the  moderately adherent fascia. Fascial incision was made and extended transversely with curved german scissors. The fascia was grasped with [facility] clamps and  from the underlying rectus tissue superiorly and inferiorly. The peritoneum was identified, found to be free of adherent bowel, and entered bluntly. Peritoneal incision was extended longitudinally. The vesico-uterine peritoneum was identified, and bladder blade was inserted. The vesico-uterine peritoneum was incised transversely and the bladder flap was bluntly freed from the lower uterine segment. During bladder flap formation, a uterine window was inadvertently noted and ruptured into the lower uterine segment. Bladder integrity was confirmed. The bladder blade was reinserted to keep the bladder out of the operative field, at which time blunt dissection was used to extended the uterine window with cephalo-caudad traction. The amniotic sac was ruptured upon entry into the uterus, and the infant was noted to be in cephalic presentation. Bandage scissors were then used to excise 1cm segments of the rectus muscle to provide ample space for delivery. The fetal head was brought to the incision and elevated out of the pelvis. The patient delivered a single viable female infant without difficulty.  Infant weighed 3270 grams with Apgar scores of 8/9 at one and five minutes respectively. After the umbilical cord was clamped and cut,the placenta was removed intact, appeared normal, and was discarded. The uterus was exteriorized. The uterine  incision was closed with running locked sutures of 0 vicryl. Hemostasis was observed. The uterine outline, tubes and ovaries appeared normal. The uterus was returned to the abdominal cavity. Incision was reinspected and good hemostasis was noted. The abdominal cavity was swept with clean laps to remove clots. The peritoneum and muscles were not reapproximated. The cut muscle segments were hemostatic. The fascia was then reapproximated with running sutures of 1 vicryl. The subcutaneous fat was reapproximated with 2-0 vicryl simple interrupted sutures, and skin was reapproximated with 4-0 monocryl. SubQ 40mg kenolog was then placed at the site of pfannenstiel for keloid prevention.     Instrument, sponge, and needle counts were correct prior the abdominal closure and at the conclusion of the case.     Pt tolerated procedure well and was in stable condition after the procedure.    **NOTE: This patient is NOT a candidate for trial of labor after  delivery. Recommend 36-37w rLTC/S with following pregnancies secondary to uterine window history**    Ree Alva MD (Mary)   Obstetrics and Gynecology, PGY2     I agree with operative note. I was scrubbed and participated during entire surgical procedure with the resident.       Specimens:   Specimen (24h ago, onward)      None            Condition: Good    VTE Risk Mitigation (From admission, onward)           Ordered     IP VTE HIGH RISK PATIENT  Once         25 1312     Place sequential compression device  Until discontinued         25 1312     Place sequential compression device  Until discontinued         25 0422                    Disposition: PACU - hemodynamically stable.    Attestation: Good         Delivery Information for Kay Durant    Birth information:  YOB: 2023   Time of birth: 1:18 PM   Sex: female   Head Delivery Date/Time: 2025  1:18 PM   Delivery type: , Low Transverse   Gestational Age:  "39w1d       Delivery Providers    Delivering clinician: Pina Figueroa MD   Provider Role    Génesis Rios RN Delivery Nurse    Emely Alva MD Resident    Barbara Madrigal [facility] Tech    Tommy Potts RN Delivery Assist              Measurements    Weight: 3270 g  Weight (lbs): 7 lb 3.3 oz  Length: 52.1 cm  Length (in): 20.5"  Head circumference: 36.5 cm  Chest circumference: 33.5 cm         Apgars    Living status: Living  Apgar Component Scores:  1 min.:  5 min.:  10 min.:  15 min.:  20 min.:    Skin color:  0  1       Heart rate:  2  2       Reflex irritability:  2  2       Muscle tone:  2  2       Respiratory effort:  2  2       Total:  8  9       Apgars assigned by: DANIEL WRIGHT RN         Operative Delivery    Forceps attempted?: No  Vacuum extractor attempted?: No         Shoulder Dystocia    Shoulder dystocia present?: No           Presentation    Presentation: Vertex  Position: Left Occiput Anterior           Interventions/Resuscitation    Method: Bulb Suctioning, Tactile Stimulation, Deep Suctioning       Cord    Vessels: 3 vessels  Complications: None  Delayed Cord Clamping?: Yes  Cord Clamped Date/Time: 2025  1:19 PM  Cord Blood Disposition: Discarded  Gases Sent?: No  Stem Cell Collection (by MD): No       Placenta    Placenta delivery date/time: 2025 13:20  Placenta removal: Manual removal  Placenta appearance: Intact  Placenta disposition: Discarded           Labor Events:       labor: No     Labor Onset Date/Time:         Dilation Complete Date/Time:         Start Pushing Date/Time:         Start Pushing Date/Time:       Rupture Date/Time: 25 0150        Rupture type: ARM (Artificial Rupture)        Fluid Amount:       Fluid Color: Clear              steroids: None     Antibiotics given for GBS: No     Induction: oxytocin     Indications for induction:  Elective     Augmentation: amniotomy     Indications for augmentation: Ineffective Contraction Pattern "     Labor complications: Failure to Progress in First Stage     Additional complications:          Cervical ripening:                     Delivery:      Episiotomy: None     Indication for Episiotomy:       Perineal Lacerations: None Repaired:      Periurethral Laceration:   Repaired:     Labial Laceration:   Repaired:     Sulcus Laceration:   Repaired:     Vaginal Laceration:   Repaired:     Cervical Laceration:   Repaired:     Repair suture: None     Repair # of packets:       Last Value - EBL - Nursing (mL):       Sum - EBL - Nursing (mL): 0     Last Value - EBL - Anesthesia (mL):      Calculated QBL (mL): 540     Running total QBL (mL): 540     Vaginal Sweep Performed: No     Surgicount Correct: Yes     Vaginal Packing: No Quantity:       Other providers:       Anesthesia    Method: Epidural          Details (if applicable):  Trial of Labor Yes    Categorization: Repeat    Priority: Routine   Indications for : Failed induction   Incision Type: low transverse     Additional  information:  Forceps:    Vacuum:    Breech:    Observed anomalies    Other (Comments):

## 2025-07-26 NOTE — PROGRESS NOTES
LABOR NOTE    S:  Complaints: No.  Epidural working:  yes  Resident to bedside for routine cervical exam    O: /60   Pulse 102   Temp 97.1 °F (36.2 °C) (Axillary)   Resp 16   LMP 2024 (Exact Date)   SpO2 100%   Breastfeeding No     FHT: Cat 1 (reassuring), baseline 115bpm, moderate variability, + accels, - decels  CTX: Q2-4 minutes, pit @ 14  SVE: 70/-3; continues to be too high to AROM at this time    TIMELINE:  0700: 50/-3, (LD) pit @ 2  1015: 60/-3, pit @ 4   1245: 3/60/-3  1730: /-3  2115: /-3    A/P: 30 y.o.  at 39w0d, admitted for IOL    Labor management:  - Continue Close Maternal/Fetal Monitoring  - LD Pitocin Augmentation per protocol  - Discussion   - Given cervical change, okay to continue IOL. Will recheck in 2 hours and possible AROM at that time  - Recheck 2-4 hours or PRN    Brynn Youssef MD  Obstetrics and Gynecology - PGY3

## 2025-07-26 NOTE — PROGRESS NOTES
LABOR NOTE    S:  Complaints: No.  Epidural working: Yes s/p anesthesia replaced epidural @ 0700    O: /70   Pulse (!) 126   Temp 99.3 °F (37.4 °C) (Oral)   Resp 16   LMP 2024 (Exact Date)   SpO2 100%   Breastfeeding No     FHT: Cat 1 (reassuring), baseline 130bpm, moderate variability, + accels, + intermittent early decels  CTX: Q 2-3 minutes, pit at 12  SVE: /-2; IUPC remains in place    TIMELINE:  0700: -3, (LD) pit @ 2  1015: -3, pit @ 4   1245: 3/60/-3  1730: 3  2115: 3  0200: /2; AROM clr; IUPC placed; pit @ 18  0345: /-2, pit at 20  0500: /-2, pit paused  0700: pit restarted at 10  0930: 2, pit increased to 12    A/P: 30 y.o.  at 39w0d, admitted for IOL    Labor management:  - Continue Close Maternal/Fetal Monitoring  - Continue pitocin as tolerated  - Recheck 2-4 hours or PRN    Ekaterina Ontiveros M.D.  Obstetrics and Gynecology  PGY-1

## 2025-07-26 NOTE — PROGRESS NOTES
LABOR NOTE    S:  Complaints: No.  Epidural working:  somewhat, anesthesia recently at bedside to evaluate    O: BP (!) 109/57   Pulse (!) 111   Temp 98.5 °F (36.9 °C) (Oral)   Resp 16   LMP 2024 (Exact Date)   SpO2 100%   Breastfeeding No     FHT: Cat 1 (reassuring), baseline 120bpm, moderate variability, no accels, + intermittent early decels  CTX: Q 2-4 minutes, pit @ 18  SVE: /-2; AROM clr; IUPC placed    TIMELINE:  0700: 3, (LD) pit @ 2  1015: -3, pit @ 4   1245: 3/60/-3  1730: 3  2115: 3  0200: /-2; AROM clr; IUPC placed; pit @ 18  0345: /-2, pit at 20    A/P: 30 y.o.  at 39w0d, admitted for IOL    Labor management:  - Continue Close Maternal/Fetal Monitoring  - LD Pitocin Augmentation per protocol  - Recheck 2-4 hours or PRN    Anette Frank MD  OB/GYN PGY-2

## 2025-07-26 NOTE — ANESTHESIA PROCEDURE NOTES
Epidural    Patient location during procedure: OB   Reason for block: primary anesthetic   Reason for block: labor analgesia requested by patient and obstetrician  Diagnosis: IUP   Start time: 7/26/2025 5:50 AM  Timeout: 7/26/2025 5:50 AM  End time: 7/26/2025 5:55 AM  Surgery related to: Vaginal Delivery    Staffing  Performing Provider: Klever Nichols DO  Authorizing Provider: Yudi Segovia MD    Staffing  Performed by: Klever Nichols DO  Authorized by: Yudi Segovia MD        Preanesthetic Checklist  Completed: patient identified, IV checked, site marked, risks and benefits discussed, surgical consent, monitors and equipment checked, pre-op evaluation, timeout performed, anesthesia consent given, hand hygiene performed and patient being monitored  Preparation  Patient position: sitting  Prep: ChloraPrep  Patient monitoring: Pulse Ox  Reason for block: primary anesthetic   Epidural  Skin Anesthetic: lidocaine 1%  Skin Wheal: 3 mL  Administration type: continuous  Approach: midline  Interspace: L3-4    Injection technique: KENNY saline  Needle and Epidural Catheter  Needle type: Tuohy   Needle gauge: 17  Needle length: 3.5 inches  Needle insertion depth: 6 cm  Catheter type: Picaboo  Catheter size: 19 G  Catheter at skin depth: 10 cm  Insertion Attempts: 1  Test dose: 3 mL of lidocaine 1.5% with Epi 1-to-200,000  Additional Documentation: incremental injection, negative aspiration for heme and CSF, no paresthesia on injection, no signs/symptoms of IV or SA injection, no significant pain on injection and no significant complaints from patient  Needle localization: anatomical landmarks  Medications:  Volume per aspiration: 5 mL  Time between aspirations: 5 minutes   Assessment  Ease of block: easy  Patient's tolerance of the procedure: comfortable throughout block and no complaints  Additional Notes  Replaced due to inadequete level and pain. One pass with positive DPE, no issues threading catheter.   No inadvertent dural puncture with Tuohy.  Dural puncture performed with spinal needle.

## 2025-07-26 NOTE — PLAN OF CARE
VSS Patient denies HA, dizziness, vision changes, and RUQ pain. Cutler in place per orders. Pain  controlled with  pain meds. Fundus is midline and firm with moderate lochia. Hydrocolloid with silver dressing clean and dry with no signs of infection. Significant other at bedside and attentive to patient's needs; parents responding to infant cues and bonding appropriately. No additional needs at this time.

## 2025-07-26 NOTE — TRANSFER OF CARE
Anesthesia Transfer of Care Note    Patient: Nina Durant    Procedure(s) Performed: * No procedures listed *    Patient location: Labor and Delivery    Anesthesia Type: epidural    Transport from OR: Transported from OR on room air with adequate spontaneous ventilation    Post pain: adequate analgesia    Post assessment: no apparent anesthetic complications    Post vital signs: stable    Level of consciousness: awake    Nausea/Vomiting: no nausea/vomiting    Complications: none    Transfer of care protocol was followed      Last vitals: Visit Vitals  /68   Pulse 108   Temp 36.9 °C (98.5 °F) (Axillary)   Resp 16   LMP 07/31/2024 (Exact Date)   SpO2 100%   Breastfeeding No

## 2025-07-26 NOTE — BRIEF OP NOTE
[facility] - Labor & Delivery  Brief Operative Note    SUMMARY     Surgery Date: 2025     Surgeons and Role:     * Pina Figueroa MD - Primary     * Emely Alva MD - Resident - Assisting        Pre-op Diagnosis:    Trial of labor after   Maternal request for repeat   Polyhydramnios   Hx PreE   Chrons disease     Post-op Diagnosis:  Same and S/P rLTCS     Procedure(s) (LRB):   SECTION (N/A)    Anesthesia: Spinal/Epidural    Implants:  * No implants in log *    Operative Findings:   Previous pfannenstiel incision resected and carried down to moderately adherent fascia and peritoneum   Bladder flap performed, with inadvertent entry into the uterus due to exceedingly thin lower uterine window   Viable female infant weighing 3270 grams delivered via lower uterine hysterotomy. Bilateral rectus muscles were transected 1cm to facilitate fetal delivery  Fascia, subQ, and skin closed via normal standard fashion without complication, with subQ 40mg kenolog placed at the site of pfannenstiel for keloid prevention     Estimated Blood Loss: 540cc     Estimated Blood Loss has been documented.           Ree Alva MD (Mary)   Obstetrics and Gynecology, PGY2     Specimens:   Specimen (24h ago, onward)      None          * No specimens in log *    [id number]    I agree with Brief Op Note  NELY Figueroa MD

## 2025-07-26 NOTE — PROGRESS NOTES
LABOR NOTE    S:  Complaints: No.  Epidural working: Yes s/p anesthesia replaced epidural @ 0700    O: /68   Pulse 108   Temp 98.5 °F (36.9 °C) (Axillary)   Resp 16   LMP 2024 (Exact Date)   SpO2 100%   Breastfeeding No     FHT: Cat 2 (overall reassuring), baseline 120bpm, moderate variability, + accels, + intermittent early/late decels  CTX: Q 2-4  minutes, pit turned off   SVE: -2; IUPC remains in place    TIMELINE:  0700: 3, (LD) pit @ 2  1015: -3, pit @ 4   1245: 3/60/-3  1730: 3  2115: 3  0200: 2; AROM clr; IUPC placed; pit @ 18  0345: -2, pit at 20  0500: -2, pit paused  0700: pit restarted at 10  0930: 2, pit increased to 12  1130: /-2     A/P: 30 y.o.  at 39w0d, admitted for IOL    Indication: Maternal Request, Hx of Prior C/S x1   Time of amniotomy: 0200 AM   Pitocin start time: 07  Length of time with amniotomy AND pitocin augmentation: 9 hours  Length of time at current cervical dilation (at time of c/s): 8 hours  - IUPC present? yes  - Is there fetal head descent? no   - If so, to what station?: -2  Are fetal heart rate abnormalities present?: yes - occasional late and early decels   - What type? Recurrent late decelerations  - For how long? 2 hours  - Are accelerations present? yes  Has labor course been reviewed with attending physician? yes  What resuscitative measures have been attempted? Maternal repositioning and Pitocin pause     Ree Alva MD (Mary)   Obstetrics and Gynecology, PGY2

## 2025-07-26 NOTE — PROGRESS NOTES
LABOR NOTE    S:  Complaints: No.  Epidural working:  yes  Resident to bedside for routine cervical exam    O: BP (!) 107/56   Pulse 96   Temp 98.9 °F (37.2 °C) (Oral)   Resp 16   LMP 2024 (Exact Date)   SpO2 100%   Breastfeeding No     FHT: Cat 1 (reassuring), baseline 120bpm, moderate variability, + accels, + intermittent early decels  CTX: Q 2-4 minutes, pit @ 18  SVE: /-2; AROM clr; IUPC placed    TIMELINE:  0700: -3, (LD) pit @ 2  1015: /-3, pit @ 4   1245: 3/60/-3  1730: -3  2115: -3  0200: /-2; AROM clr; IUPC placed; pit @ 18    A/P: 30 y.o.  at 39w0d, admitted for IOL    Labor management:  - Continue Close Maternal/Fetal Monitoring  - LD Pitocin Augmentation per protocol  - Recheck 2-4 hours or PRN    Brynn Youssef MD  Obstetrics and Gynecology - PGY3

## 2025-07-26 NOTE — ANESTHESIA PROCEDURE NOTES
Epidural    Patient location during procedure: OB   Reason for block: primary anesthetic   Reason for block: labor analgesia requested by patient and obstetrician   Start time: 7/26/2025 6:41 AM  Timeout: 7/26/2025 6:40 AM  End time: 7/26/2025 6:50 AM  Surgery related to: Vaginal Delivery    Staffing  Performing Provider: Janette Anderson MD  Authorizing Provider: Janette Anderson MD    Staffing  Performed by: Janette Anderson MD  Authorized by: Janette Andersno MD        Preanesthetic Checklist  Completed: patient identified, IV checked, risks and benefits discussed, surgical consent, monitors and equipment checked, pre-op evaluation, timeout performed, anesthesia consent given, hand hygiene performed and patient being monitored  Preparation  Patient position: sitting  Prep: ChloraPrep  Patient monitoring: Pulse Ox  Reason for block: primary anesthetic   Epidural  Skin Anesthetic: lidocaine 1%  Skin Wheal: 3 mL  Administration type: continuous  Approach: midline  Interspace: L3-4    Injection technique: KENNY air  Needle and Epidural Catheter  Needle type: Tuohy   Needle gauge: 17  Needle length: 3.5 inches  Needle insertion depth: 6 cm  Catheter type: springwProfista  Catheter size: 19 G  Catheter at skin depth: 10 cm  Insertion Attempts: 1  Test dose: 3 mL of lidocaine 1.5% with Epi 1-to-200,000  Additional Documentation: incremental injection, negative aspiration for heme and CSF, no paresthesia on injection, no signs/symptoms of IV or SA injection, no significant pain on injection and no significant complaints from patient  Needle localization: anatomical landmarks  Medications:  Volume per aspiration: 5 mL  Time between aspirations: 5 minutes   Assessment  Ease of block: easy  Patient's tolerance of the procedure: comfortable throughout block and no complaints No inadvertent dural puncture with Tuohy.  Dural puncture performed with spinal needle.

## 2025-07-27 PROBLEM — D62 ABLA (ACUTE BLOOD LOSS ANEMIA): Status: ACTIVE | Noted: 2025-07-27

## 2025-07-27 LAB
ABSOLUTE EOSINOPHIL (OHS): 0.07 K/UL
ABSOLUTE MONOCYTE (OHS): 0.89 K/UL (ref 0.3–1)
ABSOLUTE NEUTROPHIL COUNT (OHS): 7.67 K/UL (ref 1.8–7.7)
BASOPHILS # BLD AUTO: 0.03 K/UL
BASOPHILS NFR BLD AUTO: 0.3 %
ERYTHROCYTE [DISTWIDTH] IN BLOOD BY AUTOMATED COUNT: 14.9 % (ref 11.5–14.5)
HCT VFR BLD AUTO: 29.2 % (ref 37–48.5)
HGB BLD-MCNC: 9.2 GM/DL (ref 12–16)
IMM GRANULOCYTES # BLD AUTO: 0.03 K/UL (ref 0–0.04)
IMM GRANULOCYTES NFR BLD AUTO: 0.3 % (ref 0–0.5)
LYMPHOCYTES # BLD AUTO: 1.49 K/UL (ref 1–4.8)
MCH RBC QN AUTO: 24.9 PG (ref 27–31)
MCHC RBC AUTO-ENTMCNC: 31.5 G/DL (ref 32–36)
MCV RBC AUTO: 79 FL (ref 82–98)
NUCLEATED RBC (/100WBC) (OHS): 0 /100 WBC
PLATELET # BLD AUTO: 190 K/UL (ref 150–450)
PMV BLD AUTO: 11.4 FL (ref 9.2–12.9)
RBC # BLD AUTO: 3.7 M/UL (ref 4–5.4)
RELATIVE EOSINOPHIL (OHS): 0.7 %
RELATIVE LYMPHOCYTE (OHS): 14.6 % (ref 18–48)
RELATIVE MONOCYTE (OHS): 8.7 % (ref 4–15)
RELATIVE NEUTROPHIL (OHS): 75.4 % (ref 38–73)
WBC # BLD AUTO: 10.18 K/UL (ref 3.9–12.7)

## 2025-07-27 PROCEDURE — 36415 COLL VENOUS BLD VENIPUNCTURE: CPT | Performed by: OBSTETRICS & GYNECOLOGY

## 2025-07-27 PROCEDURE — 25000003 PHARM REV CODE 250: Performed by: STUDENT IN AN ORGANIZED HEALTH CARE EDUCATION/TRAINING PROGRAM

## 2025-07-27 PROCEDURE — 85025 COMPLETE CBC W/AUTO DIFF WBC: CPT | Performed by: OBSTETRICS & GYNECOLOGY

## 2025-07-27 PROCEDURE — 63600175 PHARM REV CODE 636 W HCPCS: Mod: JZ,TB | Performed by: STUDENT IN AN ORGANIZED HEALTH CARE EDUCATION/TRAINING PROGRAM

## 2025-07-27 PROCEDURE — 25000003 PHARM REV CODE 250

## 2025-07-27 PROCEDURE — 11000001 HC ACUTE MED/SURG PRIVATE ROOM

## 2025-07-27 RX ORDER — LANOLIN ALCOHOL/MO/W.PET/CERES
1 CREAM (GRAM) TOPICAL DAILY
Status: DISCONTINUED | OUTPATIENT
Start: 2025-07-28 | End: 2025-07-29 | Stop reason: HOSPADM

## 2025-07-27 RX ORDER — OXYCODONE HYDROCHLORIDE 5 MG/1
5 TABLET ORAL EVERY 4 HOURS PRN
Refills: 0 | Status: DISCONTINUED | OUTPATIENT
Start: 2025-07-27 | End: 2025-07-29 | Stop reason: HOSPADM

## 2025-07-27 RX ORDER — OXYCODONE HYDROCHLORIDE 10 MG/1
10 TABLET ORAL EVERY 4 HOURS PRN
Refills: 0 | Status: DISCONTINUED | OUTPATIENT
Start: 2025-07-27 | End: 2025-07-29 | Stop reason: HOSPADM

## 2025-07-27 RX ADMIN — OXYCODONE HYDROCHLORIDE 10 MG: 10 TABLET ORAL at 08:07

## 2025-07-27 RX ADMIN — KETOROLAC TROMETHAMINE 30 MG: 30 INJECTION, SOLUTION INTRAMUSCULAR at 01:07

## 2025-07-27 RX ADMIN — KETOROLAC TROMETHAMINE 30 MG: 30 INJECTION, SOLUTION INTRAMUSCULAR at 08:07

## 2025-07-27 RX ADMIN — DOCUSATE SODIUM 200 MG: 100 CAPSULE, LIQUID FILLED ORAL at 08:07

## 2025-07-27 RX ADMIN — OXYCODONE HYDROCHLORIDE 10 MG: 10 TABLET ORAL at 12:07

## 2025-07-27 RX ADMIN — PRENATAL VIT W/ FE FUMARATE-FA TAB 27-0.8 MG 1 TABLET: 27-0.8 TAB at 08:07

## 2025-07-27 RX ADMIN — OXYCODONE HYDROCHLORIDE 10 MG: 10 TABLET ORAL at 09:07

## 2025-07-27 RX ADMIN — ACETAMINOPHEN 650 MG: 325 TABLET, FILM COATED ORAL at 05:07

## 2025-07-27 RX ADMIN — IBUPROFEN 800 MG: 400 TABLET ORAL at 05:07

## 2025-07-27 RX ADMIN — OXYCODONE HYDROCHLORIDE 10 MG: 10 TABLET ORAL at 05:07

## 2025-07-27 RX ADMIN — ACETAMINOPHEN 650 MG: 325 TABLET ORAL at 01:07

## 2025-07-27 NOTE — PLAN OF CARE
To follow lactation education. Continue to feed on cue 8 or more times in 24 hours. Supplement with donor milk PRN. Call PRN for assist/questions

## 2025-07-27 NOTE — PLAN OF CARE
VSS Patient denies HA, dizziness, vision changes, and RUQ pain. Pt safety maintained, side rails up, bed low and locked position. Pt ambulating independently and voiding without difficulty. Pain  controlled with  pain medication. Fundus is midline and firm with minimal lochia. Hydrocolloid dressing in place; dressing is clean and dry with no signs of infection. Significant other at bedside and attentive to patient's needs; parents responding to infant cues and bonding appropriately.Mom tries to learn  using the special bottle,pump and breastfeeding the baby.No additional needs at this time.

## 2025-07-27 NOTE — LACTATION NOTE
07/27/25 0935   Maternal Assessment   Breast Shape Bilateral:;round   Breast Density Bilateral:;soft   Areola Bilateral:;elastic   Nipples Bilateral:;everted;graspable   Maternal Infant Feeding   Maternal Preparation hand hygiene   Maternal Emotional State assist needed;relaxed   Pain with Feeding no   Latch Assistance no   Additional Documentation Breastfeeding Supplementation (Group)   Breastfeeding Supplementation   Infant Indication for Supplementation other (see comments)  (cleft)   Breastfeeding Supplementation Type expressed breast milk;donor breast milk   Equipment Type   Breast Pump Type double electric, personal;other (see comments)  (considering starting pumping with hospital pump)   Community Referrals   Community Referrals support group;pediatric care provider;outpatient lactation program     LC to room for initial consult: introduced self, reviewed BF Hx and feedings with current infant up to date. Client reports that she BF her first child for 13 months with no issues with maternal-led weaning.   Current infant, per client, is doing well on the L breast more than the R, and did not take the specialty nipple/bottle well. Parents would like more demo/edu on syringe feeding.   LC assisted couplet with s2s initiation, infant asleep, no feeding cues noted. Discussed upcoming speech eval and encouraged parents to call LC after OR if infant showing feeding cues. Parents v/u.  Speech arrived, updated, and LC called [facility] RN for 10 ml donor milk to bedside. Extension on whiteboard, v/u how to call.

## 2025-07-27 NOTE — ANESTHESIA POSTPROCEDURE EVALUATION
Anesthesia Post Evaluation    Patient: Nina Durant    Procedure(s) Performed: * No procedures listed *    Final Anesthesia Type: epidural      Patient location during evaluation: med/surg floor  Patient participation: Yes- Able to Participate  Level of consciousness: awake and alert and oriented  Post-procedure vital signs: reviewed and stable  Pain management: adequate  Airway patency: patent  ADRIEL mitigation strategies: Multimodal analgesia  PONV status at discharge: No PONV  Anesthetic complications: no      Cardiovascular status: hemodynamically stable  Respiratory status: unassisted, spontaneous ventilation and room air  Hydration status: euvolemic  Follow-up not needed.              Vitals Value Taken Time   /76 07/27/25 12:55   Temp 36.4 °C (97.5 °F) 07/27/25 12:55   Pulse 82 07/27/25 12:55   Resp 18 07/27/25 12:55   SpO2 100 % 07/27/25 12:55         No case tracking events are documented in the log.      Pain/Timothy Score: Pain Rating Prior to Med Admin: 9 (7/27/2025 12:53 PM)

## 2025-07-27 NOTE — PROGRESS NOTES
POSTPARTUM PROGRESS NOTE    Subjective:     PPD/POD#: 1   Procedure: Repeat LTCS (Uterine Window discovered during surgery)   EGA: 39w1d   N/V: No   F/C: No   Abd Pain: Mild, well-controlled with oral pain medication   Lochia: Mild   Voiding: Yes   Ambulating: Yes   Bowel fnc: Yes   Contraception: Interval IUD     Objective:      Temp:  [97.4 °F (36.3 °C)-99.3 °F (37.4 °C)] 98.1 °F (36.7 °C)  Pulse:  [] 90  Resp:  [16-18] 18  SpO2:  [93 %-100 %] 98 %  BP: (101-148)/(57-93) 101/61    Abdomen: Soft, appropriately tender   Uterus: Firm, no fundal tenderness   Incision: Bandage in place with minimal shadowing     Lab Review    Recent Labs   Lab 07/25/25  0447   *   K 3.5      CO2 17*   BUN 3*   CREATININE 0.6   GLU 97   PROT 6.9   BILITOT 0.3   ALKPHOS 150   ALT 10   AST 27       Recent Labs   Lab 07/25/25  0447   WBC 7.73   HGB 11.5*   HCT 36.1*   MCV 77*            I/O    Intake/Output Summary (Last 24 hours) at 7/27/2025 0711  Last data filed at 7/27/2025 0600  Gross per 24 hour   Intake 1254.51 ml   Output 5390 ml   Net -4135.49 ml        Assessment and Plan:   Postpartum care:  - Patient doing well.  - Continue routine management and advances.    gHTN (H/o PreE)  - BP as above  - asymptomatic  - preE labs as above  - UOP: 2.9 cc/kg/hr  - Mag: not indicated  - Hypertensive agent not indicated at this time, will continue to monitor     Crohn's    - On Becki Baptiste MD  OB/GYN PGY-3

## 2025-07-28 PROCEDURE — 25000003 PHARM REV CODE 250

## 2025-07-28 PROCEDURE — 25000003 PHARM REV CODE 250: Performed by: STUDENT IN AN ORGANIZED HEALTH CARE EDUCATION/TRAINING PROGRAM

## 2025-07-28 PROCEDURE — 11000001 HC ACUTE MED/SURG PRIVATE ROOM

## 2025-07-28 RX ADMIN — OXYCODONE HYDROCHLORIDE 10 MG: 10 TABLET ORAL at 02:07

## 2025-07-28 RX ADMIN — OXYCODONE HYDROCHLORIDE 10 MG: 10 TABLET ORAL at 08:07

## 2025-07-28 RX ADMIN — IBUPROFEN 800 MG: 400 TABLET ORAL at 02:07

## 2025-07-28 RX ADMIN — IBUPROFEN 800 MG: 400 TABLET ORAL at 10:07

## 2025-07-28 RX ADMIN — ACETAMINOPHEN 650 MG: 325 TABLET, FILM COATED ORAL at 05:07

## 2025-07-28 RX ADMIN — FERROUS SULFATE TAB 325 MG (65 MG ELEMENTAL FE) 1 EACH: 325 (65 FE) TAB at 08:07

## 2025-07-28 RX ADMIN — ACETAMINOPHEN 650 MG: 325 TABLET, FILM COATED ORAL at 11:07

## 2025-07-28 RX ADMIN — DOCUSATE SODIUM 200 MG: 100 CAPSULE, LIQUID FILLED ORAL at 08:07

## 2025-07-28 RX ADMIN — IBUPROFEN 800 MG: 400 TABLET ORAL at 05:07

## 2025-07-28 RX ADMIN — PRENATAL VIT W/ FE FUMARATE-FA TAB 27-0.8 MG 1 TABLET: 27-0.8 TAB at 08:07

## 2025-07-28 RX ADMIN — ACETAMINOPHEN 650 MG: 325 TABLET, FILM COATED ORAL at 12:07

## 2025-07-28 RX ADMIN — OXYCODONE HYDROCHLORIDE 10 MG: 10 TABLET ORAL at 03:07

## 2025-07-28 NOTE — NURSING
Patient safety maintained, side rails up, bed low and locked position. Pt ambulating and voiding independently. Pain well controlled with PRN pain medication. Fundus midline, firm, with moderate lochia. Patient responding to infant cues. Dressing clean, dry, and intact. Significant other at bedside and assisting in patient's care. No further needs at this time.

## 2025-07-28 NOTE — ADDENDUM NOTE
Addendum  created 07/28/25 0822 by Samira Harrington    Charge Capture section accepted       Ear Wedge Repair Text: A wedge excision was completed by carrying down an excision through the full thickness of the ear and cartilage with an inward facing Burow's triangle. The wound was then closed in a layered fashion.

## 2025-07-28 NOTE — LACTATION NOTE
07/27/25 1652   Maternal Assessment   Breast Shape Bilateral:;round   Breast Density Bilateral:;soft   Maternal Infant Feeding   Maternal Preparation breast care;hand hygiene   Maternal Emotional State assist needed;anxious   Infant Positioning clutch/football;cross-cradle   Signs of Milk Transfer audible swallow;infant jaw motion present   Pain with Feeding no   Nipple Shape After Feeding, Left round   Latch Assistance yes   Additional Documentation Breastfeeding Supplementation (Group)   Breastfeeding Supplementation   Breastfeeding Supplementation Type donor breast milk   Method of Supplementation other (see comments)  (specialty nipple/bottle)   Equipment Type   Breast Pump Type double electric, hospital grade  (encouraged to pump post-feeds PRN or if only offering supplementation for a feeding)   Breast Pumping   Breast Pumping Interventions post-feed pumping encouraged   Breast Pumping hand expression utilized     MBU RN notified LC that client was feeling concerned about infant's ability to take the specialty bottle. Discussed techniques with speech and went to room. Assisted with BF session first. Infant did well at breast, multiple swallows noted. 5 ml given of donor milk post-BF with specialty bottle. Educated client on technique and angle to support cleft. Client v/u and stated that she felt less anxious after support from lactation. All questions answered, extension on whiteboard to call PRN, MBU RN updated.

## 2025-07-28 NOTE — PLAN OF CARE
To follow lactation education. Continue to feed on cue 8 or more times in 24 hours and to continue speech and specialty consults/follow-ups. Call PRN for assist/questions

## 2025-07-28 NOTE — LACTATION NOTE
07/28/25 0920   Maternal Assessment   Breast Shape Bilateral:;round   Breast Density Bilateral:;soft   Maternal Infant Feeding   Maternal Preparation hand hygiene   Maternal Emotional State anxious   Pain with Feeding no   Latch Assistance no   Breast Pumping   Breast Pumping Interventions post-feed pumping encouraged   Community Referrals   Community Referrals support group     LC to room for follow-up consult: reviewed last night/this morning's feedings. Client anxious and crying intermittently while recounting infant's spit up episodes post-bottle feedings. Additional questions asked for clarity. Client stated that infant did well at the breast with no post-BF spit up episodes but would gag/spit up an hour or 2 after bottle feeds. She expressed frustration. Encouragement provided, reassured client that she has BF and bottle feeding support from us and to see speech again today. Encouraged client to call during next speech consult so LC can be present for POC discussion. Client v/u and encouraged to call for next feeding. Message sent to SLP, Peds, and MBU RN.   Extension on whiteboard, v/u how to call.

## 2025-07-28 NOTE — PROGRESS NOTES
POSTPARTUM PROGRESS NOTE    Subjective:     PPD/POD#: 2   Procedure: Repeat LTCS (Uterine Window discovered during surgery)   EGA: 39w1d   N/V: No   F/C: No   Abd Pain: Mild, well-controlled with oral pain medication   Lochia: Mild   Voiding: Yes   Ambulating: Yes   Bowel fnc: Yes, + flatus   Contraception: Interval IUD     Objective:      Temp:  [97.5 °F (36.4 °C)-98.4 °F (36.9 °C)] 98 °F (36.7 °C)  Pulse:  [] 79  Resp:  [16-18] 18  SpO2:  [94 %-100 %] 96 %  BP: ()/(60-88) 95/60    Abdomen: Soft, appropriately tender   Uterus: Firm, no fundal tenderness   Incision: Bandage in place with minimal shadowing on right side     Lab Review    Recent Labs   Lab 07/25/25  0447   *   K 3.5      CO2 17*   BUN 3*   CREATININE 0.6   GLU 97   PROT 6.9   BILITOT 0.3   ALKPHOS 150   ALT 10   AST 27     Recent Labs   Lab 07/25/25  0447 07/27/25  0724   WBC 7.73 10.18   HGB 11.5* 9.2*   HCT 36.1* 29.2*   MCV 77* 79*    190     I/O    Intake/Output Summary (Last 24 hours) at 7/28/2025 0628  Last data filed at 7/27/2025 1500  Gross per 24 hour   Intake --   Output 1050 ml   Net -1050 ml        Assessment and Plan:   Postpartum care:  - Patient doing well.  - Continue routine management and advances.    gHTN (H/o PreE)  - BP as above  - asymptomatic  - preE labs as above  - UOP: 2.9 cc/kg/hr  - Mag: not indicated  - Hypertensive agent not indicated at this time, will continue to monitor     Crohn's    - On Becki Ontiveros M.D.  Obstetrics and Gynecology  PGY-1

## 2025-07-29 ENCOUNTER — PATIENT MESSAGE (OUTPATIENT)
Dept: LACTATION | Facility: CLINIC | Age: 30
End: 2025-07-29
Payer: COMMERCIAL

## 2025-07-29 VITALS
TEMPERATURE: 98 F | SYSTOLIC BLOOD PRESSURE: 129 MMHG | HEART RATE: 81 BPM | DIASTOLIC BLOOD PRESSURE: 86 MMHG | RESPIRATION RATE: 18 BRPM | OXYGEN SATURATION: 100 %

## 2025-07-29 PROBLEM — O13.9 GESTATIONAL HTN: Status: ACTIVE | Noted: 2023-10-04

## 2025-07-29 PROBLEM — O13.9 GESTATIONAL HYPERTENSION, ANTEPARTUM: Status: RESOLVED | Noted: 2023-09-12 | Resolved: 2025-07-29

## 2025-07-29 PROCEDURE — 25000003 PHARM REV CODE 250

## 2025-07-29 PROCEDURE — 94760 N-INVAS EAR/PLS OXIMETRY 1: CPT

## 2025-07-29 PROCEDURE — 25000003 PHARM REV CODE 250: Performed by: STUDENT IN AN ORGANIZED HEALTH CARE EDUCATION/TRAINING PROGRAM

## 2025-07-29 RX ORDER — IBUPROFEN 600 MG/1
600 TABLET, FILM COATED ORAL EVERY 6 HOURS
Qty: 30 TABLET | Refills: 1 | Status: SHIPPED | OUTPATIENT
Start: 2025-07-29

## 2025-07-29 RX ORDER — DEXTROMETHORPHAN HYDROBROMIDE, GUAIFENESIN 5; 100 MG/5ML; MG/5ML
650 LIQUID ORAL EVERY 6 HOURS
Qty: 30 TABLET | Refills: 1 | Status: SHIPPED | OUTPATIENT
Start: 2025-07-29

## 2025-07-29 RX ORDER — LIDOCAINE 50 MG/G
1 PATCH TOPICAL
Status: DISCONTINUED | OUTPATIENT
Start: 2025-07-29 | End: 2025-07-29 | Stop reason: HOSPADM

## 2025-07-29 RX ORDER — DOCUSATE SODIUM 100 MG/1
100 CAPSULE, LIQUID FILLED ORAL 2 TIMES DAILY
Qty: 30 CAPSULE | Refills: 1 | Status: SHIPPED | OUTPATIENT
Start: 2025-07-29

## 2025-07-29 RX ORDER — OXYCODONE HYDROCHLORIDE 5 MG/1
5 TABLET ORAL EVERY 4 HOURS PRN
Qty: 15 TABLET | Refills: 0 | Status: SHIPPED | OUTPATIENT
Start: 2025-07-29

## 2025-07-29 RX ORDER — FERROUS SULFATE 325(65) MG
325 TABLET ORAL DAILY
Qty: 30 TABLET | Refills: 1 | Status: SHIPPED | OUTPATIENT
Start: 2025-07-29

## 2025-07-29 RX ADMIN — PRENATAL VIT W/ FE FUMARATE-FA TAB 27-0.8 MG 1 TABLET: 27-0.8 TAB at 08:07

## 2025-07-29 RX ADMIN — DOCUSATE SODIUM 200 MG: 100 CAPSULE, LIQUID FILLED ORAL at 08:07

## 2025-07-29 RX ADMIN — ACETAMINOPHEN 650 MG: 325 TABLET, FILM COATED ORAL at 01:07

## 2025-07-29 RX ADMIN — IBUPROFEN 800 MG: 400 TABLET ORAL at 08:07

## 2025-07-29 RX ADMIN — IBUPROFEN 800 MG: 400 TABLET ORAL at 01:07

## 2025-07-29 RX ADMIN — OXYCODONE HYDROCHLORIDE 10 MG: 10 TABLET ORAL at 01:07

## 2025-07-29 RX ADMIN — OXYCODONE HYDROCHLORIDE 10 MG: 10 TABLET ORAL at 07:07

## 2025-07-29 RX ADMIN — ACETAMINOPHEN 650 MG: 325 TABLET, FILM COATED ORAL at 12:07

## 2025-07-29 RX ADMIN — FERROUS SULFATE TAB 325 MG (65 MG ELEMENTAL FE) 1 EACH: 325 (65 FE) TAB at 08:07

## 2025-07-29 NOTE — DISCHARGE INSTRUCTIONS
Discharge Instructions    The AAP recommends exclusive breastfeeding for about the first 6 months of age and as solid foods are introduced, continued breastfeeding  for at least 2 years or longer.    Feed the baby at the earliest sign of hunger or comfort (see signs on the back cover of the Mother's Breastfeeding Guide)  Hands to mouth, sucking motions  Rooting or searching for something to suck on  Dont wait for crying - it is a sign of distress    The feedings may be 8-12 times per 24hrs and will not follow a schedule  Avoid pacifiers and bottles for the first 4 weeks  Alternate the breast you start the feeding with, or start with the breast that feels the fullest  Switch breasts when the baby takes himself off the breast or falls asleep  Keep offering breasts until the baby looks full, no longer gives hunger signs, and stays asleep when placed on his back in the crib  If the baby is sleepy and wont wake for a feeding, put the baby skin-to-skin dressed in a diaper against the mothers bare chest  Sleep with your baby in your room near you  The baby should be positioned and latched on to the breast correctly  Chest-to-chest, chin in the breast  Babys lips are flipped outward  Babys mouth is stretched open wide like a shout  Babys sucking should feel like tugging to the mother  The baby should be drinking at the breast:  You should hear swallowing or gulping throughout the feeding  You should see milk on the babys lips when he comes off the breast  Your breasts should be softer when the baby is finished feeding  The baby should look relaxed at the end of feedings  After the 4th day and your milk is in:  The babys poop should turn bright yellow and be loose, watery, and seedy  The baby should have at least 3-4 poops the size of the palm of your hand per day  The baby should have at least 5-6 wet diapers per day  The urine should be light yellow in color  You should drink when you are thirsty and eat a  healthy diet when you are    hungry.     Take naps to get the rest you need.   Take medications and/or drink alcohol only with permission of your obstetrician    or the babys pediatrician.  You can also call the Infant Risk Center,   ([phone]), Monday-Friday, 8am-5pm Central time, to get the most   up-to-date evidence-based information on the use of medications during   pregnancy and breastfeeding.      Complete the First Alert form in the Mother's Breastfeeding Guide 3-5 days after the baby's birth.  Please call the Breastfeeding Warmline ([phone]) or the baby's pediatrician if you have any concerns.    The baby should be examined by a pediatrician at 3-5 days of age and again at 2 weeks of age.  Once your milk production increases, the baby should be gaining at least ½ - 1oz each day and should be back to birthweight no later than 10-14 days of age.  If this is not the case, please call the Breastfeeding Warmline ([phone]) for assistance and support.    Community Resources    [facility] Breastfeeding Warmline: [phone]   Local [facility] clinics: provide incentives and breastpumps to eligible mothers 8-539-832-BABY  La Leche League International (LLLI):  mother-to-mother support group website        www.lll.UKDN Waterflow  [facility] La Leche League mother-to-mother support groups:        www.lllShop 9 Seven.Material [place]        La Leche League [facility]   Dr. Micheal Clark website for latch videos and general information:        www.breastfeedinginc.ca  Infant Risk Center is a call center that provides information about the safety of taking medications while breastfeeding.  Call 5-243-020-4273, M-F, 8am-5pm, [place].  International Lactation Consultant Association provides resources for assistance:        www.ilca.org  [place] Breastfeeding Coalition provides informationand resources for parents  and the community    www.LaBreastfeedingSupport.org  Cayla Murillo is a mom-to-mom support group:                              www.nolanesting.com//breastfeedng-support/  Partners for Healthy Babies:  2-439-791-BABY(1679)  Cafe au Lait: a breastfeeding support group for women of color, [phone]         .Community Resources for Breastfeeding Mothers:   Hospital Breastfeeding Centers/ Lactation Consultants:   [facility]........................................................................................[phone]  [facility]....................................................................................[phone]   [facility]..........................................................................................[phone]   [facility].................................................................................[phone]   [facility].......................................................[phone]   [facility].................................................................[phone]   [facility].......................................................................[phone]   [facility]..................................................[phone]   [facility].....................................................................[phone]      AAPCC (Poison Control)...........5-318-744-1050  PoisonHelp.org   Free medical advice 24/7 through the Poison Help Line and the online tool      Online Resources:   International Breastfeeding [place] ...............................................................................ibconline.ca   Dr Micheal Clark online resource provides videos, articles, and information sheets.     Coeffective...............................................................................................................coeffective.com   Download the free mobile gigi to help get off to a great start with breastfeeding.    Droplet.....................................................................................................................Urbful.LynxIT Solutions Media...........................................................................................DRC Computer.Lender Sentinel   Videos that teach and empower mothers and caregivers   Infant Risk Center.............................................................................[phone]      SproutBox   Provides up to date information for medication use by moms during pregnancy and while breastfeeding.   Tiffanie Menchaca....................................................................................................................Shop Airlinesmom.com   Provides online information on breastfeeding and parenting      La Leche Leandrea........................................................................................ lllalmsla.org   /   llli.org   Mother-to-mother support groups with education, information support, and encouragement    Work and Pump........................................................................................... WildFire Connections.com   Information about breastfeeding for working moms     [place] Resources:   [place] Breastfeeding CoalUnited [facility]............................... [phone]    [facility]   Find local breastfeeding support   [place] Breastfeeding Support............................................................ LaBreastfeedingSport.org   Zip code search of breastfeeding resources in your area   Partners for Healthy Babies............................................................9-287-245-2631   0494890foee.org   Connects [place] moms and their families to health and pregnancy resources.  24/7   WIC (Women, Infant, Children)......................................................... 7-393-659-4129   [facility].la.gov/WIC   Download the Pacify gigi, get code from [facility] office    [facility] Resources:     Baby  Cafe............................................................................................................. babycafeusa.org   Free, drop in, informal breastfeeding support groups offering professional lactation care and intervention.    [facility] [place] [facility]....................................... birthmarkdoulas.com   Infant feeding drop in clinics, Lactation services, support groups, education programs   Cafe au Lait...............................................[phone]   Seclore.com/groups/[facility]   Free breastfeeding support group for families of color   Mothers Milk Bank of [place] at [facility]....................................................[phone]                                                             [facility]/services/mothers-milk-bank-at-[facility] LagBannermanDeaconess Inc[facility] Lactation, LLC.................... [email or web address]..................[phone]    [employer]ing..................................................................................[phone] nolanesting.com   In person and virtual support for families through pregnancy, birth, and early parenthood.       [facility] [place]- Dr. Gladis Finley.......................[phone]   [address] [place] [address]                                  www.AllTrailsastfeedingVeriFone   [email or web address]   Anastasia Justice RN, IBCLC  In-Home Lactation Consultant [email or web address] [phone] (Text or Call)     [facility] Lactation Services   [email or web address]  [phone]  [address] [place] [address], [facility] [place] [address]    Healthy Start [place].....................................[phone] (Forbes)  [phone] ([place])   [facility].gov/health-department/healthy-start   Serves women of childbearing age and addresses issues for pregnant women and their  children from birth  to age two.          La Leche League- Jose J Paris............................. KARLPathfinder Technologies.Estify/ Vibrado Technologies.com/ theron   In person and virtual mother to mother support groups with education, information support and   encouragement to women who want to breastfeed      [place] Resources:   Breastfeeding Resources- [facility].....msd.ms.gov (under womens services)   Find resources and info about planning for breastfeeding, its benefits, and help with breastfeeding  s uccessfully.    [place] For Pregnancy Choices- [place]....................................... LetsCram   [phone]   [address] [facility] [place], [place]. Call or text 24/7   [facility].......................................................3VRastfeedingWorking Equityer.Estify   [facility] Lactation Consultants [facility], including [facility],   [facility], and surrounding areas.    [place] Breastfeeding Coalition...............................................................................msc.org   Promotes and supports breastfeeding with families, health providers, and communities.   [facility] breastfeeding Coalition.....................................................................smbfc.org   Find breastfeeding resources and support groups in your area.    [facility] Nutrition Program- [place] Department of Health.................................... [place].gov

## 2025-07-29 NOTE — DISCHARGE SUMMARY
Delivery Discharge Summary  Obstetrics      Primary OB Clinician: Pina Figueroa MD     Admission date: 2025  Discharge date: 2025    Disposition: To home, self care    Discharge Diagnosis List:Problem List[1]    Procedure: , due to maternal request for rLTCS. Incidental finding: uterine window     Hospital Course:  Nina Durant is a 30 y.o. now , POD #3 who was admitted on 2025 at 39w0d for IOL. IUP complicated by  polyhydraminos, Crohns disease (on Stelara), history of Csx1 (failed IOL), history of PreE (on ASA), gHTN, and ABLA.  Patient was subsequently admitted to labor and delivery unit with signed consents.     Labor course was complicated by 9 hours s/p amniotomy and pit augmentation with 8h with unchanged cervix. Maternal request for repeat C/S. The decision was made to proceed with delivery via  which was performed without complications. Of note, a uterine window was identified intra-op.     Please see delivery note for further details. Her postpartum course was uncomplicated. On discharge day, patient's pain is controlled with oral pain medications. Pt is tolerating ambulation without SOB or CP, and regular diet without N/V. Reports lochia is mild. Denies any HA, vision changes, F/C, LE swelling. Denies any breast pain/soreness.    Pt in stable condition and ready for discharge. She has been instructed to start and/or continue medications and follow up with her obstetrics provider as listed below.    Pertinent studies:  Recent Labs   Lab 25  0447 25  0724   WBC 7.73 10.18   HGB 11.5* 9.2*   HCT 36.1* 29.2*   MCV 77* 79*    190      Immunization History   Administered Date(s) Administered    DTP 1997, 1997, 1997, 1998, 2001    DTaP 1997, 1997, 1997, 1998, 2001    Hep B Immune Globulin 1997    Hepatitis B 1997, 1998, 1998    Hepatitis B,  "Pediatric/Adolescent 1997, 1997, 1998, 1998    HiB PRP-OMP 1997, 1997, 1998    Hib-HbOC 1997, 1997, 1998    IPV 1997, 1997, 1998, 2001    MMR 1998, 2001    Meningococcal 2008, 2015    Meningococcal Conjugate (MCV4P) 2008, 2015    OPV 1997, 1997, 1998, 2001    Pneumococcal Conjugate - 7 Valent 1997, 1997    Tdap 2008, 2019, 2023, 2025    Varicella 1997, 1998, 1998        Delivery:    Episiotomy: None   Lacerations: None   Repair suture: None   Repair # of packets:     Blood loss (ml):       Birth information:  YOB: 2023   Time of birth: 1:18 PM   Sex: female   Delivery type: , Low Transverse   Gestational Age: 39w1d     Measurements    Weight: 3270 g  Weight (lbs): 7 lb 3.3 oz  Length: 52.1 cm  Length (in): 20.5"  Head circumference: 36.5 cm  Chest circumference: 33.5 cm         Delivery Clinician: Delivery Providers    Delivering clinician: Pina Figueroa MD   Provider Role    Génesis Rios RN Delivery Nurse    Emely Alva MD Resident    [facility]    Tommy Potts RN Delivery Assist             Additional  information:  Forceps:    Vacuum:    Breech:    Observed anomalies      Living?:     Apgars    Living status: Living  Apgar Component Scores:  1 min.:  5 min.:  10 min.:  15 min.:  20 min.:    Skin color:  0  1       Heart rate:  2  2       Reflex irritability:  2  2       Muscle tone:  2  2       Respiratory effort:  2  2       Total:  8  9       Apgars assigned by: DANIEL WRIGHT RN         Placenta: Delivered:       appearance        2025     4:00 PM 2023    10:17 AM 10/16/2023    11:00 AM   Letts  Depression Scale   I have been able to laugh and see the funny side of things. 0 0 0   I have looked forward with enjoyment to things. 0 0 0 "   I have blamed myself unnecessarily when things went wrong. 0 0 0   I have been anxious or worried for no good reason. 0 2 0   I have felt scared or panicky for no good reason. 0 0 0   Things have been getting on top of me. 0 0 0   I have been so unhappy that I have had difficulty sleeping. 0 0 0   I have felt sad or miserable. 0 0 0   I have been so unhappy that I have been crying. 0 0 0   The thought of harming myself has occurred to me. 0 0 0       Patient Instructions:   Current Discharge Medication List        START taking these medications    Details   acetaminophen (TYLENOL) 650 MG TbSR Take 1 tablet (650 mg total) by mouth every 6 (six) hours. Alternate between ibuprofen and tylenol every 3 hours. For example: @0800: ibuprofen 600mg @1100: tylenol 1000mg @1400: ibuprofen 600mg @1700: tylenol 1000 mg @2000: ibuprofen 600mg  Qty: 30 tablet, Refills: 1      docusate sodium (COLACE) 100 MG capsule Take 1 capsule (100 mg total) by mouth 2 (two) times daily.  Qty: 30 capsule, Refills: 1      ferrous sulfate (IRON) 325 mg (65 mg iron) Tab tablet Take 1 tablet (325 mg total) by mouth once daily.  Qty: 30 tablet, Refills: 1      ibuprofen (ADVIL,MOTRIN) 600 MG tablet Take 1 tablet (600 mg total) by mouth every 6 (six) hours. Alternate between ibuprofen and tylenol every 3 hours. For example: @0800: ibuprofen 600mg @1100: tylenol 1000mg @1400: ibuprofen 600mg @1700: tylenol 1000 mg @2000: ibuprofen 600mg  Qty: 30 tablet, Refills: 1      oxyCODONE (ROXICODONE) 5 MG immediate release tablet Take 1 tablet (5 mg total) by mouth every 4 (four) hours as needed for Pain.  Qty: 15 tablet, Refills: 0    Comments: Quantity prescribed more than 7 day supply? No  Associated Diagnoses: Status post repeat low transverse  section           CONTINUE these medications which have NOT CHANGED    Details   LIDOcaine (LIDODERM) 5 % Place 1 patch onto the skin once daily. To affected area. Remove & Discard patch within 12 hours or  as directed by MD  Qty: 10 patch, Refills: 0    Associated Diagnoses: Skin pain      ondansetron (ZOFRAN) 4 MG tablet Take 1 tablet (4 mg total) by mouth every 6 (six) hours as needed for Nausea.  Qty: 30 tablet, Refills: 2    Associated Diagnoses: Nausea      polyethylene glycol 3350 (MIRALAX ORAL) Take by mouth. 1 cap every few days      prenatal comb no.42-folic acid (PRENA1 CHEW) 1.4 mg chbp Take by mouth.      ustekinumab (STELARA) 90 mg/mL Syrg syringe INJECT 1 SYRINGE SUBCUTANEOUSLY  EVERY 8 WEEKS  Qty: 1 mL, Refills: 2    Associated Diagnoses: Crohn's disease of small intestine with intestinal obstruction           STOP taking these medications       aspirin (ECOTRIN) 81 MG EC tablet Comments:   Reason for Stopping:               Discharge Procedure Orders   Diet Adult Regular     Lifting restrictions   Order Comments: No lifting anything larger than baby for 6 weeks     No driving until:   Order Comments: No driving while taking narcotics     Pelvic Rest   Order Comments: Until seen in clinic     Notify your health care provider if you experience any of the following:  temperature >100.4     Notify your health care provider if you experience any of the following:  persistent nausea and vomiting or diarrhea     Notify your health care provider if you experience any of the following:  severe uncontrolled pain     Notify your health care provider if you experience any of the following:  redness, tenderness, or signs of infection (pain, swelling, redness, odor or green/yellow discharge around incision site)     Notify your health care provider if you experience any of the following:  difficulty breathing or increased cough     Notify your health care provider if you experience any of the following:  severe persistent headache     Notify your health care provider if you experience any of the following:  persistent dizziness, light-headedness, or visual disturbances     Activity as tolerated        Follow-up  Information       Pina Figueroa MD Follow up in 1 week(s).    Specialty: Obstetrics and Gynecology  Why: Postpartum BP check  Contact information:  [address] [address] [phone]               Pina Figueroa MD Follow up in 6 week(s).    Specialty: Obstetrics and Gynecology  Why: Postpartum follow up  Contact information:  [address] [address] [phone]                            Ekaterina Ontiveros M.D.  Obstetrics and Gynecology  PGY-1        [1]   Patient Active Problem List  Diagnosis    Crohn's disease of small intestine with intestinal obstruction    Iron deficiency anemia due to chronic blood loss    Vitamin D deficiency    Gestational HTN    History of  section (failed IOL on MgSO4)    History of pre-eclampsia    Status post repeat low transverse  section UTERINE WINDOW    ABLA (acute blood loss anemia)

## 2025-07-29 NOTE — LACTATION NOTE
07/29/25 0845   Breasts WDL   Breast WDL WDL   Breast Pumping   Breast Pumping Interventions post-feed pumping encouraged   Breast Pumping hand expression utilized   Maternal Feeding Assessment   Maternal Preparation hand hygiene   Maternal Emotional State assist needed   Infant Positioning clutch/football   Signs of Milk Transfer infant jaw motion present   Pain with Feeding no   Comfort Measures Before/During Feeding infant position adjusted;latch adjusted;maternal position adjusted   Latch Assistance yes  (minimal to deepen)   Reproductive Interventions   Breast Care: Breastfeeding open to air   Breastfeeding Assistance feeding cue recognition promoted;feeding on demand promoted;assisted with positioning;infant latch-on verified;feeding session observed;support offered;infant suck/swallow verified   Breastfeeding Support maternal rest encouraged;maternal nutrition promoted;maternal hydration promoted;lactation counseling provided;infant-mother separation minimized;encouragement provided;diary/feeding log utilized     Lactation note: lactation rounds, discharge education reviewed. Pt to continue to nurse, pump and offer supplementation as directed by pediatrician and craniofacial team secondary to cleft palate.  Infant is able to latch and sustain with some on and off, does well with breast support and compression. Pt encouraged to continue to pump post feeding until supplementation is not necessary or as directed by care team.

## 2025-07-29 NOTE — PROGRESS NOTES
Mother Baby Care Guide reviewed. Pt verbalized understanding. Pt also verbalized understanding that she will check her BP at least once a day when discharged via [facility]. Pt also stated that she will call/come into the [facility] for any SS of HTN or for any severe range BP.

## 2025-07-29 NOTE — PROGRESS NOTES
POSTPARTUM PROGRESS NOTE    Subjective:     PPD/POD#: 3   Procedure: Repeat LTCS (Uterine Window discovered during surgery)   EGA: 39w1d   N/V: No   F/C: No   Abd Pain: Mild, well-controlled with oral pain medication   Lochia: Mild   Voiding: Yes   Ambulating: Yes   Bowel fnc: Yes, + flatus   Contraception: Interval IUD     Patient reports back pain after laying in bed for prolonged periods, improves with ambulation. Denies lightheadedness/dizziness, SOB, palpitations at rest and with ambulation.     Objective:      Temp:  [97.4 °F (36.3 °C)-98.4 °F (36.9 °C)] 98.4 °F (36.9 °C)  Pulse:  [72-98] 84  Resp:  [16-18] 17  SpO2:  [96 %-100 %] 97 %  BP: ()/(63-84) 110/74    Abdomen: Soft, appropriately tender   Uterus: Firm, no fundal tenderness   Incision: Bandage in place with minimal shadowing on right side     Lab Review    Recent Labs   Lab 07/25/25  0447   *   K 3.5      CO2 17*   BUN 3*   CREATININE 0.6   GLU 97   PROT 6.9   BILITOT 0.3   ALKPHOS 150   ALT 10   AST 27     Recent Labs   Lab 07/25/25  0447 07/27/25  0724   WBC 7.73 10.18   HGB 11.5* 9.2*   HCT 36.1* 29.2*   MCV 77* 79*    190     I/O  No intake or output data in the 24 hours ending 07/29/25 0629       Assessment and Plan:   Postpartum care:  - Patient doing well.  - Continue routine management and advances.  - Lidocaine patch ordered for back pain    gHTN (h/o PreE)  - BP as above  - asymptomatic  - preE labs as above  - Mag: not indicated  - Hypertensive agent not indicated at this time, will continue to monitor     ABLA  - H/H as above  - QBL: 540cc  - asymptomatic  - iron/colace    Crohn's    - On Becki Umaña MD  Obstetrics & Gynecology, PGY-2

## 2025-07-30 ENCOUNTER — TELEPHONE (OUTPATIENT)
Dept: OBSTETRICS AND GYNECOLOGY | Facility: CLINIC | Age: 30
End: 2025-07-30
Payer: COMMERCIAL

## 2025-07-30 ENCOUNTER — POSTPARTUM VISIT (OUTPATIENT)
Dept: OBSTETRICS AND GYNECOLOGY | Facility: CLINIC | Age: 30
End: 2025-07-30
Payer: COMMERCIAL

## 2025-07-30 VITALS
DIASTOLIC BLOOD PRESSURE: 82 MMHG | WEIGHT: 156.5 LBS | BODY MASS INDEX: 29.55 KG/M2 | SYSTOLIC BLOOD PRESSURE: 128 MMHG | HEIGHT: 61 IN

## 2025-07-30 DIAGNOSIS — Z51.89 VISIT FOR WOUND CHECK: Primary | ICD-10-CM

## 2025-07-30 PROCEDURE — 99999 PR PBB SHADOW E&M-EST. PATIENT-LVL III: CPT | Mod: PBBFAC,,, | Performed by: ADVANCED PRACTICE MIDWIFE

## 2025-07-30 NOTE — TELEPHONE ENCOUNTER
Copied from CRM #[id number]. Topic: General Inquiry - Patient Advice  >> Jul 30, 2025 11:59 AM Rober wrote:  Name of Who is Calling: Nina Durant      What is the request in detail: Pt called to report she has stomach swelling after delivery and wanted to know if she can be seen today, baby has an appt today.Please contact to further discuss and advise.          Can the clinic reply by [facility]: Y      What Number to Call Back if not in [facility]: [phone]

## 2025-07-31 NOTE — PROGRESS NOTES
Chief Complaint: Incision Check    HPI: 30 y.o.  pt is 4days s/p repeat  delivery in today for incision check. Pt reports toddler ran into her abdomen and she is concerned as to possible bleeding sec to noting a bruise over abdomen above incision. Pt also with report of rash over abdomen- reports clearing up and no longer itching.   Reports infant doing well  [place]- 0      ROS   Systemic: Not feeling tired (fatigue). No fever chills   Gastrointestinal: No nausea, vomiting, no abdominal pain. No diarrhea.  Genitourinary: No dysuria, frequency, urgency  Skin:  As above     Vitals:    25 1429   BP: 128/82       Physical Exam   Vital Signs:° Normal.  General Appearance:° Well developed. ° Well nourished.  Neurological:° No disorientation was observed.  Psychiatric: Affect: ° normal   Abdomen: Abdomen is soft, mild distension,non tender    Abdominal Incision- bandage removed easily. Incision healing  and edges well approximated, no erythema, no warmth, no drainage, no s/s infection, small amount of bleeding noted with bandage removal, 3cm bruise noted below incision but no evidence of hematoma.     Assessment/Plan  1. Post  delivery- 4 day post op and doing well, stable. Continue ambulation and continue keeping incision CDI. Pelvic rest until fu at 6 wks  Discussed wound care  RTC as scheduled for pp visit and prn

## 2025-08-01 ENCOUNTER — LACTATION ENCOUNTER (OUTPATIENT)
Dept: INTENSIVE CARE | Facility: OTHER | Age: 30
End: 2025-08-01

## 2025-08-01 ENCOUNTER — PATIENT MESSAGE (OUTPATIENT)
Dept: OBSTETRICS AND GYNECOLOGY | Facility: CLINIC | Age: 30
End: 2025-08-01
Payer: COMMERCIAL

## 2025-08-01 NOTE — LACTATION NOTE
This note was copied from a baby's chart.  NICU Lactation Discharge Note:  LC met parents at bedside; introduced self. Discussed feeding plan. Mother to breastfeed for bonding only. Parents to bottle feed full volume feed each feeding. Parents using Dr Tyson's special needs/cleft feeder. Mother voiced understanding. Mother reports pumping 8 x/day; 4 oz/pump (day 6). Mother reports breastfeeding her first baby over 13 mos. Discussed breastfeeding for nutrition once cleft palate is repaired in future. Discussed volume goals and how to manage an oversupply if needed.   Completed NICU lactation discharge teaching with good understanding verbalized by mother.  Provided mother with written handouts to reinforce verbal instructions.  Encouraged mother to participate in a breast feeding support group to facilitate meeting her breast feeding goals.  Provided mother with list of lactation community resources as well as NICU lactation contact numbers.  Pat Palomino, BSN, RNC, CLC, IBCLC

## 2025-08-04 ENCOUNTER — PATIENT MESSAGE (OUTPATIENT)
Dept: OBSTETRICS AND GYNECOLOGY | Facility: OTHER | Age: 30
End: 2025-08-04
Payer: COMMERCIAL

## 2025-08-22 ENCOUNTER — PROCEDURE VISIT (OUTPATIENT)
Dept: OBSTETRICS AND GYNECOLOGY | Facility: CLINIC | Age: 30
End: 2025-08-22
Payer: COMMERCIAL

## 2025-08-22 VITALS
WEIGHT: 127.44 LBS | DIASTOLIC BLOOD PRESSURE: 72 MMHG | BODY MASS INDEX: 24.06 KG/M2 | SYSTOLIC BLOOD PRESSURE: 108 MMHG | HEIGHT: 61 IN

## 2025-08-22 DIAGNOSIS — Z30.430 ENCOUNTER FOR INSERTION OF PARAGARD IUD: ICD-10-CM

## 2025-08-22 DIAGNOSIS — Z30.430 ENCOUNTER FOR IUD INSERTION: ICD-10-CM

## 2025-08-22 LAB
B-HCG UR QL: NEGATIVE
CTP QC/QA: YES